# Patient Record
Sex: FEMALE | Race: WHITE | Employment: FULL TIME | URBAN - METROPOLITAN AREA
[De-identification: names, ages, dates, MRNs, and addresses within clinical notes are randomized per-mention and may not be internally consistent; named-entity substitution may affect disease eponyms.]

---

## 2017-01-04 ENCOUNTER — TELEPHONE (OUTPATIENT)
Dept: FAMILY MEDICINE CLINIC | Facility: CLINIC | Age: 57
End: 2017-01-04

## 2017-01-04 DIAGNOSIS — M54.12 CERVICAL RADICULOPATHY: Primary | ICD-10-CM

## 2017-01-04 DIAGNOSIS — Q76.49 CONGENITAL CERVICAL SPINE STENOSIS: ICD-10-CM

## 2017-01-04 NOTE — TELEPHONE ENCOUNTER
Pt requesting referral for Dr Ed Ortiz Neuro, for at least 6 more visit  Surgery follow ups. Please Advise.

## 2017-01-10 ENCOUNTER — TELEPHONE (OUTPATIENT)
Dept: FAMILY MEDICINE CLINIC | Facility: CLINIC | Age: 57
End: 2017-01-10

## 2017-01-10 RX ORDER — DULAGLUTIDE 1.5 MG/.5ML
INJECTION, SOLUTION SUBCUTANEOUS
Qty: 6 ML | Refills: 5 | Status: SHIPPED | OUTPATIENT
Start: 2017-01-10 | End: 2017-07-21

## 2017-01-12 RX ORDER — BLOOD SUGAR DIAGNOSTIC
STRIP MISCELLANEOUS
Qty: 100 EACH | Refills: 11 | Status: SHIPPED | OUTPATIENT
Start: 2017-01-12 | End: 2017-01-13

## 2017-01-13 NOTE — TELEPHONE ENCOUNTER
· Diabetic supplies refilled for one year per protocol.    Recent Visits       Provider Department Primary Dx    1 month ago Flynn Nick MD [facility], [facility], Edgar Ovalles Essential hypertension with goal blood pressure less than 130/80    6 mon

## 2017-01-13 NOTE — TELEPHONE ENCOUNTER
Pt calling checking status of refill states she usually picks up 200 at a time to make this more convenient please advise.

## 2017-01-17 RX ORDER — FLUTICASONE PROPIONATE 50 MCG
SPRAY, SUSPENSION (ML) NASAL
Qty: 1 INHALER | Refills: 2 | Status: SHIPPED | OUTPATIENT
Start: 2017-01-17 | End: 2017-05-17

## 2017-01-17 NOTE — TELEPHONE ENCOUNTER
Refill Protocol Appointment Criteria  · Appointment scheduled in the past 12 months or in the next 3 months  Recent Visits       Provider Department Primary Dx    1 month ago Nadine Rivera MD [facility], [facility], [place]ita Dow[place], Edgar Essential hypertension

## 2017-01-18 ENCOUNTER — TELEPHONE (OUTPATIENT)
Dept: FAMILY MEDICINE CLINIC | Facility: CLINIC | Age: 57
End: 2017-01-18

## 2017-01-18 RX ORDER — BLOOD SUGAR DIAGNOSTIC
STRIP MISCELLANEOUS
Qty: 200 STRIP | Refills: 4 | Status: SHIPPED | OUTPATIENT
Start: 2017-01-18 | End: 2017-08-29

## 2017-01-18 NOTE — TELEPHONE ENCOUNTER
Please ask the PharmD working with us to review the patient's medications and/or help pt make an appointment with her please.

## 2017-01-19 RX ORDER — FLUTICASONE PROPIONATE 50 MCG
SPRAY, SUSPENSION (ML) NASAL
Qty: 3 BOTTLE | Refills: 0 | Status: SHIPPED | OUTPATIENT
Start: 2017-01-19 | End: 2017-08-18

## 2017-01-19 NOTE — TELEPHONE ENCOUNTER
Juanpablo Freed and Fidel Valadez,     Thank you for the referral, I have called the patient and set up an appointment to see her this Tuesday. I will route my note to you after our visit.      Sincerely,   Celeste Grey, PharmD  Clinical Pharmacist

## 2017-01-19 NOTE — TELEPHONE ENCOUNTER
Refill Protocol Appointment Criteria  · Appointment scheduled in the past 12 months or in the next 3 months  Recent Visits       Provider Department Primary Dx    1 month ago Nayeli Porter MD [facility], [facility], [place]Ed[place] clinton Essential hypertension

## 2017-01-24 ENCOUNTER — PHARMACIST (OUTPATIENT)
Dept: FAMILY MEDICINE CLINIC | Facility: CLINIC | Age: 57
End: 2017-01-24

## 2017-01-24 DIAGNOSIS — I10 ESSENTIAL HYPERTENSION: Primary | ICD-10-CM

## 2017-01-24 DIAGNOSIS — K22.719 BARRETT'S ESOPHAGUS WITH DYSPLASIA: ICD-10-CM

## 2017-01-24 RX ORDER — LANSOPRAZOLE 15 MG/1
CAPSULE, DELAYED RELEASE ORAL
Qty: 270 CAPSULE | Refills: 0 | Status: SHIPPED | OUTPATIENT
Start: 2017-01-24 | End: 2018-01-30

## 2017-01-24 RX ORDER — LOSARTAN POTASSIUM 25 MG/1
25 TABLET ORAL DAILY
Qty: 30 TABLET | Refills: 1 | Status: SHIPPED | OUTPATIENT
Start: 2017-01-24 | End: 2017-01-24

## 2017-01-26 VITALS — HEART RATE: 62 BPM | SYSTOLIC BLOOD PRESSURE: 138 MMHG | DIASTOLIC BLOOD PRESSURE: 78 MMHG

## 2017-01-26 RX ORDER — PROPRANOLOL/HYDROCHLOROTHIAZID 40 MG-25MG
1 TABLET ORAL DAILY
COMMUNITY
End: 2017-11-27 | Stop reason: ALTCHOICE

## 2017-01-26 RX ORDER — CETIRIZINE HYDROCHLORIDE 10 MG/1
10 TABLET ORAL DAILY
COMMUNITY
End: 2017-02-07

## 2017-01-26 NOTE — PROGRESS NOTES
Rufus Mackey is a 61year old patient coming in to see the clinical pharmacist for an initial medication therapy management visit. HPI: Patient represents with a copy of her medication list, SMBG log, and BP log.  She states that since starting lisinop of dosing: Dulaglutide (Trulicity) is taken every Tuesday or Wednesday.   PRN medications: Betamethasone dipropionate cream (apply twice daily), chlordiazepoxide-clidinium (take 1 capsule four times daily every month), chlorhexidine gluconate (rinse mouth w • Herpes simplex    • Allergic rhinitis    • Spinal stenosis    • TIA (transient ischemic attack)    • Diabetes mellitus ([facility] [address]    • Ovarian cyst 1980     Laparoscopic cystectomy   • DUB (dysfunctional uterine bleeding) 2005     endometrial biopsy   • Hi Current Some Day Smoker  0.25 Packs/Day  For 10.00 Years     Types: Cigarettes    Smokeless tobacco: Never Used    Comment: 1 pack/wk per pt.     Alcohol Use: Yes  0.0 oz/week    0 Standard drinks or equivalent per week         Comment: Occasionally    Drug taking differently: TAKE 1 TABLET BY MOUTH EVERY OTHER DAY) Disp: 90 tablet Rfl: 0   PROPRANOLOL HCL 60 MG Oral Tab TAKE 1 TABLET(60 MG) BY MOUTH TWICE DAILY Disp: 180 tablet Rfl: 0   Imiquimod 5 % External Cream  Disp:  Rfl: 0   diazepam (VALIUM) 5 MG Ora her blood glucose every morning and occasionally around bedtime, which she states is close to 2 hours after dinner. HYPOGLYCEMIA: Patient denies any recent hypoglycemic events and states her BG has never reaching below 80 mg/dL.  In the past, when she h ORDERED COMPLETE METABOLIC PANEL FOR PATIENT AND SENT LOSARTAN 25 MG 30 DAYS SUPPLY 1 REFILL TO [facility] IN [place]. Counseled patient regarding possible side effects of losartan and instructed her to monitor for any side effects.  Counseled patient to -Rosuvastatin is another high-intensity statin that may have less of a risk to cause myalgias due to its hydrophilic nature, may consider switching to rosuvastatin 20 mg in the future and assessing side effects with that medication.  In 7/2016, AST normal

## 2017-01-27 RX ORDER — LOSARTAN POTASSIUM 25 MG/1
TABLET ORAL
Qty: 90 TABLET | Refills: 1 | Status: SHIPPED | OUTPATIENT
Start: 2017-01-27 | End: 2019-05-30

## 2017-01-27 NOTE — TELEPHONE ENCOUNTER
Hypertensive Medications  Protocol Criteria:  · Appointment scheduled in the past 6 months or in the next 3 months  · BMP or CMP in the past 12 months  · Creatinine result < 2  Recent Visits       Provider Department Primary Dx    2 months ago Mary Carmen Esposito

## 2017-01-30 ENCOUNTER — PATIENT MESSAGE (OUTPATIENT)
Dept: PULMONOLOGY | Facility: CLINIC | Age: 57
End: 2017-01-30

## 2017-01-30 DIAGNOSIS — G47.33 OSA (OBSTRUCTIVE SLEEP APNEA): Primary | ICD-10-CM

## 2017-01-30 NOTE — TELEPHONE ENCOUNTER
From: Esperanza Quinn  To: Maria Eugenia Duarte MD  Sent: 1/30/2017 4:49 PM CST  Subject: Other    Dear Dr. Jacquelyn Poon,    Per your suggestion as of approx. 1 year ago I am now using a DreamStation CPAP Machine from [employer].   I have to have neck surgery a

## 2017-01-31 ENCOUNTER — TELEPHONE (OUTPATIENT)
Dept: INTERNAL MEDICINE CLINIC | Facility: CLINIC | Age: 57
End: 2017-01-31

## 2017-01-31 DIAGNOSIS — E11.8 TYPE 2 DIABETES MELLITUS WITH COMPLICATION, WITHOUT LONG-TERM CURRENT USE OF INSULIN (HCC): Primary | ICD-10-CM

## 2017-01-31 NOTE — TELEPHONE ENCOUNTER
Patient called to inquire which labs she will need to have obtained prior to next PharmD's follow-up appointment.  Counseled patient to have her complete metabolic panel and also hemoglobin A1c drawn since it has been 6 months since her last value (which wa

## 2017-02-01 ENCOUNTER — TELEPHONE (OUTPATIENT)
Dept: FAMILY MEDICINE CLINIC | Facility: CLINIC | Age: 57
End: 2017-02-01

## 2017-02-01 ENCOUNTER — PATIENT MESSAGE (OUTPATIENT)
Dept: FAMILY MEDICINE CLINIC | Facility: CLINIC | Age: 57
End: 2017-02-01

## 2017-02-02 RX ORDER — GLIMEPIRIDE 4 MG/1
TABLET ORAL
Qty: 90 TABLET | Refills: 0 | Status: SHIPPED | OUTPATIENT
Start: 2017-02-02 | End: 2017-05-03

## 2017-02-02 NOTE — TELEPHONE ENCOUNTER
From: Kendra Gonzalez  To: Christine Perez MD  Sent: 2/1/2017 9:16 AM CST  Subject: Other    Hi Dr. Jerel Luevano,    I scheduled cervical stenosis surgery with Dr. Bry Lisa for 2/20/2017. He tells me I need to see you to get surgery clearance.  When I called you

## 2017-02-02 NOTE — TELEPHONE ENCOUNTER
*-*-*This message has not been handled. *-*-*     Dr. Harrison Mom office has already called me and I have an appointment scheduled for next week.  Please cancel this request.     Thanks!  ----- Message -----

## 2017-02-04 ENCOUNTER — LAB ENCOUNTER (OUTPATIENT)
Dept: LAB | Age: 57
End: 2017-02-04
Attending: NEUROLOGICAL SURGERY
Payer: COMMERCIAL

## 2017-02-04 ENCOUNTER — HOSPITAL ENCOUNTER (OUTPATIENT)
Dept: GENERAL RADIOLOGY | Age: 57
Discharge: HOME | End: 2017-02-04
Attending: NEUROLOGICAL SURGERY
Payer: COMMERCIAL

## 2017-02-04 DIAGNOSIS — M43.12 SPONDYLOLISTHESIS OF CERVICAL REGION: Primary | ICD-10-CM

## 2017-02-04 DIAGNOSIS — M48.02 SPINAL STENOSIS IN CERVICAL REGION: ICD-10-CM

## 2017-02-04 DIAGNOSIS — E78.5 HYPERLIPEMIA: ICD-10-CM

## 2017-02-04 DIAGNOSIS — M47.22 CERVICAL SPONDYLOSIS WITH RADICULOPATHY: ICD-10-CM

## 2017-02-04 DIAGNOSIS — E11.9 DIABETES MELLITUS (HCC): ICD-10-CM

## 2017-02-04 DIAGNOSIS — Z01.818 PRE-OP EXAM: ICD-10-CM

## 2017-02-04 DIAGNOSIS — E11.8 TYPE 2 DIABETES MELLITUS WITH COMPLICATION, WITHOUT LONG-TERM CURRENT USE OF INSULIN (HCC): ICD-10-CM

## 2017-02-04 DIAGNOSIS — I10 ESSENTIAL HYPERTENSION, MALIGNANT: ICD-10-CM

## 2017-02-04 DIAGNOSIS — I10 ESSENTIAL HYPERTENSION: ICD-10-CM

## 2017-02-04 LAB
ALBUMIN SERPL BCP-MCNC: 3.9 G/DL (ref 3.5–4.8)
ALBUMIN/GLOB SERPL: 1.6 {RATIO} (ref 1–2)
ALP SERPL-CCNC: 64 U/L (ref 32–100)
ALT SERPL-CCNC: 51 U/L (ref 14–54)
ANION GAP SERPL CALC-SCNC: 10 MMOL/L (ref 0–18)
APTT PPP: 26.3 SECONDS (ref 23.2–35.3)
AST SERPL-CCNC: 31 U/L (ref 15–41)
BASOPHILS # BLD: 0.1 K/UL (ref 0–0.2)
BASOPHILS NFR BLD: 1 %
BILIRUB SERPL-MCNC: 0.6 MG/DL (ref 0.3–1.2)
BUN SERPL-MCNC: 7 MG/DL (ref 8–20)
BUN/CREAT SERPL: 10.6 (ref 10–20)
CALCIUM SERPL-MCNC: 9.2 MG/DL (ref 8.5–10.5)
CHLORIDE SERPL-SCNC: 104 MMOL/L (ref 95–110)
CO2 SERPL-SCNC: 23 MMOL/L (ref 22–32)
CREAT SERPL-MCNC: 0.66 MG/DL (ref 0.5–1.5)
EOSINOPHIL # BLD: 0.4 K/UL (ref 0–0.7)
EOSINOPHIL NFR BLD: 6 %
ERYTHROCYTE [DISTWIDTH] IN BLOOD BY AUTOMATED COUNT: 13.8 % (ref 11–15)
GLOBULIN PLAS-MCNC: 2.5 G/DL (ref 2.5–3.7)
GLUCOSE SERPL-MCNC: 141 MG/DL (ref 70–99)
HCT VFR BLD AUTO: 38.7 % (ref 35–48)
HGB BLD-MCNC: 12.8 G/DL (ref 12–16)
LYMPHOCYTES # BLD: 2.1 K/UL (ref 1–4)
LYMPHOCYTES NFR BLD: 30 %
MCH RBC QN AUTO: 28.4 PG (ref 27–32)
MCHC RBC AUTO-ENTMCNC: 33.1 G/DL (ref 32–37)
MCV RBC AUTO: 85.8 FL (ref 80–100)
MONOCYTES # BLD: 0.4 K/UL (ref 0–1)
MONOCYTES NFR BLD: 6 %
NEUTROPHILS # BLD AUTO: 3.8 K/UL (ref 1.8–7.7)
NEUTROPHILS NFR BLD: 57 %
OSMOLALITY UR CALC.SUM OF ELEC: 284 MOSM/KG (ref 275–295)
PLATELET # BLD AUTO: 215 K/UL (ref 140–400)
PMV BLD AUTO: 9.2 FL (ref 7.4–10.3)
POTASSIUM SERPL-SCNC: 4 MMOL/L (ref 3.3–5.1)
PROT SERPL-MCNC: 6.4 G/DL (ref 5.9–8.4)
RBC # BLD AUTO: 4.51 M/UL (ref 3.7–5.4)
SODIUM SERPL-SCNC: 137 MMOL/L (ref 136–144)
WBC # BLD AUTO: 6.8 K/UL (ref 4–11)

## 2017-02-04 PROCEDURE — 87641 MR-STAPH DNA AMP PROBE: CPT

## 2017-02-04 PROCEDURE — 71020 XR CHEST PA + LAT CHEST (CPT=71020): CPT

## 2017-02-04 PROCEDURE — 93005 ELECTROCARDIOGRAM TRACING: CPT

## 2017-02-04 PROCEDURE — 85730 THROMBOPLASTIN TIME PARTIAL: CPT

## 2017-02-04 PROCEDURE — 85025 COMPLETE CBC W/AUTO DIFF WBC: CPT

## 2017-02-04 PROCEDURE — 36415 COLL VENOUS BLD VENIPUNCTURE: CPT

## 2017-02-04 PROCEDURE — 83036 HEMOGLOBIN GLYCOSYLATED A1C: CPT

## 2017-02-04 PROCEDURE — 82306 VITAMIN D 25 HYDROXY: CPT

## 2017-02-04 PROCEDURE — 80053 COMPREHEN METABOLIC PANEL: CPT

## 2017-02-04 PROCEDURE — 93010 ELECTROCARDIOGRAM REPORT: CPT | Performed by: NEUROLOGICAL SURGERY

## 2017-02-05 LAB
HBA1C MFR BLD: 7.3 % (ref 4–6)
MRSA DNA SPEC QL NAA+PROBE: NEGATIVE

## 2017-02-06 ENCOUNTER — PATIENT MESSAGE (OUTPATIENT)
Dept: FAMILY MEDICINE CLINIC | Facility: CLINIC | Age: 57
End: 2017-02-06

## 2017-02-06 LAB — 25(OH)D3 SERPL-MCNC: 15.2 NG/ML

## 2017-02-06 NOTE — PROGRESS NOTES
Quick Note:    Tests are all stable.  Will see patient tomorrow to review. - Dr. Mitchell Herman  ______

## 2017-02-07 ENCOUNTER — OFFICE VISIT (OUTPATIENT)
Dept: FAMILY MEDICINE CLINIC | Facility: CLINIC | Age: 57
End: 2017-02-07

## 2017-02-07 VITALS
WEIGHT: 205.81 LBS | DIASTOLIC BLOOD PRESSURE: 68 MMHG | SYSTOLIC BLOOD PRESSURE: 114 MMHG | BODY MASS INDEX: 35 KG/M2 | HEART RATE: 57 BPM

## 2017-02-07 DIAGNOSIS — E11.00 UNCONTROLLED TYPE 2 DIABETES MELLITUS WITH HYPEROSMOLARITY WITHOUT COMA, WITHOUT LONG-TERM CURRENT USE OF INSULIN (HCC): Primary | ICD-10-CM

## 2017-02-07 DIAGNOSIS — M50.90 CERVICAL DISC DISEASE: ICD-10-CM

## 2017-02-07 DIAGNOSIS — Z01.818 PREOPERATIVE CLEARANCE: ICD-10-CM

## 2017-02-07 DIAGNOSIS — I10 ESSENTIAL HYPERTENSION WITH GOAL BLOOD PRESSURE LESS THAN 130/80: ICD-10-CM

## 2017-02-07 DIAGNOSIS — Z72.0 TOBACCO USE: ICD-10-CM

## 2017-02-07 PROCEDURE — 99244 OFF/OP CNSLTJ NEW/EST MOD 40: CPT | Performed by: FAMILY MEDICINE

## 2017-02-07 PROCEDURE — 99212 OFFICE O/P EST SF 10 MIN: CPT | Performed by: FAMILY MEDICINE

## 2017-02-07 RX ORDER — FLUOCINONIDE 0.5 MG/G
OINTMENT TOPICAL AS NEEDED
Refills: 0 | COMMUNITY
Start: 2017-01-11 | End: 2017-11-21

## 2017-02-07 RX ORDER — AMLODIPINE BESYLATE 5 MG/1
5 TABLET ORAL DAILY
Refills: 2 | COMMUNITY
Start: 2016-12-15 | End: 2017-02-07

## 2017-02-07 NOTE — PROGRESS NOTES
HPI:   Shaheed Almodovar is a 61year old female who presents for a complete physical exam for preoperative clearance per Dr. Neva Casey' request for C6 corpectomy, C5-7 anterior fusion.  Fax [phone]    Pt quit smoking 7 days ago and surgery date is for 20th - INTRANASALLY AS NEEDED AS DIRECTED Disp: 3 Bottle Rfl: 0   ACCU-CHEK JUAN PLUS In Vitro Strip CHECK BLOOD SUGAR TWICE DAILY Disp: 200 strip Rfl: 4   TRULICITY 1.5 HN/7.4EC Subcutaneous Solution Pen-injector INJECT 1.5 MG INTO THE SKIN EVERY 7 DAYS Disp: 6 Diagnosis Date   • Unspecified essential hypertension 2003   • Other and unspecified hyperlipidemia    • Type II or unspecified type diabetes mellitus without mention of complication, not stated as uncontrolled 2005   • Barretts esophagus    • Herpes sim Cataracts Neg       Social History:     Smoking Status: Former Smoker                   Packs/Day: 0.25  Years: 10        Types: Cigarettes      Quit date: 01/31/2017    Smokeless Status: Never Used                        Comment: 1 pack/wk per pt.     Alco time. Stable overall with recent glucose readings controlled. Managed per Dr. Timmy Langley. Will not take glimepiride evening before procedure and will give herself 12 units of Lantus morning of surgery vs nl 25 units.      2. Essential hypertension with goal bloo

## 2017-02-10 NOTE — TELEPHONE ENCOUNTER
From: Shaheed Almodovar  To: Gautam Brown MD  Sent: 2/6/2017 4:39 PM CST  Subject: Test Results Question    On Saturday 2/4/2017 I also took an EKG and a chest X-rays as well as the blood work up for my pre-op testing.    Is it possible to view those as w

## 2017-02-13 ENCOUNTER — TELEPHONE (OUTPATIENT)
Dept: FAMILY MEDICINE CLINIC | Facility: CLINIC | Age: 57
End: 2017-02-13

## 2017-02-13 RX ORDER — AZITHROMYCIN 250 MG/1
TABLET, FILM COATED ORAL
Qty: 6 TABLET | Refills: 0 | Status: SHIPPED | OUTPATIENT
Start: 2017-02-13 | End: 2017-07-10

## 2017-02-13 NOTE — TELEPHONE ENCOUNTER
Pt stts she would like Dr to send Mony Simpson at pharmacy for a cold that turned into a sinus infection.  Please advise

## 2017-02-13 NOTE — TELEPHONE ENCOUNTER
Reason for Call/Chief Complaint: Patient was seen 2/7/17 and at the time had a cold.  Was told to call back if no better - patient scheduled for surgery 2/20/17 - states now feels like sinus infection  Onset: 2/7/17  Nursing Assessment/Associated Symptoms:

## 2017-02-13 NOTE — TELEPHONE ENCOUNTER
Patient notified Rx sent in. Advised to call back or go straight to ER if s/sx worsen, questions or concerns. Patient verbalized understanding and agrees with plan.

## 2017-02-14 ENCOUNTER — PHARMACIST (OUTPATIENT)
Dept: FAMILY MEDICINE CLINIC | Facility: CLINIC | Age: 57
End: 2017-02-14

## 2017-02-14 VITALS — HEART RATE: 70 BPM | DIASTOLIC BLOOD PRESSURE: 80 MMHG | SYSTOLIC BLOOD PRESSURE: 128 MMHG

## 2017-02-14 DIAGNOSIS — E55.9 VITAMIN D DEFICIENCY: Primary | ICD-10-CM

## 2017-02-14 NOTE — PROGRESS NOTES
Viktor Pal is a 61year old patient coming in to see the clinical pharmacist for an initial medication therapy management visit. HPI: Patient represents with a copy of her medication list, SMBG log, and BP log.   Patient's lisinopril was discontinued diabetes mellitus without mention of complication, not stated as uncontrolled 2005   • Barretts esophagus    • Herpes simplex    • Allergic rhinitis    • Spinal stenosis    • TIA (transient ischemic attack)    • Diabetes mellitus ([facility] [address]    • Ovarian cyst 19 necrosis   • Macular degeneration Neg    • Cataracts Neg        SH:     Social History   Marital Status:   Spouse Name: N/A    Years of Education: N/A  Number of Children: N/A     Occupational History  None on file     Social History Main Topics   S Take 2 capsules by mouth every morning and 1 capsule every evening Disp: 270 capsule Rfl: 0   FLUTICASONE PROPIONATE 50 MCG/ACT Nasal Suspension USE 2 SPRAYS INTRANASALLY AS NEEDED AS DIRECTED Disp: 3 Bottle Rfl: 0   ACCU-CHEK JUAN PLUS In Vitro Strip CECILIO mouth 4 (four) times daily as needed. Disp: 120 tablet Rfl: 2   aspirin 81 MG Oral Tab Take 1 tablet by mouth daily.  Disp:  Rfl:        DIET:   Breakfast (7:30am) - Egg fried in avocado oil with a piece of cheese on toast, coffee with equal and coffee    S Patient's in-clinic BP today was 128/80 mmHg, HR 70.  - Counseled patient on BP goal of <140/90.     2. Diabetes Mellitus, Goal A1c<7%, FPG  mg/dL, 2-H PPG <180 mg/dL  - Uncontrolled, last A1c on 02/04/17 was 7.3%.  PPG values are generally within [employer] Patient will start cholecalciferol after her surgery. 5. Dyslipidemia  - ASCVD score has decreased from 12.5% to 4.5%, since patient quit smoking. Moderate intensity statin is recommended.  Patient is currently taking atorvastatin 40 mg every other day,

## 2017-02-20 ENCOUNTER — ANESTHESIA EVENT (OUTPATIENT)
Dept: SURGERY | Facility: HOSPITAL | Age: 57
DRG: 473 | End: 2017-02-20
Payer: COMMERCIAL

## 2017-02-20 ENCOUNTER — HOSPITAL ENCOUNTER (INPATIENT)
Facility: HOSPITAL | Age: 57
LOS: 1 days | Discharge: HOME | DRG: 473 | End: 2017-02-21
Attending: NEUROLOGICAL SURGERY | Admitting: NEUROLOGICAL SURGERY
Payer: COMMERCIAL

## 2017-02-20 ENCOUNTER — APPOINTMENT (OUTPATIENT)
Dept: GENERAL RADIOLOGY | Facility: HOSPITAL | Age: 57
DRG: 473 | End: 2017-02-20
Attending: NEUROLOGICAL SURGERY
Payer: COMMERCIAL

## 2017-02-20 ENCOUNTER — SURGERY (OUTPATIENT)
Age: 57
End: 2017-02-20

## 2017-02-20 ENCOUNTER — ANESTHESIA (OUTPATIENT)
Dept: SURGERY | Facility: HOSPITAL | Age: 57
DRG: 473 | End: 2017-02-20
Payer: COMMERCIAL

## 2017-02-20 ENCOUNTER — TELEPHONE (OUTPATIENT)
Dept: FAMILY MEDICINE CLINIC | Facility: CLINIC | Age: 57
End: 2017-02-20

## 2017-02-20 DIAGNOSIS — M43.12 SPONDYLOLISTHESIS OF CERVICAL REGION: ICD-10-CM

## 2017-02-20 DIAGNOSIS — M47.12 CERVICAL SPONDYLOSIS WITH MYELOPATHY: Primary | ICD-10-CM

## 2017-02-20 DIAGNOSIS — M48.02 CERVICAL SPINAL STENOSIS: ICD-10-CM

## 2017-02-20 PROBLEM — J44.9 COPD (CHRONIC OBSTRUCTIVE PULMONARY DISEASE) (HCC): Chronic | Status: ACTIVE | Noted: 2017-02-20

## 2017-02-20 PROBLEM — E78.5 DYSLIPIDEMIA: Chronic | Status: ACTIVE | Noted: 2017-02-20

## 2017-02-20 PROBLEM — M47.812 CERVICAL SPONDYLARTHRITIS: Status: ACTIVE | Noted: 2017-02-20

## 2017-02-20 LAB
GLUCOSE BLDC GLUCOMTR-MCNC: 136 MG/DL (ref 70–99)
GLUCOSE BLDC GLUCOMTR-MCNC: 146 MG/DL (ref 70–99)
GLUCOSE BLDC GLUCOMTR-MCNC: 160 MG/DL (ref 70–99)
GLUCOSE BLDC GLUCOMTR-MCNC: 204 MG/DL (ref 70–99)
GLUCOSE BLDC GLUCOMTR-MCNC: 205 MG/DL (ref 70–99)

## 2017-02-20 PROCEDURE — 99232 SBSQ HOSP IP/OBS MODERATE 35: CPT | Performed by: HOSPITALIST

## 2017-02-20 PROCEDURE — 76001 XR OR CERVICAL SPINE (1 VIEW) WITH >60 MIN C-ARM  (CPT=72020/76001): CPT

## 2017-02-20 PROCEDURE — 72020 X-RAY EXAM OF SPINE 1 VIEW: CPT

## 2017-02-20 PROCEDURE — 0RB30ZZ EXCISION OF CERVICAL VERTEBRAL DISC, OPEN APPROACH: ICD-10-PCS | Performed by: NEUROLOGICAL SURGERY

## 2017-02-20 PROCEDURE — 0RG20A0 FUSION OF 2 OR MORE CERVICAL VERTEBRAL JOINTS WITH INTERBODY FUSION DEVICE, ANTERIOR APPROACH, ANTERIOR COLUMN, OPEN APPROACH: ICD-10-PCS | Performed by: NEUROLOGICAL SURGERY

## 2017-02-20 DEVICE — ARCHON SCRW 4.0X17 SLF-TP VAR: Type: IMPLANTABLE DEVICE | Site: SPINE CERVICAL | Status: FUNCTIONAL

## 2017-02-20 DEVICE — X-CORE MINI 15-20 12: Type: IMPLANTABLE DEVICE | Site: SPINE CERVICAL | Status: FUNCTIONAL

## 2017-02-20 DEVICE — X-CORE MINI TI LCK SCRW ENDCAP: Type: IMPLANTABLE DEVICE | Site: SPINE CERVICAL | Status: FUNCTIONAL

## 2017-02-20 DEVICE — OSTEOCEL PRO BONE MATRIX MED: Type: IMPLANTABLE DEVICE | Site: SPINE CERVICAL | Status: FUNCTIONAL

## 2017-02-20 DEVICE — ENDCAP SPNL 17MM 14MM 7D TI: Type: IMPLANTABLE DEVICE | Site: SPINE CERVICAL | Status: FUNCTIONAL

## 2017-02-20 DEVICE — FLOSEAL SEALENT STERILE 10ML: Type: IMPLANTABLE DEVICE | Site: SPINE CERVICAL | Status: FUNCTIONAL

## 2017-02-20 DEVICE — IMPLANTABLE DEVICE: Type: IMPLANTABLE DEVICE | Site: SPINE CERVICAL | Status: FUNCTIONAL

## 2017-02-20 DEVICE — X-CORE MINI TI LCK SCRW 12: Type: IMPLANTABLE DEVICE | Site: SPINE CERVICAL | Status: FUNCTIONAL

## 2017-02-20 RX ORDER — ALBUTEROL SULFATE 90 UG/1
1 AEROSOL, METERED RESPIRATORY (INHALATION) EVERY 4 HOURS PRN
Status: DISCONTINUED | OUTPATIENT
Start: 2017-02-20 | End: 2017-02-21

## 2017-02-20 RX ORDER — ATORVASTATIN CALCIUM 40 MG/1
40 TABLET, FILM COATED ORAL
Status: DISCONTINUED | OUTPATIENT
Start: 2017-02-20 | End: 2017-02-21

## 2017-02-20 RX ORDER — LIDOCAINE HYDROCHLORIDE 10 MG/ML
INJECTION, SOLUTION EPIDURAL; INFILTRATION; INTRACAUDAL; PERINEURAL AS NEEDED
Status: DISCONTINUED | OUTPATIENT
Start: 2017-02-20 | End: 2017-02-20 | Stop reason: SURG

## 2017-02-20 RX ORDER — SENNOSIDES 8.6 MG
17.2 TABLET ORAL NIGHTLY
Status: DISCONTINUED | OUTPATIENT
Start: 2017-02-20 | End: 2017-02-21

## 2017-02-20 RX ORDER — DEXAMETHASONE SODIUM PHOSPHATE 4 MG/ML
VIAL (ML) INJECTION AS NEEDED
Status: DISCONTINUED | OUTPATIENT
Start: 2017-02-20 | End: 2017-02-20 | Stop reason: SURG

## 2017-02-20 RX ORDER — FLUTICASONE PROPIONATE 50 MCG
1 SPRAY, SUSPENSION (ML) NASAL DAILY
Status: DISCONTINUED | OUTPATIENT
Start: 2017-02-21 | End: 2017-02-21

## 2017-02-20 RX ORDER — SODIUM CHLORIDE, SODIUM LACTATE, POTASSIUM CHLORIDE, CALCIUM CHLORIDE 600; 310; 30; 20 MG/100ML; MG/100ML; MG/100ML; MG/100ML
INJECTION, SOLUTION INTRAVENOUS CONTINUOUS PRN
Status: DISCONTINUED | OUTPATIENT
Start: 2017-02-20 | End: 2017-02-20 | Stop reason: SURG

## 2017-02-20 RX ORDER — MORPHINE SULFATE 4 MG/ML
4 INJECTION, SOLUTION INTRAMUSCULAR; INTRAVENOUS EVERY 10 MIN PRN
Status: DISCONTINUED | OUTPATIENT
Start: 2017-02-20 | End: 2017-02-20 | Stop reason: HOSPADM

## 2017-02-20 RX ORDER — MORPHINE SULFATE 2 MG/ML
2 INJECTION, SOLUTION INTRAMUSCULAR; INTRAVENOUS EVERY 2 HOUR PRN
Status: DISCONTINUED | OUTPATIENT
Start: 2017-02-20 | End: 2017-02-21

## 2017-02-20 RX ORDER — HYDROCODONE BITARTRATE AND ACETAMINOPHEN 5; 325 MG/1; MG/1
1 TABLET ORAL AS NEEDED
Status: DISCONTINUED | OUTPATIENT
Start: 2017-02-20 | End: 2017-02-20 | Stop reason: HOSPADM

## 2017-02-20 RX ORDER — HALOPERIDOL 5 MG/ML
0.25 INJECTION INTRAMUSCULAR ONCE AS NEEDED
Status: DISCONTINUED | OUTPATIENT
Start: 2017-02-20 | End: 2017-02-20 | Stop reason: HOSPADM

## 2017-02-20 RX ORDER — HYDROMORPHONE HYDROCHLORIDE 1 MG/ML
0.4 INJECTION, SOLUTION INTRAMUSCULAR; INTRAVENOUS; SUBCUTANEOUS EVERY 5 MIN PRN
Status: DISCONTINUED | OUTPATIENT
Start: 2017-02-20 | End: 2017-02-20 | Stop reason: HOSPADM

## 2017-02-20 RX ORDER — MORPHINE SULFATE 2 MG/ML
0.5 INJECTION, SOLUTION INTRAMUSCULAR; INTRAVENOUS EVERY 2 HOUR PRN
Status: DISCONTINUED | OUTPATIENT
Start: 2017-02-20 | End: 2017-02-21

## 2017-02-20 RX ORDER — HYDROMORPHONE HYDROCHLORIDE 1 MG/ML
INJECTION, SOLUTION INTRAMUSCULAR; INTRAVENOUS; SUBCUTANEOUS AS NEEDED
Status: DISCONTINUED | OUTPATIENT
Start: 2017-02-20 | End: 2017-02-20 | Stop reason: SURG

## 2017-02-20 RX ORDER — AMLODIPINE BESYLATE 5 MG/1
5 TABLET ORAL DAILY
Status: DISCONTINUED | OUTPATIENT
Start: 2017-02-21 | End: 2017-02-21

## 2017-02-20 RX ORDER — NALOXONE HYDROCHLORIDE 0.4 MG/ML
80 INJECTION, SOLUTION INTRAMUSCULAR; INTRAVENOUS; SUBCUTANEOUS AS NEEDED
Status: DISCONTINUED | OUTPATIENT
Start: 2017-02-20 | End: 2017-02-20 | Stop reason: HOSPADM

## 2017-02-20 RX ORDER — GLYCOPYRROLATE 0.2 MG/ML
INJECTION INTRAMUSCULAR; INTRAVENOUS AS NEEDED
Status: DISCONTINUED | OUTPATIENT
Start: 2017-02-20 | End: 2017-02-20 | Stop reason: SURG

## 2017-02-20 RX ORDER — MORPHINE SULFATE 2 MG/ML
1 INJECTION, SOLUTION INTRAMUSCULAR; INTRAVENOUS EVERY 2 HOUR PRN
Status: DISCONTINUED | OUTPATIENT
Start: 2017-02-20 | End: 2017-02-21

## 2017-02-20 RX ORDER — MIDAZOLAM HYDROCHLORIDE 1 MG/ML
INJECTION INTRAMUSCULAR; INTRAVENOUS AS NEEDED
Status: DISCONTINUED | OUTPATIENT
Start: 2017-02-20 | End: 2017-02-20 | Stop reason: SURG

## 2017-02-20 RX ORDER — DEXTROSE MONOHYDRATE 25 G/50ML
50 INJECTION, SOLUTION INTRAVENOUS AS NEEDED
Status: DISCONTINUED | OUTPATIENT
Start: 2017-02-20 | End: 2017-02-21

## 2017-02-20 RX ORDER — DIPHENHYDRAMINE HCL 25 MG
25 CAPSULE ORAL EVERY 4 HOURS PRN
Status: DISCONTINUED | OUTPATIENT
Start: 2017-02-20 | End: 2017-02-21

## 2017-02-20 RX ORDER — DICYCLOMINE HCL 20 MG
20 TABLET ORAL EVERY 4 HOURS PRN
Status: DISCONTINUED | OUTPATIENT
Start: 2017-02-20 | End: 2017-02-21

## 2017-02-20 RX ORDER — HYDROCODONE BITARTRATE AND ACETAMINOPHEN 5; 325 MG/1; MG/1
2 TABLET ORAL AS NEEDED
Status: DISCONTINUED | OUTPATIENT
Start: 2017-02-20 | End: 2017-02-20 | Stop reason: HOSPADM

## 2017-02-20 RX ORDER — MORPHINE SULFATE 2 MG/ML
2 INJECTION, SOLUTION INTRAMUSCULAR; INTRAVENOUS EVERY 10 MIN PRN
Status: DISCONTINUED | OUTPATIENT
Start: 2017-02-20 | End: 2017-02-20 | Stop reason: HOSPADM

## 2017-02-20 RX ORDER — BUPIVACAINE HYDROCHLORIDE AND EPINEPHRINE 2.5; 5 MG/ML; UG/ML
INJECTION, SOLUTION INFILTRATION; PERINEURAL AS NEEDED
Status: DISCONTINUED | OUTPATIENT
Start: 2017-02-20 | End: 2017-02-20 | Stop reason: HOSPADM

## 2017-02-20 RX ORDER — SODIUM CHLORIDE 9 MG/ML
INJECTION, SOLUTION INTRAVENOUS CONTINUOUS PRN
Status: DISCONTINUED | OUTPATIENT
Start: 2017-02-20 | End: 2017-02-20 | Stop reason: SURG

## 2017-02-20 RX ORDER — METOCLOPRAMIDE 10 MG/1
10 TABLET ORAL ONCE
Status: COMPLETED | OUTPATIENT
Start: 2017-02-20 | End: 2017-02-20

## 2017-02-20 RX ORDER — SODIUM CHLORIDE, SODIUM LACTATE, POTASSIUM CHLORIDE, CALCIUM CHLORIDE 600; 310; 30; 20 MG/100ML; MG/100ML; MG/100ML; MG/100ML
INJECTION, SOLUTION INTRAVENOUS CONTINUOUS
Status: DISCONTINUED | OUTPATIENT
Start: 2017-02-20 | End: 2017-02-21

## 2017-02-20 RX ORDER — SUCCINYLCHOLINE CHLORIDE 20 MG/ML
INJECTION INTRAMUSCULAR; INTRAVENOUS AS NEEDED
Status: DISCONTINUED | OUTPATIENT
Start: 2017-02-20 | End: 2017-02-20 | Stop reason: SURG

## 2017-02-20 RX ORDER — ACETAMINOPHEN 325 MG/1
650 TABLET ORAL ONCE
Status: COMPLETED | OUTPATIENT
Start: 2017-02-20 | End: 2017-02-20

## 2017-02-20 RX ORDER — HYDROCODONE BITARTRATE AND ACETAMINOPHEN 10; 325 MG/1; MG/1
1 TABLET ORAL EVERY 4 HOURS PRN
Status: DISCONTINUED | OUTPATIENT
Start: 2017-02-20 | End: 2017-02-21

## 2017-02-20 RX ORDER — GLIMEPIRIDE 4 MG/1
4 TABLET ORAL
Status: DISCONTINUED | OUTPATIENT
Start: 2017-02-21 | End: 2017-02-21

## 2017-02-20 RX ORDER — ONDANSETRON 2 MG/ML
4 INJECTION INTRAMUSCULAR; INTRAVENOUS EVERY 4 HOURS PRN
Status: DISCONTINUED | OUTPATIENT
Start: 2017-02-20 | End: 2017-02-21

## 2017-02-20 RX ORDER — ROCURONIUM BROMIDE 10 MG/ML
INJECTION, SOLUTION INTRAVENOUS AS NEEDED
Status: DISCONTINUED | OUTPATIENT
Start: 2017-02-20 | End: 2017-02-20 | Stop reason: SURG

## 2017-02-20 RX ORDER — ONDANSETRON 2 MG/ML
INJECTION INTRAMUSCULAR; INTRAVENOUS AS NEEDED
Status: DISCONTINUED | OUTPATIENT
Start: 2017-02-20 | End: 2017-02-20 | Stop reason: SURG

## 2017-02-20 RX ORDER — FAMOTIDINE 20 MG/1
20 TABLET ORAL ONCE
Status: DISCONTINUED | OUTPATIENT
Start: 2017-02-20 | End: 2017-02-20 | Stop reason: HOSPADM

## 2017-02-20 RX ORDER — PANTOPRAZOLE SODIUM 20 MG/1
20 TABLET, DELAYED RELEASE ORAL
Status: DISCONTINUED | OUTPATIENT
Start: 2017-02-21 | End: 2017-02-21

## 2017-02-20 RX ORDER — HYDROCODONE BITARTRATE AND ACETAMINOPHEN 10; 325 MG/1; MG/1
2 TABLET ORAL EVERY 4 HOURS PRN
Status: DISCONTINUED | OUTPATIENT
Start: 2017-02-20 | End: 2017-02-21

## 2017-02-20 RX ORDER — ACETAMINOPHEN 325 MG/1
650 TABLET ORAL EVERY 4 HOURS PRN
Status: DISCONTINUED | OUTPATIENT
Start: 2017-02-20 | End: 2017-02-21

## 2017-02-20 RX ORDER — CETIRIZINE HYDROCHLORIDE 10 MG/1
10 TABLET ORAL DAILY
COMMUNITY

## 2017-02-20 RX ORDER — HYDROMORPHONE HYDROCHLORIDE 1 MG/ML
0.6 INJECTION, SOLUTION INTRAMUSCULAR; INTRAVENOUS; SUBCUTANEOUS EVERY 5 MIN PRN
Status: DISCONTINUED | OUTPATIENT
Start: 2017-02-20 | End: 2017-02-20 | Stop reason: HOSPADM

## 2017-02-20 RX ORDER — MORPHINE SULFATE 10 MG/ML
6 INJECTION, SOLUTION INTRAMUSCULAR; INTRAVENOUS EVERY 10 MIN PRN
Status: DISCONTINUED | OUTPATIENT
Start: 2017-02-20 | End: 2017-02-20 | Stop reason: HOSPADM

## 2017-02-20 RX ORDER — LIDOCAINE HYDROCHLORIDE 40 MG/ML
SOLUTION TOPICAL AS NEEDED
Status: DISCONTINUED | OUTPATIENT
Start: 2017-02-20 | End: 2017-02-20 | Stop reason: SURG

## 2017-02-20 RX ORDER — LOSARTAN POTASSIUM 25 MG/1
25 TABLET ORAL DAILY
Status: DISCONTINUED | OUTPATIENT
Start: 2017-02-21 | End: 2017-02-21

## 2017-02-20 RX ORDER — HYDROMORPHONE HYDROCHLORIDE 1 MG/ML
0.2 INJECTION, SOLUTION INTRAMUSCULAR; INTRAVENOUS; SUBCUTANEOUS EVERY 5 MIN PRN
Status: DISCONTINUED | OUTPATIENT
Start: 2017-02-20 | End: 2017-02-20 | Stop reason: HOSPADM

## 2017-02-20 RX ORDER — DIPHENHYDRAMINE HYDROCHLORIDE 50 MG/ML
25 INJECTION INTRAMUSCULAR; INTRAVENOUS EVERY 4 HOURS PRN
Status: DISCONTINUED | OUTPATIENT
Start: 2017-02-20 | End: 2017-02-21

## 2017-02-20 RX ORDER — CETIRIZINE HYDROCHLORIDE 10 MG/1
10 TABLET ORAL DAILY
Status: DISCONTINUED | OUTPATIENT
Start: 2017-02-20 | End: 2017-02-21

## 2017-02-20 RX ORDER — CYCLOBENZAPRINE HCL 10 MG
10 TABLET ORAL EVERY 8 HOURS PRN
Status: DISCONTINUED | OUTPATIENT
Start: 2017-02-20 | End: 2017-02-21

## 2017-02-20 RX ORDER — ONDANSETRON 2 MG/ML
4 INJECTION INTRAMUSCULAR; INTRAVENOUS ONCE AS NEEDED
Status: DISCONTINUED | OUTPATIENT
Start: 2017-02-20 | End: 2017-02-20 | Stop reason: HOSPADM

## 2017-02-20 RX ORDER — SODIUM CHLORIDE 9 MG/ML
INJECTION, SOLUTION INTRAVENOUS CONTINUOUS
Status: DISCONTINUED | OUTPATIENT
Start: 2017-02-20 | End: 2017-02-21

## 2017-02-20 RX ORDER — DIAZEPAM 5 MG/1
5 TABLET ORAL EVERY 8 HOURS PRN
Status: DISCONTINUED | OUTPATIENT
Start: 2017-02-20 | End: 2017-02-21

## 2017-02-20 RX ADMIN — HYDROMORPHONE HYDROCHLORIDE 0.2 MG: 1 INJECTION, SOLUTION INTRAMUSCULAR; INTRAVENOUS; SUBCUTANEOUS at 12:55:00

## 2017-02-20 RX ADMIN — SODIUM CHLORIDE: 9 INJECTION, SOLUTION INTRAVENOUS at 10:42:00

## 2017-02-20 RX ADMIN — SUCCINYLCHOLINE CHLORIDE 120 MG: 20 INJECTION INTRAMUSCULAR; INTRAVENOUS at 10:40:00

## 2017-02-20 RX ADMIN — MIDAZOLAM HYDROCHLORIDE 2 MG: 1 INJECTION INTRAMUSCULAR; INTRAVENOUS at 10:33:00

## 2017-02-20 RX ADMIN — LIDOCAINE HYDROCHLORIDE 4 ML: 40 SOLUTION TOPICAL at 10:40:00

## 2017-02-20 RX ADMIN — GLYCOPYRROLATE 0.2 MG: 0.2 INJECTION INTRAMUSCULAR; INTRAVENOUS at 10:39:00

## 2017-02-20 RX ADMIN — DEXAMETHASONE SODIUM PHOSPHATE 4 MG: 4 MG/ML VIAL (ML) INJECTION at 10:45:00

## 2017-02-20 RX ADMIN — HYDROMORPHONE HYDROCHLORIDE 0.3 MG: 1 INJECTION, SOLUTION INTRAMUSCULAR; INTRAVENOUS; SUBCUTANEOUS at 12:50:00

## 2017-02-20 RX ADMIN — SODIUM CHLORIDE, SODIUM LACTATE, POTASSIUM CHLORIDE, CALCIUM CHLORIDE: 600; 310; 30; 20 INJECTION, SOLUTION INTRAVENOUS at 12:40:00

## 2017-02-20 RX ADMIN — ROCURONIUM BROMIDE 10 MG: 10 INJECTION, SOLUTION INTRAVENOUS at 10:39:00

## 2017-02-20 RX ADMIN — SODIUM CHLORIDE, SODIUM LACTATE, POTASSIUM CHLORIDE, CALCIUM CHLORIDE: 600; 310; 30; 20 INJECTION, SOLUTION INTRAVENOUS at 10:33:00

## 2017-02-20 RX ADMIN — LIDOCAINE HYDROCHLORIDE 50 MG: 10 INJECTION, SOLUTION EPIDURAL; INFILTRATION; INTRACAUDAL; PERINEURAL at 10:39:00

## 2017-02-20 RX ADMIN — HYDROMORPHONE HYDROCHLORIDE 0.5 MG: 1 INJECTION, SOLUTION INTRAMUSCULAR; INTRAVENOUS; SUBCUTANEOUS at 11:15:00

## 2017-02-20 RX ADMIN — ONDANSETRON 4 MG: 2 INJECTION INTRAMUSCULAR; INTRAVENOUS at 10:45:00

## 2017-02-20 NOTE — OPERATIVE REPORT
[facility]    PATIENT'S NAME: Lillian Toro   ATTENDING PHYSICIAN: Tracy Portillo MD   OPERATING PHYSICIAN: Amanda Franklin MD   PATIENT ACCOUNT#:   [id number]    LOCATION:  [facility] [address] PACU 11 [facility] 10  MEDICAL RECORD #:   [id number]       DATE OF LY the problem. We discussed benefits and risks of the proposed procedure. This discussion was documented in the chart. A consent was signed. We proceeded to the operating room on 02/20/2017.     PROCEDURE:  The patient was brought to the operating room, p and C6-7, and subsequently we performed our standard discectomies using straight curettes, up-going curettes, and Kerrisons.   Once we had delineated the C6 vertebral body, we used the high-speed bur to drill troughs laterally down through the cortex into t treated the pinholes with bone wax. The anterior osteophytes were resected with a large Leksell and the anterior vertebral column was prepared for plate placement.   Once that was adequately done, we chose our proper sized plate and drilled our holes in C5

## 2017-02-20 NOTE — PLAN OF CARE
NEUROLOGICAL SURGERY & SPINE SURGERY    2/20/2017  9:50 AM    PRE-OPERATIVE CONSULTATION    Yazmin Johnson was again informed of the nature of the problem, planned treatment, indications and alternatives.   We again reviewed the expected benefits

## 2017-02-20 NOTE — PROGRESS NOTES
[facility]    Progress Note    Nathaniel Ma Patient Status:  Inpatient    10/15/1960 MRN [id number]   Location [facility] 4W/SW/SE Attending Tania Carballo MD   Hosp Day # 0 PCP Chares Kehr, MD     Subjective:     Jennifer Joaquin II diabetes mellitus ([facility] [address]  CONT HOME MEDS, MONITOR ACCU CHECKS. Essential hypertension  CONT HOME MEDS, MONITOR. Dyslipidemia  CONT HOME MEDS.          Results:     Lab Results  Component Value Date   WBC 6.8 02/04/2017   HGB 12.8 02/04/2017

## 2017-02-20 NOTE — ANESTHESIA PREPROCEDURE EVALUATION
Anesthesia PreOp Note    HPI:     Shaheed Almodovar is a 61year old female who presents for preoperative consultation requested by: Obey Allen MD    Date of Surgery: 2/20/2017    Procedure(s):  ANTERIOR CERVICAL FUSION BG & INST 1 LEVEL  Indication: cervic Back pain         Date Noted: 11/13/2014      Sciatica         Date Noted: 11/13/2014      Herpes         Date Noted: 11/13/2014      Muscle pain         Date Noted: 11/13/2014      Dermatitis         Date Noted: 10/07/2014      Essential hypertension 7/28/2015   • Age-related nuclear cataract of both eyes 1/27/2015   • MGD (meibomian gland dysfunction) 7/28/2015   • Allergic rhinitis      pereniaal and seasonal   • Sleep apnea    • High blood pressure    • High cholesterol    • COPD (chronic obstructiv Past Week at Unknown time   TRULICITY 1.5 RQ/7.3ON Subcutaneous Solution Pen-injector INJECT 1.5 MG INTO THE SKIN EVERY 7 DAYS Disp: 6 mL Rfl: 5 Past Week at Unknown time   ATORVASTATIN CALCIUM 40 MG Oral Tab TAKE 1 TABLET BY MOUTH ONCE DAILY (Patient christinafabiola than a month at Unknown time   TEMAZEPAM 30 MG Oral Cap TAKE ONE CAPSULE BY MOUTH NIGHTLY AS NEEDED FOR SLEEP Disp: 90 capsule Rfl: 1 More than a month at Unknown time   BETAMETHASONE DIPROPIONATE 0.05 % External Cream APPLY TO AFFECTED SKIN TWICE DAILY Shona Maynard Age of Onset   • Colon Cancer Mother    • Heart Disease Mother      coronary artery disease   • Hypertension Mother    • Heart Disease Father      coronary artery disease   • Diabetes Father    • Glaucoma Sister    • Other[other] Pamila Meadow Sister      Vascul 02/08/17  1837 02/20/17  0840   BP:  127/71   Pulse:  66   Temp:  98.4 °F (36.9 °C)   TempSrc:  Oral   Resp:  16   Height: 1.626 m (5' 4\")    Weight: 90.719 kg (200 lb) 90.719 kg (200 lb)   SpO2:  96%        Anesthesia ROS/Med Hx and Physical Exam     Mary Barone

## 2017-02-20 NOTE — INTERVAL H&P NOTE
Pre-op Diagnosis: cervical spondylolithesis    The above referenced H&P was reviewed by Lee Hudson MD on 2/20/2017, the patient was examined and no significant changes have occurred in the patient's condition since the H&P was performed.   I discussed wit

## 2017-02-20 NOTE — ANESTHESIA POSTPROCEDURE EVALUATION
Patient: Ryland Dixon    Procedure Summary     Date Anesthesia Start Anesthesia Stop Room / Location    02/20/17 1033  [facility] OR  / [facility] OR       Procedure Diagnosis Surgeon Responsible Provider    ANTERIOR CERVICAL FUSION BG & INST 1 LEVEL (N/A

## 2017-02-21 ENCOUNTER — TELEPHONE (OUTPATIENT)
Dept: ENDOCRINOLOGY CLINIC | Facility: CLINIC | Age: 57
End: 2017-02-21

## 2017-02-21 VITALS
SYSTOLIC BLOOD PRESSURE: 145 MMHG | TEMPERATURE: 98 F | WEIGHT: 200 LBS | RESPIRATION RATE: 20 BRPM | HEIGHT: 64 IN | OXYGEN SATURATION: 94 % | BODY MASS INDEX: 34.15 KG/M2 | HEART RATE: 64 BPM | DIASTOLIC BLOOD PRESSURE: 70 MMHG

## 2017-02-21 LAB
ANION GAP SERPL CALC-SCNC: 8 MMOL/L (ref 0–18)
BASOPHILS # BLD: 0 K/UL (ref 0–0.2)
BASOPHILS NFR BLD: 0 %
BUN SERPL-MCNC: 7 MG/DL (ref 8–20)
BUN/CREAT SERPL: 11.3 (ref 10–20)
CALCIUM SERPL-MCNC: 8.8 MG/DL (ref 8.5–10.5)
CHLORIDE SERPL-SCNC: 105 MMOL/L (ref 95–110)
CO2 SERPL-SCNC: 26 MMOL/L (ref 22–32)
CREAT SERPL-MCNC: 0.62 MG/DL (ref 0.5–1.5)
EOSINOPHIL # BLD: 0 K/UL (ref 0–0.7)
EOSINOPHIL NFR BLD: 0 %
ERYTHROCYTE [DISTWIDTH] IN BLOOD BY AUTOMATED COUNT: 13.4 % (ref 11–15)
GLUCOSE BLDC GLUCOMTR-MCNC: 142 MG/DL (ref 70–99)
GLUCOSE SERPL-MCNC: 175 MG/DL (ref 70–99)
HCT VFR BLD AUTO: 35.8 % (ref 35–48)
HGB BLD-MCNC: 11.7 G/DL (ref 12–16)
LYMPHOCYTES # BLD: 1.4 K/UL (ref 1–4)
LYMPHOCYTES NFR BLD: 12 %
MCH RBC QN AUTO: 28.1 PG (ref 27–32)
MCHC RBC AUTO-ENTMCNC: 32.7 G/DL (ref 32–37)
MCV RBC AUTO: 86 FL (ref 80–100)
MONOCYTES # BLD: 0.7 K/UL (ref 0–1)
MONOCYTES NFR BLD: 6 %
NEUTROPHILS # BLD AUTO: 9.3 K/UL (ref 1.8–7.7)
NEUTROPHILS NFR BLD: 82 %
OSMOLALITY UR CALC.SUM OF ELEC: 290 MOSM/KG (ref 275–295)
PLATELET # BLD AUTO: 287 K/UL (ref 140–400)
PMV BLD AUTO: 8.2 FL (ref 7.4–10.3)
POTASSIUM SERPL-SCNC: 3.9 MMOL/L (ref 3.3–5.1)
RBC # BLD AUTO: 4.16 M/UL (ref 3.7–5.4)
SODIUM SERPL-SCNC: 139 MMOL/L (ref 136–144)
WBC # BLD AUTO: 11.4 K/UL (ref 4–11)

## 2017-02-21 PROCEDURE — 99239 HOSP IP/OBS DSCHRG MGMT >30: CPT | Performed by: HOSPITALIST

## 2017-02-21 RX ORDER — HYDROCODONE BITARTRATE AND ACETAMINOPHEN 10; 325 MG/1; MG/1
1 TABLET ORAL EVERY 6 HOURS PRN
Qty: 60 TABLET | Refills: 0 | Status: SHIPPED
Start: 2017-02-21 | End: 2017-07-10

## 2017-02-21 RX ORDER — METHOCARBAMOL 750 MG/1
750 TABLET, FILM COATED ORAL 3 TIMES DAILY PRN
Qty: 90 TABLET | Refills: 0 | Status: SHIPPED
Start: 2017-02-21 | End: 2017-08-28 | Stop reason: ALTCHOICE

## 2017-02-21 NOTE — PLAN OF CARE
Impaired Functional Mobility    • Achieve highest/safest level of mobility/gait Progressing        PAIN - ADULT    • Verbalizes/displays adequate comfort level or patient's stated pain goal Progressing        Patient/Family Goals    • Patient/Family Long T

## 2017-02-21 NOTE — OCCUPATIONAL THERAPY NOTE
OCCUPATIONAL THERAPY EVALUATION - INPATIENT      Room Number: 408/408-A  Evaluation Date: 2/21/2017  Type of Evaluation: Initial  Presenting Problem:  (s/p ACDF)    Physician Order: IP Consult to Occupational Therapy  Reason for Therapy: ADL/IADL Dysfuncti C5-6, C6-7, physical therapy   • Diabetes mellitus type 2 with complications ([facility] 7/55/1659   • Diabetic retinopathy of left eye ([facility] 1/27/2015   • Choroidal nevus, right eye 1/27/2015   • Alternating exotropia 1/27/2015   • Atrial tachycardia, paroxysma limits    RANGE OF MOTION   Upper extremity ROM is within functional limits     STRENGTH ASSESSMENT  Upper extremity strength is within functional limits     ACTIVITIES OF DAILY LIVING ASSESSMENT  AM-PAC ‘6-Clicks’ Inpatient Daily Activity Short Form  How

## 2017-02-21 NOTE — DISCHARGE SUMMARY
[facility]    Discharge Summary    Sterling Body Patient Status:  Inpatient    10/15/1960 MRN [id number]   Location [facility] 4W/SW/SE Attending No att. providers found   [facility] Day # 1 PCP July Huerta MD     Date of Admiss Notes to Patient:  TO PREVENT CONSTIPATION        Take 1 tablet (17.2 mg total) by mouth nightly.     Stop taking on:  3/23/2017   Quantity:  120 tablet   Refills:  0         CHANGE how you take these medications       Instructions Prescription details PEN        USE AS DIRECTED DAILY    Quantity:  100 each   Refills:  3       betamethasone dipropionate 0.05 % Crea   Commonly known as:  DIPROSONE        APPLY TO AFFECTED SKIN TWICE DAILY    Quantity:  45 g   Refills:  1       cetirizine 10 MG Tabs   Last Insulin Glargine 100 UNIT/ML Sopn   Commonly known as:  TANIYA PATEL   Notes to Patient:  RESUME HOME SCHEDULE        INJECT 20 UNITS SUBCUTANEOUS DAILY    Quantity:  30 mL   Refills:  1       Lansoprazole 15 MG Cpdr   Commonly known as:  PREVACID   Not re-check          Other Discharge Instructions: none  >30 MIN SPENT ON THIS DC   EAGLE MIRELES  2/21/2017  2:48 PM

## 2017-02-21 NOTE — PLAN OF CARE
Diabetes/Glucose Control    • Glucose maintained within prescribed range Adequate for Discharge    GLUCOSE LEVELS CONTROLED    Impaired Functional Mobility    • Achieve highest/safest level of mobility/gait Adequate for Discharge        PAIN - ADULT    • V

## 2017-02-21 NOTE — PHYSICAL THERAPY NOTE
PHYSICAL THERAPY EVALUATION - INPATIENT     Room Number: 408/408-A  Evaluation Date: 2/21/2017  Type of Evaluation: Initial  Physician Order: PT Eval and Treat    Presenting Problem: cervical spondylosis with myelopathy    pt is s/p  ACF C5 7   s/p C 6 Dry eyes 7/28/2015   • Age-related nuclear cataract of both eyes 1/27/2015   • MGD (meibomian gland dysfunction) 7/28/2015   • Allergic rhinitis      pereniaal and seasonal   • Sleep apnea    • High blood pressure    • High cholesterol    • COPD (chronic o promotion; Body mechanics;Breathing techniques;Relaxation;Repositioning    COGNITION  ·  WFL     RANGE OF MOTION AND STRENGTH ASSESSMENT   UE  See OT eval       Lower extremity ROM is within functional limits    Lower extremity strength is within functional CGA for safety cues for safety and proper sequencing    no family present for training  per pt spouse will be picking her outside later today and will not be coming in later for training      Skilled Therapy Provided: chart reviewed     RN approve particip function. Presently for a d/c to home would recommend need for continued  24 hr assisted mobility and care and need for a RW  . Per pt spouse will provide this care at d/c. No family training completed as they are unavailable.   Pt denies concerns for

## 2017-02-21 NOTE — TELEPHONE ENCOUNTER
Nick Bradford from 4   Calling to advise pt had neck surgery and will not be mobile for a while, and pt requested to call and inform  that is why she will not be coming in soon.

## 2017-02-21 NOTE — DISCHARGE SUMMARY
DISCHARGE INSTRUCTIONS PER DR. Childers [facility]    Wound Care:   No dressing on the incision necessary. Dab dry the incision after shower/cleaning. No tub baths until first post-op follow-up. Leave drain dressing until the next day.     May remove then and show

## 2017-02-21 NOTE — CM/SW NOTE
CTL/Rounds: Spoke with patient at bedside regarding discharge planning. Patient states that she is independent with ADLs, working and driving. Patient's  has taken time off work to assist her at home.  Per patient she will see surgeon in two weeks an

## 2017-02-22 ENCOUNTER — TELEPHONE (OUTPATIENT)
Dept: INTERNAL MEDICINE UNIT | Facility: HOSPITAL | Age: 57
End: 2017-02-22

## 2017-02-22 ENCOUNTER — TELEPHONE (OUTPATIENT)
Dept: MEDSURG UNIT | Facility: HOSPITAL | Age: 57
End: 2017-02-22

## 2017-02-22 NOTE — TELEPHONE ENCOUNTER
Patient discharged from [facility] on February 21, 2017. Please call patient to schedule hospital follow-up appointment with PCP, Dr. Susannah Herrera.

## 2017-03-01 ENCOUNTER — TELEPHONE (OUTPATIENT)
Dept: FAMILY MEDICINE CLINIC | Facility: CLINIC | Age: 57
End: 2017-03-01

## 2017-03-01 DIAGNOSIS — Z98.1 S/P CERVICAL SPINAL FUSION: Primary | ICD-10-CM

## 2017-03-01 NOTE — TELEPHONE ENCOUNTER
Pt requesting PT for after neck surgery  Would like to go to [facility], [address], [place], [place] ph# [phone]  Would like RN to call her back to discuss

## 2017-03-02 NOTE — TELEPHONE ENCOUNTER
Sourav Bradshaw My chart message     I need post op physical therapy for C6 corpectomy and C5-7 anterior fusion on 2/20/2017.   I cannot drive so I would like to go to the PT office closest to my home. Tanja Right you please give me a referral for [facility] at

## 2017-03-03 NOTE — TELEPHONE ENCOUNTER
There is a my chart communication on this. She can go to [facility] locations or the hospital locations but not [facility] - not in network.  Approved referral for [facility]

## 2017-03-21 RX ORDER — PROPRANOLOL HYDROCHLORIDE 60 MG/1
TABLET ORAL
Qty: 180 TABLET | Refills: 4 | Status: SHIPPED | OUTPATIENT
Start: 2017-03-21 | End: 2017-12-02

## 2017-04-06 ENCOUNTER — TELEPHONE (OUTPATIENT)
Dept: FAMILY MEDICINE CLINIC | Facility: CLINIC | Age: 57
End: 2017-04-06

## 2017-04-06 NOTE — TELEPHONE ENCOUNTER
[facility] stating that pt will need a new referral for PT/ Reason:F/U. Pt will need 12 more visit. Fax:[phone].

## 2017-04-10 ENCOUNTER — PATIENT MESSAGE (OUTPATIENT)
Dept: FAMILY MEDICINE CLINIC | Facility: CLINIC | Age: 57
End: 2017-04-10

## 2017-04-10 DIAGNOSIS — M43.12 SPONDYLOLISTHESIS OF CERVICAL REGION: Primary | ICD-10-CM

## 2017-04-11 NOTE — TELEPHONE ENCOUNTER
[facility] is calling on the status of this referral. They were informed that it was approved by  but is still awaiting insurance approval. [facility] will call back later this week to check the status

## 2017-04-13 RX ORDER — INSULIN GLARGINE 100 [IU]/ML
INJECTION, SOLUTION SUBCUTANEOUS
Qty: 30 ML | Refills: 0 | Status: SHIPPED | OUTPATIENT
Start: 2017-04-13 | End: 2017-07-21

## 2017-04-13 NOTE — TELEPHONE ENCOUNTER
LOV 7/2016. Letter sent in February reminding patient to schedule follow up. LM with patient today asking her to call and schedule.

## 2017-04-20 ENCOUNTER — TELEPHONE (OUTPATIENT)
Dept: PULMONOLOGY | Facility: CLINIC | Age: 57
End: 2017-04-20

## 2017-04-20 DIAGNOSIS — G47.33 OSA (OBSTRUCTIVE SLEEP APNEA): Primary | ICD-10-CM

## 2017-04-20 NOTE — TELEPHONE ENCOUNTER
Please clarify what a referral request is, do you mean place an order for new CPAP supplies? Thank You.

## 2017-05-03 RX ORDER — GLIMEPIRIDE 4 MG/1
TABLET ORAL
Qty: 90 TABLET | Refills: 0 | Status: SHIPPED | OUTPATIENT
Start: 2017-05-03 | End: 2017-07-21

## 2017-05-16 ENCOUNTER — TELEPHONE (OUTPATIENT)
Dept: FAMILY MEDICINE CLINIC | Facility: CLINIC | Age: 57
End: 2017-05-16

## 2017-05-16 DIAGNOSIS — M54.12 BRACHIAL NEURITIS: Primary | ICD-10-CM

## 2017-05-16 DIAGNOSIS — M47.12 CERVICAL SPONDYLOSIS WITH MYELOPATHY: ICD-10-CM

## 2017-05-16 NOTE — TELEPHONE ENCOUNTER
[facility] NINA STEINER from [facility] physical therapy is requesting referral for future additional visits.     Fax # [phone]

## 2017-05-18 RX ORDER — FLUTICASONE PROPIONATE 50 MCG
SPRAY, SUSPENSION (ML) NASAL
Qty: 3 INHALER | Refills: 0 | Status: SHIPPED | OUTPATIENT
Start: 2017-05-18 | End: 2017-08-16

## 2017-05-18 NOTE — TELEPHONE ENCOUNTER
Refill Protocol Appointment Criteria; PLEASE ADVISE ON ALLERGY ALERT. THANKS.   · Appointment scheduled in the past 12 months or in the next 3 months  Recent Visits       Provider Department Primary Dx    3 months ago Jose Chappell MD [facility], [facility], [facility]

## 2017-05-20 ENCOUNTER — HOSPITAL ENCOUNTER (OUTPATIENT)
Dept: GENERAL RADIOLOGY | Age: 57
Discharge: HOME | End: 2017-05-20
Attending: NEUROLOGICAL SURGERY
Payer: COMMERCIAL

## 2017-05-20 DIAGNOSIS — M43.12 SPONDYLOLISTHESIS OF CERVICAL REGION: ICD-10-CM

## 2017-05-20 DIAGNOSIS — M47.22 CERVICAL SPONDYLOSIS WITH RADICULOPATHY: ICD-10-CM

## 2017-05-20 DIAGNOSIS — M48.02 CERVICAL SPINAL STENOSIS: ICD-10-CM

## 2017-05-20 PROCEDURE — 72040 X-RAY EXAM NECK SPINE 2-3 VW: CPT | Performed by: NEUROLOGICAL SURGERY

## 2017-05-24 ENCOUNTER — PATIENT MESSAGE (OUTPATIENT)
Dept: FAMILY MEDICINE CLINIC | Facility: CLINIC | Age: 57
End: 2017-05-24

## 2017-05-27 NOTE — TELEPHONE ENCOUNTER
From: Marylene Kid  To: Solis Queen MD  Sent: 5/24/2017 10:56 PM CDT  Subject: Test Results Question    I had a 3 month post operative xray done of the neck on 5/20/2017 at the [address] [place] [address].  Since Dr. Denzel Harry is not linked to My Chart I cannot see

## 2017-06-09 ENCOUNTER — PATIENT MESSAGE (OUTPATIENT)
Dept: OPTOMETRY | Facility: CLINIC | Age: 57
End: 2017-06-09

## 2017-06-09 NOTE — TELEPHONE ENCOUNTER
DR Felicity Freed: OC last prescribed 6/20/16.     Refill Protocol Appointment Criteria  · Appointment scheduled in the past 6 months or in the next 3 months  Recent Visits       Provider Department Primary Dx    4 months ago Corbin Brambila  [address]

## 2017-06-12 RX ORDER — BETAMETHASONE DIPROPIONATE 0.5 MG/G
CREAM TOPICAL
Qty: 45 G | Refills: 0 | Status: SHIPPED | OUTPATIENT
Start: 2017-06-12 | End: 2017-08-28

## 2017-07-06 ENCOUNTER — PATIENT MESSAGE (OUTPATIENT)
Dept: FAMILY MEDICINE CLINIC | Facility: CLINIC | Age: 57
End: 2017-07-06

## 2017-07-06 ENCOUNTER — TELEPHONE (OUTPATIENT)
Dept: INTERNAL MEDICINE CLINIC | Facility: CLINIC | Age: 57
End: 2017-07-06

## 2017-07-06 DIAGNOSIS — Z09 FOLLOW UP: Primary | ICD-10-CM

## 2017-07-06 DIAGNOSIS — E11.8 TYPE 2 DIABETES MELLITUS WITH COMPLICATION, WITHOUT LONG-TERM CURRENT USE OF INSULIN (HCC): ICD-10-CM

## 2017-07-06 NOTE — TELEPHONE ENCOUNTER
Actions Requested: appointment in the next few days  Problem: hair loss , dry/cracked finger tips and stomach pain RUQ  Onset and Timin months  Associated Symptoms: Pt stts experiencing significant hair loss,dry finger tips and constant RUQ pain/discom and has diabetes mellitus or a weak immune system (e.g., HIV positive, cancer chemotherapy, organ transplant, splenectomy, chronic steroids)  * Fever > 100.5 F (38.1 C) and bedridden (e.g., nursing home patient, stroke, chronic illness, recovering from oswaldo

## 2017-07-06 NOTE — TELEPHONE ENCOUNTER
Advised patient of Dr. Coral Dos Santos note. Patient verbalized understanding. Appointment made for Monday 7/10/17 at 10:45am with Dr John Vizcaino in [address] [place] [address].

## 2017-07-10 ENCOUNTER — OFFICE VISIT (OUTPATIENT)
Dept: FAMILY MEDICINE CLINIC | Facility: CLINIC | Age: 57
End: 2017-07-10

## 2017-07-10 ENCOUNTER — LAB ENCOUNTER (OUTPATIENT)
Dept: LAB | Age: 57
End: 2017-07-10
Attending: FAMILY MEDICINE
Payer: COMMERCIAL

## 2017-07-10 VITALS — HEART RATE: 63 BPM | DIASTOLIC BLOOD PRESSURE: 64 MMHG | HEIGHT: 64 IN | SYSTOLIC BLOOD PRESSURE: 96 MMHG

## 2017-07-10 DIAGNOSIS — K76.0 FATTY LIVER: ICD-10-CM

## 2017-07-10 DIAGNOSIS — Z79.4 TYPE 2 DIABETES MELLITUS WITHOUT COMPLICATION, WITH LONG-TERM CURRENT USE OF INSULIN (HCC): ICD-10-CM

## 2017-07-10 DIAGNOSIS — L65.9 ALOPECIA: ICD-10-CM

## 2017-07-10 DIAGNOSIS — E11.9 TYPE 2 DIABETES MELLITUS WITHOUT COMPLICATION, WITH LONG-TERM CURRENT USE OF INSULIN (HCC): ICD-10-CM

## 2017-07-10 DIAGNOSIS — R10.11 RUQ PAIN: ICD-10-CM

## 2017-07-10 DIAGNOSIS — L65.9 ALOPECIA: Primary | ICD-10-CM

## 2017-07-10 DIAGNOSIS — L30.9 DERMATITIS: ICD-10-CM

## 2017-07-10 LAB
BASOPHILS # BLD: 0.1 K/UL (ref 0–0.2)
BASOPHILS NFR BLD: 1 %
EOSINOPHIL # BLD: 0.4 K/UL (ref 0–0.7)
EOSINOPHIL NFR BLD: 5 %
ERYTHROCYTE [DISTWIDTH] IN BLOOD BY AUTOMATED COUNT: 14.9 % (ref 11–15)
HBA1C MFR BLD: 7.3 % (ref 4–6)
HCT VFR BLD AUTO: 39 % (ref 35–48)
HGB BLD-MCNC: 12.9 G/DL (ref 12–16)
LYMPHOCYTES # BLD: 2.4 K/UL (ref 1–4)
LYMPHOCYTES NFR BLD: 31 %
MCH RBC QN AUTO: 26.8 PG (ref 27–32)
MCHC RBC AUTO-ENTMCNC: 33.1 G/DL (ref 32–37)
MCV RBC AUTO: 81.1 FL (ref 80–100)
MONOCYTES # BLD: 0.6 K/UL (ref 0–1)
MONOCYTES NFR BLD: 7 %
NEUTROPHILS # BLD AUTO: 4.2 K/UL (ref 1.8–7.7)
NEUTROPHILS NFR BLD: 56 %
PLATELET # BLD AUTO: 261 K/UL (ref 140–400)
PMV BLD AUTO: 8.6 FL (ref 7.4–10.3)
RBC # BLD AUTO: 4.81 M/UL (ref 3.7–5.4)
TSH SERPL-ACNC: 2.92 UIU/ML (ref 0.45–5.33)
VIT B12 SERPL-MCNC: 439 PG/ML (ref 181–914)
WBC # BLD AUTO: 7.5 K/UL (ref 4–11)

## 2017-07-10 PROCEDURE — 84443 ASSAY THYROID STIM HORMONE: CPT

## 2017-07-10 PROCEDURE — 99214 OFFICE O/P EST MOD 30 MIN: CPT | Performed by: FAMILY MEDICINE

## 2017-07-10 PROCEDURE — 83036 HEMOGLOBIN GLYCOSYLATED A1C: CPT

## 2017-07-10 PROCEDURE — 82306 VITAMIN D 25 HYDROXY: CPT

## 2017-07-10 PROCEDURE — 85025 COMPLETE CBC W/AUTO DIFF WBC: CPT

## 2017-07-10 PROCEDURE — 82607 VITAMIN B-12: CPT

## 2017-07-10 PROCEDURE — 99212 OFFICE O/P EST SF 10 MIN: CPT | Performed by: FAMILY MEDICINE

## 2017-07-10 PROCEDURE — 36415 COLL VENOUS BLD VENIPUNCTURE: CPT

## 2017-07-10 RX ORDER — CLOTRIMAZOLE AND BETAMETHASONE DIPROPIONATE 10; .64 MG/G; MG/G
1 CREAM TOPICAL 2 TIMES DAILY
Qty: 15 G | Refills: 3 | Status: SHIPPED | OUTPATIENT
Start: 2017-07-10 | End: 2017-11-21

## 2017-07-10 NOTE — PROGRESS NOTES
Ameena Zavala is a 61year old female. Patient presents with:  Abdominal Pain: R side. Hair/Scalp Problem: hair loss    HPI:   Still has tingling in right hand and some neck stiffness but feeling much better.    Reports excess hair loss in past few month Rfl:    Lansoprazole (PREVACID) 15 MG Oral Capsule Delayed Release Take 2 capsules by mouth every morning and 1 capsule every evening Disp: 270 capsule Rfl: 0   FLUTICASONE PROPIONATE 50 MCG/ACT Nasal Suspension USE 2 SPRAYS INTRANASALLY AS NEEDED AS DIRE rhinitis     pereniaal and seasonal   • Alternating exotropia 1/27/2015   • Anxiety state    • Atrial tachycardia, paroxysmal ([facility] 4/15/2015   • Barretts esophagus    • Carpal tunnel syndrome    • Choroidal nevus, right eye 1/27/2015   • COPD (chronic obs denies heartburn  NEURO: denies headaches  Musculoskeletal: no joint pain, back pain    EXAM:   BP 96/64   Pulse 63   Ht 5' 4\" (1.626 m)   LMP 02/08/2010   GENERAL: well developed, well nourished,in no apparent distress  SKIN: overall scalp hair thinning

## 2017-07-12 LAB — 25(OH)D3 SERPL-MCNC: 24.2 NG/ML

## 2017-07-14 ENCOUNTER — TELEPHONE (OUTPATIENT)
Dept: FAMILY MEDICINE CLINIC | Facility: CLINIC | Age: 57
End: 2017-07-14

## 2017-07-17 NOTE — PROGRESS NOTES
Vitamin D levels are still mildly decreased - would benefit from extra daily supplements - take extra 2000 units of vitamin d daily.  Diabetes, cholesterol is stable. - Dr. John Vizcaino

## 2017-07-21 ENCOUNTER — OFFICE VISIT (OUTPATIENT)
Dept: ENDOCRINOLOGY CLINIC | Facility: CLINIC | Age: 57
End: 2017-07-21

## 2017-07-21 VITALS
SYSTOLIC BLOOD PRESSURE: 114 MMHG | HEIGHT: 64 IN | WEIGHT: 204 LBS | DIASTOLIC BLOOD PRESSURE: 72 MMHG | BODY MASS INDEX: 34.83 KG/M2 | HEART RATE: 61 BPM

## 2017-07-21 DIAGNOSIS — E11.65 UNCONTROLLED TYPE 2 DIABETES MELLITUS WITH HYPERGLYCEMIA, WITH LONG-TERM CURRENT USE OF INSULIN (HCC): Primary | ICD-10-CM

## 2017-07-21 DIAGNOSIS — Z79.4 UNCONTROLLED TYPE 2 DIABETES MELLITUS WITH HYPERGLYCEMIA, WITH LONG-TERM CURRENT USE OF INSULIN (HCC): Primary | ICD-10-CM

## 2017-07-21 LAB
GLUCOSE BLOOD: 143
TEST STRIP LOT #: NORMAL NUMERIC

## 2017-07-21 PROCEDURE — 36416 COLLJ CAPILLARY BLOOD SPEC: CPT | Performed by: INTERNAL MEDICINE

## 2017-07-21 PROCEDURE — 99214 OFFICE O/P EST MOD 30 MIN: CPT | Performed by: INTERNAL MEDICINE

## 2017-07-21 PROCEDURE — 99212 OFFICE O/P EST SF 10 MIN: CPT | Performed by: INTERNAL MEDICINE

## 2017-07-21 PROCEDURE — 82962 GLUCOSE BLOOD TEST: CPT | Performed by: INTERNAL MEDICINE

## 2017-07-21 RX ORDER — DULAGLUTIDE 1.5 MG/.5ML
1.5 INJECTION, SOLUTION SUBCUTANEOUS
Qty: 12 PEN | Refills: 1 | Status: SHIPPED | OUTPATIENT
Start: 2017-07-21 | End: 2018-06-21

## 2017-07-21 RX ORDER — GLIMEPIRIDE 4 MG/1
TABLET ORAL
Qty: 90 TABLET | Refills: 1 | Status: SHIPPED | OUTPATIENT
Start: 2017-07-21 | End: 2017-10-18

## 2017-07-21 RX ORDER — LANCETS
EACH MISCELLANEOUS
Qty: 100 EACH | Refills: 5 | Status: SHIPPED | OUTPATIENT
Start: 2017-07-21 | End: 2017-08-29

## 2017-07-21 NOTE — PROGRESS NOTES
Name: Marylene Kid  Date: 7/21/2017    Referring Physician: No ref. provider found    HISTORY OF PRESENT ILLNESS   Marylene Kid is a 61year old female who presents for diabetes mellitus.      Prior HbA, C or glycohemoglobin were 8.5% 4/2014; 7.9% 7/201 reaction(s): PENICILLINS  Sulindac                    Comment:Other reaction(s): Hives    Social History:   Social History    Marital status:              Spouse name:                       Years of education:                 Number of children: menstrual cycle 2008    Neg endometrial biopsy   • MGD (meibomian gland dysfunction) 7/28/2015   • Ocular migraine 7/28/2015   • Other and unspecified hyperlipidemia    • Ovarian cyst 1980    Laparoscopic cystectomy   • Pregnancy 1990, 1991, 1993   • Sleep Uncontrolled  -Uncontrolled, HgA1c 7.3% -->stable   -Congratulated patient on improved BG levels  -Discussed importance of glycemic control to prevent complications of diabetes  -Discussed complications of diabetes include retinopathy, neuropathy, nephropa

## 2017-07-26 ENCOUNTER — TELEPHONE (OUTPATIENT)
Dept: FAMILY MEDICINE CLINIC | Facility: CLINIC | Age: 57
End: 2017-07-26

## 2017-07-26 ENCOUNTER — HOSPITAL ENCOUNTER (OUTPATIENT)
Dept: ULTRASOUND IMAGING | Age: 57
Discharge: HOME | End: 2017-07-26
Attending: FAMILY MEDICINE
Payer: COMMERCIAL

## 2017-07-26 ENCOUNTER — OFFICE VISIT (OUTPATIENT)
Dept: OBGYN CLINIC | Facility: CLINIC | Age: 57
End: 2017-07-26

## 2017-07-26 VITALS
WEIGHT: 199 LBS | HEART RATE: 65 BPM | DIASTOLIC BLOOD PRESSURE: 68 MMHG | HEIGHT: 63.7 IN | BODY MASS INDEX: 34.39 KG/M2 | SYSTOLIC BLOOD PRESSURE: 109 MMHG

## 2017-07-26 DIAGNOSIS — Z01.419 ENCOUNTER FOR GYNECOLOGICAL EXAMINATION WITHOUT ABNORMAL FINDING: Primary | ICD-10-CM

## 2017-07-26 DIAGNOSIS — K76.0 FATTY LIVER: ICD-10-CM

## 2017-07-26 DIAGNOSIS — Z12.31 ENCOUNTER FOR SCREENING MAMMOGRAM FOR BREAST CANCER: ICD-10-CM

## 2017-07-26 DIAGNOSIS — R10.11 RUQ PAIN: ICD-10-CM

## 2017-07-26 PROCEDURE — 99396 PREV VISIT EST AGE 40-64: CPT | Performed by: OBSTETRICS & GYNECOLOGY

## 2017-07-26 PROCEDURE — 76705 ECHO EXAM OF ABDOMEN: CPT | Performed by: FAMILY MEDICINE

## 2017-07-26 NOTE — PROGRESS NOTES
Well Woman Exam    HPI:  The patient is a 59yo female who presents today for annual exam. She continues to have anal warts and Ledell Atnika makes her feel ill. She is using apple cider vinegar and that works well for her.  She denies history of abnormal pap smear (transient ischemic attack)    • Tobacco abuse counseling 4/15/2015   • Type II or unspecified type diabetes mellitus without mention of complication, not stated as uncontrolled 2005   • Unspecified essential hypertension 2003       SURGICAL HISTORY:  Past Rfl: 1  •  insulin glargine (LANTUS SOLOSTAR) 100 UNIT/ML Subcutaneous Solution Pen-injector, INJECT 30 UNITS SUBCUTANEOUS DAILY, Disp: 30 mL, Rfl: 1  •  glimepiride 4 MG Oral Tab, TAKE 1 TABLET BY MOUTH EVERY MORNING BEFORE BREAKFAST, Disp: 90 tablet, Rfl Delayed Release, Take 2 capsules by mouth every morning and 1 capsule every evening, Disp: 270 capsule, Rfl: 0  •  FLUTICASONE PROPIONATE 50 MCG/ACT Nasal Suspension, USE 2 SPRAYS INTRANASALLY AS NEEDED AS DIRECTED, Disp: 3 Bottle, Rfl: 0  •  ACCU-CHEK KYLE Comment:Other reaction(s): METRONIDAZOLE  Naproxen Sodium             Comment:Other reaction(s): NAPROXEN SODIUM  Nitrofurantoin              Comment:Other reaction(s): NITROFURANTOIN  Nitrofurantoin Macr*        Comment:Other reaction(s): [place] masses or tenderness  Perineum: normal  Anal warts (3 of them noted on exam)    Assessment/Plan:  1. WWE:   1. Reviewed ASCCP guidelines with the patient   2. Cotesting negative in 2013 and will repeat in 2018   2. Breast Health:     1.  Reviewed current gu

## 2017-07-26 NOTE — TELEPHONE ENCOUNTER
Current Outpatient Prescriptions:  TRULICITY 1.5 ZL/8.7WV Subcutaneous Solution Pen-injector Inject 1.5 mg into the skin every 7 days.  Disp: 12 pen Rfl: 1   insulin glargine (LANTUS SOLOSTAR) 100 UNIT/ML Subcutaneous Solution Pen-injector INJECT 30 UNITS Disp:  Rfl:    Lansoprazole (PREVACID) 15 MG Oral Capsule Delayed Release Take 2 capsules by mouth every morning and 1 capsule every evening Disp: 270 capsule Rfl: 0   diazepam (VALIUM) 5 MG Oral Tab Take 1 tablet (5 mg total) by mouth as needed.  Disp: 30

## 2017-07-31 RX ORDER — DIAZEPAM 5 MG/1
5 TABLET ORAL AS NEEDED
Qty: 30 TABLET | Refills: 2 | Status: SHIPPED | OUTPATIENT
Start: 2017-07-31 | End: 2017-08-05

## 2017-08-01 RX ORDER — AMLODIPINE BESYLATE 10 MG/1
5 TABLET ORAL DAILY
Qty: 90 TABLET | Refills: 0 | Status: SHIPPED | OUTPATIENT
Start: 2017-08-01 | End: 2017-08-28

## 2017-08-01 NOTE — TELEPHONE ENCOUNTER
I called the patient who stated all she needs is her Amlodipine. 90 day supply   Refilled per protocol    Hypertensive Medications  Protocol Criteria:  · Appointment scheduled in the past 6 months or in the next 3 months  · BMP or CMP in the past 12 months

## 2017-08-01 NOTE — TELEPHONE ENCOUNTER
Patient notified of Rx for Diazepam faxed to [facility] at [phone] confirmation received.

## 2017-08-03 NOTE — TELEPHONE ENCOUNTER
Pt calling states the wrong dosage was sent for amlodipine, pt states she currentlu takes 5 mg but . 5 was sent to pharmacy. Please correct. Pt is out of meds.      Pharmacy also need to clarify the instructions for diazepam please advise how often pt is to

## 2017-08-05 RX ORDER — AMLODIPINE BESYLATE 5 MG/1
TABLET ORAL
Qty: 90 TABLET | Refills: 0 | Status: SHIPPED | OUTPATIENT
Start: 2017-08-05 | End: 2017-11-02

## 2017-08-05 RX ORDER — DIAZEPAM 5 MG/1
5 TABLET ORAL EVERY 12 HOURS PRN
Qty: 30 TABLET | Refills: 2 | Status: SHIPPED | OUTPATIENT
Start: 2017-08-05 | End: 2018-06-09

## 2017-08-05 NOTE — TELEPHONE ENCOUNTER
Pt called back & stated its too small for amlodipine 10 mg to cut in half. Pt preferred to take 5 mg qd, LR 8--1-17 # 90. Rx sent to pharm per protocol.   Pt also stated pharm need a specific direction for gen valium 5 mg, they don't accept 1 tab by suresh

## 2017-08-09 ENCOUNTER — PATIENT MESSAGE (OUTPATIENT)
Dept: FAMILY MEDICINE CLINIC | Facility: CLINIC | Age: 57
End: 2017-08-09

## 2017-08-10 ENCOUNTER — PATIENT MESSAGE (OUTPATIENT)
Dept: FAMILY MEDICINE CLINIC | Facility: CLINIC | Age: 57
End: 2017-08-10

## 2017-08-10 DIAGNOSIS — M54.5 LOW BACK PAIN, UNSPECIFIED BACK PAIN LATERALITY, UNSPECIFIED CHRONICITY, WITH SCIATICA PRESENCE UNSPECIFIED: Primary | ICD-10-CM

## 2017-08-12 NOTE — TELEPHONE ENCOUNTER
From: Kayren Bernheim  To: Frank Regalado MD  Sent: 8/9/2017 12:27 PM CDT  Subject: Visit Rosamaria Vizcaino,    This is a follow up to my last visit with you.  My stomach is doing better and it seems with the increase in Vitamin D my hair

## 2017-08-15 NOTE — TELEPHONE ENCOUNTER
From: Vaishnavi Neighbours  To: Bernie Cox MD  Sent: 8/10/2017 9:33 AM CDT  Subject: Referral Request    Last August Dr. Rosalina Hurley gave me injections in both the left and right sacarilliac's to ease my lower back pain and my leg pain.  The shot lasted unt

## 2017-08-17 ENCOUNTER — HOSPITAL ENCOUNTER (OUTPATIENT)
Dept: MAMMOGRAPHY | Age: 57
Discharge: HOME | End: 2017-08-17
Attending: OBSTETRICS & GYNECOLOGY
Payer: COMMERCIAL

## 2017-08-17 ENCOUNTER — HOSPITAL ENCOUNTER (OUTPATIENT)
Dept: GENERAL RADIOLOGY | Age: 57
Discharge: HOME | End: 2017-08-17
Attending: NEUROLOGICAL SURGERY
Payer: COMMERCIAL

## 2017-08-17 DIAGNOSIS — Z12.31 ENCOUNTER FOR SCREENING MAMMOGRAM FOR BREAST CANCER: ICD-10-CM

## 2017-08-17 DIAGNOSIS — M48.02 CERVICAL SPINAL STENOSIS: ICD-10-CM

## 2017-08-17 PROCEDURE — 77067 SCR MAMMO BI INCL CAD: CPT | Performed by: OBSTETRICS & GYNECOLOGY

## 2017-08-17 PROCEDURE — 72040 X-RAY EXAM NECK SPINE 2-3 VW: CPT | Performed by: NEUROLOGICAL SURGERY

## 2017-08-18 NOTE — TELEPHONE ENCOUNTER
Temazepam needs to be phoned in if approved last refilled on 6-20-16 #90 #1RF    Failed protocol no recent labs done.   Cholesterol Medications  Protocol Criteria:  · Appointment scheduled in the past 12 months or in the next 3 months  · ALT & LDL on file i Marie Adame MD    Office Visit    6 months ago Uncontrolled type 2 diabetes mellitus with hyperosmolarity without coma, without long-term current use of insulin [facility])    Denis Saldaña, Kendra Elliott MD    Office Visit    8

## 2017-08-19 RX ORDER — FLUTICASONE PROPIONATE 50 MCG
SPRAY, SUSPENSION (ML) NASAL
Qty: 3 BOTTLE | Refills: 4 | Status: SHIPPED | OUTPATIENT
Start: 2017-08-19 | End: 2017-08-31

## 2017-08-19 RX ORDER — TEMAZEPAM 30 MG/1
CAPSULE ORAL
Qty: 90 CAPSULE | Refills: 4 | Status: SHIPPED | OUTPATIENT
Start: 2017-08-19 | End: 2018-04-18

## 2017-08-19 RX ORDER — ATORVASTATIN CALCIUM 40 MG/1
TABLET, FILM COATED ORAL
Qty: 90 TABLET | Refills: 4 | Status: SHIPPED | OUTPATIENT
Start: 2017-08-19 | End: 2019-05-30

## 2017-08-22 NOTE — TELEPHONE ENCOUNTER
Last OV with Dr. Padmini Orellana 6-6-16. No record of Dr. Padmini Orellana filling ventolin. Dr. Padmini Orellana please advise.

## 2017-08-23 ENCOUNTER — PATIENT MESSAGE (OUTPATIENT)
Dept: FAMILY MEDICINE CLINIC | Facility: CLINIC | Age: 57
End: 2017-08-23

## 2017-08-24 NOTE — TELEPHONE ENCOUNTER
From: Emerald Guevara  To:  Soto Devine MD  Sent: 8/23/2017 9:31 PM CDT  Subject: Test Results Question    I took a neck x-ray on Thursday 8/17/2017 for a surgery follow up visit with Dr. Ricarda Nobles. I have already seenDr. Ed Ortiz on 8/22/2017 but he is n

## 2017-08-24 NOTE — TELEPHONE ENCOUNTER
FLUTICASONE 50 MCG NASAL SPRAY #48  USE 2 SPRAYS INTRANASALLY AS NEEDED AS DIRECTED    PLAN REQUIRES SPECIFIC DIRECTIONS TO PROCESS THE PRESCRIPTION PLEASE FAX BACK WITH FREQUENCY OF HOW PT WILL BE USING THE MEDICATION AND DAYS SUPPLY  LIMITATIONS

## 2017-08-25 ENCOUNTER — TELEPHONE (OUTPATIENT)
Dept: NEUROLOGY | Facility: CLINIC | Age: 57
End: 2017-08-25

## 2017-08-25 NOTE — TELEPHONE ENCOUNTER
Contacted patient and offered her appt on 8/29/17 at 2:40p with Dr. Mary Barney. Patient confirmed appt and notified if Dr. Mary Barney recommends repeat bilateral S1 TFESIs we can process it under her new insurance plan on 9/1/2017.  Patient verbalized understanding a

## 2017-08-28 ENCOUNTER — OFFICE VISIT (OUTPATIENT)
Dept: FAMILY MEDICINE CLINIC | Facility: CLINIC | Age: 57
End: 2017-08-28

## 2017-08-28 VITALS
BODY MASS INDEX: 35 KG/M2 | WEIGHT: 201.63 LBS | HEART RATE: 58 BPM | DIASTOLIC BLOOD PRESSURE: 66 MMHG | SYSTOLIC BLOOD PRESSURE: 104 MMHG

## 2017-08-28 DIAGNOSIS — Z79.4 TYPE 2 DIABETES MELLITUS WITH COMPLICATION, WITH LONG-TERM CURRENT USE OF INSULIN (HCC): ICD-10-CM

## 2017-08-28 DIAGNOSIS — E11.8 TYPE 2 DIABETES MELLITUS WITH COMPLICATION, WITH LONG-TERM CURRENT USE OF INSULIN (HCC): ICD-10-CM

## 2017-08-28 DIAGNOSIS — I10 ESSENTIAL HYPERTENSION: Primary | ICD-10-CM

## 2017-08-28 DIAGNOSIS — K13.0 DRY LIPS: ICD-10-CM

## 2017-08-28 DIAGNOSIS — J01.00 ACUTE MAXILLARY SINUSITIS, RECURRENCE NOT SPECIFIED: ICD-10-CM

## 2017-08-28 PROCEDURE — 99212 OFFICE O/P EST SF 10 MIN: CPT | Performed by: FAMILY MEDICINE

## 2017-08-28 PROCEDURE — 99214 OFFICE O/P EST MOD 30 MIN: CPT | Performed by: FAMILY MEDICINE

## 2017-08-28 RX ORDER — GABAPENTIN 300 MG/1
300 CAPSULE ORAL
COMMUNITY
Start: 2010-12-30 | End: 2017-08-29

## 2017-08-28 RX ORDER — AZITHROMYCIN 250 MG/1
TABLET, FILM COATED ORAL
Qty: 6 TABLET | Refills: 0 | Status: SHIPPED | OUTPATIENT
Start: 2017-08-28 | End: 2017-11-27 | Stop reason: ALTCHOICE

## 2017-08-28 RX ORDER — PREDNISONE 20 MG/1
TABLET ORAL
Qty: 10 TABLET | Refills: 0 | Status: SHIPPED | OUTPATIENT
Start: 2017-08-28 | End: 2017-11-27 | Stop reason: ALTCHOICE

## 2017-08-28 RX ORDER — BETAMETHASONE DIPROPIONATE 0.5 MG/G
CREAM TOPICAL
Qty: 45 G | Refills: 0 | Status: SHIPPED | OUTPATIENT
Start: 2017-08-28 | End: 2017-11-21

## 2017-08-28 NOTE — PROGRESS NOTES
Lian Brock is a 61year old female. Patient presents with:  Sinus Problem  Hypertension  Diabetes  Dryness: lips and finger tips     HPI:   Was having fluctuations in her glucose - reports was on low carb diet and thinks it threw her body off.  Glucose External Cream Apply 1 Application topically 2 (two) times daily.  Disp: 15 g Rfl: 3   BETAMETHASONE DIPROPIONATE 0.05 % External Cream APPLY EXTERNALLY TO THE AFFECTED AREA TWICE DAILY Disp: 45 g Rfl: 0   PROPRANOLOL HCL 60 MG Oral Tab TAKE 1 TABLET(60 MG) 1/27/2015   • Allergic rhinitis    • Allergic rhinitis     pereniaal and seasonal   • Alternating exotropia 1/27/2015   • Anxiety state    • Atrial tachycardia, paroxysmal ([facility] [address] 4/15/2015   • Barretts esophagus    • Carpal tunnel syndrome    • Choroidal ne cough  CARDIOVASCULAR: denies chest pain  GI: denies abdominal pain and denies heartburn  NEURO: denies headaches  Musculoskeletal: no joint pain, back pain    EXAM:   /66 (BP Location: Left arm)   Pulse 58   Wt 201 lb 9.6 oz (91.4 kg)   LMP 02/08/20

## 2017-08-29 ENCOUNTER — OFFICE VISIT (OUTPATIENT)
Dept: NEUROLOGY | Facility: CLINIC | Age: 57
End: 2017-08-29

## 2017-08-29 VITALS
SYSTOLIC BLOOD PRESSURE: 110 MMHG | BODY MASS INDEX: 34 KG/M2 | HEART RATE: 68 BPM | DIASTOLIC BLOOD PRESSURE: 62 MMHG | RESPIRATION RATE: 17 BRPM | WEIGHT: 197 LBS

## 2017-08-29 DIAGNOSIS — M51.9 LUMBAR DISC DISEASE: ICD-10-CM

## 2017-08-29 DIAGNOSIS — M47.816 LUMBAR SPONDYLOSIS: ICD-10-CM

## 2017-08-29 DIAGNOSIS — M54.16 LUMBAR RADICULOPATHY: Primary | ICD-10-CM

## 2017-08-29 PROCEDURE — 99214 OFFICE O/P EST MOD 30 MIN: CPT | Performed by: PHYSICAL MEDICINE & REHABILITATION

## 2017-08-29 RX ORDER — CYCLOSPORINE 0.5 MG/ML
EMULSION OPHTHALMIC
Qty: 180 ML | Refills: 0 | Status: SHIPPED | OUTPATIENT
Start: 2017-08-29 | End: 2018-09-17 | Stop reason: ALTCHOICE

## 2017-08-29 NOTE — PROGRESS NOTES
Low Back Pain H & P    Chief Complaint: Patient presents with:  Low Back Pain: pt here for follow up after Bilateral S1 transforaminal epidural steroid injections on 7/22/2016 with 100% relief until 5/2017.  pt would like to discuss repeat injections for c/ Exotropia    • Herpes simplex    • High blood pressure    • High cholesterol    • History of pregnancy 1990, 1991   • Hypercholesterolemia 2006   • Irregular menstrual cycle 2008    Neg endometrial biopsy   • MGD (meibomian gland dysfunction) 7/28/2015   • Occasionally    Drug use: No    Sexual activity: Yes    Partners: Male    Birth control/ protection: Tubal Ligation     Other Topics Concern    Caffeine Concern Yes    Comment: 2 cups coffee daily    Exercise No     Social History Narrative    The patient Strength:  All LE strength measurements 5/5 except:  Hamstring RIGHT:   4/5  Hamstring LEFT:   4/5   RIGHT plantar reflexes: downward response   LEFT plantar reflexes: downward response   Reflexes: 2+ in bilateral lower extremities     Assessment  1. right

## 2017-08-29 NOTE — PATIENT INSTRUCTIONS
Plan  I will perform bilateral S1 TFESI(s) with Dexamethasone 10 mg to each side. The patient will continue with her home exercise program.    The patient does not need any pain medications at this time.     The patient will follow up in 3 months, but

## 2017-08-31 ENCOUNTER — TELEPHONE (OUTPATIENT)
Dept: OPTOMETRY | Facility: CLINIC | Age: 57
End: 2017-08-31

## 2017-08-31 RX ORDER — FLUTICASONE PROPIONATE 50 MCG
SPRAY, SUSPENSION (ML) NASAL
Qty: 3 BOTTLE | Refills: 4 | Status: SHIPPED | OUTPATIENT
Start: 2017-08-31 | End: 2017-09-02

## 2017-08-31 NOTE — TELEPHONE ENCOUNTER
Dr Candace Giron- please advise on how frequently pt should be using fluticasone as well if you want pt to have 90 day supply or 6 month?

## 2017-08-31 NOTE — TELEPHONE ENCOUNTER
Patient states Insurance is requesting a prior auth for Restasis. Patient states to put in an explanation as to why this prescription was given in prior auth. Scooter Huffman will be faxing over prior [place] form shortly. Please advise thank you.

## 2017-09-01 ENCOUNTER — TELEPHONE (OUTPATIENT)
Dept: FAMILY MEDICINE CLINIC | Facility: CLINIC | Age: 57
End: 2017-09-01

## 2017-09-01 NOTE — TELEPHONE ENCOUNTER
[facility] calling and needs to clarify directions on med below. Please advise.     Medication Quantity Refills Start End   Fluticasone Propionate 50 MCG/ACT Nasal Suspension 3 Bottle 4 8/31/2017    Sig :  INSTILL 2 SPRAYS INTRANASALLY AS NEEDED AS DIRECTED

## 2017-09-02 RX ORDER — FLUTICASONE PROPIONATE 50 MCG
SPRAY, SUSPENSION (ML) NASAL
Qty: 3 BOTTLE | Refills: 4 | OUTPATIENT
Start: 2017-09-02 | End: 2020-05-07

## 2017-09-05 ENCOUNTER — TELEPHONE (OUTPATIENT)
Dept: NEUROLOGY | Facility: CLINIC | Age: 57
End: 2017-09-05

## 2017-09-05 NOTE — TELEPHONE ENCOUNTER
Spoke to patient regarding scheduling bilateral S1 TFESIs. Patient states she will have new insurance and is unable to schedule until new insurance info is received. Patient will call back once new info is obtained.

## 2017-09-05 NOTE — TELEPHONE ENCOUNTER
Received in basket from [email or web address] advising of approval for bilateral S! TFESIs for one unit/DOS. Will inform Nursing.

## 2017-10-02 NOTE — BRIEF OP NOTE
Allergic Reaction, Other [Local]  You are having an allergic reaction. This is due to exposure to something you have become sensitive to. This may be a household product, medicine, chemical, soap, cream, cosmetics or jewelry. A sting from an insect (that you were not aware of) can also cause this reaction. Sometimes it is difficult to know exactly what caused this reaction. There may be redness, itching, and swelling. The rash will fade over the next few days.  Home Care:  · If itching is a problem, avoid anything that heats up your skin (hot showers/baths, direct sunlight) since heat will make itching worse.  · An ice pack (ice cubes in a plastic bag, wrapped in a towel) will reduce local areas of redness and itching. Lanacaine cream or Solarcaine spray (or other product containing \"benzocaine\") will reduce the itching.  · Oral Benadryl (diphenhydramine) is an antihistamine available at drug and grocery stores. Unless a prescription antihistamine was given, Benadryl may be used to reduce itching if large areas of the skin are involved. Use lower doses during the daytime and higher doses at bedtime since the drug may make you sleepy. [NOTE: Do not use Benadryl if you have glaucoma or if you are a man with trouble urinating due to an enlarged prostate.] Claritin (loratadine) is an antihistamine that causes less drowsiness and is a good alternative for daytime use.  Follow Up  with your doctor or this facility in the next two days if your symptoms do not continue to improve.  Get Prompt Medical Attention  if any of the following occur:  · Spreading areas of itching, redness or swelling  · New or worse swelling in the face, eyelids, lips, mouth, throat or tongue  · Trouble swallowing or breathing  · Dizziness, weakness or fainting  · Signs of infection:  ¨ Spreading redness  ¨ Increased pain or swelling  ¨ Fever of 100.4ºF (38ºC) or higher, or as directed by your healthcare provider  ¨ Colored fluid draining from the  [address] BRIEF OPERATIVE NOTE     Lacinda Blare Location: [place]   [facility] [id number] [address] [id number]   Admission Date 2/20/2017 Operation Date 2/20/2017     Operating Physician Britni Graham MD        Preoperative Diagnosis: cer wound  © 6542-0634 [facility], [address] [place] [address], [place] [address]. All rights reserved. This information is not intended as a substitute for professional medical care. Always follow your healthcare professional's instructions.      PHARYNGITIS (Sore Throat),    Pharyngitis (sore throat) is often due to a virus, but can also be caused by the “strep” bacteria. This is called “strep throat”. Both viral and strep infection can cause throat pain that is worse when swallowing, aching all over with headache and fever. Both types of infections are contagious. They may be spread by coughing, kissing or touching others after touching your mouth or nose.  A test has been done to determine whether or not you have strep throat. Call this facility as directed for the result. If it is positive for strep infection you will need to take antibiotics. A prescription can be called in to your pharmacy at that time. If the test is negative, you probably have a viral pharyngitis and it will not require antibiotic treatment.  HOME CARE:  · If your symptoms are severe, rest at home for the first 2-3 days. If you are told that your test is positive for strep, you should be off work and school for the first two days of antibiotic treatment. After that, you will no longer be contagious.  · Children: Use acetaminophen (Tylenol) for fever, fussiness or discomfort. In infants over six months of age, you may use ibuprofen (Children's Motrin) instead of Tylenol. [NOTE: If your child has chronic liver or kidney disease or ever had a stomach ulcer or GI bleeding, talk with your doctor before using these medicines.]   (Aspirin should never be used in anyone under 18 years of age who is ill with a fever. It may cause severe liver damage.)Adults: You may use acetaminophen (Tylenol) or ibuprofen (Motrin, Advil) to control pain or fever, unless another medicine was prescribed for this. [NOTE: If you have chronic liver or kidney disease or ever  had a stomach ulcer or GI bleeding, talk with your doctor before using these medicines.]  · Throat lozenges or sprays (Chloraseptic and others), or gargling with warm salt water will reduce throat pain. Dissolve 1/2 teaspoon of salt in 1 glass of warm water. This is especially useful just before meals.  FOLLOW UP with your doctor as advised by our staff if you are not improving over the next week.  GET PROMPT MEDICAL ATTENTION  if any of the following occur:  · Fever of 100.4°F (38°C) oral or higher, not better with fever medication  · New or worsening ear pain, sinus pain or headache  · Painful lumps in the back of your neck  · Unable to swallow liquids or open your mouth wide due to throat pain  · Trouble breathing or noisy breathing  · Muffled voice  · New rash  © 4980-9245 [employer], [address] [place] [address], [place] [address]. All rights reserved. This information is not intended as a substitute for professional medical care. Always follow your healthcare professional's instructions.

## 2017-10-16 ENCOUNTER — IMMUNIZATION (OUTPATIENT)
Dept: FAMILY MEDICINE CLINIC | Facility: CLINIC | Age: 57
End: 2017-10-16

## 2017-10-16 DIAGNOSIS — Z23 NEED FOR VACCINATION: ICD-10-CM

## 2017-10-16 PROCEDURE — 90471 IMMUNIZATION ADMIN: CPT | Performed by: FAMILY MEDICINE

## 2017-10-16 PROCEDURE — 90686 IIV4 VACC NO PRSV 0.5 ML IM: CPT | Performed by: FAMILY MEDICINE

## 2017-10-23 ENCOUNTER — TELEPHONE (OUTPATIENT)
Dept: FAMILY MEDICINE CLINIC | Facility: CLINIC | Age: 57
End: 2017-10-23

## 2017-10-23 NOTE — TELEPHONE ENCOUNTER
PA for Bydureon 2 mg subcutaneous solution pen injector completed with [employer] via CMM response time 3-5 business days KEY [id number].

## 2017-10-24 ENCOUNTER — TELEPHONE (OUTPATIENT)
Dept: FAMILY MEDICINE CLINIC | Facility: CLINIC | Age: 57
End: 2017-10-24

## 2017-10-24 ENCOUNTER — NURSE ONLY (OUTPATIENT)
Dept: FAMILY MEDICINE CLINIC | Facility: CLINIC | Age: 57
End: 2017-10-24

## 2017-10-24 DIAGNOSIS — Z23 NEED FOR VACCINATION: Primary | ICD-10-CM

## 2017-10-24 PROCEDURE — 90670 PCV13 VACCINE IM: CPT | Performed by: FAMILY MEDICINE

## 2017-10-24 PROCEDURE — 90471 IMMUNIZATION ADMIN: CPT | Performed by: FAMILY MEDICINE

## 2017-10-24 NOTE — TELEPHONE ENCOUNTER
Fax received from [facility] therapeutis stating Bydureon 2mg inj pen up to 4 pens per month is approved from 10/19/17 through 10/19/18

## 2017-10-24 NOTE — PROGRESS NOTES
Pt in for Prevnar 13, ok per Dr. Mitchell Herman. Administered Prevnar 13 left deltoid, IM.  Pt tolerated vaccine well, N/C N/R

## 2017-11-04 RX ORDER — AMLODIPINE BESYLATE 5 MG/1
TABLET ORAL
Qty: 90 TABLET | Refills: 0 | Status: SHIPPED | OUTPATIENT
Start: 2017-11-04 | End: 2018-01-18

## 2017-11-04 NOTE — TELEPHONE ENCOUNTER
Chart reviewed, amlodipine besylate 5 mg refilled for 90 days per [facility] refill protocol.        Hypertensive Medications  Protocol Criteria:  · Appointment scheduled in the past 6 months or in the next 3 months  · BMP or CMP in the past 12 months  · Creatini

## 2017-11-05 ENCOUNTER — PATIENT MESSAGE (OUTPATIENT)
Dept: FAMILY MEDICINE CLINIC | Facility: CLINIC | Age: 57
End: 2017-11-05

## 2017-11-06 ENCOUNTER — TELEPHONE (OUTPATIENT)
Dept: INTERNAL MEDICINE CLINIC | Facility: CLINIC | Age: 57
End: 2017-11-06

## 2017-11-06 RX ORDER — LOSARTAN POTASSIUM 25 MG/1
25 TABLET ORAL DAILY
Qty: 90 TABLET | Refills: 2 | Status: SHIPPED | OUTPATIENT
Start: 2017-11-06 | End: 2017-11-27

## 2017-11-06 NOTE — TELEPHONE ENCOUNTER
From: Sterling Body  To: Evelio Castillo MD  Sent: 11/5/2017 4:00 PM CST  Subject: Prescription Question    Please soha my file that all prescriptions should be filled as a 90 day supply so I can avoid any future issues.   I need my Losartan 25 mg sent t

## 2017-11-06 NOTE — TELEPHONE ENCOUNTER
----- Message from Beronica Johnson sent at 11/5/2017  4:00 PM CST -----  Regarding: Prescription Question  Contact: [phone]  Please soha my file that all prescriptions should be filled as a 90 day supply so I can avoid any future issues.   I need my Lo

## 2017-11-08 ENCOUNTER — PATIENT MESSAGE (OUTPATIENT)
Dept: FAMILY MEDICINE CLINIC | Facility: CLINIC | Age: 57
End: 2017-11-08

## 2017-11-09 ENCOUNTER — TELEPHONE (OUTPATIENT)
Dept: INTERNAL MEDICINE CLINIC | Facility: CLINIC | Age: 57
End: 2017-11-09

## 2017-11-09 NOTE — TELEPHONE ENCOUNTER
Patient notified that she is due for DM eye exam. She states that her insurance has changed and she will find out who is in the new network and call us back.

## 2017-11-09 NOTE — TELEPHONE ENCOUNTER
From: Etelvina Maria  To: Quique Quintero MD  Sent: 11/8/2017 10:40 PM CST  Subject: Prescription Question    Can you please send in a script for Microlet Lancets for the free blood sugar meter I just received,   the [address]?   Please give m

## 2017-11-09 NOTE — TELEPHONE ENCOUNTER
Refilled per protocol    Diabetes Medications  Protocol Criteria:  · Appointment scheduled in the past 6 months or the next 3 months  · A1C < 7.5 in the past 6 months  · Creatinine in the past 12 months  · Creatinine result < 1.5   Recent Outpatient Visits

## 2017-11-14 ENCOUNTER — OFFICE VISIT (OUTPATIENT)
Dept: FAMILY MEDICINE CLINIC | Facility: CLINIC | Age: 57
End: 2017-11-14

## 2017-11-14 VITALS
HEIGHT: 64 IN | TEMPERATURE: 99 F | HEART RATE: 61 BPM | DIASTOLIC BLOOD PRESSURE: 70 MMHG | WEIGHT: 200 LBS | SYSTOLIC BLOOD PRESSURE: 116 MMHG | BODY MASS INDEX: 34.15 KG/M2

## 2017-11-14 DIAGNOSIS — K13.0 LIP LESION: Primary | ICD-10-CM

## 2017-11-14 PROCEDURE — 99213 OFFICE O/P EST LOW 20 MIN: CPT | Performed by: NURSE PRACTITIONER

## 2017-11-14 RX ORDER — KETOCONAZOLE 20 MG/G
CREAM TOPICAL
Qty: 30 G | Refills: 0 | Status: SHIPPED | OUTPATIENT
Start: 2017-11-14 | End: 2017-11-21

## 2017-11-14 NOTE — PROGRESS NOTES
HPI  Pt presents for con't rash to upper lips with little red dots surrounding lips. Lips feel very dry as do finger tips. Uses CPAP machine at night (without humidity); has seen Dr Jer Adame in August and given steroids but there is no improvement in s/s.  Pt 4/15/2015   • Spinal stenosis    • Spinal stenosis 2010    C5-6, C6-7, physical therapy   • TIA (transient ischemic attack)    • Tobacco abuse counseling 4/15/2015   • Type II or unspecified type diabetes mellitus without mention of complication, not state Disp: 30 g Rfl: 0   Losartan Potassium 25 MG Oral Tab Take 1 tablet (25 mg total) by mouth daily.  Disp: 90 tablet Rfl: 2   AMLODIPINE BESYLATE 5 MG Oral Tab TAKE 1 TABLET BY MOUTH DAILY Disp: 90 tablet Rfl: 0   glimepiride 4 MG Oral Tab TAKE 1 TABLET BY MO mouth daily. Disp:  Rfl:    GARLIC OR Take 1 capsule by mouth daily.  Disp:  Rfl:    Lansoprazole (PREVACID) 15 MG Oral Capsule Delayed Release Take 2 capsules by mouth every morning and 1 capsule every evening Disp: 270 capsule Rfl: 0   Imiquimod 5 % Exter Comment:Other reaction(s): NITROFURANTOIN MACROCRYSTAL  Penicillins                 Comment:Other reaction(s): PENICILLINS  Sulindac                    Comment:Other reaction(s): Hives    Physical Exam   Constitutional: She is oriented to person, place

## 2017-11-21 DIAGNOSIS — L20.9 ATOPIC DERMATITIS, UNSPECIFIED TYPE: Primary | ICD-10-CM

## 2017-11-22 ENCOUNTER — PATIENT MESSAGE (OUTPATIENT)
Dept: FAMILY MEDICINE CLINIC | Facility: CLINIC | Age: 57
End: 2017-11-22

## 2017-11-24 ENCOUNTER — NURSE TRIAGE (OUTPATIENT)
Dept: OTHER | Age: 57
End: 2017-11-24

## 2017-11-24 DIAGNOSIS — H04.123 DRY EYES: Primary | ICD-10-CM

## 2017-11-24 NOTE — TELEPHONE ENCOUNTER
From: Etelvina Maria  To:  Quique Quintero MD  Sent: 11/22/2017 10:31 PM CST  Subject: Referral Request    Hi Dr. Selwyn Mullins,    Can you have your staff see if they can obtain a referral for Dr. Michelle March for my diabetic eye exam. My eyes are always blurry so

## 2017-11-24 NOTE — TELEPHONE ENCOUNTER
Action Requested: Summary for Provider     []  Critical Lab, Recommendations Needed  [] Need Additional Advice  [x]   FYI    []   Need Orders  [] Need Medications Sent to Pharmacy  []  Other     SUMMARY: Appointment schedule for Monday 11/27/17 at 10:45am

## 2017-11-24 NOTE — TELEPHONE ENCOUNTER
To: REAL BOB ADO LPN/CMA      From: Kristin Hernandez      Created: 11/22/2017 10:31 PM        *-*-*This message has not been handled. *-*-*    Hi Dr. Vickey Austin,    Can you have your staff see if they can obtain a referral for Dr. Marleny Atkinson for my diabetic ey

## 2017-11-27 ENCOUNTER — OFFICE VISIT (OUTPATIENT)
Dept: FAMILY MEDICINE CLINIC | Facility: CLINIC | Age: 57
End: 2017-11-27

## 2017-11-27 VITALS
WEIGHT: 199.63 LBS | DIASTOLIC BLOOD PRESSURE: 69 MMHG | HEART RATE: 70 BPM | BODY MASS INDEX: 34 KG/M2 | SYSTOLIC BLOOD PRESSURE: 113 MMHG

## 2017-11-27 DIAGNOSIS — G43.809 OTHER MIGRAINE WITHOUT STATUS MIGRAINOSUS, NOT INTRACTABLE: Primary | ICD-10-CM

## 2017-11-27 DIAGNOSIS — J01.00 ACUTE MAXILLARY SINUSITIS, RECURRENCE NOT SPECIFIED: ICD-10-CM

## 2017-11-27 DIAGNOSIS — K76.0 FATTY LIVER: ICD-10-CM

## 2017-11-27 DIAGNOSIS — Z79.4 TYPE 2 DIABETES MELLITUS WITH COMPLICATION, WITH LONG-TERM CURRENT USE OF INSULIN (HCC): ICD-10-CM

## 2017-11-27 DIAGNOSIS — E11.8 TYPE 2 DIABETES MELLITUS WITH COMPLICATION, WITH LONG-TERM CURRENT USE OF INSULIN (HCC): ICD-10-CM

## 2017-11-27 PROCEDURE — 99212 OFFICE O/P EST SF 10 MIN: CPT | Performed by: FAMILY MEDICINE

## 2017-11-27 PROCEDURE — 99214 OFFICE O/P EST MOD 30 MIN: CPT | Performed by: FAMILY MEDICINE

## 2017-11-27 RX ORDER — MONTELUKAST SODIUM 10 MG/1
10 TABLET ORAL DAILY
Qty: 90 TABLET | Refills: 3 | Status: SHIPPED | OUTPATIENT
Start: 2017-11-27 | End: 2019-02-28

## 2017-11-27 RX ORDER — LANCETS
EACH MISCELLANEOUS
Refills: 2 | COMMUNITY
Start: 2017-11-09 | End: 2019-09-12

## 2017-11-27 RX ORDER — AZITHROMYCIN 250 MG/1
TABLET, FILM COATED ORAL
Qty: 6 TABLET | Refills: 0 | Status: SHIPPED | OUTPATIENT
Start: 2017-11-27 | End: 2017-12-14 | Stop reason: ALTCHOICE

## 2017-11-27 RX ORDER — TOPIRAMATE 25 MG/1
25 TABLET ORAL 2 TIMES DAILY
Qty: 60 TABLET | Refills: 1 | Status: SHIPPED | OUTPATIENT
Start: 2017-11-27 | End: 2017-11-27

## 2017-11-27 NOTE — PROGRESS NOTES
Esperanza Quinn is a 61year old female. Patient presents with:  Migraine: blurry vison with migraines   Hypertension    HPI:   Pt here for follow up. Reports BP has been high the last few months.  Reports feels a film over her eyes making it difficult to se Take 1 tablet (5 mg total) by mouth every 12 (twelve) hours as needed. Disp: 30 tablet Rfl: 2   TRULICITY 1.5 BM/5.4FI Subcutaneous Solution Pen-injector Inject 1.5 mg into the skin every 7 days.  Disp: 12 pen Rfl: 1   PROPRANOLOL HCL 60 MG Oral Tab TAKE 1 • Diabetes mellitus ([facility]    • Diabetes mellitus type 2 with complications ([address] [facility] 0/85/3681   • Diabetic retinopathy of left eye ([address] [facility] 1/27/2015   • Dry eyes 7/28/2015   • DUB (dysfunctional uterine bleeding) 2005    endometrial biopsy   • Esophageal refl distress  SKIN: no rashes,no suspicious lesions  HEENT: atraumatic, normocephalic,ears and throat are clear  Tenderness in maxillary sinuses.    NECK: supple,no adenopathy,no bruits  LUNGS: clear to auscultation  CARDIO: RRR without murmur  EXTREMITIES: no

## 2017-11-29 RX ORDER — TOPIRAMATE 25 MG/1
TABLET ORAL
Qty: 180 TABLET | Refills: 1 | Status: SHIPPED | OUTPATIENT
Start: 2017-11-29 | End: 2018-08-01

## 2017-11-30 ENCOUNTER — PATIENT MESSAGE (OUTPATIENT)
Dept: FAMILY MEDICINE CLINIC | Facility: CLINIC | Age: 57
End: 2017-11-30

## 2017-11-30 DIAGNOSIS — G43.809 OTHER MIGRAINE WITHOUT STATUS MIGRAINOSUS, NOT INTRACTABLE: ICD-10-CM

## 2017-11-30 DIAGNOSIS — H92.09 OTALGIA, UNSPECIFIED LATERALITY: Primary | ICD-10-CM

## 2017-12-01 NOTE — TELEPHONE ENCOUNTER
From: Kevan Andrade  To: Holly Ochoa MD  Sent: 11/30/2017 5:27 PM CST  Subject: Prescription Question    While recovering from neck surgery I was taking Methocarbamol 750 mg to relax the neck and back muscles.  I have noticed that when my headaches g

## 2017-12-01 NOTE — TELEPHONE ENCOUNTER
DR Encarnacion Erm: please advise on refill request for mehocarbamol 750mg (90 day supply).     LOV 11/27/17

## 2017-12-02 RX ORDER — PROPRANOLOL HYDROCHLORIDE 60 MG/1
60 TABLET ORAL 2 TIMES DAILY
Qty: 180 TABLET | Refills: 0 | Status: SHIPPED | OUTPATIENT
Start: 2017-12-02 | End: 2018-03-12

## 2017-12-02 NOTE — TELEPHONE ENCOUNTER
From: Jaydon Getting  Sent: 12/2/2017 11:44 AM CST  Subject: Medication Renewal Request    Perri Marvin.  Elizabeth would like a refill of the following medications:     PROPRANOLOL HCL 60 MG Oral Tab Hillary Friend MD]    Preferred pharmacy: [address] [place] [address] [facility]

## 2017-12-05 RX ORDER — METHOCARBAMOL 750 MG/1
750 TABLET, FILM COATED ORAL 3 TIMES DAILY PRN
Qty: 90 TABLET | Refills: 1 | Status: SHIPPED | OUTPATIENT
Start: 2017-12-05 | End: 2018-03-30

## 2017-12-05 NOTE — TELEPHONE ENCOUNTER
Please reply to pool: REAL Freed please see the pending methocarbamol rx, and patient's 2 questions below regarding methocarbamol and MRI or ENT referal  ---- Message -----  Stephanie Sorto From: Perri Johnson     Sent: 12/5/2017 12:23 PM CST       To: Eleazar Avitia

## 2017-12-05 NOTE — TELEPHONE ENCOUNTER
The MRI will cover her sinuses and part of her ear. It is not specific to inner ear - that would be a different kind of MRI that would not assess the rest of her brain so we will start with MRI of the brain. I renewed the meds.    She can see an ENT doc

## 2017-12-07 ENCOUNTER — OFFICE VISIT (OUTPATIENT)
Dept: OPTOMETRY | Facility: CLINIC | Age: 57
End: 2017-12-07

## 2017-12-07 DIAGNOSIS — H52.13 MYOPIA WITH ASTIGMATISM AND PRESBYOPIA, BILATERAL: ICD-10-CM

## 2017-12-07 DIAGNOSIS — H52.203 MYOPIA WITH ASTIGMATISM AND PRESBYOPIA, BILATERAL: ICD-10-CM

## 2017-12-07 DIAGNOSIS — H52.4 MYOPIA WITH ASTIGMATISM AND PRESBYOPIA, BILATERAL: ICD-10-CM

## 2017-12-07 DIAGNOSIS — E11.9 TYPE 2 DIABETES MELLITUS WITHOUT COMPLICATION, UNSPECIFIED LONG TERM INSULIN USE STATUS: Primary | ICD-10-CM

## 2017-12-07 DIAGNOSIS — G43.109 OCULAR MIGRAINE: ICD-10-CM

## 2017-12-07 DIAGNOSIS — H25.13 AGE-RELATED NUCLEAR CATARACT OF BOTH EYES: ICD-10-CM

## 2017-12-07 PROCEDURE — 92015 DETERMINE REFRACTIVE STATE: CPT | Performed by: OPTOMETRIST

## 2017-12-07 PROCEDURE — 92014 COMPRE OPH EXAM EST PT 1/>: CPT | Performed by: OPTOMETRIST

## 2017-12-07 RX ORDER — KETOCONAZOLE 20 MG/G
CREAM TOPICAL
Refills: 0 | COMMUNITY
Start: 2017-11-14

## 2017-12-08 NOTE — PROGRESS NOTES
Kevan Andrade is a 61year old female. HPI:     HPI     Diabetic Eye Exam   Diabetes characteristics include Type 2, controlled with diet, taking oral medications and on insulin. Duration of 10 years.            Comments   Patient is in for an annual d hyperlipidemia    • Ovarian cyst 1980    Laparoscopic cystectomy   • Pregnancy 1990, 1991, 1993   • Sleep apnea    • Sleep apnea, obstructive 4/15/2015   • Spinal stenosis    • Spinal stenosis 2010    C5-6, C6-7, physical therapy   • TIA (transient ischemi Sodium (SINGULAIR) 10 MG Oral Tab Take 1 tablet (10 mg total) by mouth daily. Disp: 90 tablet Rfl: 3   triamcinolone acetonide 0.5 % External Cream Apply thin layer to affected area twice a day as directed.  Disp: 30 g Rfl: 1   AMLODIPINE BESYLATE 5 MG Oral nightly. Disp: 120 capsule Rfl: 1   Chlorhexidine Gluconate 0.12 % Mouth/Throat Solution Use as directed 15 mL in the mouth or throat 2 (two) times daily.    Disp:  Rfl: 2   TRIAMCINOLONE ACETONIDE 0.1 % External Cream APPLY EXTERNALLY TO THE AFFECTED AREA Right Left    Dist cc 20/30 20/30    Near cc 5pt 5pt    Correction:  Glasses          Tonometry (Non-contact air puff, 6:29 PM)       Right Left    Pressure 16 19          Pupils       Pupils    Right PERRL    Left PERRL          Visual Fields needed.       Type 2 diabetes mellitus without complication ([facility].)  I advised patient that there is no background diabetic retinopathy in either eye and that they should continue to keep their blood sugar under control and continue to see their physician as

## 2017-12-12 ENCOUNTER — HOSPITAL ENCOUNTER (OUTPATIENT)
Dept: MRI IMAGING | Age: 57
Discharge: HOME | End: 2017-12-12
Attending: FAMILY MEDICINE
Payer: COMMERCIAL

## 2017-12-12 DIAGNOSIS — G43.809 OTHER MIGRAINE WITHOUT STATUS MIGRAINOSUS, NOT INTRACTABLE: ICD-10-CM

## 2017-12-12 PROCEDURE — 70551 MRI BRAIN STEM W/O DYE: CPT | Performed by: FAMILY MEDICINE

## 2017-12-13 ENCOUNTER — TELEPHONE (OUTPATIENT)
Dept: OTHER | Age: 57
End: 2017-12-13

## 2017-12-13 NOTE — TELEPHONE ENCOUNTER
Hi Dr. Samy Lester,    It looks like the referral for the dietician is still in open status and has not yet been authorized.   I have an appointment next week and do not want to run into any issues.  Can you please ask your staff to follow up on this referral and

## 2017-12-13 NOTE — PROGRESS NOTES
MRI brain shows no concerning causes for your pain. Can see ENT doctor for another opinion and/or neurologist. See ENT first.  - Dr. Kyung Joel

## 2017-12-14 ENCOUNTER — PATIENT MESSAGE (OUTPATIENT)
Dept: FAMILY MEDICINE CLINIC | Facility: CLINIC | Age: 57
End: 2017-12-14

## 2017-12-14 ENCOUNTER — OFFICE VISIT (OUTPATIENT)
Dept: OTOLARYNGOLOGY | Facility: CLINIC | Age: 57
End: 2017-12-14

## 2017-12-14 ENCOUNTER — TELEPHONE (OUTPATIENT)
Dept: OTOLARYNGOLOGY | Facility: CLINIC | Age: 57
End: 2017-12-14

## 2017-12-14 VITALS
SYSTOLIC BLOOD PRESSURE: 112 MMHG | TEMPERATURE: 99 F | DIASTOLIC BLOOD PRESSURE: 70 MMHG | BODY MASS INDEX: 34.15 KG/M2 | WEIGHT: 200 LBS | HEIGHT: 64 IN

## 2017-12-14 DIAGNOSIS — H92.09 OTALGIA, UNSPECIFIED LATERALITY: Primary | ICD-10-CM

## 2017-12-14 PROCEDURE — 99203 OFFICE O/P NEW LOW 30 MIN: CPT | Performed by: OTOLARYNGOLOGY

## 2017-12-14 PROCEDURE — 99212 OFFICE O/P EST SF 10 MIN: CPT | Performed by: OTOLARYNGOLOGY

## 2017-12-14 RX ORDER — CELECOXIB 200 MG/1
CAPSULE ORAL
Qty: 90 CAPSULE | Refills: 0 | Status: SHIPPED | OUTPATIENT
Start: 2017-12-14 | End: 2018-03-30

## 2017-12-14 RX ORDER — AZELASTINE 1 MG/ML
2 SPRAY, METERED NASAL 2 TIMES DAILY
Qty: 1 BOTTLE | Refills: 0 | Status: SHIPPED | OUTPATIENT
Start: 2017-12-14 | End: 2018-08-01

## 2017-12-14 RX ORDER — CELECOXIB 200 MG/1
200 CAPSULE ORAL DAILY PRN
Qty: 30 CAPSULE | Refills: 0 | Status: SHIPPED | OUTPATIENT
Start: 2017-12-14 | End: 2017-12-14

## 2017-12-14 NOTE — PROGRESS NOTES
Jaydon Juan is a 61year old female. Patient presents with:  Headache: c/o headace. migraines for 6 months, MRI  of the brain done 12/12/17      HISTORY OF PRESENT ILLNESS  She presents with a four-month history of worsening migraines.   She does have a patient does live in a home with stairs.         Lives with         Work             Family History   Problem Relation Age of Onset   • Colon Cancer Mother    • Heart Disease Mother      coronary artery disease   • Hypertension Mother scanned to media tab  1980: OTHER SURGICAL HISTORY      Comment: Laparoscopic cystectomy  2005: OTHER SURGICAL HISTORY      Comment: Endometrial biopsy  2008: OTHER SURGICAL HISTORY      Comment: Neg endometrial biopsy  02/21/2017: SPINE SURGERY PROCEDURE Lymph Detail Normal Submental. Submandibular. Anterior cervical. Posterior cervical. Supraclavicular. TMJ   very tender to palpation on the right.    Nose/Mouth/Throat Normal External nose - Normal. Lips/teeth/gums - Normal. Tonsils - Normal. Oropharynx Suspension, INSTILL 2 SPRAYS INTRANASALLY AS NEEDED AS DIRECTED once daily, Disp: 3 Bottle, Rfl: 4  •  RESTASIS 0.05 % Ophthalmic Emulsion, INSTILL 1 DROP IN AFFECTED EYE(S) TWICE DAILY( EVERY TWELVE HOURS), Disp: 180 mL, Rfl: 0  •  ATORVASTATIN 40 MG Oral laterality  Significant TMJ tenderness on the right with normal ear exam.  I suspect significant TMJ inflammation which could be a trigger of her migraines. Start Celebrex which she has tolerated in the past as well as warm heat and soft diet.   I will ask

## 2017-12-14 NOTE — TELEPHONE ENCOUNTER
Rx request for Celecoxib 200 mg, pt requesting a 90 day supply for insurance to cover rx. Please review. Thank you. LOV: 12/14/17  Last refill 12/14/17 #30 with 0 refills.

## 2017-12-16 NOTE — TELEPHONE ENCOUNTER
From: Marylene Kid  To: Solis Queen MD  Sent: 12/14/2017 2:00 PM CST  Subject: Referral Request    Hi Dr. Lalo Nguyen,    I saw the ENT today (Dr. Shaniqua Zepeda). He feels my headaches are TMJ related and wants me to go for PT for the TMJ.  I need a referral

## 2017-12-21 ENCOUNTER — HOSPITAL ENCOUNTER (OUTPATIENT)
Dept: ENDOCRINOLOGY | Facility: HOSPITAL | Age: 57
Discharge: HOME | End: 2017-12-21
Attending: FAMILY MEDICINE
Payer: COMMERCIAL

## 2017-12-21 VITALS — WEIGHT: 200.31 LBS | BODY MASS INDEX: 34 KG/M2

## 2017-12-21 DIAGNOSIS — R42 DIZZINESS: Primary | ICD-10-CM

## 2017-12-21 DIAGNOSIS — J30.9 CHRONIC ALLERGIC RHINITIS, UNSPECIFIED SEASONALITY, UNSPECIFIED TRIGGER: Primary | ICD-10-CM

## 2017-12-21 NOTE — PROGRESS NOTES
Chidi Patient  : 10/15/1960 was seen for Diabetic Medical Nutrition Therapy:    Date: 2017  Start time: 56  End time: 9896    H/o Type 2 diabetes, HTN, high cholesterol.   Pt reports recent problems with headaches progressing to migraines, sin antibodies. However, some people who do not test positive do still experience some symptoms after eating gluten.          Roger Traore, RD

## 2018-01-18 RX ORDER — AMLODIPINE BESYLATE 5 MG/1
TABLET ORAL
Qty: 90 TABLET | Refills: 0 | Status: SHIPPED | OUTPATIENT
Start: 2018-01-18 | End: 2018-05-08

## 2018-01-18 NOTE — TELEPHONE ENCOUNTER
Medication refilled for 90 days per protocol.     Hypertensive Medications  Protocol Criteria:  · Appointment scheduled in the past 6 months or in the next 3 months  · BMP or CMP in the past 12 months  · Creatinine result < 2  Recent Outpatient Visits 02/21/2017   CO2 26 02/21/2017   GLOBULIN 2.5 02/04/2017   AGRATIO 1.4 07/09/2016   ANIONGAP 8 02/21/2017   OSMOCALC 290 02/21/2017

## 2018-01-20 ENCOUNTER — APPOINTMENT (OUTPATIENT)
Dept: ENDOCRINOLOGY | Facility: HOSPITAL | Age: 58
End: 2018-01-20
Attending: FAMILY MEDICINE
Payer: COMMERCIAL

## 2018-01-25 ENCOUNTER — LAB ENCOUNTER (OUTPATIENT)
Dept: LAB | Age: 58
End: 2018-01-25
Attending: FAMILY MEDICINE
Payer: COMMERCIAL

## 2018-01-25 DIAGNOSIS — J30.9 CHRONIC ALLERGIC RHINITIS: ICD-10-CM

## 2018-01-25 PROCEDURE — 86003 ALLG SPEC IGE CRUDE XTRC EA: CPT

## 2018-01-25 PROCEDURE — 83516 IMMUNOASSAY NONANTIBODY: CPT

## 2018-01-25 PROCEDURE — 86256 FLUORESCENT ANTIBODY TITER: CPT

## 2018-01-25 PROCEDURE — 82785 ASSAY OF IGE: CPT

## 2018-01-25 PROCEDURE — 36415 COLL VENOUS BLD VENIPUNCTURE: CPT

## 2018-01-26 LAB
GLIADIN IGA SER-ACNC: 5.8 U/ML (ref ?–7)
GLIADIN IGG SER-ACNC: <0.4 U/ML (ref ?–7)
TTG IGA SER-ACNC: 0.2 U/ML (ref ?–7)
TTG IGG SER-ACNC: <0.6 U/ML (ref ?–7)

## 2018-01-27 ENCOUNTER — PATIENT MESSAGE (OUTPATIENT)
Dept: FAMILY MEDICINE CLINIC | Facility: CLINIC | Age: 58
End: 2018-01-27

## 2018-01-29 NOTE — TELEPHONE ENCOUNTER
From: Mally Ng  To: Becca Gonsales MD  Sent: 1/27/2018 5:21 PM CST  Subject: Prescription Question    Hi Dr. Radford Sites,    I just found out that my new insurance will pay for generic Prevacid as my old insurance would not.   Can you please put in a pres

## 2018-01-29 NOTE — TELEPHONE ENCOUNTER
Patient states she is currently taking Lansoprazole OTC 15 mg ( she takes two 15 mg tablets in them morning and 1 tablet at night). States she was on lansoprazole in past and 30 mg in the morning and night was her dose.  Still getting getting acid reflux at

## 2018-01-30 LAB
CLAM IGE QN: <0.1 KUA/L (ref ?–0.1)
CODFISH IGE QN: <0.1 KUA/L (ref ?–0.1)
CORN IGE QN: <0.1 KUA/L (ref ?–0.1)
COW MILK IGE QN: <0.1 KUA/L (ref ?–0.1)
EGG WHITE IGE QN: <0.1 KUA/L (ref ?–0.1)
IGE SERPL-ACNC: 22.5 KU/L (ref 2–214)
PEANUT IGE QN: <0.1 KUA/L (ref ?–0.1)
SCALLOP IGE QN: <0.1 KUA/L (ref ?–0.1)
SESAME SEED IGE QN: <0.1 KUA/L (ref ?–0.1)
SHRIMP IGE QN: <0.1 KUA/L (ref ?–0.1)
SOYBEAN IGE QN: <0.1 KUA/L (ref ?–0.1)
WALNUT IGE QN: <0.1 KUA/L (ref ?–0.1)
WHEAT IGE QN: <0.1 KUA/L (ref ?–0.1)

## 2018-01-30 RX ORDER — DEXLANSOPRAZOLE 60 MG/1
60 CAPSULE, DELAYED RELEASE ORAL DAILY
Qty: 90 CAPSULE | Refills: 4 | Status: SHIPPED | OUTPATIENT
Start: 2018-01-30 | End: 2018-02-06

## 2018-01-31 ENCOUNTER — PATIENT MESSAGE (OUTPATIENT)
Dept: FAMILY MEDICINE CLINIC | Facility: CLINIC | Age: 58
End: 2018-01-31

## 2018-01-31 ENCOUNTER — TELEPHONE (OUTPATIENT)
Dept: OPHTHALMOLOGY | Facility: CLINIC | Age: 58
End: 2018-01-31

## 2018-01-31 ENCOUNTER — TELEPHONE (OUTPATIENT)
Dept: OTHER | Age: 58
End: 2018-01-31

## 2018-01-31 DIAGNOSIS — K22.719 BARRETT'S ESOPHAGUS WITH DYSPLASIA: ICD-10-CM

## 2018-01-31 DIAGNOSIS — H43.391 FLOATERS, RIGHT: Primary | ICD-10-CM

## 2018-01-31 NOTE — TELEPHONE ENCOUNTER
From: Agnes Schaefer  To: Audelia Díaz MD  Sent: 1/31/2018 12:21 PM CST  Subject: Prescription Question    Hi Dr. Law Chavez,    I am SO SORRY to be such a thorn in your side.  It appears per the pharmacist there is no generic at this time for Dexilant and t

## 2018-01-31 NOTE — TELEPHONE ENCOUNTER
See other encounter 1/31/18 addressed by Bere Yun with Dr Geo Avila. Pt has appt with Dr Carvalho No 2/1/18 @ 1:15pm. [facility]. Tasked to manage care.      Pt informed if she developes severe visual disturbances, dizziness, swelling with rt eye pr

## 2018-01-31 NOTE — TELEPHONE ENCOUNTER
Pt calling about MyChart message copied below; educated to always call about symptoms instead of sending message.  Pt states has appt tomorrow scheduled with Dr Maryjo Savage after speaking with a nurse at that office (see 1/31/18 encounter with ophthalmology) a

## 2018-01-31 NOTE — TELEPHONE ENCOUNTER
Pt complains of a large floater in her right eye x 1 week. Pt states that she will get the referral started today. OV booked at 1:15 with RUFINA tomorrow.

## 2018-01-31 NOTE — TELEPHONE ENCOUNTER
pt called. She notice a large floater yesterday, she never had one this big. LOV 12/07/17 with [facility]. Please advise.

## 2018-01-31 NOTE — TELEPHONE ENCOUNTER
Advised patient referral had been completed. Patient verbalized understanding and will call to schedule ophthalmology appt.

## 2018-02-01 ENCOUNTER — TELEPHONE (OUTPATIENT)
Dept: FAMILY MEDICINE CLINIC | Facility: CLINIC | Age: 58
End: 2018-02-01

## 2018-02-01 ENCOUNTER — OFFICE VISIT (OUTPATIENT)
Dept: OPHTHALMOLOGY | Facility: CLINIC | Age: 58
End: 2018-02-01

## 2018-02-01 ENCOUNTER — TELEPHONE (OUTPATIENT)
Dept: OPHTHALMOLOGY | Facility: CLINIC | Age: 58
End: 2018-02-01

## 2018-02-01 DIAGNOSIS — E10.9 TYPE 1 DIABETES MELLITUS WITHOUT RETINOPATHY (HCC): ICD-10-CM

## 2018-02-01 DIAGNOSIS — H43.391 FLOATER, VITREOUS, RIGHT: Primary | ICD-10-CM

## 2018-02-01 DIAGNOSIS — H25.13 AGE-RELATED NUCLEAR CATARACT OF BOTH EYES: ICD-10-CM

## 2018-02-01 PROCEDURE — 99243 OFF/OP CNSLTJ NEW/EST LOW 30: CPT | Performed by: OPHTHALMOLOGY

## 2018-02-01 PROCEDURE — 99212 OFFICE O/P EST SF 10 MIN: CPT | Performed by: OPHTHALMOLOGY

## 2018-02-01 NOTE — TELEPHONE ENCOUNTER
Pt states she was seen for floaters today, asking if OV would be considered a DM EE? States she is concerned about billing because she had a DM EE on 12/2017 and she was just seen for an eye problem not EE. Pls advise thank you.

## 2018-02-01 NOTE — PROGRESS NOTES
Esperanza Quinn is a 61year old female.     HPI:     HPI     Consult    Additional comments: Consult per Dr. Luz Benavides    Additional comments: Pt has been a diabetic for 10 years  10 years on pills/  Last 2 years on Insulin   Pt check counseling 4/15/2015   • Type II or unspecified type diabetes mellitus without mention of complication, not stated as uncontrolled 2005   • Unspecified essential hypertension 2003       Surgical History: Viktor Kae has a past surgical history that incl (two) times daily.  Disp: 180 tablet Rfl: 0   TOPIRAMATE 25 MG Oral Tab TAKE 1 TABLET BY MOUTH TWICE DAILY Disp: 180 tablet Rfl: 1   MICROLET LANCETS Does not apply Misc TEST QID Disp:  Rfl: 2   Montelukast Sodium (SINGULAIR) 10 MG Oral Tab Take 1 tablet (1 clidinium-chlordiazepoxide 5-2.5 MG Oral Cap Take 1 capsule by mouth 4 (four) times daily before meals and nightly.  Disp: 120 capsule Rfl: 1   Chlorhexidine Gluconate 0.12 % Mouth/Throat Solution Use as directed 15 mL in the mouth or throat 2 (two) times PERRL    Left PERRL          Dilation     Both eyes:  Paremyd @ 1:18 PM            Additional Tests     Amsler       Right Left     Normal Normal            Slit Lamp and Fundus Exam     External Exam       Right Left    External Normal Normal          Sli this encounter     Follow up instructions:  Return in about 1 year (around 2/1/2019) for Diabetic eye exam.    2/1/2018  Scribed by: Serafin Kennedy MD

## 2018-02-01 NOTE — PATIENT INSTRUCTIONS
Floater, vitreous, right   There is no evidence of retinal pathology. All signs and symptoms of retinal detachment/tears explained in detail.     Patient instructed to call the office if they experience increase in floaters, increase in flashes of light, l is not intended as a substitute for professional medical care. Always follow your healthcare professional's instructions.         Treating Flashes and Floaters    Most often, seeing a few flashes and floaters is normal. Also, some people may notice them for

## 2018-02-05 ENCOUNTER — OFFICE VISIT (OUTPATIENT)
Dept: PHYSICAL THERAPY | Facility: HOSPITAL | Age: 58
End: 2018-02-05
Attending: OTOLARYNGOLOGY
Payer: COMMERCIAL

## 2018-02-05 DIAGNOSIS — H92.09 OTALGIA, UNSPECIFIED LATERALITY: ICD-10-CM

## 2018-02-05 PROCEDURE — 97162 PT EVAL MOD COMPLEX 30 MIN: CPT

## 2018-02-05 PROCEDURE — 97110 THERAPEUTIC EXERCISES: CPT

## 2018-02-05 NOTE — TELEPHONE ENCOUNTER
Dr Jer Adame, do you want to do a PA on the [facility] or just order lansoprazole?

## 2018-02-05 NOTE — PROGRESS NOTES
TMJ EVALUATION:    Referring Physician: Alex Duff MD  Date of Onset:   7709 Date of Service: 2/5/18   Dx:  Otalgia, unspecified laterality (H92.09)  SUBJECTIVE:     PATIENT SUMMARY:  Chelo Erazo year 55old y/o female who presents to therapy today with com pain.  Based on evaluation findings,  Megan Inman would benefit from skilled therapy services to address these impairments and progress her towards pain free and functional use of jaw.   OBJECTIVE:     Observation/Posture: Slumped with forward head    ROM: ( W=w range to tolerate daily activities without increasing tension. 2. Improve postural awareness to facilitate Sx management and aide with decreasing paracervical tension.   3. Improve Vertical ROM to 48 mm(or better),and lateral excursion to 9 mm or greater b From: 2/5/2018     To: 5/5/2018

## 2018-02-06 RX ORDER — LANSOPRAZOLE 15 MG/1
CAPSULE, DELAYED RELEASE ORAL
Qty: 270 CAPSULE | Refills: 0 | Status: SHIPPED | OUTPATIENT
Start: 2018-02-06 | End: 2018-06-07

## 2018-02-07 ENCOUNTER — OFFICE VISIT (OUTPATIENT)
Dept: PHYSICAL THERAPY | Facility: HOSPITAL | Age: 58
End: 2018-02-07
Attending: OTOLARYNGOLOGY
Payer: COMMERCIAL

## 2018-02-07 DIAGNOSIS — H92.09 OTALGIA, UNSPECIFIED LATERALITY: ICD-10-CM

## 2018-02-07 PROCEDURE — 97140 MANUAL THERAPY 1/> REGIONS: CPT

## 2018-02-07 PROCEDURE — 97110 THERAPEUTIC EXERCISES: CPT

## 2018-02-07 NOTE — PROGRESS NOTES
Diagnosis:Otalgia, unspecified laterality (H92.09)  Visit #/ Authorized # of Visits: 2/8       Next MD visit: none scheduled  Fall Risk: standard         Precautions: n/a           Medication Changes since last visit?: No  Subjective: No questions regardin

## 2018-02-12 ENCOUNTER — OFFICE VISIT (OUTPATIENT)
Dept: PHYSICAL THERAPY | Facility: HOSPITAL | Age: 58
End: 2018-02-12
Attending: OTOLARYNGOLOGY
Payer: COMMERCIAL

## 2018-02-12 DIAGNOSIS — H92.09 OTALGIA, UNSPECIFIED LATERALITY: ICD-10-CM

## 2018-02-12 PROCEDURE — 97140 MANUAL THERAPY 1/> REGIONS: CPT

## 2018-02-12 NOTE — PROGRESS NOTES
Diagnosis:Otalgia, unspecified laterality (H92.09)  Visit #/ Authorized # of Visits: 3/8       Next MD visit: none scheduled  Fall Risk: standard         Precautions: n/a           Medication Changes since last visit?: No  Subjective: Reported having a hea

## 2018-02-14 ENCOUNTER — OFFICE VISIT (OUTPATIENT)
Dept: PHYSICAL THERAPY | Facility: HOSPITAL | Age: 58
End: 2018-02-14
Attending: OTOLARYNGOLOGY
Payer: COMMERCIAL

## 2018-02-14 DIAGNOSIS — H92.09 OTALGIA, UNSPECIFIED LATERALITY: ICD-10-CM

## 2018-02-14 PROCEDURE — 97140 MANUAL THERAPY 1/> REGIONS: CPT

## 2018-02-14 NOTE — PROGRESS NOTES
Diagnosis:Otalgia, unspecified laterality (H92.09)  Visit #/ Authorized # of Visits: 4/8       Next MD visit: none scheduled  Fall Risk: standard         Precautions: n/a           Medication Changes since last visit?: No  Subjective: Reported having to ta

## 2018-02-19 ENCOUNTER — OFFICE VISIT (OUTPATIENT)
Dept: PHYSICAL THERAPY | Facility: HOSPITAL | Age: 58
End: 2018-02-19
Attending: OTOLARYNGOLOGY
Payer: COMMERCIAL

## 2018-02-19 DIAGNOSIS — H92.09 OTALGIA, UNSPECIFIED LATERALITY: ICD-10-CM

## 2018-02-19 PROCEDURE — 97140 MANUAL THERAPY 1/> REGIONS: CPT

## 2018-02-19 PROCEDURE — 97110 THERAPEUTIC EXERCISES: CPT

## 2018-02-19 NOTE — PROGRESS NOTES
Diagnosis:Otalgia, unspecified laterality (H92.09)  Visit #/ Authorized # of Visits: 5/8       Next MD visit: none scheduled  Fall Risk: standard         Precautions: n/a           Medication Changes since last visit?: No  Subjective: Noted nausea  after l

## 2018-02-21 ENCOUNTER — APPOINTMENT (OUTPATIENT)
Dept: PHYSICAL THERAPY | Facility: HOSPITAL | Age: 58
End: 2018-02-21
Attending: OTOLARYNGOLOGY
Payer: COMMERCIAL

## 2018-02-26 ENCOUNTER — OFFICE VISIT (OUTPATIENT)
Dept: PHYSICAL THERAPY | Facility: HOSPITAL | Age: 58
End: 2018-02-26
Attending: OTOLARYNGOLOGY
Payer: COMMERCIAL

## 2018-02-26 DIAGNOSIS — H92.09 OTALGIA, UNSPECIFIED LATERALITY: ICD-10-CM

## 2018-02-26 PROCEDURE — 97140 MANUAL THERAPY 1/> REGIONS: CPT

## 2018-02-26 PROCEDURE — 97110 THERAPEUTIC EXERCISES: CPT

## 2018-02-26 NOTE — PROGRESS NOTES
Diagnosis:Otalgia, unspecified laterality (H92.09)  Visit #/ Authorized # of Visits: 6/8       Next MD visit: none scheduled  Fall Risk: standard         Precautions: n/a           Medication Changes since last visit?: No  Subjective: \"Tuesday was the onl

## 2018-02-28 ENCOUNTER — OFFICE VISIT (OUTPATIENT)
Dept: PHYSICAL THERAPY | Facility: HOSPITAL | Age: 58
End: 2018-02-28
Attending: OTOLARYNGOLOGY
Payer: COMMERCIAL

## 2018-02-28 DIAGNOSIS — H92.09 OTALGIA, UNSPECIFIED LATERALITY: ICD-10-CM

## 2018-02-28 PROCEDURE — 97110 THERAPEUTIC EXERCISES: CPT

## 2018-02-28 PROCEDURE — 97140 MANUAL THERAPY 1/> REGIONS: CPT

## 2018-02-28 NOTE — PROGRESS NOTES
Diagnosis:Otalgia, unspecified laterality (H92.09)  Visit #/ Authorized # of Visits: 7/8       Next MD visit: none scheduled  Fall Risk: standard         Precautions: n/a           Medication Changes since last visit?: No  Subjective: Mild soreness noted a

## 2018-03-05 ENCOUNTER — OFFICE VISIT (OUTPATIENT)
Dept: PHYSICAL THERAPY | Facility: HOSPITAL | Age: 58
End: 2018-03-05
Attending: OTOLARYNGOLOGY
Payer: COMMERCIAL

## 2018-03-05 PROCEDURE — 97110 THERAPEUTIC EXERCISES: CPT

## 2018-03-05 PROCEDURE — 97140 MANUAL THERAPY 1/> REGIONS: CPT

## 2018-03-05 NOTE — PROGRESS NOTES
Diagnosis:Otalgia, unspecified laterality (H92.09)  Visit #/ Authorized # of Visits: 8/8       Next MD visit: none scheduled  Fall Risk: standard         Precautions: n/a           Medication Changes since last visit?: No  Subjective: \"Today I'm fine.  Yes                Joint/Accessory movements:              Distraction:  Slightly restricted on Rt.             OYNCDPV Glide:  No significant restriction              Anterior translation: Slightly restricted on Rt.      TMD Disability Index Questionnaire = 4

## 2018-03-05 NOTE — PROGRESS NOTES
Patient Name: Verena Mullins, : 10/15/1960, MRN: [id number]   Date:  3/5/2018  Referring Physician:  Mario Hollingsworth    Diagnosis: Diagnosis: Otalgia, unspecified laterality (H92.09)    Progress Summary    Pt has attended 8 visits in Physical Therapy. closing of mouth  Provocation tests:                TMJ Compression: Posterior= (-); Contralateral= (-)              Distraction: (-) bilaterally  Observation/palpation:               Resting position of mandible and tongue:  WNL              Teeth position return this letter via fax as soon as possible to [phone]. If you have any questions, please contact me at Dept: [phone].     Sincerely,  Graham Clark    Electronically signed by therapist: Graham Clark, PT    Physician's certification require

## 2018-03-09 ENCOUNTER — TELEPHONE (OUTPATIENT)
Dept: OTOLARYNGOLOGY | Facility: CLINIC | Age: 58
End: 2018-03-09

## 2018-03-09 DIAGNOSIS — M26.69 TMJ INFLAMMATION: Primary | ICD-10-CM

## 2018-03-09 NOTE — TELEPHONE ENCOUNTER
[facility] PT Dept requesting 6 additional PT sessions. Requesting a referral to be placed in system. Please advise thank you.

## 2018-03-10 NOTE — TELEPHONE ENCOUNTER
AMELIA for [facility] PT and advised order in the system.

## 2018-03-13 RX ORDER — PROPRANOLOL HYDROCHLORIDE 60 MG/1
TABLET ORAL
Qty: 180 TABLET | Refills: 4 | Status: SHIPPED | OUTPATIENT
Start: 2018-03-13 | End: 2019-02-22

## 2018-03-22 DIAGNOSIS — G47.33 SLEEP APNEA, OBSTRUCTIVE: Primary | ICD-10-CM

## 2018-03-26 ENCOUNTER — NURSE TRIAGE (OUTPATIENT)
Dept: OTHER | Age: 58
End: 2018-03-26

## 2018-03-26 ENCOUNTER — TELEPHONE (OUTPATIENT)
Dept: OPTOMETRY | Facility: CLINIC | Age: 58
End: 2018-03-26

## 2018-03-26 NOTE — TELEPHONE ENCOUNTER
LM that if she had them adjusted and she cannot see as well as she would like to call and make and appointment for a NC glasses recheck.

## 2018-03-26 NOTE — TELEPHONE ENCOUNTER
Pt states she bought glasses with prescription given, states she has had the glasses adjusted a few times, states she is not seeing well for distance portion, asking how long does it take to get used to the new prescription? Pls advise thank you.

## 2018-03-26 NOTE — TELEPHONE ENCOUNTER
Action Requested: Summary for Provider     []  Critical Lab, Recommendations Needed  [] Need Additional Advice  [x]   FYI    []   Need Orders  [] Need Medications Sent to Pharmacy  []  Other     SUMMARY: Pt with 2-3 weeks of lower back pain that radiates d

## 2018-03-29 ENCOUNTER — OFFICE VISIT (OUTPATIENT)
Dept: FAMILY MEDICINE CLINIC | Facility: CLINIC | Age: 58
End: 2018-03-29

## 2018-03-29 ENCOUNTER — APPOINTMENT (OUTPATIENT)
Dept: LAB | Age: 58
End: 2018-03-29
Attending: FAMILY MEDICINE
Payer: COMMERCIAL

## 2018-03-29 ENCOUNTER — HOSPITAL ENCOUNTER (OUTPATIENT)
Dept: GENERAL RADIOLOGY | Age: 58
Discharge: HOME | End: 2018-03-29
Attending: FAMILY MEDICINE
Payer: COMMERCIAL

## 2018-03-29 VITALS
DIASTOLIC BLOOD PRESSURE: 68 MMHG | BODY MASS INDEX: 34.49 KG/M2 | TEMPERATURE: 98 F | SYSTOLIC BLOOD PRESSURE: 128 MMHG | HEIGHT: 64 IN | WEIGHT: 202 LBS

## 2018-03-29 DIAGNOSIS — M79.661 PAIN IN BOTH LOWER LEGS: ICD-10-CM

## 2018-03-29 DIAGNOSIS — I70.0 ATHEROSCLEROSIS OF ABDOMINAL AORTA (HCC): ICD-10-CM

## 2018-03-29 DIAGNOSIS — E78.2 MIXED HYPERLIPIDEMIA: ICD-10-CM

## 2018-03-29 DIAGNOSIS — M54.41 ACUTE BILATERAL LOW BACK PAIN WITH BILATERAL SCIATICA: ICD-10-CM

## 2018-03-29 DIAGNOSIS — M54.42 ACUTE BILATERAL LOW BACK PAIN WITH BILATERAL SCIATICA: ICD-10-CM

## 2018-03-29 DIAGNOSIS — M79.662 PAIN IN BOTH LOWER LEGS: ICD-10-CM

## 2018-03-29 DIAGNOSIS — M79.10 MUSCLE PAIN: ICD-10-CM

## 2018-03-29 DIAGNOSIS — M54.42 ACUTE BILATERAL LOW BACK PAIN WITH BILATERAL SCIATICA: Primary | ICD-10-CM

## 2018-03-29 DIAGNOSIS — M54.41 ACUTE BILATERAL LOW BACK PAIN WITH BILATERAL SCIATICA: Primary | ICD-10-CM

## 2018-03-29 PROCEDURE — 99214 OFFICE O/P EST MOD 30 MIN: CPT | Performed by: FAMILY MEDICINE

## 2018-03-29 PROCEDURE — 72110 X-RAY EXAM L-2 SPINE 4/>VWS: CPT | Performed by: FAMILY MEDICINE

## 2018-03-29 PROCEDURE — 80048 BASIC METABOLIC PNL TOTAL CA: CPT

## 2018-03-29 PROCEDURE — 99212 OFFICE O/P EST SF 10 MIN: CPT | Performed by: FAMILY MEDICINE

## 2018-03-29 PROCEDURE — 36415 COLL VENOUS BLD VENIPUNCTURE: CPT

## 2018-03-29 PROCEDURE — 83735 ASSAY OF MAGNESIUM: CPT

## 2018-03-29 RX ORDER — PEN NEEDLE, DIABETIC 32GX 5/32"
NEEDLE, DISPOSABLE MISCELLANEOUS
Refills: 2 | COMMUNITY
Start: 2018-03-13 | End: 2019-09-12

## 2018-03-29 RX ORDER — DOXYCYCLINE 100 MG/1
CAPSULE ORAL
Refills: 0 | COMMUNITY
Start: 2018-01-04 | End: 2018-08-01 | Stop reason: ALTCHOICE

## 2018-03-29 NOTE — PROGRESS NOTES
Patient ID: Shadi Arriaga is a 61year old female.     HPI  Patient presents with:  Low Back Pain  Leg Pain    Action Requested: Summary for Provider     []  Critical Lab, Recommendations Needed  [] Need Additional Advice  [x]   FYI    []   Need Orders  [ leg all the way down to the foot. Her HLA-B27 test was negative and January 2015. She states sometimes lying down helps the pressure and sometimes it makes it worse.   She does have Robaxin and Celebrex at home which she states she is able to tolerate a 2010    C5-6, C6-7, physical therapy   • TIA (transient ischemic attack)    • Tobacco abuse counseling 4/15/2015   • Type II or unspecified type diabetes mellitus without mention of complication, not stated as uncontrolled 2005   • Unspecified essential hy Cream Apply thin layer to affected area twice a day as directed.  Disp: 30 g Rfl: 1   glimepiride 4 MG Oral Tab TAKE 1 TABLET BY MOUTH EVERY MORNING BEFORE BREAKFAST Disp: 90 tablet Rfl: 1   insulin glargine (LANTUS SOLOSTAR) 100 UNIT/ML Subcutaneous Soluti TWICE DAILY AS NEEDED Disp: 60 g Rfl: 0   Dicyclomine HCl (BENTYL) 20 MG Oral Tab Take 1 tablet (20 mg total) by mouth 4 (four) times daily as needed.  Disp: 120 tablet Rfl: 2     Allergies:  Clarithromycin          Nausea and vomiting    Comment:Other reac Diagnoses and all orders for this visit:    Acute bilateral low back pain with bilateral sciatica  -     MAGNESIUM; Future  -     BASIC METABOLIC PANEL (8); Future  -     XR LUMBAR SPINE (MIN 4 VIEWS) (CPT=72110);  Future  Exam is quite normal at this t

## 2018-04-02 ENCOUNTER — TELEPHONE (OUTPATIENT)
Dept: OTHER | Age: 58
End: 2018-04-02

## 2018-04-02 NOTE — TELEPHONE ENCOUNTER
Pt called back, verified with insurance that referral is needed prior to having US PV Arterial Lower Ext Exercise test.   Routing to managed care, please advise. Pt's test is scheduled for Wednesday 4/4/18.    Pt would like a phone call so that she knows th

## 2018-04-02 NOTE — TELEPHONE ENCOUNTER
Patient called about whether her insurance has approved the US of her extremities ordered by Dr Adriana Zepeda on 3/29/18. She has scheduled the procedure. Advised patient to call her insurance to verify and call back if needed; she agreed.

## 2018-04-03 NOTE — TELEPHONE ENCOUNTER
There is no referral on file for this exam . Please generate a referral for this imaging test so it can be processed through their insurance .     Thank You

## 2018-04-03 NOTE — TELEPHONE ENCOUNTER
Rec'd call from pt, she is concerned that she has not received authorization for test which is scheduled for tomorrow. Spoke to [facility] in [facility].  There is no need for a referral and no pre-authorization needed for this test.   Per [facility], if pt woul

## 2018-04-04 ENCOUNTER — HOSPITAL ENCOUNTER (OUTPATIENT)
Dept: ULTRASOUND IMAGING | Facility: HOSPITAL | Age: 58
Discharge: HOME | End: 2018-04-04
Attending: FAMILY MEDICINE
Payer: COMMERCIAL

## 2018-04-04 DIAGNOSIS — M79.10 MUSCLE PAIN: ICD-10-CM

## 2018-04-04 DIAGNOSIS — M54.42 ACUTE BILATERAL LOW BACK PAIN WITH BILATERAL SCIATICA: ICD-10-CM

## 2018-04-04 DIAGNOSIS — M79.661 PAIN IN BOTH LOWER LEGS: ICD-10-CM

## 2018-04-04 DIAGNOSIS — I70.0 ATHEROSCLEROSIS OF ABDOMINAL AORTA (HCC): ICD-10-CM

## 2018-04-04 DIAGNOSIS — E78.2 MIXED HYPERLIPIDEMIA: ICD-10-CM

## 2018-04-04 DIAGNOSIS — M54.41 ACUTE BILATERAL LOW BACK PAIN WITH BILATERAL SCIATICA: ICD-10-CM

## 2018-04-04 DIAGNOSIS — M79.662 PAIN IN BOTH LOWER LEGS: ICD-10-CM

## 2018-04-04 PROCEDURE — 93923 UPR/LXTR ART STDY 3+ LVLS: CPT | Performed by: FAMILY MEDICINE

## 2018-04-11 ENCOUNTER — TELEPHONE (OUTPATIENT)
Dept: PHYSICAL THERAPY | Age: 58
End: 2018-04-11

## 2018-04-11 ENCOUNTER — APPOINTMENT (OUTPATIENT)
Dept: PHYSICAL THERAPY | Facility: HOSPITAL | Age: 58
End: 2018-04-11
Attending: OTOLARYNGOLOGY
Payer: COMMERCIAL

## 2018-04-12 ENCOUNTER — OFFICE VISIT (OUTPATIENT)
Dept: OPTOMETRY | Facility: CLINIC | Age: 58
End: 2018-04-12

## 2018-04-12 DIAGNOSIS — H52.4 MYOPIA WITH ASTIGMATISM AND PRESBYOPIA, BILATERAL: Primary | ICD-10-CM

## 2018-04-12 DIAGNOSIS — H52.13 MYOPIA WITH ASTIGMATISM AND PRESBYOPIA, BILATERAL: Primary | ICD-10-CM

## 2018-04-12 DIAGNOSIS — H52.203 MYOPIA WITH ASTIGMATISM AND PRESBYOPIA, BILATERAL: Primary | ICD-10-CM

## 2018-04-12 NOTE — PROGRESS NOTES
Shanna Kinsey is a 61year old female. HPI:     HPI     Patient is in for a glasses recheck. She had a refraction in  and finally bought the glasses at [facility]. She  cannot see as well as she would like in the distance but reading vision is god. other surgical history (2005) (Endometrial biopsy); other surgical history (2008) (Neg endometrial biopsy); tubal ligation (1996); electrocardiogram, complete (2/5/2013) (scanned to media tab); and spine surgery procedure unlisted (02/21/2017) (cervical ). DAILY Disp: 180 tablet Rfl: 1   MICROLET LANCETS Does not apply Misc TEST QID Disp:  Rfl: 2   Montelukast Sodium (SINGULAIR) 10 MG Oral Tab Take 1 tablet (10 mg total) by mouth daily.  Disp: 90 tablet Rfl: 3   triamcinolone acetonide 0.5 % External Cream Ap Disp: 120 capsule Rfl: 1   Chlorhexidine Gluconate 0.12 % Mouth/Throat Solution Use as directed 15 mL in the mouth or throat 2 (two) times daily.    Disp:  Rfl: 2   TRIAMCINOLONE ACETONIDE 0.1 % External Cream APPLY EXTERNALLY TO THE AFFECTED AREA TWICE JAQUAN bilateral  New glasses RX given. I advised patient to have them check the progressive height to make sure it is not too high. NO CHARGE GLASSES RECHECK. No orders of the defined types were placed in this encounter.       Meds This Visit:    No presc

## 2018-04-12 NOTE — PATIENT INSTRUCTIONS
Myopia with astigmatism and presbyopia, bilateral  New glasses RX given. I advised patient to have them check the progressive height to make sure it is not too high. NO CHARGE GLASSES RECHECK.

## 2018-04-16 ENCOUNTER — APPOINTMENT (OUTPATIENT)
Dept: PHYSICAL THERAPY | Facility: HOSPITAL | Age: 58
End: 2018-04-16
Attending: OTOLARYNGOLOGY
Payer: COMMERCIAL

## 2018-04-16 ENCOUNTER — TELEPHONE (OUTPATIENT)
Dept: PHYSICAL THERAPY | Facility: HOSPITAL | Age: 58
End: 2018-04-16

## 2018-04-18 ENCOUNTER — OFFICE VISIT (OUTPATIENT)
Dept: PHYSICAL THERAPY | Facility: HOSPITAL | Age: 58
End: 2018-04-18
Attending: OTOLARYNGOLOGY
Payer: COMMERCIAL

## 2018-04-18 DIAGNOSIS — M26.69 TMJ INFLAMMATION: ICD-10-CM

## 2018-04-18 PROCEDURE — 97140 MANUAL THERAPY 1/> REGIONS: CPT

## 2018-04-18 PROCEDURE — 97164 PT RE-EVAL EST PLAN CARE: CPT

## 2018-04-18 NOTE — PROGRESS NOTES
Diagnosis: Otalgia, unspecified laterality (H92.09)  Visit #/ Authorized # of Visits: 9/14(+6 approved, total 14: ins[place]     Next MD visit: none scheduled  Fall Risk: standard         Precautions: n/a           Medication Changes since last visit                Joint/Accessory movements:              Distraction:  Slightly restricted on Rt.               [id number] Glide:  No significant restriction              Anterior translation: Slightly restricted on Rt.     -MHP to cervical and Suboccipital corey

## 2018-04-20 NOTE — TELEPHONE ENCOUNTER
Rx request for Temazepam 30 mg, UNABLE to fill per protocol. Please review. Thank you.     LOV: 3/29/18  Last refill: 8/19/17    Please reply to pool: REAL TITUSO LPN/CMA      Refill Protocol Appointment Criteria  · Appointment scheduled in the past 6 months

## 2018-04-21 RX ORDER — TEMAZEPAM 30 MG/1
CAPSULE ORAL
Qty: 90 CAPSULE | Refills: 1 | Status: SHIPPED | OUTPATIENT
Start: 2018-04-21 | End: 2019-01-21

## 2018-04-23 ENCOUNTER — OFFICE VISIT (OUTPATIENT)
Dept: PHYSICAL THERAPY | Facility: HOSPITAL | Age: 58
End: 2018-04-23
Attending: OTOLARYNGOLOGY
Payer: COMMERCIAL

## 2018-04-23 DIAGNOSIS — M26.69 TMJ INFLAMMATION: ICD-10-CM

## 2018-04-23 PROCEDURE — 97140 MANUAL THERAPY 1/> REGIONS: CPT

## 2018-04-23 PROCEDURE — 97110 THERAPEUTIC EXERCISES: CPT

## 2018-04-23 NOTE — PROGRESS NOTES
Diagnosis: Otalgia, unspecified laterality (H92.09)  Visit #/ Authorized # of Visits: 10/14(+6 approved, total 14: [place]     Next MD visit: none scheduled  Fall Risk: standard         Precautions: n/a           Medication Changes since last visi headaches by 50% so that patient may participate in social activities--MET. 5. Independent with HEP to allow for Sx management and progression toward goals--IN PROGRESS          Plan: Continue to progress as able    Charges:  TherEx1, MTx 3     Total Timed

## 2018-04-25 ENCOUNTER — OFFICE VISIT (OUTPATIENT)
Dept: PHYSICAL THERAPY | Facility: HOSPITAL | Age: 58
End: 2018-04-25
Attending: OTOLARYNGOLOGY
Payer: COMMERCIAL

## 2018-04-25 DIAGNOSIS — M26.69 TMJ INFLAMMATION: ICD-10-CM

## 2018-04-25 PROCEDURE — 97140 MANUAL THERAPY 1/> REGIONS: CPT

## 2018-04-25 NOTE — PROGRESS NOTES
Diagnosis: Otalgia, unspecified laterality (H92.09)  Visit #/ Authorized # of Visits: 11/14(+6 approved, total 14: [place]     Next MD visit: none scheduled  Fall Risk: standard         Precautions: n/a           Medication Changes since last visi

## 2018-04-30 ENCOUNTER — OFFICE VISIT (OUTPATIENT)
Dept: PHYSICAL THERAPY | Facility: HOSPITAL | Age: 58
End: 2018-04-30
Attending: OTOLARYNGOLOGY
Payer: COMMERCIAL

## 2018-04-30 DIAGNOSIS — M26.69 TMJ INFLAMMATION: ICD-10-CM

## 2018-04-30 PROCEDURE — 97530 THERAPEUTIC ACTIVITIES: CPT

## 2018-04-30 PROCEDURE — 97140 MANUAL THERAPY 1/> REGIONS: CPT

## 2018-04-30 NOTE — PROGRESS NOTES
Diagnosis: Otalgia, unspecified laterality (H92.09)  Visit #/ Authorized # of Visits: 12/14(+6 approved, total 14: [place]     Next MD visit: none scheduled  Fall Risk: standard         Precautions: n/a           Medication Changes since last visi motion to chew/bite and yawn without deviation or pain--MET FOR VERTICAL ROM; LATERAL REMAINS IN PROGRESS . 4. Subjective report of decreased headaches by 50% so that patient may participate in social activities--MET.   5. Independent with HEP to allow for

## 2018-05-02 ENCOUNTER — OFFICE VISIT (OUTPATIENT)
Dept: PHYSICAL THERAPY | Facility: HOSPITAL | Age: 58
End: 2018-05-02
Attending: OTOLARYNGOLOGY
Payer: COMMERCIAL

## 2018-05-02 ENCOUNTER — TELEPHONE (OUTPATIENT)
Dept: FAMILY MEDICINE CLINIC | Facility: CLINIC | Age: 58
End: 2018-05-02

## 2018-05-02 DIAGNOSIS — M26.69 TMJ INFLAMMATION: ICD-10-CM

## 2018-05-02 PROCEDURE — 97140 MANUAL THERAPY 1/> REGIONS: CPT

## 2018-05-02 NOTE — PROGRESS NOTES
Diagnosis: Otalgia, unspecified laterality (H92.09)  Visit #/ Authorized # of Visits: 13/14(+6 approved, total 14: [place]     Next MD visit: none scheduled  Fall Risk: standard         Precautions: n/a           Medication Changes since last visi

## 2018-05-02 NOTE — TELEPHONE ENCOUNTER
PA for Ciclopirox 8% solution completed with [employer] via CMM response time 3-5 business days [address].

## 2018-05-04 NOTE — TELEPHONE ENCOUNTER
PA denied. Nail infection must be confirmed with the appropriate lat testing and a copy of the laboratory report is required.

## 2018-05-05 NOTE — TELEPHONE ENCOUNTER
Insurance will not cover this neither. The other option is oral medications - lamisil but I do not recommend that either because she usually has so many side effects usually.

## 2018-05-07 ENCOUNTER — OFFICE VISIT (OUTPATIENT)
Dept: PHYSICAL THERAPY | Facility: HOSPITAL | Age: 58
End: 2018-05-07
Attending: OTOLARYNGOLOGY
Payer: COMMERCIAL

## 2018-05-07 DIAGNOSIS — M26.69 TMJ INFLAMMATION: ICD-10-CM

## 2018-05-07 PROCEDURE — 97110 THERAPEUTIC EXERCISES: CPT

## 2018-05-07 PROCEDURE — 97140 MANUAL THERAPY 1/> REGIONS: CPT

## 2018-05-07 NOTE — PROGRESS NOTES
Diagnosis: Otalgia, unspecified laterality (H92.09)  Visit #/ Authorized # of Visits: 14/14(+6 approved, total 14: [place]     Next MD visit: none scheduled  Fall Risk: standard         Precautions: n/a           Medication Changes since last visi shrugs with Blue TB x 15--issued as HEP  Reviewed sleeping positions with pillow  Tennis ball massage x 5 min  Headache releases in supine x 5, abolished, B    Assessment: Patient progressed weel in therapy with improved mandibular ROM, no mandibular devia

## 2018-05-07 NOTE — PROGRESS NOTES
Patient Name: Chidi Patient, : 10/15/1960, MRN: [id number]   Date:  2018  Referring Physician:  Nona López    Diagnosis: Otalgia, unspecified laterality (H92.09)    Discharge Summary    Pt has attended 14 visits in Physical Therapy.     Layne WNL              Palpation:  tenderness to UTs only  Joint Mobility:                Joint/Accessory movements:              Distraction:  No restrictions noted              Lateral Glide:  No significant restriction              Anterior translation: Sligh

## 2018-05-09 ENCOUNTER — APPOINTMENT (OUTPATIENT)
Dept: PHYSICAL THERAPY | Facility: HOSPITAL | Age: 58
End: 2018-05-09
Attending: OTOLARYNGOLOGY
Payer: COMMERCIAL

## 2018-05-10 RX ORDER — AMLODIPINE BESYLATE 5 MG/1
TABLET ORAL
Qty: 90 TABLET | Refills: 0 | Status: SHIPPED | OUTPATIENT
Start: 2018-05-10 | End: 2018-09-13

## 2018-05-10 NOTE — TELEPHONE ENCOUNTER
Hypertensive Medications  Protocol Criteria:  · Appointment scheduled in the past 6 months or in the next 3 months  · BMP or CMP in the past 12 months  · Creatinine result < 2  Recent Outpatient Visits            3 days ago TMJ inflammation    [employer]

## 2018-05-16 ENCOUNTER — APPOINTMENT (OUTPATIENT)
Dept: PHYSICAL THERAPY | Facility: HOSPITAL | Age: 58
End: 2018-05-16
Attending: FAMILY MEDICINE
Payer: COMMERCIAL

## 2018-05-21 ENCOUNTER — APPOINTMENT (OUTPATIENT)
Dept: PHYSICAL THERAPY | Facility: HOSPITAL | Age: 58
End: 2018-05-21
Attending: FAMILY MEDICINE
Payer: COMMERCIAL

## 2018-05-31 ENCOUNTER — TELEPHONE (OUTPATIENT)
Dept: NEUROLOGY | Facility: CLINIC | Age: 58
End: 2018-05-31

## 2018-05-31 ENCOUNTER — PATIENT MESSAGE (OUTPATIENT)
Dept: FAMILY MEDICINE CLINIC | Facility: CLINIC | Age: 58
End: 2018-05-31

## 2018-05-31 DIAGNOSIS — M51.9 LUMBAR DISC DISEASE: ICD-10-CM

## 2018-05-31 DIAGNOSIS — M54.16 LUMBAR RADICULOPATHY: Primary | ICD-10-CM

## 2018-05-31 NOTE — TELEPHONE ENCOUNTER
Spoke to patient, states [employer] blue precision is correct insurance. Patient would like to have injection and did not have injection when last  ordered LOV 8/29/17 due to no insurance when lost job. NOV none-transferred to PSR to make NOV.    Please

## 2018-06-01 NOTE — TELEPHONE ENCOUNTER
Patient has been scheduled for a bilateral S1 TFESIs  on 6/8/18 at the [address] [place] [address]. Medications and allergies reviewed. Patient informed to hold aspirins, nsaids, blood thinners, vitamins and fish oils 3-7 days prior to procedure.  Patient informed we will need ve

## 2018-06-01 NOTE — TELEPHONE ENCOUNTER
She will need to be seen and ok for injections as ordered in the past.  She will need to follow up after that.

## 2018-06-01 NOTE — TELEPHONE ENCOUNTER
From: Ryland Dixon  To: Nadine Rivera MD  Sent: 5/31/2018 4:56 PM CDT  Subject: Referral Request     Hi Dr. Webster Credit,    I hope all is well.  Back in 2016 Dr. Laurita Rivera gave me steroid injections in both the left and right sacroiliac joints due to lower

## 2018-06-01 NOTE — TELEPHONE ENCOUNTER
Called [facility] to verify insurance coverage. T/t Laureano ANTOINE who states she is in network with our managed care plan effective 18. Referral from PCP Dr. Deana Lugo  will  on 18. NOTE: Dr. Suggs Nurse wants to see Pt. before procedure.  Will inform Nurs

## 2018-06-02 ENCOUNTER — PATIENT MESSAGE (OUTPATIENT)
Dept: FAMILY MEDICINE CLINIC | Facility: CLINIC | Age: 58
End: 2018-06-02

## 2018-06-02 DIAGNOSIS — M54.16 LUMBAR RADICULOPATHY: Primary | ICD-10-CM

## 2018-06-04 NOTE — TELEPHONE ENCOUNTER
Left detailed message informing patient of the below message from Dr. Angela Monroe. Patient instructed to call back with questions.  -verified in fyi

## 2018-06-05 NOTE — TELEPHONE ENCOUNTER
Dr Yaneth Luna please clarify if want to see patient in office prior to injection. Injection is tentatively scheduled for 6/8/18. LOV 8/29/17. NOV 8/1/18.

## 2018-06-06 ENCOUNTER — TELEPHONE (OUTPATIENT)
Dept: NEUROLOGY | Facility: CLINIC | Age: 58
End: 2018-06-06

## 2018-06-06 NOTE — TELEPHONE ENCOUNTER
Received in basket from [email or web address] advising of approval for bilateral S1 TFESIs for one unit/DOS. Procedure is scheduled on 06/08/18. Will  inform Nursing.

## 2018-06-06 NOTE — TELEPHONE ENCOUNTER
If she is not able to be seen before injections, then I will need to see her afterward. The August date will work if we are not able to get her in sooner.

## 2018-06-07 DIAGNOSIS — K22.719 BARRETT'S ESOPHAGUS WITH DYSPLASIA: ICD-10-CM

## 2018-06-08 ENCOUNTER — OFFICE VISIT (OUTPATIENT)
Dept: SURGERY | Facility: CLINIC | Age: 58
End: 2018-06-08

## 2018-06-08 DIAGNOSIS — M54.16 LUMBAR RADICULOPATHY: Primary | ICD-10-CM

## 2018-06-08 DIAGNOSIS — M51.9 LUMBAR DISC DISEASE: ICD-10-CM

## 2018-06-08 PROCEDURE — 64483 NJX AA&/STRD TFRM EPI L/S 1: CPT | Performed by: PHYSICAL MEDICINE & REHABILITATION

## 2018-06-08 NOTE — PROCEDURES
Diego FELIZ [facility].    BILATERAL S1 TFESI   NAME:  Mally Ng    MR #:    [id number] :  10/15/1960     PHYSICIAN:  Kiki Villegas        Operative Report    DATE OF PROCEDURE: 2018   PREOPERATIVE DIAGNOSES: 1. right > left S1 radi they remained completely stable. Also, throughout the whole procedure, prior to injection of any medication, aspiration was performed. No blood, fluid, or air was aspirated at anytime.

## 2018-06-09 RX ORDER — LANSOPRAZOLE 15 MG/1
CAPSULE, DELAYED RELEASE ORAL
Qty: 270 CAPSULE | Refills: 0 | Status: SHIPPED | OUTPATIENT
Start: 2018-06-09 | End: 2019-03-31

## 2018-06-09 NOTE — TELEPHONE ENCOUNTER
Refill passed per [facility] [facility] protocol.   Refill Protocol Appointment Criteria  · Appointment scheduled in the past 12 months or in the next 3 months  Recent Outpatient Visits            1 month ago TMJ inflammation    [facility]

## 2018-06-09 NOTE — TELEPHONE ENCOUNTER
From: Rupa Farias  Sent: 6/9/2018 11:44 AM CDT  Subject: Medication Renewal Request    Osiris Pruitt.  Cameron Mckeon would like a refill of the following medications:     Dicyclomine HCl (BENTYL) 20 MG Oral Tab [place] MD]     clidinium-chlordiazepoxide 5-2

## 2018-06-10 RX ORDER — CHLORDIAZEPOXIDE HYDROCHLORIDE AND CLIDINIUM BROMIDE 5; 2.5 MG/1; MG/1
1 CAPSULE ORAL
Qty: 120 CAPSULE | Refills: 1 | Status: SHIPPED
Start: 2018-06-10 | End: 2019-04-22

## 2018-06-10 RX ORDER — DIAZEPAM 5 MG/1
5 TABLET ORAL EVERY 12 HOURS PRN
Qty: 30 TABLET | Refills: 2 | Status: SHIPPED
Start: 2018-06-10 | End: 2020-11-19

## 2018-06-10 RX ORDER — DICYCLOMINE HCL 20 MG
20 TABLET ORAL 4 TIMES DAILY PRN
Qty: 120 TABLET | Refills: 2 | Status: SHIPPED
Start: 2018-06-10

## 2018-06-21 ENCOUNTER — PATIENT MESSAGE (OUTPATIENT)
Dept: ENDOCRINOLOGY CLINIC | Facility: CLINIC | Age: 58
End: 2018-06-21

## 2018-06-21 DIAGNOSIS — E03.9 HYPOTHYROIDISM, UNSPECIFIED TYPE: Primary | ICD-10-CM

## 2018-06-21 DIAGNOSIS — E11.8 UNCONTROLLED TYPE 2 DIABETES MELLITUS WITH COMPLICATION, UNSPECIFIED WHETHER LONG TERM INSULIN USE: ICD-10-CM

## 2018-06-21 DIAGNOSIS — E11.65 UNCONTROLLED TYPE 2 DIABETES MELLITUS WITH COMPLICATION, UNSPECIFIED WHETHER LONG TERM INSULIN USE: ICD-10-CM

## 2018-06-21 RX ORDER — DULAGLUTIDE 1.5 MG/.5ML
1.5 INJECTION, SOLUTION SUBCUTANEOUS
Qty: 6 ML | Refills: 0 | Status: SHIPPED | OUTPATIENT
Start: 2018-06-21 | End: 2018-09-12

## 2018-06-21 NOTE — TELEPHONE ENCOUNTER
LOV 7/21/17 -No FU -RTC 6 mos    Sent mychart message reminding patient to book apt.     Pended refill for provider

## 2018-06-21 NOTE — TELEPHONE ENCOUNTER
From: Mally Ng  To: Manisha Viera MD  Sent: 6/21/2018 1:02 PM CDT  Subject: Non-Urgent Medical Question    I received a note that I need to come see you for my annual check up.   Please put thru an order thru for my complete blood workup so we can dis

## 2018-06-22 RX ORDER — TRIAMCINOLONE ACETONIDE 5 MG/G
CREAM TOPICAL
Qty: 30 G | Refills: 0 | Status: SHIPPED | OUTPATIENT
Start: 2018-06-22 | End: 2020-10-12

## 2018-06-22 NOTE — TELEPHONE ENCOUNTER
LOV: 3-29-18 Last Rx: 8-1-16    No protocol     Please advise in regards to refill request. Thank You

## 2018-06-29 ENCOUNTER — TELEPHONE (OUTPATIENT)
Dept: NEUROLOGY | Facility: CLINIC | Age: 58
End: 2018-06-29

## 2018-06-29 DIAGNOSIS — M54.16 LUMBAR RADICULOPATHY: Primary | ICD-10-CM

## 2018-06-29 NOTE — TELEPHONE ENCOUNTER
Patient calling with condition update post Bilateral S1 TFESIs 6/8/18  Patient feels she received 70% relief in the first 2 weeks then pain returned to baseline. Feels she was more active so she was gardening doing yardwork and pain slowly came back.     Pa

## 2018-07-03 ENCOUNTER — TELEPHONE (OUTPATIENT)
Dept: FAMILY MEDICINE CLINIC | Facility: CLINIC | Age: 58
End: 2018-07-03

## 2018-07-03 DIAGNOSIS — E11.8 DM (DIABETES MELLITUS) WITH COMPLICATIONS (HCC): Primary | ICD-10-CM

## 2018-07-03 NOTE — TELEPHONE ENCOUNTER
Lissette Yu for Dr Susannah Herrera please see pended referral to Endocrinology dx type 2 dm with complications    Patient calling she has diabetic f/u with Dr Rayna Juarez 7/18/18 and needs referral approved.  Also she has lab orders from Dr Rayna Juarez and does she need to wa

## 2018-07-05 ENCOUNTER — PATIENT MESSAGE (OUTPATIENT)
Dept: FAMILY MEDICINE CLINIC | Facility: CLINIC | Age: 58
End: 2018-07-05

## 2018-07-05 ENCOUNTER — APPOINTMENT (OUTPATIENT)
Dept: LAB | Age: 58
End: 2018-07-05
Attending: INTERNAL MEDICINE
Payer: COMMERCIAL

## 2018-07-05 DIAGNOSIS — E11.8 UNCONTROLLED TYPE 2 DIABETES MELLITUS WITH COMPLICATION, UNSPECIFIED WHETHER LONG TERM INSULIN USE: ICD-10-CM

## 2018-07-05 DIAGNOSIS — E11.65 UNCONTROLLED TYPE 2 DIABETES MELLITUS WITH COMPLICATION, UNSPECIFIED WHETHER LONG TERM INSULIN USE: ICD-10-CM

## 2018-07-05 DIAGNOSIS — E03.9 HYPOTHYROIDISM, UNSPECIFIED TYPE: ICD-10-CM

## 2018-07-05 LAB
ALBUMIN SERPL BCP-MCNC: 3.9 G/DL (ref 3.5–4.8)
ALBUMIN/GLOB SERPL: 1.6 {RATIO} (ref 1–2)
ALP SERPL-CCNC: 55 U/L (ref 32–100)
ALT SERPL-CCNC: 35 U/L (ref 14–54)
ANION GAP SERPL CALC-SCNC: 8 MMOL/L (ref 0–18)
AST SERPL-CCNC: 23 U/L (ref 15–41)
BILIRUB SERPL-MCNC: 0.5 MG/DL (ref 0.3–1.2)
BUN SERPL-MCNC: 9 MG/DL (ref 8–20)
BUN/CREAT SERPL: 11.8 (ref 10–20)
CALCIUM SERPL-MCNC: 9.2 MG/DL (ref 8.5–10.5)
CHLORIDE SERPL-SCNC: 106 MMOL/L (ref 95–110)
CHOLEST SERPL-MCNC: 189 MG/DL (ref 110–200)
CO2 SERPL-SCNC: 25 MMOL/L (ref 22–32)
CREAT SERPL-MCNC: 0.76 MG/DL (ref 0.5–1.5)
CREAT UR-MCNC: 81.6 MG/DL
GLOBULIN PLAS-MCNC: 2.4 G/DL (ref 2.5–3.7)
GLUCOSE SERPL-MCNC: 149 MG/DL (ref 70–99)
HDLC SERPL-MCNC: 59 MG/DL
LDLC SERPL CALC-MCNC: 97 MG/DL (ref 0–99)
MICROALBUMIN UR-MCNC: 0.2 MG/DL (ref 0–1.8)
MICROALBUMIN/CREAT UR: 2.5 MG/G{CREAT} (ref 0–20)
NONHDLC SERPL-MCNC: 130 MG/DL
OSMOLALITY UR CALC.SUM OF ELEC: 289 MOSM/KG (ref 275–295)
PATIENT FASTING: YES
POTASSIUM SERPL-SCNC: 4.3 MMOL/L (ref 3.3–5.1)
PROT SERPL-MCNC: 6.3 G/DL (ref 5.9–8.4)
SODIUM SERPL-SCNC: 139 MMOL/L (ref 136–144)
T4 FREE SERPL-MCNC: 1.01 NG/DL (ref 0.58–1.64)
TRIGL SERPL-MCNC: 165 MG/DL (ref 1–149)
TSH SERPL-ACNC: 5.52 UIU/ML (ref 0.45–5.33)

## 2018-07-05 PROCEDURE — 82043 UR ALBUMIN QUANTITATIVE: CPT

## 2018-07-05 PROCEDURE — 80053 COMPREHEN METABOLIC PANEL: CPT

## 2018-07-05 PROCEDURE — 84439 ASSAY OF FREE THYROXINE: CPT

## 2018-07-05 PROCEDURE — 80061 LIPID PANEL: CPT

## 2018-07-05 PROCEDURE — 36415 COLL VENOUS BLD VENIPUNCTURE: CPT

## 2018-07-05 PROCEDURE — 82570 ASSAY OF URINE CREATININE: CPT

## 2018-07-05 PROCEDURE — 84443 ASSAY THYROID STIM HORMONE: CPT

## 2018-07-05 NOTE — TELEPHONE ENCOUNTER
From: Esperanza Quinn  To: Kelle Pearce MD  Sent: 7/5/2018 2:36 PM CDT  Subject: Referral Request    I'm sorry. I just noticed that it looks like Dr. Ariadna Rodriguez office has already put in for a referral for the MRI. It is pending at this time.  Please adv

## 2018-07-10 ENCOUNTER — TELEPHONE (OUTPATIENT)
Dept: NEUROLOGY | Facility: CLINIC | Age: 58
End: 2018-07-10

## 2018-07-10 NOTE — TELEPHONE ENCOUNTER
Pt. informed insurance was verified and MRI L-spine wo is a covered benefit and does not require authorization. Transferred call to scheduling for appt.

## 2018-07-25 ENCOUNTER — HOSPITAL ENCOUNTER (OUTPATIENT)
Dept: MRI IMAGING | Age: 58
Discharge: HOME | End: 2018-07-25
Attending: PHYSICAL MEDICINE & REHABILITATION
Payer: COMMERCIAL

## 2018-07-25 ENCOUNTER — NURSE ONLY (OUTPATIENT)
Dept: ENDOCRINOLOGY CLINIC | Facility: CLINIC | Age: 58
End: 2018-07-25

## 2018-07-25 DIAGNOSIS — E11.8 UNCONTROLLED TYPE 2 DIABETES MELLITUS WITH COMPLICATION, UNSPECIFIED WHETHER LONG TERM INSULIN USE: ICD-10-CM

## 2018-07-25 DIAGNOSIS — M54.16 LUMBAR RADICULOPATHY: ICD-10-CM

## 2018-07-25 DIAGNOSIS — E11.65 UNCONTROLLED TYPE 2 DIABETES MELLITUS WITH COMPLICATION, UNSPECIFIED WHETHER LONG TERM INSULIN USE: ICD-10-CM

## 2018-07-25 DIAGNOSIS — R79.89 ELEVATED TSH: Primary | ICD-10-CM

## 2018-07-25 LAB
CARTRIDGE LOT#: ABNORMAL NUMERIC
HEMOGLOBIN A1C: 7.1 % (ref 4.3–5.6)

## 2018-07-25 PROCEDURE — 83036 HEMOGLOBIN GLYCOSYLATED A1C: CPT | Performed by: INTERNAL MEDICINE

## 2018-07-25 PROCEDURE — 72148 MRI LUMBAR SPINE W/O DYE: CPT | Performed by: PHYSICAL MEDICINE & REHABILITATION

## 2018-07-25 PROCEDURE — 36416 COLLJ CAPILLARY BLOOD SPEC: CPT | Performed by: INTERNAL MEDICINE

## 2018-07-25 NOTE — PROGRESS NOTES
Patient here for BG review with RN. She has appt with Radha Arceo scheduled in September but had steroid injections on 6/8 so following up for check on sugars.  Morning sugars since injection:  119, 161, 119, 123, 127, 109, 160, 159, 128, 157, 144  She is taking Gli

## 2018-07-26 RX ORDER — GLIMEPIRIDE 4 MG/1
TABLET ORAL
Qty: 90 TABLET | Refills: 0 | Status: SHIPPED | OUTPATIENT
Start: 2018-07-26 | End: 2018-09-12

## 2018-08-01 ENCOUNTER — TELEPHONE (OUTPATIENT)
Dept: NEUROLOGY | Facility: CLINIC | Age: 58
End: 2018-08-01

## 2018-08-01 ENCOUNTER — OFFICE VISIT (OUTPATIENT)
Dept: NEUROLOGY | Facility: CLINIC | Age: 58
End: 2018-08-01
Payer: COMMERCIAL

## 2018-08-01 VITALS
DIASTOLIC BLOOD PRESSURE: 84 MMHG | HEIGHT: 64 IN | SYSTOLIC BLOOD PRESSURE: 110 MMHG | RESPIRATION RATE: 16 BRPM | BODY MASS INDEX: 35 KG/M2 | WEIGHT: 205 LBS | HEART RATE: 60 BPM

## 2018-08-01 DIAGNOSIS — M51.9 LUMBAR DISC DISEASE: ICD-10-CM

## 2018-08-01 DIAGNOSIS — M47.816 LUMBAR SPONDYLOSIS: ICD-10-CM

## 2018-08-01 DIAGNOSIS — M51.24 THORACIC DISC HERNIATION: ICD-10-CM

## 2018-08-01 DIAGNOSIS — M51.9 THORACIC DISC DISEASE: ICD-10-CM

## 2018-08-01 DIAGNOSIS — M54.16 LUMBAR RADICULOPATHY: Primary | ICD-10-CM

## 2018-08-01 PROCEDURE — 99214 OFFICE O/P EST MOD 30 MIN: CPT | Performed by: PHYSICAL MEDICINE & REHABILITATION

## 2018-08-01 NOTE — PATIENT INSTRUCTIONS
As of October 6th 2014, the Drug Enforcement Agency [facility]) is reclassifying all hydrocodone combination medications from Schedule III to Schedule II. This includes medications such as Norco, Vicodin, Lortab, Zohydro, and Vicoprofen.     What this means for y will start PT on the thoracic and lumbar spines. She will get a thoracic MRI scan. She will get lumbar flexion and extension x-rays. She will continue with the Ibuprofen for the pain.     She will call me once she has had the imaging studies and I

## 2018-08-01 NOTE — TELEPHONE ENCOUNTER
L/m advising Pt. insurance was verified and MRI T-spine wo is a covered benefit and does not require authorization. Can proceed with scheduling appt.

## 2018-08-01 NOTE — PROGRESS NOTES
Low Back Pain H & P    Chief Complaint: Patient presents with:  Low Back Pain: Patient presents for follow up on low back pain, LOV: 8/29/17, states her pain worsen, pain is constant and worse when active.  Bilateral side low back, occasionally radiates maria eugenia Esophageal reflux    • Exotropia    • Herpes simplex    • High blood pressure    • High cholesterol    • History of pregnancy 1990, 1991   • Hypercholesterolemia 2006   • Irregular menstrual cycle 2008    Neg endometrial biopsy   • MGD (meibomian gland dys oz/week     Comment: Occasionally    Drug use: No    Sexual activity: Yes    Partners: Male    Birth control/ protection: Tubal Ligation     Other Topics Concern    Caffeine Concern Yes    Comment: 2 cups coffee daily    Exercise No     Social History Narr Spine:  Sitting straight leg raise-RIGHT Positive for right posterior leg pain   Sitting straight leg raise-LEFT Positive for left posterior leg pain     Radiology Imaging:  I reviewed with the patient her MRI of the lumbar spine from 7/25/18.       Krys

## 2018-08-08 ENCOUNTER — TELEPHONE (OUTPATIENT)
Dept: NEUROLOGY | Facility: CLINIC | Age: 58
End: 2018-08-08

## 2018-08-08 NOTE — TELEPHONE ENCOUNTER
Received Iin basket from [email or web address]  advising of approval for physical therapy. Will call Pt. To inform. L/m advising Pt. 8 PT sessions were approved. Can proceed with scheduling appts.

## 2018-09-07 ENCOUNTER — TELEPHONE (OUTPATIENT)
Dept: OBGYN CLINIC | Facility: CLINIC | Age: 58
End: 2018-09-07

## 2018-09-07 ENCOUNTER — APPOINTMENT (OUTPATIENT)
Dept: LAB | Age: 58
End: 2018-09-07
Attending: INTERNAL MEDICINE
Payer: COMMERCIAL

## 2018-09-07 DIAGNOSIS — R79.89 ELEVATED TSH: ICD-10-CM

## 2018-09-07 LAB
T3FREE SERPL-MCNC: 3.25 PG/ML (ref 2.53–4.29)
T4 FREE SERPL-MCNC: 0.89 NG/DL (ref 0.58–1.64)
TSH SERPL-ACNC: 2.18 UIU/ML (ref 0.45–5.33)

## 2018-09-07 PROCEDURE — 84443 ASSAY THYROID STIM HORMONE: CPT

## 2018-09-07 PROCEDURE — 84439 ASSAY OF FREE THYROXINE: CPT

## 2018-09-07 PROCEDURE — 36415 COLL VENOUS BLD VENIPUNCTURE: CPT

## 2018-09-07 PROCEDURE — 84481 FREE ASSAY (FT-3): CPT

## 2018-09-07 NOTE — TELEPHONE ENCOUNTER
Triage unfortunately cannot verify insurance. Please verify pt's insurance and let her know if we accept her insurance or not. Thank you!

## 2018-09-12 ENCOUNTER — OFFICE VISIT (OUTPATIENT)
Dept: ENDOCRINOLOGY CLINIC | Facility: CLINIC | Age: 58
End: 2018-09-12

## 2018-09-12 VITALS
SYSTOLIC BLOOD PRESSURE: 111 MMHG | BODY MASS INDEX: 34 KG/M2 | HEART RATE: 65 BPM | DIASTOLIC BLOOD PRESSURE: 72 MMHG | WEIGHT: 200.81 LBS

## 2018-09-12 DIAGNOSIS — E11.65 UNCONTROLLED TYPE 2 DIABETES MELLITUS WITH COMPLICATION, WITH LONG-TERM CURRENT USE OF INSULIN (HCC): Primary | ICD-10-CM

## 2018-09-12 DIAGNOSIS — E11.8 UNCONTROLLED TYPE 2 DIABETES MELLITUS WITH COMPLICATION, WITH LONG-TERM CURRENT USE OF INSULIN (HCC): Primary | ICD-10-CM

## 2018-09-12 DIAGNOSIS — Z79.4 UNCONTROLLED TYPE 2 DIABETES MELLITUS WITH COMPLICATION, WITH LONG-TERM CURRENT USE OF INSULIN (HCC): Primary | ICD-10-CM

## 2018-09-12 LAB
CARTRIDGE LOT#: ABNORMAL NUMERIC
GLUCOSE BLOOD: 147
HEMOGLOBIN A1C: 7 % (ref 4.3–5.6)
TEST STRIP LOT #: NORMAL NUMERIC

## 2018-09-12 PROCEDURE — 83036 HEMOGLOBIN GLYCOSYLATED A1C: CPT | Performed by: INTERNAL MEDICINE

## 2018-09-12 PROCEDURE — 82962 GLUCOSE BLOOD TEST: CPT | Performed by: INTERNAL MEDICINE

## 2018-09-12 PROCEDURE — 36416 COLLJ CAPILLARY BLOOD SPEC: CPT | Performed by: INTERNAL MEDICINE

## 2018-09-12 PROCEDURE — 99212 OFFICE O/P EST SF 10 MIN: CPT | Performed by: INTERNAL MEDICINE

## 2018-09-12 PROCEDURE — 99214 OFFICE O/P EST MOD 30 MIN: CPT | Performed by: INTERNAL MEDICINE

## 2018-09-12 RX ORDER — DULAGLUTIDE 1.5 MG/.5ML
1.5 INJECTION, SOLUTION SUBCUTANEOUS
Qty: 6 ML | Refills: 6 | Status: SHIPPED | OUTPATIENT
Start: 2018-09-12 | End: 2019-02-26

## 2018-09-12 RX ORDER — ALBUTEROL SULFATE 90 UG/1
2 AEROSOL, METERED RESPIRATORY (INHALATION)
COMMUNITY
Start: 2015-12-04 | End: 2020-10-12

## 2018-09-12 RX ORDER — GLIMEPIRIDE 4 MG/1
TABLET ORAL
Qty: 90 TABLET | Refills: 3 | Status: SHIPPED | OUTPATIENT
Start: 2018-09-12 | End: 2019-04-22

## 2018-09-12 NOTE — PROGRESS NOTES
Name: Minor Lawson  Date: 9/12/2018    Referring Physician: No ref. provider found    HISTORY OF PRESENT ILLNESS   Minor Lawson is a 61year old female who presents for diabetes mellitus.      Prior HbA, C or glycohemoglobin were 8.5% 4/2014; 7.9% 7/201 MACROCRYSTAL  Penicillins                 Comment:Other reaction(s): PENICILLINS  Sulindac                    Comment:Other reaction(s): Hives    Social History:   Social History    Socioeconomic History      Marital status:       Spouse name: Not o nuclear cataract of both eyes 1/27/2015   • Allergic rhinitis    • Allergic rhinitis     pereniaal and seasonal   • Alternating exotropia 1/27/2015   • Anxiety state    • Atrial tachycardia, paroxysmal ([facility] [address] 4/15/2015   • Barretts esophagus    • Carpal julianna Appearance:  alert, well developed, in no acute distress  Eyes:  normal conjunctivae, sclera. , normal sclera and normal pupils  Ears/Nose/Mouth/Throat/Neck:  no palpable thyroid nodules or cervical lymphadenopathy  Back: no kyphosis or back tenderness  Res

## 2018-09-13 ENCOUNTER — OFFICE VISIT (OUTPATIENT)
Dept: FAMILY MEDICINE CLINIC | Facility: CLINIC | Age: 58
End: 2018-09-13

## 2018-09-13 VITALS
HEART RATE: 63 BPM | SYSTOLIC BLOOD PRESSURE: 105 MMHG | WEIGHT: 201 LBS | BODY MASS INDEX: 35 KG/M2 | DIASTOLIC BLOOD PRESSURE: 67 MMHG

## 2018-09-13 DIAGNOSIS — I10 ESSENTIAL HYPERTENSION: Primary | ICD-10-CM

## 2018-09-13 DIAGNOSIS — E78.2 MIXED HYPERLIPIDEMIA: ICD-10-CM

## 2018-09-13 DIAGNOSIS — R01.1 MURMUR: ICD-10-CM

## 2018-09-13 DIAGNOSIS — E10.9 TYPE 1 DIABETES MELLITUS WITHOUT RETINOPATHY (HCC): ICD-10-CM

## 2018-09-13 DIAGNOSIS — G47.33 SLEEP APNEA, OBSTRUCTIVE: ICD-10-CM

## 2018-09-13 DIAGNOSIS — R10.13 EPIGASTRIC PAIN: ICD-10-CM

## 2018-09-13 PROCEDURE — 99214 OFFICE O/P EST MOD 30 MIN: CPT | Performed by: FAMILY MEDICINE

## 2018-09-13 PROCEDURE — 99212 OFFICE O/P EST SF 10 MIN: CPT | Performed by: FAMILY MEDICINE

## 2018-09-13 RX ORDER — AMLODIPINE BESYLATE 5 MG/1
5 TABLET ORAL
Qty: 90 TABLET | Refills: 1 | Status: SHIPPED | OUTPATIENT
Start: 2018-09-13 | End: 2019-02-17

## 2018-09-13 RX ORDER — ATORVASTATIN CALCIUM 40 MG/1
20 TABLET, FILM COATED ORAL NIGHTLY
Qty: 90 TABLET | Refills: 0 | COMMUNITY
Start: 2018-09-13 | End: 2018-09-17

## 2018-09-13 NOTE — PROGRESS NOTES
Yaya Hsu is a 61year old female. Patient presents with:  Bloating    HPI:   2-3 weeks - Having more bloating and constant pressure in top of middle of her stomach. Was taking mylanta and her normal medications.  Any position occasional would get quic chlordiazepoxide-clidinium 5-2.5 MG Oral Cap Take 1 capsule by mouth 4 (four) times daily before meals and nightly. Disp: 120 capsule Rfl: 1   diazepam (VALIUM) 5 MG Oral Tab Take 1 tablet (5 mg total) by mouth every 12 (twelve) hours as needed.  Disp: 30 Cholecalciferol 30998 units Oral Cap Take 1 capsule by mouth once a week.  Disp: 8 capsule Rfl: 1   LOSARTAN POTASSIUM 25 MG Oral Tab TAKE 1 TABLET(25 MG) BY MOUTH DAILY Disp: 90 tablet Rfl: 1   B Complex Vitamins (B COMPLEX OR) Take 1 tablet by mouth na uncontrolled 2005   • Unspecified essential hypertension 2003      Social History:  Social History    Tobacco Use      Smoking status: Former Smoker        Packs/day: 0.25        Years: 10.00        Pack years: 2.5        Types: Cigarettes        Quit date

## 2018-09-15 ENCOUNTER — PATIENT MESSAGE (OUTPATIENT)
Dept: ENDOCRINOLOGY CLINIC | Facility: CLINIC | Age: 58
End: 2018-09-15

## 2018-09-17 ENCOUNTER — OFFICE VISIT (OUTPATIENT)
Dept: OBGYN CLINIC | Facility: CLINIC | Age: 58
End: 2018-09-17

## 2018-09-17 VITALS
WEIGHT: 201 LBS | HEART RATE: 65 BPM | HEIGHT: 64 IN | DIASTOLIC BLOOD PRESSURE: 73 MMHG | BODY MASS INDEX: 34.31 KG/M2 | SYSTOLIC BLOOD PRESSURE: 111 MMHG

## 2018-09-17 DIAGNOSIS — N95.1 VAGINAL DRYNESS, MENOPAUSAL: ICD-10-CM

## 2018-09-17 DIAGNOSIS — Z12.31 VISIT FOR SCREENING MAMMOGRAM: ICD-10-CM

## 2018-09-17 DIAGNOSIS — Z01.419 ENCOUNTER FOR GYNECOLOGICAL EXAMINATION WITHOUT ABNORMAL FINDING: Primary | ICD-10-CM

## 2018-09-17 DIAGNOSIS — A63.0 ANAL WARTS: ICD-10-CM

## 2018-09-17 DIAGNOSIS — Z12.4 SCREENING FOR MALIGNANT NEOPLASM OF CERVIX: ICD-10-CM

## 2018-09-17 PROCEDURE — 99214 OFFICE O/P EST MOD 30 MIN: CPT | Performed by: OBSTETRICS & GYNECOLOGY

## 2018-09-17 PROCEDURE — 99396 PREV VISIT EST AGE 40-64: CPT | Performed by: OBSTETRICS & GYNECOLOGY

## 2018-09-17 NOTE — TELEPHONE ENCOUNTER
From: Claudette Lies  To: Hitesh Wood MD  Sent: 9/15/2018 10:27 AM CDT  Subject: Prescription Question    Hi Dr. Lagos Bolds,    I saw you on Wednesday 9/12/2018.  When I went to the pharmacy to pickup Ketoconazole-Hydrocortisone 2 & 1 % External Kit they said

## 2018-09-17 NOTE — PROGRESS NOTES
Chan Reich is a 61year old female  No LMP recorded (lmp unknown).  Patient is postmenopausal. who presents for Patient presents with:  Gyn Exam: New Pt, annual   pt has anal warts and is using imiquod lotion but cannot take it due to it makes he • Hypercholesterolemia 2006   • Irregular menstrual cycle 2008    Neg endometrial biopsy   • MGD (meibomian gland dysfunction) 7/28/2015   • Ocular migraine 7/28/2015   • Other and unspecified hyperlipidemia    • Ovarian cyst 1980    Laparoscopic cystectom Types: Cigarettes        Quit date: 2017        Years since quittin.6      Smokeless tobacco: Former User      Tobacco comment: 1 pack/wk per pt. Substance and Sexual Activity      Alcohol use:  Yes        Alcohol/week: 0.0 oz        Comme •  TRIAMCINOLONE ACETONIDE 0.5 % External Cream, APPLY THIN LAYER EXTERNALLY TO THE AFFECTED AREA TWICE DAILY AS DIRECTED, Disp: 30 g, Rfl: 0  •  Dicyclomine HCl (BENTYL) 20 MG Oral Tab, Take 1 tablet (20 mg total) by mouth 4 (four) times daily as needed. , •  GARLIC OR, Take 1 capsule by mouth daily. , Disp: , Rfl:   •  Chlorhexidine Gluconate 0.12 % Mouth/Throat Solution, Use as directed 15 mL in the mouth or throat 2 (two) times daily.   , Disp: , Rfl: 2  •  Ketoconazole-Hydrocortisone 2 & 1 % External Kit, Eyes:  denies blurred or double vision  Cardiovascular:  denies chest pain or palpitations  Respiratory:  denies shortness of breath  Gastrointestinal:  denies heartburn, abdominal pain, diarrhea or constipation  Genitourinary:  denies dysuria, incontinenc Assessment & Plan:   ASCCP guidelines discussed,cotest done,mammogram ordered,rtc 1 year for annual exam  rtc for vulvar lesion removal- procedure explained to patient  Hemorrhoid cream for hemorrhoids- see pcp if no improvement  Discussed postmenopausal v

## 2018-09-18 ENCOUNTER — TELEPHONE (OUTPATIENT)
Dept: FAMILY MEDICINE CLINIC | Facility: CLINIC | Age: 58
End: 2018-09-18

## 2018-09-18 ENCOUNTER — TELEPHONE (OUTPATIENT)
Dept: ENDOCRINOLOGY CLINIC | Facility: CLINIC | Age: 58
End: 2018-09-18

## 2018-09-18 PROBLEM — E10.9 TYPE 1 DIABETES MELLITUS WITHOUT RETINOPATHY (HCC): Status: RESOLVED | Noted: 2018-02-01 | Resolved: 2018-09-18

## 2018-09-18 LAB — HPV I/H RISK 1 DNA SPEC QL NAA+PROBE: NEGATIVE

## 2018-09-19 ENCOUNTER — HOSPITAL ENCOUNTER (OUTPATIENT)
Dept: CARDIOLOGY CLINIC | Age: 58
Discharge: HOME | End: 2018-09-19
Attending: FAMILY MEDICINE
Payer: COMMERCIAL

## 2018-09-19 DIAGNOSIS — R01.1 MURMUR: ICD-10-CM

## 2018-09-19 PROCEDURE — 93306 TTE W/DOPPLER COMPLETE: CPT | Performed by: FAMILY MEDICINE

## 2018-09-19 NOTE — TELEPHONE ENCOUNTER
Spoke with the pharmacy. They wanted clarification on cream order. They received order for ketokonasole-hydrocortisone 1% with instructions in comments to dispense 1% ketoconazole cream only.      However pharmacy only has ketokonazole 2% cream. Would this

## 2018-09-24 DIAGNOSIS — I51.89 DIASTOLIC DYSFUNCTION: ICD-10-CM

## 2018-09-24 DIAGNOSIS — I34.0 MITRAL VALVE INSUFFICIENCY, UNSPECIFIED ETIOLOGY: Primary | ICD-10-CM

## 2018-09-24 NOTE — PROGRESS NOTES
Echo shows the mild mitral valve regurgitation and decreased relaxation of the heart muscle with filling.  Please see cardiologist Dr. Shirley Green if any recommendation on medication changes to optimize your heart muscle or any other tests recommended. - Dr. Kaiser Plants

## 2018-09-25 ENCOUNTER — NURSE ONLY (OUTPATIENT)
Dept: FAMILY MEDICINE CLINIC | Facility: CLINIC | Age: 58
End: 2018-09-25

## 2018-09-25 DIAGNOSIS — Z23 NEED FOR PNEUMOCOCCAL VACCINATION: ICD-10-CM

## 2018-09-25 DIAGNOSIS — Z23 NEED FOR INFLUENZA VACCINATION: Primary | ICD-10-CM

## 2018-09-25 PROCEDURE — 90472 IMMUNIZATION ADMIN EACH ADD: CPT | Performed by: FAMILY MEDICINE

## 2018-09-25 PROCEDURE — 90686 IIV4 VACC NO PRSV 0.5 ML IM: CPT | Performed by: FAMILY MEDICINE

## 2018-09-25 PROCEDURE — 90732 PPSV23 VACC 2 YRS+ SUBQ/IM: CPT | Performed by: FAMILY MEDICINE

## 2018-09-25 PROCEDURE — 90471 IMMUNIZATION ADMIN: CPT | Performed by: FAMILY MEDICINE

## 2018-09-25 NOTE — PROGRESS NOTES
Patient is here for her Pneumovax 23 and Influenza injection. Patient tolerated injection well, no adverse reaction noted.

## 2018-09-27 ENCOUNTER — TELEPHONE (OUTPATIENT)
Dept: ENDOCRINOLOGY CLINIC | Facility: CLINIC | Age: 58
End: 2018-09-27

## 2018-09-27 NOTE — TELEPHONE ENCOUNTER
Kit not covered but same dose and formulation available in cream. Called in cream as written per [facility] and no PA required.

## 2018-09-27 NOTE — TELEPHONE ENCOUNTER
Current Outpatient Medications:  Ketoconazole-Hydrocortisone 2 & 1 % External Kit Apply 2 Application topically daily.  Disp: 1 kit Rfl: 0     PA request call [phone] Pt ID# [id number]

## 2018-10-20 ENCOUNTER — TELEPHONE (OUTPATIENT)
Dept: OBGYN CLINIC | Facility: CLINIC | Age: 58
End: 2018-10-20

## 2018-10-20 NOTE — TELEPHONE ENCOUNTER
Pt has warts on her genital area. Advised pt clinic is closed and call can be rerouted to answering service for an on-call doctor.    Pt stated she can wait until Monday 10/22/18 for a call back

## 2018-10-22 NOTE — TELEPHONE ENCOUNTER
LMTCB. LEFT MESSAGE THAT [facility] HAS 1 APPT TOMORROW AT 1PM AT [facility] AND THAT I BOOKED THE APPT. ASKED PT TO CALL US BACK EITHER WAY ABOUT THE APPT.

## 2018-10-22 NOTE — TELEPHONE ENCOUNTER
Assisted pt with rescheduling appt to 10/29/18 at 3pm at [facility]. Pt very appreciative and verbalized understanding.

## 2018-10-22 NOTE — TELEPHONE ENCOUNTER
PER PT CANCELLED THE APPT FOR TOMORROW / BECAUSE SHE'S OUT OF TOWN / PT WANT TO RESCHEDULE / PT STATE SHE WILL BE AVAILABLE 10/24/18 / PLS ADV

## 2018-10-25 ENCOUNTER — PATIENT MESSAGE (OUTPATIENT)
Dept: FAMILY MEDICINE CLINIC | Facility: CLINIC | Age: 58
End: 2018-10-25

## 2018-10-25 ENCOUNTER — TELEPHONE (OUTPATIENT)
Dept: OTHER | Age: 58
End: 2018-10-25

## 2018-10-25 NOTE — TELEPHONE ENCOUNTER
From: Yury De La Cruz  To: Catalino Solano MD  Sent: 10/25/2018 10:17 AM CDT  Subject: Prescription Question     My insurance sent me a letter a while ago that they will allow for me to take Dexilant 60 mg once a day.   (Doesn't guarantee that the insuranc

## 2018-10-25 NOTE — TELEPHONE ENCOUNTER
Dr Gonzalez=medication pended for approval,it was discontinued on 2/6/18. From: Minor Lawson  To:  Marie Adame MD  Sent: 10/25/2018 10:17 AM CDT  Subject: Prescription Question    My insurance sent me a letter a while ago that they will allow for me

## 2018-10-26 RX ORDER — DEXLANSOPRAZOLE 60 MG/1
60 CAPSULE, DELAYED RELEASE ORAL DAILY
Qty: 90 CAPSULE | Refills: 4 | Status: SHIPPED | OUTPATIENT
Start: 2018-10-26 | End: 2019-04-18 | Stop reason: ALTCHOICE

## 2018-10-29 ENCOUNTER — OFFICE VISIT (OUTPATIENT)
Dept: OBGYN CLINIC | Facility: CLINIC | Age: 58
End: 2018-10-29

## 2018-10-29 VITALS
BODY MASS INDEX: 35 KG/M2 | DIASTOLIC BLOOD PRESSURE: 76 MMHG | HEART RATE: 64 BPM | SYSTOLIC BLOOD PRESSURE: 118 MMHG | WEIGHT: 201.38 LBS

## 2018-10-29 DIAGNOSIS — N90.89 VULVAR SKIN TAG: Primary | ICD-10-CM

## 2018-10-29 PROCEDURE — 56605 BIOPSY OF VULVA/PERINEUM: CPT | Performed by: OBSTETRICS & GYNECOLOGY

## 2018-10-29 PROCEDURE — 56606 BIOPSY OF VULVA/PERINEUM: CPT | Performed by: OBSTETRICS & GYNECOLOGY

## 2018-10-29 NOTE — PROCEDURES
Vulvar Biopsy       Birth control method(s) used:  postmenopausal    Consent signed. Procedure discussed with patient in detail including indication, risk, benefits, alternatives and complications.     Lesion Description: 3 irreg shaped 1cm skin tags on in

## 2018-11-01 RX ORDER — LOSARTAN POTASSIUM 25 MG/1
TABLET ORAL
Qty: 90 TABLET | Refills: 4 | Status: SHIPPED | OUTPATIENT
Start: 2018-11-01 | End: 2018-11-27 | Stop reason: ALTCHOICE

## 2018-11-01 NOTE — TELEPHONE ENCOUNTER
Review pended refill request as it does not fall under a protocol.     Last Rx: 7-30-18  LOV: 9-13-18

## 2018-11-05 DIAGNOSIS — G47.30 SLEEP APNEA, UNSPECIFIED TYPE: Primary | ICD-10-CM

## 2018-11-08 ENCOUNTER — TELEPHONE (OUTPATIENT)
Dept: FAMILY MEDICINE CLINIC | Facility: CLINIC | Age: 58
End: 2018-11-08

## 2018-11-19 ENCOUNTER — TELEPHONE (OUTPATIENT)
Dept: FAMILY MEDICINE CLINIC | Facility: CLINIC | Age: 58
End: 2018-11-19

## 2018-11-19 NOTE — TELEPHONE ENCOUNTER
[facility] returning call with insurance information. [facility] insurance fax:  Fax# [phone]    She states that the referral needs to be submitted to the insurance. Please advise.

## 2018-11-19 NOTE — TELEPHONE ENCOUNTER
[facility] requesting copy of referral from 11/05/2018 to be faxed to Patients insurance.      Will call back with insurance information

## 2018-11-21 ENCOUNTER — TELEPHONE (OUTPATIENT)
Dept: NEUROLOGY | Facility: CLINIC | Age: 58
End: 2018-11-21

## 2018-11-21 DIAGNOSIS — M51.24 THORACIC DISC HERNIATION: ICD-10-CM

## 2018-11-21 DIAGNOSIS — M54.16 LUMBAR RADICULOPATHY: Primary | ICD-10-CM

## 2018-11-21 DIAGNOSIS — M51.9 THORACIC DISC DISEASE: ICD-10-CM

## 2018-11-21 DIAGNOSIS — M51.9 LUMBAR DISC DISEASE: ICD-10-CM

## 2018-11-21 DIAGNOSIS — M47.816 LUMBAR SPONDYLOSIS: ICD-10-CM

## 2018-11-21 NOTE — TELEPHONE ENCOUNTER
Pt called requesting updated physical therapy order as previous order . Order has been placed. Pt has been notified, verbalized understanding.

## 2018-11-24 ENCOUNTER — HOSPITAL ENCOUNTER (OUTPATIENT)
Dept: MRI IMAGING | Facility: HOSPITAL | Age: 58
Discharge: HOME | End: 2018-11-24
Attending: PHYSICAL MEDICINE & REHABILITATION
Payer: COMMERCIAL

## 2018-11-24 DIAGNOSIS — M51.9 THORACIC DISC DISEASE: ICD-10-CM

## 2018-11-24 DIAGNOSIS — M51.24 THORACIC DISC HERNIATION: ICD-10-CM

## 2018-11-24 PROCEDURE — 72146 MRI CHEST SPINE W/O DYE: CPT | Performed by: PHYSICAL MEDICINE & REHABILITATION

## 2018-11-26 ENCOUNTER — TELEPHONE (OUTPATIENT)
Dept: NEUROLOGY | Facility: CLINIC | Age: 58
End: 2018-11-26

## 2018-11-27 ENCOUNTER — OFFICE VISIT (OUTPATIENT)
Dept: FAMILY MEDICINE CLINIC | Facility: CLINIC | Age: 58
End: 2018-11-27

## 2018-11-27 ENCOUNTER — TELEPHONE (OUTPATIENT)
Dept: NEUROLOGY | Facility: CLINIC | Age: 58
End: 2018-11-27

## 2018-11-27 VITALS
DIASTOLIC BLOOD PRESSURE: 69 MMHG | WEIGHT: 202 LBS | BODY MASS INDEX: 35 KG/M2 | HEART RATE: 69 BPM | SYSTOLIC BLOOD PRESSURE: 113 MMHG

## 2018-11-27 DIAGNOSIS — G56.03 BILATERAL CARPAL TUNNEL SYNDROME: Primary | ICD-10-CM

## 2018-11-27 PROBLEM — M48.04 THORACIC STENOSIS: Status: ACTIVE | Noted: 2018-11-27

## 2018-11-27 PROCEDURE — 99212 OFFICE O/P EST SF 10 MIN: CPT | Performed by: FAMILY MEDICINE

## 2018-11-27 PROCEDURE — 99214 OFFICE O/P EST MOD 30 MIN: CPT | Performed by: FAMILY MEDICINE

## 2018-11-27 RX ORDER — LANCETS
EACH MISCELLANEOUS
Qty: 400 EACH | Refills: 4 | Status: SHIPPED | OUTPATIENT
Start: 2018-11-27 | End: 2018-11-27 | Stop reason: ALTCHOICE

## 2018-11-27 RX ORDER — METHOCARBAMOL 750 MG/1
TABLET, FILM COATED ORAL
Qty: 90 TABLET | Refills: 1 | Status: SHIPPED | OUTPATIENT
Start: 2018-11-27 | End: 2018-11-27 | Stop reason: ALTCHOICE

## 2018-11-27 NOTE — TELEPHONE ENCOUNTER
Starting new job this coming Monday and will be unable to answer her phone - hoping to receive MRI results this week. Pt was informed Dr. Toni Eaton takes 7-10 days to read imaging, but we will be sure to contact her as soon as they are received.  Pt verbali

## 2018-11-27 NOTE — PROGRESS NOTES
Ryland Dixon is a 60year old female. Patient presents with:  Hand Pain    HPI:   Right hand carpal tunnel - last few months has been worse. Reports more numbness and pain. Wears a splint at night. Reports she had EMG done probably over 5 years ago.     (twelve) hours as needed.  Disp: 30 tablet Rfl: 2   LANSOPRAZOLE 15 MG Oral Capsule Delayed Release TAKE 2 CAPSULES BY MOUTH EVERY MORNING AND 1 CAPSULE BY MOUTH EVERY EVENING Disp: 270 capsule Rfl: 0   Ciclopirox 8 % External Solution Apply 1 Application t History:   Diagnosis Date   • Age-related nuclear cataract of both eyes 1/27/2015   • Allergic rhinitis    • Allergic rhinitis     pereniaal and seasonal   • Alternating exotropia 1/27/2015   • Amenorrhea 08/2007   • Anxiety 2008   • Anxiety state    • Atr Alcohol use:  Yes      Alcohol/week: 0.0 oz      Comment: Occasionally    Drug use: No       REVIEW OF SYSTEMS:   GENERAL HEALTH: feels well otherwise  SKIN: denies any unusual skin lesions or rashes  HEENT: denies eye complaints,denies sore throat, denies

## 2018-11-27 NOTE — TELEPHONE ENCOUNTER
Received in basket from [email or web address] [email or web address]  advising of approval for physical therapy under referral # [id number]. Will call Pt. To inform. L/m advising Pt.  8 PT sessions were approved. Can proceed with scheduling appts.

## 2018-11-28 NOTE — TELEPHONE ENCOUNTER
She has 5 bulging discs and one herniated disc which causes moderate central stenosis. Please see how she has done with the PT. Depending on how she has done or is doing with the PT, she might need to have a sooner appointment.

## 2018-11-29 NOTE — TELEPHONE ENCOUNTER
Deanna Friend from [address] [place] [address] is calling in reg to form that was faxed onNov 23 for referrla Per Deanna Friend needs a code to be added [phone]. Can the form please be signed and sent back to [phone].

## 2018-12-05 NOTE — TELEPHONE ENCOUNTER
Re-faxed updated referral to [phone]. Corrected referral with CPT  was faxed on 12/3/18.      Thank you,  [facility]

## 2018-12-11 ENCOUNTER — TELEPHONE (OUTPATIENT)
Dept: NEUROLOGY | Facility: CLINIC | Age: 58
End: 2018-12-11

## 2018-12-11 ENCOUNTER — PROCEDURE VISIT (OUTPATIENT)
Dept: NEUROLOGY | Facility: CLINIC | Age: 58
End: 2018-12-11

## 2018-12-11 VITALS — HEIGHT: 64 IN | BODY MASS INDEX: 35 KG/M2

## 2018-12-11 DIAGNOSIS — G56.03 BILATERAL CARPAL TUNNEL SYNDROME: Primary | ICD-10-CM

## 2018-12-11 PROCEDURE — 95886 MUSC TEST DONE W/N TEST COMP: CPT | Performed by: PHYSICAL MEDICINE & REHABILITATION

## 2018-12-11 PROCEDURE — 95911 NRV CNDJ TEST 9-10 STUDIES: CPT | Performed by: PHYSICAL MEDICINE & REHABILITATION

## 2018-12-11 NOTE — TELEPHONE ENCOUNTER
Right carpal tunnel injection cpt codes 52897,54550,  Received fax from [employer] advising no authorization is required for in office injection  Will inform Nursing

## 2018-12-15 NOTE — PROCEDURES
[address] [place] [address]  Phone: [phone]  Fax: [phone]    ELECTRODIAGNOSTIC REPORT          Patient:  Elizabeth Dodd Hand Dominance: right  Patient ID: [id number] Referring Dr: Dr. Kwabena Palacios o the peak latency was increased for Wrist stimulation    The needle EMG study was normal in all 10 tested muscles: L. Abductor pollicis brevis, L. First dorsal interosseous, L. Flexor carpi radialis, L. Biceps brachii, L.  Triceps brachii, R. Abductor poll Wrist Dig II 4.90 5.73 6.7 17.1 Wrist - Dig II 14 29   L Median - Digit III      Wrist Dig II 3.65 4.53 27.5 44.1 Wrist - Dig II 14 38   R Ulnar - Digit  V      Wrist Dig V 2.66 3.49 27.8 42.3 Wrist - Dig V 14 53   L Ulnar - Digit  V      Wrist Dig V 2.

## 2019-01-09 ENCOUNTER — TELEPHONE (OUTPATIENT)
Dept: NEUROLOGY | Facility: CLINIC | Age: 59
End: 2019-01-09

## 2019-01-09 DIAGNOSIS — M51.24 HERNIATED THORACIC DISC WITHOUT MYELOPATHY: Primary | ICD-10-CM

## 2019-01-09 DIAGNOSIS — M54.6 CHRONIC THORACIC BACK PAIN, UNSPECIFIED BACK PAIN LATERALITY: ICD-10-CM

## 2019-01-09 DIAGNOSIS — G89.29 CHRONIC THORACIC BACK PAIN, UNSPECIFIED BACK PAIN LATERALITY: ICD-10-CM

## 2019-01-09 DIAGNOSIS — M51.9 THORACIC DISC DISEASE: ICD-10-CM

## 2019-01-09 DIAGNOSIS — M51.9 LUMBAR DISC DISEASE: ICD-10-CM

## 2019-01-09 DIAGNOSIS — M48.04 THORACIC STENOSIS: ICD-10-CM

## 2019-01-09 NOTE — TELEPHONE ENCOUNTER
Contacted patient who states she is unsure what she asked for and the baby is crying and she will call back.   No action required at this time

## 2019-01-17 NOTE — TELEPHONE ENCOUNTER
Pt called VM left to obtain more information regarding request for new PT order. No NOV. LOV 08/01/18. Follow up 3 months.

## 2019-01-19 ENCOUNTER — NURSE TRIAGE (OUTPATIENT)
Dept: OTHER | Age: 59
End: 2019-01-19

## 2019-01-19 NOTE — TELEPHONE ENCOUNTER
Action Requested: Summary for Provider     []  Critical Lab, Recommendations Needed  [] Need Additional Advice  []   FYI    []   Need Orders  [] Need Medications Sent to Pharmacy  []  Other     SUMMARY: pt states she has had a cold since Christmas.  Cough p

## 2019-01-21 ENCOUNTER — TELEPHONE (OUTPATIENT)
Dept: FAMILY MEDICINE CLINIC | Facility: CLINIC | Age: 59
End: 2019-01-21

## 2019-01-21 ENCOUNTER — TELEPHONE (OUTPATIENT)
Dept: OTHER | Age: 59
End: 2019-01-21

## 2019-01-21 ENCOUNTER — OFFICE VISIT (OUTPATIENT)
Dept: FAMILY MEDICINE CLINIC | Facility: CLINIC | Age: 59
End: 2019-01-21

## 2019-01-21 VITALS
BODY MASS INDEX: 34.31 KG/M2 | DIASTOLIC BLOOD PRESSURE: 88 MMHG | WEIGHT: 201 LBS | HEART RATE: 66 BPM | TEMPERATURE: 99 F | HEIGHT: 64 IN | SYSTOLIC BLOOD PRESSURE: 145 MMHG

## 2019-01-21 DIAGNOSIS — J01.00 ACUTE NON-RECURRENT MAXILLARY SINUSITIS: Primary | ICD-10-CM

## 2019-01-21 PROCEDURE — 99213 OFFICE O/P EST LOW 20 MIN: CPT | Performed by: NURSE PRACTITIONER

## 2019-01-21 RX ORDER — LEVOFLOXACIN 500 MG/1
500 TABLET, FILM COATED ORAL DAILY
Qty: 10 TABLET | Refills: 0 | Status: SHIPPED | OUTPATIENT
Start: 2019-01-21 | End: 2019-01-21

## 2019-01-21 RX ORDER — LEVOFLOXACIN 500 MG/1
500 TABLET, FILM COATED ORAL DAILY
Qty: 10 TABLET | Refills: 0 | COMMUNITY
Start: 2019-01-21 | End: 2019-04-18 | Stop reason: ALTCHOICE

## 2019-01-21 RX ORDER — DOXYCYCLINE HYCLATE 100 MG
100 TABLET ORAL 2 TIMES DAILY
Qty: 20 TABLET | Refills: 0 | Status: SHIPPED | OUTPATIENT
Start: 2019-01-21 | End: 2019-01-21 | Stop reason: SINTOL

## 2019-01-21 NOTE — PROGRESS NOTES
HPI  Pt here for flu s/s that started on [place]. Had headache, congestion and dry cough for 2 weeks. Last week developed congestion, has prod cough-yellow/green sputum. Has not checked temperature. Taking tylenol, equate DM, cough drops.      Gets • Diabetes mellitus ([facility]    • Diabetes mellitus type 2 with complications ([facility] 8/20/4347   • Diabetic retinopathy of left eye ([facility] 1/27/2015   • Dry eyes 7/28/2015   • DUB (dysfunctional uterine bleeding) 2005    endometrial biopsy   • Esophageal refl Socioeconomic History      Marital status:       Spouse name: Not on file      Number of children: Not on file      Years of education: Not on file      Highest education level: Not on file    Social Needs      Financial resource strain: Not on yahir Insulin Pen Needle (BD PEN NEEDLE ESTEVAN U/F) 32G X 4 MM Does not apply Misc USE TWICE DAILY AS DIRECTED Disp: 90 each Rfl: 0   Dexlansoprazole 60 MG Oral Capsule Delayed Release Take 60 mg by mouth daily.  Disp: 90 capsule Rfl: 4   AmLODIPine Besylate 5 MG (ROBAXIN-750) 750 MG Oral Tab Take 1 tablet (750 mg total) by mouth 3 (three) times daily as needed.  Disp: 90 tablet Rfl: 0   BD PEN NEEDLE ESTEVAN U/F 32G X 4 MM Does not apply Misc USE BID UTD Disp:  Rfl: 2   PROPRANOLOL HCL 60 MG Oral Tab TAKE 1 TABLET(60 well-nourished. No distress. HENT:   Head: Normocephalic and atraumatic.    Right Ear: Tympanic membrane and ear canal normal. No cerumen present  Left Ear: Tympanic membrane and ear canal normal. No cerumen present  Nose: Mucosal edema and rhinorrhea pre

## 2019-01-21 NOTE — PATIENT INSTRUCTIONS
UPPER RESPIRATORY INFECTION-ANTIBIOTICS PRESCRIBED    -encourage fluids-water, diluted juices  -humidifier at bedside  -tylenol or ibuprofen for pain, fever  -check temperature with thermometer  -cough medicine: Delsym for dry cough, Muccinex for moist cou

## 2019-01-21 NOTE — ASSESSMENT & PLAN NOTE
Start levaquin 500 mg I po q d x 10 days  Supportive care discussed    Please call if symptoms worsen or are not resolving.

## 2019-01-21 NOTE — TELEPHONE ENCOUNTER
Marcos Pena pt called back and was inform of your message below. She stated that she can not take doxycycline it gave her a bad rash on her back. She took it three months ago. She stated that she is able to take Levaquin.  She is ok if you send her LevaquinPlease

## 2019-01-22 RX ORDER — TEMAZEPAM 30 MG/1
CAPSULE ORAL
Qty: 90 CAPSULE | Refills: 1 | Status: SHIPPED | OUTPATIENT
Start: 2019-01-22 | End: 2019-09-12

## 2019-01-22 NOTE — TELEPHONE ENCOUNTER
Pt called asking for Pt order for thoracic spine. Pt stated that order was written on OV 08/01/18 but pt was not able to do cause she started a new job.  Pt stated that her thoracic back has become more painful and she would like to do PT now, the symptoms

## 2019-01-22 NOTE — TELEPHONE ENCOUNTER
No Protocol on this med.      Requested Prescriptions     Pending Prescriptions Disp Refills   • TEMAZEPAM 30 MG Oral Cap [Pharmacy Med Name: TEMAZEPAM 30MG CAPSULES] 90 capsule 0     Sig: TAKE ONE CAPSULE BY MOUTH EVERY EVENING AS NEEDED FOR SLEEP       La

## 2019-01-24 NOTE — TELEPHONE ENCOUNTER
Pt was called  left told that Dr Viann Meckel has consent to rewrite order for thoracic PT. Pt told the order will be present to insurance for auth. Pt told once auth obtain we can mail her order or fax it to PT provider if she provides info.

## 2019-01-26 ENCOUNTER — TELEPHONE (OUTPATIENT)
Dept: OTHER | Age: 59
End: 2019-01-26

## 2019-01-26 NOTE — TELEPHONE ENCOUNTER
Pt contacted. States it will be week 5 with this infection. Pt states Z-Jackson worked in the past, but per Ilan Goldberg, she will need something stronger. Pt asking if there is a stronger Z-Jackson dose she can take.  Pt states when she cough, there is a metallic taste

## 2019-01-28 ENCOUNTER — TELEPHONE (OUTPATIENT)
Dept: NEUROLOGY | Facility: CLINIC | Age: 59
End: 2019-01-28

## 2019-01-28 RX ORDER — AZITHROMYCIN 250 MG/1
TABLET, FILM COATED ORAL
Qty: 6 TABLET | Refills: 0 | Status: SHIPPED | OUTPATIENT
Start: 2019-01-28 | End: 2019-01-28

## 2019-01-28 RX ORDER — AZITHROMYCIN 250 MG/1
TABLET, FILM COATED ORAL
Qty: 6 TABLET | Refills: 0 | Status: SHIPPED | OUTPATIENT
Start: 2019-01-28 | End: 2019-04-18 | Stop reason: ALTCHOICE

## 2019-01-28 NOTE — TELEPHONE ENCOUNTER
Advised patient of Dr Jael March note. Patient verbalized understanding and had no further questions. Resent to correct pharmacy in [address] [place] [address].

## 2019-01-28 NOTE — TELEPHONE ENCOUNTER
please see pt mychart message below and Joshua Pineda message below. She is waiting for a abx to be send in. She stated that she is allergic to Penicillin.  Pls Advise

## 2019-01-28 NOTE — TELEPHONE ENCOUNTER
Received  In basket from [email or web address] advising of approval for physical therapy Will call Pt. To inform. L/m advising 8 PT sessions were approved. Can proceed with scheduling appts.

## 2019-01-28 NOTE — TELEPHONE ENCOUNTER
Please call to check on patient's symptoms at his point. I was out of the office. She is allergic or has had reactions to pretty much every antibiotic except for z-pack so there are no other options.

## 2019-01-28 NOTE — TELEPHONE ENCOUNTER
Advised patient of Dr Tyler Mckeon note. Patient verbalized understanding. Patient stated she still has a \"metallic taste, is coughing, blowing her nose and taking tylenol every 6 hours\".  Please advise

## 2019-02-04 NOTE — TELEPHONE ENCOUNTER
Please see pt med request below and advise. Pharmacist states Temazepam 15 mg and 30 mg are on back order. Please advise.

## 2019-02-04 NOTE — TELEPHONE ENCOUNTER
Pt states pharmacy advised Pt the meds below has been discontinued and req a alternative meds. and states out of meds,     •  TEMAZEPAM 30 MG Oral Cap, TAKE ONE CAPSULE BY MOUTH EVERY EVENING AS NEEDED FOR SLEEP , Disp: 90 capsule, Rfl: 1

## 2019-02-04 NOTE — TELEPHONE ENCOUNTER
Temazepam med is on back order at [facility], can pt. get 2 of the 15mgs instead of 30mgs?

## 2019-02-05 RX ORDER — TEMAZEPAM 30 MG/1
CAPSULE ORAL
Qty: 90 CAPSULE | Refills: 1 | Status: CANCELLED | OUTPATIENT
Start: 2019-02-05

## 2019-02-05 NOTE — TELEPHONE ENCOUNTER
I called the patient who stated to try the [facility] in [address] [place] [address]. I called the [facility] in [place] and they do have the Temazepam 30 mg tablets #90. The medication was called into [facility] at 10:01 am    I notified the patient who will  the script today.

## 2019-02-05 NOTE — TELEPHONE ENCOUNTER
Pt can call other pharmacies to see if in stock - can call upgrade here or others.  Pt is very sensitive to medications so I think best to stick with temazepam.  There are multiple messages on this

## 2019-02-05 NOTE — TELEPHONE ENCOUNTER
Verified Temazepam information with pharmacy tech, was advised that Temazepam 15 mg is not available at this [facility].      Left VM for pt asking what pharmacy she wants alternative medication sent to as a message was received from pt today asking

## 2019-02-05 NOTE — TELEPHONE ENCOUNTER
geovany discontinued the tamezepan. Pt requesting alternative medication for sleep.   She is out of medicine and only slept for 2 hours last night

## 2019-02-06 NOTE — TELEPHONE ENCOUNTER
Tati following up on refill request for Temazepam currently requested dose is on back order. Advised this order has been phoned into a pharmacy that had it available, [facility] will cancel script at this time.  (order phoned into [facility] 2/5/19)

## 2019-02-14 ENCOUNTER — OFFICE VISIT (OUTPATIENT)
Dept: FAMILY MEDICINE CLINIC | Facility: CLINIC | Age: 59
End: 2019-02-14

## 2019-02-14 ENCOUNTER — NURSE TRIAGE (OUTPATIENT)
Dept: OTHER | Age: 59
End: 2019-02-14

## 2019-02-14 VITALS
SYSTOLIC BLOOD PRESSURE: 132 MMHG | WEIGHT: 202.19 LBS | BODY MASS INDEX: 34.52 KG/M2 | DIASTOLIC BLOOD PRESSURE: 69 MMHG | HEART RATE: 54 BPM | HEIGHT: 64 IN

## 2019-02-14 DIAGNOSIS — J01.10 ACUTE FRONTAL SINUSITIS, RECURRENCE NOT SPECIFIED: ICD-10-CM

## 2019-02-14 DIAGNOSIS — J02.9 SORE THROAT: Primary | ICD-10-CM

## 2019-02-14 LAB
CONTROL LINE PRESENT WITH A CLEAR BACKGROUND (YES/NO): YES YES/NO
KIT LOT #: NORMAL NUMERIC
STREP GRP A CUL-SCR: NEGATIVE

## 2019-02-14 PROCEDURE — 99212 OFFICE O/P EST SF 10 MIN: CPT | Performed by: FAMILY MEDICINE

## 2019-02-14 PROCEDURE — 99213 OFFICE O/P EST LOW 20 MIN: CPT | Performed by: FAMILY MEDICINE

## 2019-02-14 PROCEDURE — 87880 STREP A ASSAY W/OPTIC: CPT | Performed by: FAMILY MEDICINE

## 2019-02-14 RX ORDER — SULFAMETHOXAZOLE AND TRIMETHOPRIM 800; 160 MG/1; MG/1
1 TABLET ORAL 2 TIMES DAILY
Qty: 20 TABLET | Refills: 0 | Status: SHIPPED | OUTPATIENT
Start: 2019-02-14 | End: 2019-04-18 | Stop reason: ALTCHOICE

## 2019-02-14 NOTE — PROGRESS NOTES
HPI:   Mally Ng is a 60year old female who presents for upper respiratory symptoms for  3  weeks. Patient reports sore throat, congestion dizziness this week.        Current Outpatient Medications on File Prior to Visit:  azithromycin 250 MG Oral Tab nightly. Disp: 120 capsule Rfl: 1   diazepam (VALIUM) 5 MG Oral Tab Take 1 tablet (5 mg total) by mouth every 12 (twelve) hours as needed.  Disp: 30 tablet Rfl: 2   LANSOPRAZOLE 15 MG Oral Capsule Delayed Release TAKE 2 CAPSULES BY MOUTH EVERY MORNING AND 1 History:   Diagnosis Date   • Age-related nuclear cataract of both eyes 1/27/2015   • Allergic rhinitis    • Allergic rhinitis     pereniaal and seasonal   • Alternating exotropia 1/27/2015   • Amenorrhea 08/2007   • Anxiety 2008   • Anxiety state    • Atr • OTHER SURGICAL HISTORY  2005    Endometrial biopsy   • OTHER SURGICAL HISTORY  2008    Endometrial biopsy   • OTHER SURGICAL HISTORY  02/21/2017    cervical neck surgery    • SPINE SURGERY PROCEDURE UNLISTED  02/21/2017    cervical    • TUBAL LIGATION worsen.     Stacia Parker MD

## 2019-02-14 NOTE — TELEPHONE ENCOUNTER
Action Requested: Summary for Provider     []  Critical Lab, Recommendations Needed  [] Need Additional Advice  []   FYI    []   Need Orders  [] Need Medications Sent to Pharmacy  []  Other     SUMMARY: pt reports a list of respiratory symptoms over the pa

## 2019-02-18 ENCOUNTER — APPOINTMENT (OUTPATIENT)
Dept: PHYSICAL THERAPY | Age: 59
End: 2019-02-18
Attending: PHYSICAL MEDICINE & REHABILITATION
Payer: COMMERCIAL

## 2019-02-18 RX ORDER — AMLODIPINE BESYLATE 5 MG/1
TABLET ORAL
Qty: 90 TABLET | Refills: 0 | Status: SHIPPED | OUTPATIENT
Start: 2019-02-18 | End: 2019-05-30

## 2019-02-19 ENCOUNTER — TELEPHONE (OUTPATIENT)
Dept: FAMILY MEDICINE CLINIC | Facility: CLINIC | Age: 59
End: 2019-02-19

## 2019-02-19 NOTE — TELEPHONE ENCOUNTER
PA for Dexilant 60 mg cap completed with [employer] via CMM response time 3-5 business days KEY [id number].

## 2019-02-19 NOTE — TELEPHONE ENCOUNTER
Prior Authorization needed for Dexilant. 1. Go to key. [employer]  2. Enter  Key: Azeem Pontifdave  Last Name: Kiesha Conde  : 10/15/1960  3. Complete form and send to plan.

## 2019-02-21 ENCOUNTER — OFFICE VISIT (OUTPATIENT)
Dept: PHYSICAL THERAPY | Age: 59
End: 2019-02-21
Attending: PHYSICAL MEDICINE & REHABILITATION
Payer: COMMERCIAL

## 2019-02-21 DIAGNOSIS — M48.04 THORACIC STENOSIS: ICD-10-CM

## 2019-02-21 DIAGNOSIS — M51.24 HERNIATED THORACIC DISC WITHOUT MYELOPATHY: ICD-10-CM

## 2019-02-21 DIAGNOSIS — M51.9 LUMBAR DISC DISEASE: ICD-10-CM

## 2019-02-21 DIAGNOSIS — M54.6 CHRONIC THORACIC BACK PAIN, UNSPECIFIED BACK PAIN LATERALITY: ICD-10-CM

## 2019-02-21 DIAGNOSIS — G89.29 CHRONIC THORACIC BACK PAIN, UNSPECIFIED BACK PAIN LATERALITY: ICD-10-CM

## 2019-02-21 DIAGNOSIS — M51.9 THORACIC DISC DISEASE: ICD-10-CM

## 2019-02-21 PROCEDURE — 97161 PT EVAL LOW COMPLEX 20 MIN: CPT | Performed by: PHYSICAL THERAPIST

## 2019-02-21 PROCEDURE — 97530 THERAPEUTIC ACTIVITIES: CPT | Performed by: PHYSICAL THERAPIST

## 2019-02-21 NOTE — PROGRESS NOTES
P.T. EVALUATION:   Referring Physician: Dr. Odessa Trevizo  Diagnosis: Herniated thoracic disc without myelopathy (M51.24)  Thoracic disc disease (M51.9)  Thoracic stenosis (M48.04)  Lumbar disc disease (M51.9)  Chronic thoracic back pain, unspecified back pain la standing. Corrects self easily when cued. Balance: WFL    Gait: WFL. Walks independently without a device. Today’s Treatment and Response:  Patient education provided on PT eval findings, treatment plan, goals, HEP.   Patient received today:  PT Eval care.      X___________________________________________________ Date____________________    Certification From: 4/11/3737  To:5/22/2019

## 2019-02-22 NOTE — TELEPHONE ENCOUNTER
Please confirm with pt but I believe she already did years ago.  We have to do a PA for this last time

## 2019-02-22 NOTE — TELEPHONE ENCOUNTER
PA denied. Plan states patient must have tried and failed a generic strength PPI. Some examples are omeprazole, pantoprazole, and rabeprazole.

## 2019-02-23 RX ORDER — PROPRANOLOL HYDROCHLORIDE 60 MG/1
TABLET ORAL
Qty: 180 TABLET | Refills: 0 | Status: SHIPPED | OUTPATIENT
Start: 2019-02-23 | End: 2019-05-30

## 2019-02-23 RX ORDER — RABEPRAZOLE SODIUM 20 MG/1
20 TABLET, DELAYED RELEASE ORAL DAILY
Qty: 90 TABLET | Refills: 1 | Status: SHIPPED | OUTPATIENT
Start: 2019-02-23 | End: 2019-03-08

## 2019-02-23 NOTE — TELEPHONE ENCOUNTER
Called patient left voice message regarding new rx sent to the pharmacy, advised to call back if she has any questions.

## 2019-02-23 NOTE — TELEPHONE ENCOUNTER
Spoke with patient informed of the denial. Patient states she has taken omeprazole and pantoprazole but not rabeprazole and does not recall exactly when she took it.  Patient states she has been on the [address] for years as it is the only thing that works f

## 2019-02-26 ENCOUNTER — TELEPHONE (OUTPATIENT)
Dept: ENDOCRINOLOGY CLINIC | Facility: CLINIC | Age: 59
End: 2019-02-26

## 2019-02-26 RX ORDER — DULAGLUTIDE 1.5 MG/.5ML
1.5 INJECTION, SOLUTION SUBCUTANEOUS
Qty: 6 ML | Refills: 1 | Status: SHIPPED | OUTPATIENT
Start: 2019-02-26 | End: 2019-02-27

## 2019-02-26 NOTE — TELEPHONE ENCOUNTER
Pt requesting new script for Rx   TRULICITY sent to [facility] in [place], [place] ph: [phone] Pt also requesting refills added on to medication.  Pt states she is completely out of medication        Current Outpatient Medications:     •  TRULICITY 1.5 MG/0

## 2019-02-27 RX ORDER — DULAGLUTIDE 1.5 MG/.5ML
1.5 INJECTION, SOLUTION SUBCUTANEOUS
Qty: 6 ML | Refills: 1 | Status: SHIPPED | OUTPATIENT
Start: 2019-02-27 | End: 2019-04-18

## 2019-02-28 ENCOUNTER — OFFICE VISIT (OUTPATIENT)
Dept: PHYSICAL THERAPY | Age: 59
End: 2019-02-28
Attending: PHYSICAL MEDICINE & REHABILITATION
Payer: COMMERCIAL

## 2019-02-28 DIAGNOSIS — M51.9 THORACIC DISC DISEASE: ICD-10-CM

## 2019-02-28 DIAGNOSIS — M51.24 HERNIATED THORACIC DISC WITHOUT MYELOPATHY: ICD-10-CM

## 2019-02-28 DIAGNOSIS — M54.6 CHRONIC THORACIC BACK PAIN, UNSPECIFIED BACK PAIN LATERALITY: ICD-10-CM

## 2019-02-28 DIAGNOSIS — G89.29 CHRONIC THORACIC BACK PAIN, UNSPECIFIED BACK PAIN LATERALITY: ICD-10-CM

## 2019-02-28 DIAGNOSIS — M48.04 THORACIC STENOSIS: ICD-10-CM

## 2019-02-28 DIAGNOSIS — M51.9 LUMBAR DISC DISEASE: ICD-10-CM

## 2019-02-28 PROCEDURE — 97110 THERAPEUTIC EXERCISES: CPT

## 2019-02-28 RX ORDER — MONTELUKAST SODIUM 10 MG/1
10 TABLET ORAL DAILY
Qty: 90 TABLET | Refills: 3 | Status: SHIPPED | OUTPATIENT
Start: 2019-02-28 | End: 2019-03-08

## 2019-02-28 NOTE — TELEPHONE ENCOUNTER
Fax received from [address] for medication     Drug: Montelukast 10mg tablet  Sig: take one tablet by mouth   Qty: 90  Last refill: 5/8/2018    No there notes indicated. Please advise.

## 2019-02-28 NOTE — TELEPHONE ENCOUNTER
Please advise on refill request.     Refill Protocol Appointment Criteria  · Appointment scheduled in the past 12 months or in the next 3 months  Recent Outpatient Visits            1 week ago Herniated thoracic disc without myelopathy    [facility]

## 2019-02-28 NOTE — PROGRESS NOTES
Diagnosis: Herniated thoracic disc without myelopathy (M51.24)  Thoracic disc disease (M51.9)  Thoracic stenosis (M48.04)  Lumbar disc disease (M51.9)  Chronic thoracic back pain, unspecified back pain laterality (M54.6,G89.29)    Date of Onset: 1/24/2019

## 2019-03-05 ENCOUNTER — OFFICE VISIT (OUTPATIENT)
Dept: PHYSICAL THERAPY | Age: 59
End: 2019-03-05
Attending: PHYSICAL MEDICINE & REHABILITATION
Payer: COMMERCIAL

## 2019-03-05 DIAGNOSIS — M51.9 LUMBAR DISC DISEASE: ICD-10-CM

## 2019-03-05 DIAGNOSIS — G89.29 CHRONIC THORACIC BACK PAIN, UNSPECIFIED BACK PAIN LATERALITY: ICD-10-CM

## 2019-03-05 DIAGNOSIS — M48.04 THORACIC STENOSIS: ICD-10-CM

## 2019-03-05 DIAGNOSIS — M51.9 THORACIC DISC DISEASE: ICD-10-CM

## 2019-03-05 DIAGNOSIS — M54.6 CHRONIC THORACIC BACK PAIN, UNSPECIFIED BACK PAIN LATERALITY: ICD-10-CM

## 2019-03-05 DIAGNOSIS — M51.24 HERNIATED THORACIC DISC WITHOUT MYELOPATHY: ICD-10-CM

## 2019-03-05 PROCEDURE — 97110 THERAPEUTIC EXERCISES: CPT

## 2019-03-07 ENCOUNTER — OFFICE VISIT (OUTPATIENT)
Dept: PHYSICAL THERAPY | Age: 59
End: 2019-03-07
Attending: PHYSICAL MEDICINE & REHABILITATION
Payer: COMMERCIAL

## 2019-03-07 ENCOUNTER — PATIENT MESSAGE (OUTPATIENT)
Dept: FAMILY MEDICINE CLINIC | Facility: CLINIC | Age: 59
End: 2019-03-07

## 2019-03-07 DIAGNOSIS — M51.9 LUMBAR DISC DISEASE: ICD-10-CM

## 2019-03-07 DIAGNOSIS — M54.6 CHRONIC THORACIC BACK PAIN, UNSPECIFIED BACK PAIN LATERALITY: ICD-10-CM

## 2019-03-07 DIAGNOSIS — M51.9 THORACIC DISC DISEASE: ICD-10-CM

## 2019-03-07 DIAGNOSIS — M48.04 THORACIC STENOSIS: ICD-10-CM

## 2019-03-07 DIAGNOSIS — G89.29 CHRONIC THORACIC BACK PAIN, UNSPECIFIED BACK PAIN LATERALITY: ICD-10-CM

## 2019-03-07 DIAGNOSIS — M51.24 HERNIATED THORACIC DISC WITHOUT MYELOPATHY: ICD-10-CM

## 2019-03-07 PROCEDURE — 97110 THERAPEUTIC EXERCISES: CPT

## 2019-03-07 PROCEDURE — 97140 MANUAL THERAPY 1/> REGIONS: CPT

## 2019-03-07 NOTE — PROGRESS NOTES
Diagnosis: Herniated thoracic disc without myelopathy (M51.24)  Thoracic disc disease (M51.9)  Thoracic stenosis (M48.04)  Lumbar disc disease (M51.9)  Chronic thoracic back pain, unspecified back pain laterality (M54.6,G89.29)    Date of Onset: 1/24/2019 min

## 2019-03-08 ENCOUNTER — PATIENT MESSAGE (OUTPATIENT)
Dept: FAMILY MEDICINE CLINIC | Facility: CLINIC | Age: 59
End: 2019-03-08

## 2019-03-08 RX ORDER — RABEPRAZOLE SODIUM 20 MG/1
20 TABLET, DELAYED RELEASE ORAL DAILY
Qty: 90 TABLET | Refills: 1 | Status: SHIPPED | OUTPATIENT
Start: 2019-03-08 | End: 2019-04-18

## 2019-03-08 RX ORDER — MONTELUKAST SODIUM 10 MG/1
10 TABLET ORAL DAILY
Qty: 90 TABLET | Refills: 3 | Status: SHIPPED | OUTPATIENT
Start: 2019-03-08 | End: 2020-03-14

## 2019-03-08 NOTE — TELEPHONE ENCOUNTER
From: Viktor Pal  To:  Anson Siemens, MD  Sent: 3/7/2019 9:53 PM CST  Subject: Prescription Question    Please take [facility] in [place] [address] off my preferred pharmacy list and add as my preferred pharmacy:  Diana [facility]  [address] [facility], [address] [place] [address],[address]

## 2019-03-08 NOTE — TELEPHONE ENCOUNTER
Called Jenae at [employer] and cancelled the singulair and the rabeprazole. Resent both Rx's to [facility]. Updated patient's pharmacy list.     Email sent to patient.

## 2019-03-09 NOTE — TELEPHONE ENCOUNTER
Dr Gonzalez=pended  Blood sugar machine for approval, first time to prescribe. From: Shanna Kinsey  To: Ashley Lilly MD  Sent: 3/8/2019 10:19 PM CST  Subject: Prescription Question    I forgot to add this to my script request yesterday.   Sorry to

## 2019-03-12 ENCOUNTER — OFFICE VISIT (OUTPATIENT)
Dept: PHYSICAL THERAPY | Age: 59
End: 2019-03-12
Attending: PHYSICAL MEDICINE & REHABILITATION
Payer: COMMERCIAL

## 2019-03-12 DIAGNOSIS — M54.6 CHRONIC THORACIC BACK PAIN, UNSPECIFIED BACK PAIN LATERALITY: ICD-10-CM

## 2019-03-12 DIAGNOSIS — M48.04 THORACIC STENOSIS: ICD-10-CM

## 2019-03-12 DIAGNOSIS — M51.24 HERNIATED THORACIC DISC WITHOUT MYELOPATHY: ICD-10-CM

## 2019-03-12 DIAGNOSIS — M51.9 LUMBAR DISC DISEASE: ICD-10-CM

## 2019-03-12 DIAGNOSIS — G89.29 CHRONIC THORACIC BACK PAIN, UNSPECIFIED BACK PAIN LATERALITY: ICD-10-CM

## 2019-03-12 DIAGNOSIS — M51.9 THORACIC DISC DISEASE: ICD-10-CM

## 2019-03-12 PROCEDURE — 97110 THERAPEUTIC EXERCISES: CPT | Performed by: PHYSICAL THERAPIST

## 2019-03-12 PROCEDURE — 97140 MANUAL THERAPY 1/> REGIONS: CPT | Performed by: PHYSICAL THERAPIST

## 2019-03-14 ENCOUNTER — OFFICE VISIT (OUTPATIENT)
Dept: PHYSICAL THERAPY | Age: 59
End: 2019-03-14
Attending: PHYSICAL MEDICINE & REHABILITATION
Payer: COMMERCIAL

## 2019-03-14 DIAGNOSIS — M48.04 THORACIC STENOSIS: ICD-10-CM

## 2019-03-14 DIAGNOSIS — G89.29 CHRONIC THORACIC BACK PAIN, UNSPECIFIED BACK PAIN LATERALITY: ICD-10-CM

## 2019-03-14 DIAGNOSIS — M51.24 HERNIATED THORACIC DISC WITHOUT MYELOPATHY: ICD-10-CM

## 2019-03-14 DIAGNOSIS — M51.9 LUMBAR DISC DISEASE: ICD-10-CM

## 2019-03-14 DIAGNOSIS — M51.9 THORACIC DISC DISEASE: ICD-10-CM

## 2019-03-14 DIAGNOSIS — M54.6 CHRONIC THORACIC BACK PAIN, UNSPECIFIED BACK PAIN LATERALITY: ICD-10-CM

## 2019-03-14 NOTE — PROGRESS NOTES
Diagnosis: Herniated thoracic disc without myelopathy (M51.24)  Thoracic disc disease (M51.9)  Thoracic stenosis (M48.04)  Lumbar disc disease (M51.9)  Chronic thoracic back pain, unspecified back pain laterality (M54.6,G89.29)    Date of Onset: 1/24/2019 Progress Report    Patient Name: Dave Ahumada MRN: [id number]  Date: 3/14/2019  Referring Physician: Madi Murillo    Diagnosis: Herniated thoracic disc without myelopathy (M51.24)  Thoracic disc disease (M51.9)  Thoracic stenosis (M48.04)  Lumbar its progression, and management of residual symptoms. 2. Patient will report back pain of not more than 1/10 during activity. 3. Increase trunk ROM to WNL into all planes to improve overall mobility during household activities.   4. Increase strength to 5

## 2019-03-18 ENCOUNTER — OFFICE VISIT (OUTPATIENT)
Dept: PHYSICAL THERAPY | Age: 59
End: 2019-03-18
Attending: PHYSICAL MEDICINE & REHABILITATION
Payer: COMMERCIAL

## 2019-03-18 DIAGNOSIS — M51.9 LUMBAR DISC DISEASE: ICD-10-CM

## 2019-03-18 DIAGNOSIS — M51.24 HERNIATED THORACIC DISC WITHOUT MYELOPATHY: ICD-10-CM

## 2019-03-18 DIAGNOSIS — M51.9 THORACIC DISC DISEASE: ICD-10-CM

## 2019-03-18 DIAGNOSIS — M48.04 THORACIC STENOSIS: ICD-10-CM

## 2019-03-18 DIAGNOSIS — G89.29 CHRONIC THORACIC BACK PAIN, UNSPECIFIED BACK PAIN LATERALITY: ICD-10-CM

## 2019-03-18 DIAGNOSIS — M54.6 CHRONIC THORACIC BACK PAIN, UNSPECIFIED BACK PAIN LATERALITY: ICD-10-CM

## 2019-03-18 PROCEDURE — 97110 THERAPEUTIC EXERCISES: CPT

## 2019-03-18 PROCEDURE — 97140 MANUAL THERAPY 1/> REGIONS: CPT

## 2019-03-18 NOTE — PROGRESS NOTES
Diagnosis: Herniated thoracic disc without myelopathy (M51.24)  Thoracic disc disease (M51.9)  Thoracic stenosis (M48.04)  Lumbar disc disease (M51.9)  Chronic thoracic back pain, unspecified back pain laterality (M54.6,G89.29)    Date of Onset: 1/24/2019 resume previous household activities. 5. Patient will verbalize and demonstrate strategies to improve posture during activity. Plan: Continue PT.       Charges: EX 2, MAN MOB 1       Total Timed Treatment: 45 min  Total Treatment Time: 45 min

## 2019-03-21 ENCOUNTER — OFFICE VISIT (OUTPATIENT)
Dept: PHYSICAL THERAPY | Age: 59
End: 2019-03-21
Attending: PHYSICAL MEDICINE & REHABILITATION
Payer: COMMERCIAL

## 2019-03-21 PROCEDURE — 97110 THERAPEUTIC EXERCISES: CPT | Performed by: PHYSICAL THERAPIST

## 2019-03-21 PROCEDURE — 97140 MANUAL THERAPY 1/> REGIONS: CPT | Performed by: PHYSICAL THERAPIST

## 2019-03-21 NOTE — PROGRESS NOTES
Physical Therapy Treatment and Progress Report    Patient Name: Sonny Golden, MRN: [id number]  Date: 3/21/2019  Referring Physician: Dr. Eduarda Collins    Diagnosis: Herniated thoracic disc without myelopathy (M51.24)  Thoracic disc disease (M51.9) rotation 1 x 10 each (L sided discomfort alleviated with right rotation)  - supine R/L active hamstring stretch with sheet  5\" holds x 10 each (tighter on left side)  - supine R/L piriformis stretch 5\" holds x 10 each (R sided stretch more so)  - trisha certification required: Yes  I certify the need for these services furnished under this plan of treatment and while under my care.       X____________________________________________________ Date____________________    Certification From: 6/37/3124  To:6/19

## 2019-03-22 ENCOUNTER — TELEPHONE (OUTPATIENT)
Dept: NEUROLOGY | Facility: CLINIC | Age: 59
End: 2019-03-22

## 2019-03-22 DIAGNOSIS — M48.04 THORACIC STENOSIS: ICD-10-CM

## 2019-03-22 DIAGNOSIS — G89.29 CHRONIC THORACIC BACK PAIN, UNSPECIFIED BACK PAIN LATERALITY: Primary | ICD-10-CM

## 2019-03-22 DIAGNOSIS — M47.816 LUMBAR SPONDYLOSIS: ICD-10-CM

## 2019-03-22 DIAGNOSIS — M51.9 LUMBAR DISC DISEASE: ICD-10-CM

## 2019-03-22 DIAGNOSIS — M51.9 THORACIC DISC DISEASE: ICD-10-CM

## 2019-03-22 DIAGNOSIS — M51.24 HERNIATED THORACIC DISC WITHOUT MYELOPATHY: ICD-10-CM

## 2019-03-22 DIAGNOSIS — M54.16 LUMBAR RADICULOPATHY: ICD-10-CM

## 2019-03-22 DIAGNOSIS — M54.6 CHRONIC THORACIC BACK PAIN, UNSPECIFIED BACK PAIN LATERALITY: Primary | ICD-10-CM

## 2019-03-22 NOTE — TELEPHONE ENCOUNTER
Spoke with Radha Bhatti PT at [address] [place] [address]. Eliot Brunson PT has sent PT note. Pt has had 8 sessions. Eliot Brunson PT feels pt would benefit with continuation of therapy. Eliot Brunson PT would like order for thoracic and lumbar as given in January.  Sent to Dr Bahman Kirby for PT order

## 2019-03-28 ENCOUNTER — TELEPHONE (OUTPATIENT)
Dept: NEUROLOGY | Facility: CLINIC | Age: 59
End: 2019-03-28

## 2019-03-28 NOTE — TELEPHONE ENCOUNTER
Received in basket from CASEY [email or web address]  advising of approval for physical therapy. Will call Pt. To inform. L/ m advising 8 PT sessions were approved. Can proceed with scheduling appts.

## 2019-03-30 ENCOUNTER — PATIENT MESSAGE (OUTPATIENT)
Dept: OTHER | Age: 59
End: 2019-03-30

## 2019-03-30 ENCOUNTER — PATIENT MESSAGE (OUTPATIENT)
Dept: FAMILY MEDICINE CLINIC | Facility: CLINIC | Age: 59
End: 2019-03-30

## 2019-03-30 DIAGNOSIS — K22.719 BARRETT'S ESOPHAGUS WITH DYSPLASIA: ICD-10-CM

## 2019-03-30 RX ORDER — LANSOPRAZOLE 15 MG/1
CAPSULE, DELAYED RELEASE ORAL
Qty: 270 CAPSULE | Refills: 0 | Status: CANCELLED | OUTPATIENT
Start: 2019-03-30

## 2019-03-30 NOTE — TELEPHONE ENCOUNTER
From: Ryland Dixon  To: Nadine Rivera MD  Sent: 3/30/2019 10:28 AM CDT  Subject: Referral Request    Hi Dr. Webster Credit, I hope all is well.      I don't want to waste your time in office with this request. I am in need of a referral for a therapist for arou

## 2019-03-30 NOTE — TELEPHONE ENCOUNTER
Dr Sydney Melgar, please see patient's email. Called patient. She had seen a therapist in past, but that person is too far away, and she'd like to see someone closer. She denied any feelings of self-harm or harming others.  Advised if any self-harm feelings or fe

## 2019-03-31 NOTE — TELEPHONE ENCOUNTER
pls advise on lansoprazole rx refill request from pt?   LOV- for non acute issue-09/13/18  LR-06/2018    Refill Protocol Appointment Criteria  · Appointment scheduled in the past 12 months or in the next 3 months  Recent Outpatient Visits            1 week

## 2019-04-01 RX ORDER — LANSOPRAZOLE 15 MG/1
CAPSULE, DELAYED RELEASE ORAL
Qty: 270 CAPSULE | Refills: 0 | Status: SHIPPED | OUTPATIENT
Start: 2019-04-01 | End: 2019-06-27

## 2019-04-03 ENCOUNTER — OFFICE VISIT (OUTPATIENT)
Dept: PHYSICAL THERAPY | Age: 59
End: 2019-04-03
Attending: PHYSICAL MEDICINE & REHABILITATION
Payer: COMMERCIAL

## 2019-04-03 ENCOUNTER — TELEPHONE (OUTPATIENT)
Dept: OTHER | Age: 59
End: 2019-04-03

## 2019-04-03 PROCEDURE — 97110 THERAPEUTIC EXERCISES: CPT | Performed by: PHYSICAL THERAPIST

## 2019-04-03 PROCEDURE — 97140 MANUAL THERAPY 1/> REGIONS: CPT | Performed by: PHYSICAL THERAPIST

## 2019-04-03 RX ORDER — PERPHENAZINE 16 MG/1
TABLET, FILM COATED ORAL
Qty: 100 EACH | Refills: 0 | Status: SHIPPED | OUTPATIENT
Start: 2019-04-03 | End: 2019-09-12

## 2019-04-03 NOTE — TELEPHONE ENCOUNTER
PA for Lansoprazole 15 mg cap completed with [employer] via CMM response time 3-5 business days [address].

## 2019-04-03 NOTE — PROGRESS NOTES
Diagnosis: Herniated thoracic disc without myelopathy (M51.24)  Thoracic disc disease (M51.9)  Thoracic stenosis (M48.04)  Lumbar disc disease (M51.9)  Chronic thoracic back pain, unspecified back pain laterality (M54.6,G89.29)    Date of Onset: 1/24/2019 activities. 5. Patient will verbalize and demonstrate strategies to improve posture during activity. Plan: Continue PT.       Charges: EX 2, MAN MOB 1       Total Timed Treatment: 40 min  Total Treatment Time: 40 min

## 2019-04-08 ENCOUNTER — OFFICE VISIT (OUTPATIENT)
Dept: PHYSICAL THERAPY | Age: 59
End: 2019-04-08
Attending: PHYSICAL MEDICINE & REHABILITATION
Payer: COMMERCIAL

## 2019-04-08 PROCEDURE — 97140 MANUAL THERAPY 1/> REGIONS: CPT | Performed by: PHYSICAL THERAPIST

## 2019-04-08 PROCEDURE — 97110 THERAPEUTIC EXERCISES: CPT | Performed by: PHYSICAL THERAPIST

## 2019-04-08 NOTE — PROGRESS NOTES
Diagnosis: Herniated thoracic disc without myelopathy (M51.24)  Thoracic disc disease (M51.9)  Thoracic stenosis (M48.04)  Lumbar disc disease (M51.9)  Chronic thoracic back pain, unspecified back pain laterality (M54.6,G89.29)    Date of Onset: 1/24/2019 MOB 1       Total Timed Treatment: 40 min  Total Treatment Time: 40 min

## 2019-04-10 ENCOUNTER — OFFICE VISIT (OUTPATIENT)
Dept: PHYSICAL THERAPY | Age: 59
End: 2019-04-10
Attending: PHYSICAL MEDICINE & REHABILITATION
Payer: COMMERCIAL

## 2019-04-10 PROCEDURE — 97110 THERAPEUTIC EXERCISES: CPT | Performed by: PHYSICAL THERAPIST

## 2019-04-10 PROCEDURE — 97140 MANUAL THERAPY 1/> REGIONS: CPT | Performed by: PHYSICAL THERAPIST

## 2019-04-10 NOTE — PROGRESS NOTES
Diagnosis: Herniated thoracic disc without myelopathy (M51.24)  Thoracic disc disease (M51.9)  Thoracic stenosis (M48.04)  Lumbar disc disease (M51.9)  Chronic thoracic back pain, unspecified back pain laterality (M54.6,G89.29)    Date of Onset: 1/24/2019 posture during activity. Plan: Continue PT.       Charges: EX 2, MAN MOB 1       Total Timed Treatment: 45 min  Total Treatment Time: 45 min

## 2019-04-15 ENCOUNTER — APPOINTMENT (OUTPATIENT)
Dept: PHYSICAL THERAPY | Age: 59
End: 2019-04-15
Attending: PHYSICAL MEDICINE & REHABILITATION
Payer: COMMERCIAL

## 2019-04-17 ENCOUNTER — OFFICE VISIT (OUTPATIENT)
Dept: PHYSICAL THERAPY | Age: 59
End: 2019-04-17
Attending: PHYSICAL MEDICINE & REHABILITATION
Payer: COMMERCIAL

## 2019-04-17 PROCEDURE — 97140 MANUAL THERAPY 1/> REGIONS: CPT | Performed by: PHYSICAL THERAPIST

## 2019-04-17 PROCEDURE — 97110 THERAPEUTIC EXERCISES: CPT | Performed by: PHYSICAL THERAPIST

## 2019-04-17 NOTE — PROGRESS NOTES
Diagnosis: Herniated thoracic disc without myelopathy (M51.24)  Thoracic disc disease (M51.9)  Thoracic stenosis (M48.04)  Lumbar disc disease (M51.9)  Chronic thoracic back pain, unspecified back pain laterality (M54.6,G89.29)    Date of Onset: 1/24/2019 Treatment: 45 min  Total Treatment Time: 45 min

## 2019-04-18 ENCOUNTER — OFFICE VISIT (OUTPATIENT)
Dept: FAMILY MEDICINE CLINIC | Facility: CLINIC | Age: 59
End: 2019-04-18

## 2019-04-18 ENCOUNTER — APPOINTMENT (OUTPATIENT)
Dept: LAB | Age: 59
End: 2019-04-18
Attending: NURSE PRACTITIONER
Payer: COMMERCIAL

## 2019-04-18 VITALS
WEIGHT: 205 LBS | HEART RATE: 60 BPM | SYSTOLIC BLOOD PRESSURE: 120 MMHG | DIASTOLIC BLOOD PRESSURE: 77 MMHG | BODY MASS INDEX: 35 KG/M2 | HEIGHT: 64 IN

## 2019-04-18 DIAGNOSIS — R07.89 OTHER CHEST PAIN: ICD-10-CM

## 2019-04-18 DIAGNOSIS — R10.11 RUQ PAIN: ICD-10-CM

## 2019-04-18 DIAGNOSIS — K22.719 BARRETT'S ESOPHAGUS WITH DYSPLASIA: Primary | ICD-10-CM

## 2019-04-18 PROCEDURE — 93010 ELECTROCARDIOGRAM REPORT: CPT | Performed by: NURSE PRACTITIONER

## 2019-04-18 PROCEDURE — 99214 OFFICE O/P EST MOD 30 MIN: CPT | Performed by: NURSE PRACTITIONER

## 2019-04-18 PROCEDURE — 93005 ELECTROCARDIOGRAM TRACING: CPT

## 2019-04-18 NOTE — PROGRESS NOTES
HPI  Pt here for pain under left breast3 times over past week-pain is classified as sharp. . Radiated to left arm. Feels like her esophagus hurts after walking even short distances.    Has h/o nguyen's esophagus  Sees Dr Darylene Ear from [facility] [address] [facility] eye ([facility] 1/27/2015   • Dry eyes 7/28/2015   • DUB (dysfunctional uterine bleeding) 2005    endometrial biopsy   • Esophageal reflux    • Exotropia    • Genital warts    • Heart murmur    • Herpes simplex    • High blood pressure    • High cholesterol    • Years of education: Not on file      Highest education level: Not on file    Occupational History      Not on file    Social Needs      Financial resource strain: Not on file      Food insecurity:        Worry: Not on file        Inability: Not on file Belt: Not Asked        Self-Exams: Not Asked    Social History Narrative      The patient does not use an assistive device. .        The patient does live in a home with stairs.             Lives with             Work         Marianna Kitchen chlordiazepoxide-clidinium 5-2.5 MG Oral Cap Take 1 capsule by mouth 4 (four) times daily before meals and nightly. Disp: 120 capsule Rfl: 1   diazepam (VALIUM) 5 MG Oral Tab Take 1 tablet (5 mg total) by mouth every 12 (twelve) hours as needed.  Disp: 30 UNKNOWN  Gabapentin              UNKNOWN  Methylprednisolone      PAIN  Metronidazole           UNKNOWN  Naproxen Sodium         UNKNOWN  Nitrofurantoin          UNKNOWN  Nitrofurantoin Macr*    UNKNOWN  Doxycycline             RASH    Physical Exam   Nurs

## 2019-04-22 ENCOUNTER — OFFICE VISIT (OUTPATIENT)
Dept: PHYSICAL THERAPY | Age: 59
End: 2019-04-22
Attending: PHYSICAL MEDICINE & REHABILITATION
Payer: COMMERCIAL

## 2019-04-22 PROCEDURE — 97140 MANUAL THERAPY 1/> REGIONS: CPT | Performed by: PHYSICAL THERAPIST

## 2019-04-22 PROCEDURE — 97110 THERAPEUTIC EXERCISES: CPT | Performed by: PHYSICAL THERAPIST

## 2019-04-22 NOTE — PROGRESS NOTES
Diagnosis: Herniated thoracic disc without myelopathy (M51.24)  Thoracic disc disease (M51.9)  Thoracic stenosis (M48.04)  Lumbar disc disease (M51.9)  Chronic thoracic back pain, unspecified back pain laterality (M54.6,G89.29)    Date of Onset: 1/24/2019 EX 2, MAN MOB 1       Total Timed Treatment: 40 min  Total Treatment Time: 40 min

## 2019-04-23 RX ORDER — CHLORDIAZEPOXIDE HYDROCHLORIDE AND CLIDINIUM BROMIDE 5; 2.5 MG/1; MG/1
1 CAPSULE ORAL
Qty: 120 CAPSULE | Refills: 1 | Status: SHIPPED | OUTPATIENT
Start: 2019-04-23 | End: 2020-10-12

## 2019-04-23 NOTE — TELEPHONE ENCOUNTER
Refill passed per [facility] protocol.   Refill Protocol Appointment Criteria  · Appointment scheduled in the past 12 months or in the next 3 months  Recent Outpatient Visits            Today     [facility] [place] [address].[facility]

## 2019-04-23 NOTE — TELEPHONE ENCOUNTER
LOV 9/12/18. RTC 6 months. No f/u scheduled. Sent [employer] message reminding pt to make f/u appt. Pended 1 month supply for provider.

## 2019-04-24 ENCOUNTER — OFFICE VISIT (OUTPATIENT)
Dept: PHYSICAL THERAPY | Age: 59
End: 2019-04-24
Attending: PHYSICAL MEDICINE & REHABILITATION
Payer: COMMERCIAL

## 2019-04-24 PROCEDURE — 97140 MANUAL THERAPY 1/> REGIONS: CPT | Performed by: PHYSICAL THERAPIST

## 2019-04-24 RX ORDER — GLIMEPIRIDE 4 MG/1
TABLET ORAL
Qty: 30 TABLET | Refills: 0 | Status: SHIPPED | OUTPATIENT
Start: 2019-04-24 | End: 2019-05-22

## 2019-04-25 ENCOUNTER — TELEPHONE (OUTPATIENT)
Dept: OTHER | Age: 59
End: 2019-04-25

## 2019-04-25 NOTE — TELEPHONE ENCOUNTER
Received call from pharmacy, PA required on chlordiazepoxide-clidinium 5-2.5 MG Oral Cap.   [facility] [address] ID# [id number] [phone]

## 2019-04-25 NOTE — TELEPHONE ENCOUNTER
PA for Chlordiazepoxide-Clidinium 5-2.5 mg cap completed with [employer] via CMM response time 3-5 business days KEY [id number].

## 2019-04-28 NOTE — TELEPHONE ENCOUNTER
Ok. Please let pt know. I have no recommendation for generic alternative.  Not sure if she wants to pay out of pocket - pt has numerous drug allergies

## 2019-04-29 ENCOUNTER — OFFICE VISIT (OUTPATIENT)
Dept: OPTOMETRY | Facility: CLINIC | Age: 59
End: 2019-04-29

## 2019-04-29 DIAGNOSIS — H25.13 AGE-RELATED NUCLEAR CATARACT OF BOTH EYES: ICD-10-CM

## 2019-04-29 DIAGNOSIS — H52.203 MYOPIA WITH ASTIGMATISM AND PRESBYOPIA, BILATERAL: ICD-10-CM

## 2019-04-29 DIAGNOSIS — E11.9 TYPE 2 DIABETES MELLITUS WITHOUT COMPLICATION, UNSPECIFIED WHETHER LONG TERM INSULIN USE (HCC): Primary | ICD-10-CM

## 2019-04-29 DIAGNOSIS — H52.13 MYOPIA WITH ASTIGMATISM AND PRESBYOPIA, BILATERAL: ICD-10-CM

## 2019-04-29 DIAGNOSIS — H52.4 MYOPIA WITH ASTIGMATISM AND PRESBYOPIA, BILATERAL: ICD-10-CM

## 2019-04-29 PROCEDURE — 92015 DETERMINE REFRACTIVE STATE: CPT | Performed by: OPTOMETRIST

## 2019-04-29 PROCEDURE — 92014 COMPRE OPH EXAM EST PT 1/>: CPT | Performed by: OPTOMETRIST

## 2019-04-29 NOTE — PATIENT INSTRUCTIONS
Type II diabetes mellitus ([facility]  I advised patient that there is no background diabetic retinopathy in either eye and that they should continue to keep their blood sugar under control and continue to see their physician as directed.  I stressed the importan

## 2019-04-29 NOTE — TELEPHONE ENCOUNTER
Called patient left message regarding denial of medication and Dr. [facility] recommendation to purchase medication OOP. Advised to call back with any questions or concerns.

## 2019-04-29 NOTE — ASSESSMENT & PLAN NOTE
New glasses RX given to update as needed. I advised that if she has any issues with the new RX that she return for a recheck.  Preferred less minus

## 2019-04-30 NOTE — PROGRESS NOTES
Rufus Mackey is a 60year old female. HPI:     HPI     Diabetic Eye Exam     Diabetes characteristics include controlled with diet, taking oral medications, Type 2 and on insulin. Duration of 11 years. Number of years diabetic 6.   Number of years o C6-7, physical therapy   • TIA (transient ischemic attack)    • Tobacco abuse counseling 4/15/2015   • Type II or unspecified type diabetes mellitus without mention of complication, not stated as uncontrolled 2005   • Unspecified essential hypertension 200 2 CAPSULES BY MOUTH EVERY MORNING AND 1 CAPSULE BY MOUTH EVERY EVENING Disp: 270 capsule Rfl: 0   Montelukast Sodium (SINGULAIR) 10 MG Oral Tab Take 1 tablet (10 mg total) by mouth daily.  Disp: 90 tablet Rfl: 3   Propranolol HCl 60 MG Oral Tab TAKE 1 TABLE DAILY Disp: 1000 strip Rfl: 2   Fluticasone Propionate 50 MCG/ACT Nasal Suspension INSTILL 2 SPRAYS INTRANASALLY AS NEEDED AS DIRECTED once daily Disp: 3 Bottle Rfl: 4   ATORVASTATIN 40 MG Oral Tab TAKE 1 TABLET BY MOUTH ONCE DAILY Disp: 90 tablet Rfl: 4 Tonometry (Non-contact air puff, 6:18 PM)       Right Left    Pressure 14 17          Pupils       Pupils    Right PERRL    Left PERRL          Visual Fields       Left Right     Full Full          Extraocular Movement       Right Left     Full Full importance of yearly diabetic eye exams. Patient has been advised to call immediately if they notice any changes or problems with their vision     Myopia with astigmatism and presbyopia, bilateral  New glasses RX given to update as needed.  I advised that i

## 2019-04-30 NOTE — PROGRESS NOTES
Agnes Schaefer is a 60year old female. HPI:     HPI     Diabetic Eye Exam     Diabetes characteristics include controlled with diet, taking oral medications and Type 2. Duration of 11 years. Number of years diabetic 6. Number of years on pills 11. therapy   • TIA (transient ischemic attack)    • Tobacco abuse counseling 4/15/2015   • Type II or unspecified type diabetes mellitus without mention of complication, not stated as uncontrolled 2005   • Unspecified essential hypertension 2003   • Viral inf MOUTH EVERY MORNING AND 1 CAPSULE BY MOUTH EVERY EVENING Disp: 270 capsule Rfl: 0   Montelukast Sodium (SINGULAIR) 10 MG Oral Tab Take 1 tablet (10 mg total) by mouth daily.  Disp: 90 tablet Rfl: 3   Propranolol HCl 60 MG Oral Tab TAKE 1 TABLET(60 MG) BY MO strip Rfl: 2   Fluticasone Propionate 50 MCG/ACT Nasal Suspension INSTILL 2 SPRAYS INTRANASALLY AS NEEDED AS DIRECTED once daily Disp: 3 Bottle Rfl: 4   ATORVASTATIN 40 MG Oral Tab TAKE 1 TABLET BY MOUTH ONCE DAILY Disp: 90 tablet Rfl: 4   cetirizine 10 MG air puff, 6:18 PM)       Right Left    Pressure 14 17          Pupils       Pupils    Right PERRL    Left PERRL          Visual Fields       Left Right     Full Full          Extraocular Movement       Right Left     Full, Ortho Full, Ortho          Neuro/ importance of yearly diabetic eye exams. Patient has been advised to call immediately if they notice any changes or problems with their vision     Myopia with astigmatism and presbyopia, bilateral  New glasses RX given to update as needed.  I advised that i

## 2019-05-01 ENCOUNTER — OFFICE VISIT (OUTPATIENT)
Dept: PHYSICAL THERAPY | Age: 59
End: 2019-05-01
Attending: FAMILY MEDICINE
Payer: COMMERCIAL

## 2019-05-01 PROCEDURE — 97140 MANUAL THERAPY 1/> REGIONS: CPT | Performed by: PHYSICAL THERAPIST

## 2019-05-01 PROCEDURE — 97110 THERAPEUTIC EXERCISES: CPT | Performed by: PHYSICAL THERAPIST

## 2019-05-06 ENCOUNTER — OFFICE VISIT (OUTPATIENT)
Dept: FAMILY MEDICINE CLINIC | Facility: CLINIC | Age: 59
End: 2019-05-06

## 2019-05-06 ENCOUNTER — APPOINTMENT (OUTPATIENT)
Dept: PHYSICAL THERAPY | Age: 59
End: 2019-05-06
Attending: FAMILY MEDICINE
Payer: COMMERCIAL

## 2019-05-06 VITALS
RESPIRATION RATE: 18 BRPM | WEIGHT: 200 LBS | HEART RATE: 63 BPM | DIASTOLIC BLOOD PRESSURE: 79 MMHG | TEMPERATURE: 98 F | BODY MASS INDEX: 34.15 KG/M2 | SYSTOLIC BLOOD PRESSURE: 126 MMHG | HEIGHT: 64 IN

## 2019-05-06 DIAGNOSIS — J01.80 OTHER ACUTE SINUSITIS, RECURRENCE NOT SPECIFIED: ICD-10-CM

## 2019-05-06 DIAGNOSIS — H66.91 RIGHT OTITIS MEDIA, UNSPECIFIED OTITIS MEDIA TYPE: Primary | ICD-10-CM

## 2019-05-06 PROCEDURE — 99213 OFFICE O/P EST LOW 20 MIN: CPT | Performed by: NURSE PRACTITIONER

## 2019-05-06 RX ORDER — SULFAMETHOXAZOLE AND TRIMETHOPRIM 800; 160 MG/1; MG/1
1 TABLET ORAL 2 TIMES DAILY
Qty: 20 TABLET | Refills: 0 | Status: SHIPPED | OUTPATIENT
Start: 2019-05-06 | End: 2019-05-16

## 2019-05-06 NOTE — PROGRESS NOTES
HPI    Patient presents for a head cold with right ear pain X 3 days. Productive cough; yellow and green. Denies fever. Has taken otc cold medications with some improvement. With pain in her right molars.       Review of Systems   Constitutional: Andrzej Flash Exotropia    • Genital warts    • Heart murmur    • Herpes simplex    • High blood pressure    • High cholesterol    • History of pregnancy 1990, 1991   • Hypercholesterolemia 2006   • Irregular menstrual cycle 2008    Neg endometrial biopsy   • MGD (meibo Financial resource strain: Not on file      Food insecurity:        Worry: Not on file        Inability: Not on file      Transportation needs:        Medical: Not on file        Non-medical: Not on file    Tobacco Use      Smoking status: Former Smoker patient does live in a home with stairs. Lives with             Work         Current Outpatient Medications:  Dulaglutide (TRULICITY SC) Inject into the skin.  Disp:  Rfl:    glimepiride 4 MG Oral Tab TAKE 1 TABLET BY MOUTH times daily as needed. Disp: 120 tablet Rfl: 2   diazepam (VALIUM) 5 MG Oral Tab Take 1 tablet (5 mg total) by mouth every 12 (twelve) hours as needed. Disp: 30 tablet Rfl: 2   Ciclopirox 8 % External Solution Apply 1 Application topically nightly.  Disp: 1 UNKNOWN  Naproxen Sodium         UNKNOWN  Nitrofurantoin          UNKNOWN  Nitrofurantoin Macr*    UNKNOWN  Doxycycline             RASH    Physical Exam   Nursing note and vitals reviewed. Constitutional: She is oriented to person, place, and time.  She

## 2019-05-09 ENCOUNTER — OFFICE VISIT (OUTPATIENT)
Dept: PHYSICAL THERAPY | Age: 59
End: 2019-05-09
Attending: FAMILY MEDICINE
Payer: COMMERCIAL

## 2019-05-09 PROCEDURE — 97140 MANUAL THERAPY 1/> REGIONS: CPT | Performed by: PHYSICAL THERAPIST

## 2019-05-09 PROCEDURE — 97110 THERAPEUTIC EXERCISES: CPT | Performed by: PHYSICAL THERAPIST

## 2019-05-09 NOTE — PROGRESS NOTES
Physical Therapy Treatment and Discharge Summary    Patient Name: Yobany Bacon, MRN: [id number]  Date: 5/9/2019  Referring Physician: Dr. Jennifer Obrien    Diagnosis: Herniated thoracic disc without myelopathy (M51.24)  Thoracic disc disease (M51. during household activities. 4. Increase strength to 5/5 throughout so patient can resume previous household activities. 5. Patient will verbalize and demonstrate strategies to improve posture during activity. Plan:  Discharge from PT.  Patient

## 2019-05-12 ENCOUNTER — PATIENT MESSAGE (OUTPATIENT)
Dept: FAMILY MEDICINE CLINIC | Facility: CLINIC | Age: 59
End: 2019-05-12

## 2019-05-13 ENCOUNTER — APPOINTMENT (OUTPATIENT)
Dept: PHYSICAL THERAPY | Age: 59
End: 2019-05-13
Attending: PHYSICAL MEDICINE & REHABILITATION
Payer: COMMERCIAL

## 2019-05-13 ENCOUNTER — TELEPHONE (OUTPATIENT)
Dept: OTHER | Age: 59
End: 2019-05-13

## 2019-05-13 NOTE — TELEPHONE ENCOUNTER
From: Ryland Dixon  To: Nadine Rivera MD  Sent: 5/12/2019 11:00 AM CDT  Subject: Visit Rachel Host Dr. Webster Credit,    I hope you had a wonderful Mothers Day!     On Monday 5/6/2019 I visited with nurse Shakira Power due to a chest cold, ear and sinus

## 2019-05-13 NOTE — TELEPHONE ENCOUNTER
Dayami Ortiz, please advise. Patient stated, symptoms have improved (see below). Only issues are the ear humming sound and the heart beat sound with the popping, all are bothering her. The ear pain has resolved. Nasal congestion is less.  No change in allergy

## 2019-05-13 NOTE — TELEPHONE ENCOUNTER
----- Message from Elian Johnson sent at 5/12/2019 11:00 AM CDT -----  Regarding: Visit Follow-up Question  Contact: [phone]  Hi Dr. Anita Birmingham,    I hope you had a wonderful Mothers Day!     On Monday 5/6/2019 I visited with nurse Tanya Schreiber due to a chest co

## 2019-05-16 ENCOUNTER — APPOINTMENT (OUTPATIENT)
Dept: PHYSICAL THERAPY | Age: 59
End: 2019-05-16
Attending: PHYSICAL MEDICINE & REHABILITATION
Payer: COMMERCIAL

## 2019-05-21 ENCOUNTER — APPOINTMENT (OUTPATIENT)
Dept: PHYSICAL THERAPY | Age: 59
End: 2019-05-21
Attending: PHYSICAL MEDICINE & REHABILITATION
Payer: COMMERCIAL

## 2019-05-22 RX ORDER — GLIMEPIRIDE 4 MG/1
TABLET ORAL
Qty: 30 TABLET | Refills: 0 | Status: SHIPPED | OUTPATIENT
Start: 2019-05-22 | End: 2019-06-19

## 2019-05-23 ENCOUNTER — APPOINTMENT (OUTPATIENT)
Dept: PHYSICAL THERAPY | Age: 59
End: 2019-05-23
Attending: PHYSICAL MEDICINE & REHABILITATION
Payer: COMMERCIAL

## 2019-05-30 ENCOUNTER — APPOINTMENT (OUTPATIENT)
Dept: PHYSICAL THERAPY | Age: 59
End: 2019-05-30
Attending: PHYSICAL MEDICINE & REHABILITATION
Payer: COMMERCIAL

## 2019-05-30 DIAGNOSIS — E55.9 VITAMIN D DEFICIENCY: ICD-10-CM

## 2019-05-31 RX ORDER — ATORVASTATIN CALCIUM 40 MG/1
40 TABLET, FILM COATED ORAL
Qty: 90 TABLET | Refills: 1 | Status: SHIPPED | OUTPATIENT
Start: 2019-05-31 | End: 2019-12-23

## 2019-05-31 RX ORDER — AMLODIPINE BESYLATE 5 MG/1
TABLET ORAL
Qty: 90 TABLET | Refills: 1 | Status: SHIPPED | OUTPATIENT
Start: 2019-05-31 | End: 2019-12-21

## 2019-05-31 RX ORDER — PROPRANOLOL HYDROCHLORIDE 60 MG/1
TABLET ORAL
Qty: 180 TABLET | Refills: 0 | Status: SHIPPED | OUTPATIENT
Start: 2019-05-31 | End: 2019-09-21

## 2019-05-31 RX ORDER — LOSARTAN POTASSIUM 25 MG/1
TABLET ORAL
Qty: 90 TABLET | Refills: 1 | Status: SHIPPED | OUTPATIENT
Start: 2019-05-31 | End: 2019-12-21

## 2019-05-31 NOTE — TELEPHONE ENCOUNTER
Refill passed per [facility] protocol.      Hypertensive Medications  Protocol Criteria:  · Appointment scheduled in the past 6 months or in the next 3 months  · BMP or CMP in the past 12 months  · Creatinine result < 2  Recent Outpatient Visits

## 2019-05-31 NOTE — TELEPHONE ENCOUNTER
Pt calling in regards to refill states she is losing her insurance and would like to get refill before she does looking for 90 day supply

## 2019-05-31 NOTE — TELEPHONE ENCOUNTER
Cannot refill medication per RN protocol. Did attempt to call patient and schedule follow up. Left detailed message to call back. Spoke with on-call provider, Dr. Alejandro Sun.  She states ok to refill 90 days at this time because patient has been seen in t

## 2019-05-31 NOTE — TELEPHONE ENCOUNTER
Pt checking status on refill request.  States her insurance ends today and requesting RN to send rx. Pls call. Thank you.

## 2019-05-31 NOTE — TELEPHONE ENCOUNTER
Review pended refill request Cholecalciferol 60115 units Oral Cap. It does not fall under a protocol.

## 2019-06-05 RX ORDER — GLIMEPIRIDE 4 MG/1
TABLET ORAL
Qty: 30 TABLET | Refills: 0 | Status: SHIPPED | OUTPATIENT
Start: 2019-06-05 | End: 2019-07-14

## 2019-06-14 ENCOUNTER — TELEPHONE (OUTPATIENT)
Dept: FAMILY MEDICINE CLINIC | Facility: CLINIC | Age: 59
End: 2019-06-14

## 2019-06-14 DIAGNOSIS — G47.30 INSOMNIA WITH SLEEP APNEA: Primary | ICD-10-CM

## 2019-06-14 DIAGNOSIS — G47.00 INSOMNIA WITH SLEEP APNEA: Primary | ICD-10-CM

## 2019-06-14 NOTE — TELEPHONE ENCOUNTER
Received a fax from [facility] requesting cpap supplies. Please sign off on referral if you agree.      Thank you, Jorge

## 2019-06-19 RX ORDER — GLIMEPIRIDE 4 MG/1
TABLET ORAL
Qty: 30 TABLET | Refills: 0 | Status: SHIPPED | OUTPATIENT
Start: 2019-06-19 | End: 2019-08-12

## 2019-06-19 NOTE — TELEPHONE ENCOUNTER
Pharmacy calling for a request on the following medication         Current Outpatient Medications:   •  GLIMEPIRIDE 4 MG Oral Tab, TAKE 1 TABLET BY MOUTH EVERY MORNING BEFORE BREAKFAST., Disp: 30 tablet, Rfl: 0

## 2019-06-27 DIAGNOSIS — K22.719 BARRETT'S ESOPHAGUS WITH DYSPLASIA: ICD-10-CM

## 2019-06-28 RX ORDER — LANSOPRAZOLE 15 MG/1
CAPSULE, DELAYED RELEASE ORAL
Qty: 270 CAPSULE | Refills: 1 | Status: SHIPPED | OUTPATIENT
Start: 2019-06-28 | End: 2019-12-21

## 2019-06-28 NOTE — TELEPHONE ENCOUNTER
Review pended refill request as it does not fall under a protocol.     Last Rx: 4/1/19  LOV: 2/14/19    Requested Prescriptions     Pending Prescriptions Disp Refills   • LANSOPRAZOLE 15 MG Oral Capsule Delayed Release [Pharmacy Med Name: Lansoprazole  15

## 2019-07-15 RX ORDER — GLIMEPIRIDE 4 MG/1
TABLET ORAL
Qty: 30 TABLET | Refills: 0 | Status: SHIPPED | OUTPATIENT
Start: 2019-07-15 | End: 2019-08-14

## 2019-07-20 ENCOUNTER — PATIENT MESSAGE (OUTPATIENT)
Dept: FAMILY MEDICINE CLINIC | Facility: CLINIC | Age: 59
End: 2019-07-20

## 2019-07-20 NOTE — TELEPHONE ENCOUNTER
From: Vaishnavi Santizo  To: Bernie Cox MD  Sent: 7/20/2019 12:54 PM CDT  Subject: Prescription Question    Hi Dr. Landy Aremnta,    I hope all is well! I am in need of a few scripts that do not appear on my med refill screen in My Chart.      Can you please

## 2019-07-20 NOTE — TELEPHONE ENCOUNTER
Please advise rx last refilled in 2015  Refill Protocol Appointment Criteria  · Appointment scheduled in the past 6 months or in the next 3 months  Recent Outpatient Visits            2 months ago     FAY Elizondo in [place] [id number].[facility]

## 2019-07-22 ENCOUNTER — TELEPHONE (OUTPATIENT)
Dept: ENDOCRINOLOGY CLINIC | Facility: CLINIC | Age: 59
End: 2019-07-22

## 2019-07-22 RX ORDER — FLUTICASONE PROPIONATE 50 MCG
2 SPRAY, SUSPENSION (ML) NASAL DAILY
Qty: 3 BOTTLE | Refills: 3 | Status: SHIPPED | OUTPATIENT
Start: 2019-07-22 | End: 2019-09-12

## 2019-07-22 RX ORDER — ALBUTEROL SULFATE 90 UG/1
2 AEROSOL, METERED RESPIRATORY (INHALATION) EVERY 4 HOURS PRN
Qty: 3 INHALER | Refills: 6 | Status: SHIPPED | OUTPATIENT
Start: 2019-07-22 | End: 2020-03-16

## 2019-07-22 NOTE — TELEPHONE ENCOUNTER
LOV 9/2018. Letter already sent. LM on 7/14 with patient and no follow up scheduled. Sent [employer] message today.

## 2019-07-27 ENCOUNTER — APPOINTMENT (OUTPATIENT)
Dept: LAB | Age: 59
End: 2019-07-27
Attending: FAMILY MEDICINE
Payer: COMMERCIAL

## 2019-07-27 ENCOUNTER — TELEPHONE (OUTPATIENT)
Dept: OTHER | Age: 59
End: 2019-07-27

## 2019-07-27 ENCOUNTER — PATIENT MESSAGE (OUTPATIENT)
Dept: FAMILY MEDICINE CLINIC | Facility: CLINIC | Age: 59
End: 2019-07-27

## 2019-07-27 DIAGNOSIS — M25.552 LEFT HIP PAIN: Primary | ICD-10-CM

## 2019-07-27 DIAGNOSIS — M79.605 LEFT LEG PAIN: ICD-10-CM

## 2019-07-27 PROCEDURE — 80061 LIPID PANEL: CPT | Performed by: FAMILY MEDICINE

## 2019-07-27 PROCEDURE — 36415 COLL VENOUS BLD VENIPUNCTURE: CPT | Performed by: FAMILY MEDICINE

## 2019-07-27 PROCEDURE — 80048 BASIC METABOLIC PNL TOTAL CA: CPT | Performed by: FAMILY MEDICINE

## 2019-07-27 PROCEDURE — 82306 VITAMIN D 25 HYDROXY: CPT | Performed by: FAMILY MEDICINE

## 2019-07-27 PROCEDURE — 83036 HEMOGLOBIN GLYCOSYLATED A1C: CPT | Performed by: FAMILY MEDICINE

## 2019-07-27 NOTE — TELEPHONE ENCOUNTER
Spoke with patient , states thats he completed her PT due to bulging disc.     States that her L hip and L leg pain 8/10 now, taking pain pill  and muscle relaxant medications,states that pain is worse when sitting or lying down, better on activity, denies

## 2019-07-27 NOTE — TELEPHONE ENCOUNTER
See Acute TE 7/27/19. From: Rufus Mackey  To: Martell Martinez MD  Sent: 7/27/2019 11:51 AM CDT  Subject: Non-Urgent Medical Question    Hi Dr. Skylar Moore,    I have been having a lot of pain in my left hip for about 10 months now.   I was hoping it would

## 2019-07-29 NOTE — TELEPHONE ENCOUNTER
S/w pt who states she has [place] for [place] (as noted in media in 6/20/19).  Scheduled f/u appt w/ Dr. Odessa Trevizo for 8/27/19 at 4:00pm.     Pt interested in having MRI done prior to visit as she mentioned family hx of vascular necrosis and is concerned the

## 2019-07-29 NOTE — TELEPHONE ENCOUNTER
I will get her in for an appointment. She has now been seen in the office since 8/1/18. She had an EMG with me on 12/11/18 which showed bilateral carpal tunnel syndrome.

## 2019-07-29 NOTE — TELEPHONE ENCOUNTER
Pt needs to contact Dr. Rafia Aranog. He has been managing this. And forwarding to Dr. Rafia Arango.

## 2019-07-29 NOTE — TELEPHONE ENCOUNTER
Pt called  left  is O which is out of network for our clinics. Pt was informed that Dr Óscar Reyes is calling per Dr Mitchell Herman request for appt.  Under [facility] she is considered out of network but is welcome to call and make appt as self

## 2019-07-30 ENCOUNTER — HOSPITAL ENCOUNTER (OUTPATIENT)
Dept: MAMMOGRAPHY | Age: 59
Discharge: HOME | End: 2019-07-30
Attending: OBSTETRICS & GYNECOLOGY
Payer: COMMERCIAL

## 2019-07-30 DIAGNOSIS — Z12.31 VISIT FOR SCREENING MAMMOGRAM: ICD-10-CM

## 2019-07-30 PROCEDURE — 77067 SCR MAMMO BI INCL CAD: CPT | Performed by: OBSTETRICS & GYNECOLOGY

## 2019-07-30 PROCEDURE — 77063 BREAST TOMOSYNTHESIS BI: CPT | Performed by: OBSTETRICS & GYNECOLOGY

## 2019-07-31 ENCOUNTER — HOSPITAL ENCOUNTER (OUTPATIENT)
Dept: GENERAL RADIOLOGY | Age: 59
Discharge: HOME | End: 2019-07-31
Attending: FAMILY MEDICINE
Payer: COMMERCIAL

## 2019-07-31 DIAGNOSIS — M25.552 LEFT HIP PAIN: ICD-10-CM

## 2019-07-31 PROCEDURE — 73502 X-RAY EXAM HIP UNI 2-3 VIEWS: CPT | Performed by: FAMILY MEDICINE

## 2019-07-31 NOTE — PROGRESS NOTES
Nick Roche - x-ray of your hip is completely normal. The pain is most likely coming for your spine or could be muscle strain.  - Dr. De Souza Kos

## 2019-08-12 ENCOUNTER — NURSE TRIAGE (OUTPATIENT)
Dept: FAMILY MEDICINE CLINIC | Facility: CLINIC | Age: 59
End: 2019-08-12

## 2019-08-12 DIAGNOSIS — R00.2 PALPITATIONS: Primary | ICD-10-CM

## 2019-08-12 RX ORDER — GLIMEPIRIDE 4 MG/1
TABLET ORAL
Qty: 30 TABLET | Refills: 0 | Status: SHIPPED | OUTPATIENT
Start: 2019-08-12 | End: 2019-08-14

## 2019-08-12 NOTE — TELEPHONE ENCOUNTER
That pulse is normal. Np- Kimo Hernandez had ordered stress test for patient in April that was never done. Is she asking for referral to cardiologist? I am not understanding this message.

## 2019-08-12 NOTE — TELEPHONE ENCOUNTER
Spoke with patient, informed of LB note below, she is trying to schedule Stress test, had to re-schedule  one already. States she is feeling better as the day progresses.   Has Insurance approval for a cardiologist  , would like specific name and number

## 2019-08-12 NOTE — TELEPHONE ENCOUNTER
Action Requested: Summary for Provider     []  Critical Lab, Recommendations Needed  [x] Need Additional Advice  []   FYI    []   Need Orders  [] Need Medications Sent to Pharmacy  []  Other     SUMMARY: Per protocol advised ER.  Patient stated she'd \"elliott

## 2019-08-14 ENCOUNTER — OFFICE VISIT (OUTPATIENT)
Dept: ENDOCRINOLOGY CLINIC | Facility: CLINIC | Age: 59
End: 2019-08-14

## 2019-08-14 VITALS
DIASTOLIC BLOOD PRESSURE: 69 MMHG | SYSTOLIC BLOOD PRESSURE: 109 MMHG | BODY MASS INDEX: 35 KG/M2 | WEIGHT: 201 LBS | HEART RATE: 75 BPM

## 2019-08-14 DIAGNOSIS — E11.65 UNCONTROLLED TYPE 2 DIABETES MELLITUS WITH COMPLICATION, WITH LONG-TERM CURRENT USE OF INSULIN (HCC): Primary | ICD-10-CM

## 2019-08-14 DIAGNOSIS — Z79.4 UNCONTROLLED TYPE 2 DIABETES MELLITUS WITH COMPLICATION, WITH LONG-TERM CURRENT USE OF INSULIN (HCC): Primary | ICD-10-CM

## 2019-08-14 DIAGNOSIS — E11.8 UNCONTROLLED TYPE 2 DIABETES MELLITUS WITH COMPLICATION, WITH LONG-TERM CURRENT USE OF INSULIN (HCC): Primary | ICD-10-CM

## 2019-08-14 LAB
GLUCOSE BLOOD: 174
TEST STRIP LOT #: NORMAL NUMERIC

## 2019-08-14 PROCEDURE — 36416 COLLJ CAPILLARY BLOOD SPEC: CPT | Performed by: INTERNAL MEDICINE

## 2019-08-14 PROCEDURE — 82962 GLUCOSE BLOOD TEST: CPT | Performed by: INTERNAL MEDICINE

## 2019-08-14 PROCEDURE — 99213 OFFICE O/P EST LOW 20 MIN: CPT | Performed by: INTERNAL MEDICINE

## 2019-08-14 RX ORDER — GLIMEPIRIDE 1 MG/1
1 TABLET ORAL
Qty: 90 TABLET | Refills: 1 | Status: SHIPPED | OUTPATIENT
Start: 2019-08-14 | End: 2019-12-20

## 2019-08-14 NOTE — PROGRESS NOTES
Name: Kristin Hernandez  Date: 8/14/2019    Referring Physician: No ref. provider found    HISTORY OF PRESENT ILLNESS   Kristin Hernandez is a 60year old female who presents for diabetes mellitus.      Prior HbA, C or glycohemoglobin were 8.5% 4/2014; 7.9% 7/201 on file      Number of children: Not on file      Years of education: Not on file      Highest education level: Not on file    Tobacco Use      Smoking status: Former Smoker        Packs/day: 0.25        Years: 2.00        Pack years: .5        Types: Ciga Neg endometrial biopsy   • MGD (meibomian gland dysfunction) 7/28/2015   • Ocular migraine 7/28/2015   • Other and unspecified hyperlipidemia    • Ovarian cyst 1980    Laparoscopic cystectomy   • Pregnancy 1990, 1991, 1993   • Sleep apnea    • Sleep apnea, sensory grossly intact and motor grossly intact  Psychiatric:  oriented to time, self, and place  Nutritional:  no abnormal weight gain or loss    ASSESSMENT/PLAN:      1.  Diabetes Mellitus Type 2, Uncontrolled  -Uncontrolled, HgA1c 6.8% -->stable   -Congr

## 2019-08-27 NOTE — TELEPHONE ENCOUNTER
Pt calling for Thoracic MRI results - completed 11/24/18.
Pt is starting physical therapy now that  order has been updated. She will be going to the [place] location. Pt was instructed to call after a few weeks of PT and let us know how she is feeling and will make appointment accordingly.      Pt mentioned
Pt returning call, please advise
See other note.
TCB  For MRI results per  as copied below-    She has 5 bulging discs and one herniated disc which causes moderate central stenosis. Please see how she has done with the PT.   Depending on how she has done or is doing with the PT, she might need
Tylenol or extra strength tylenol if needed for discomfort, avoid   Advil, Motrin, Aleve and aspirin to minimize bleeding.

## 2019-09-12 ENCOUNTER — OFFICE VISIT (OUTPATIENT)
Dept: NEUROLOGY | Facility: CLINIC | Age: 59
End: 2019-09-12

## 2019-09-12 ENCOUNTER — TELEPHONE (OUTPATIENT)
Dept: NEUROLOGY | Facility: CLINIC | Age: 59
End: 2019-09-12

## 2019-09-12 VITALS — RESPIRATION RATE: 18 BRPM | HEART RATE: 76 BPM | DIASTOLIC BLOOD PRESSURE: 68 MMHG | SYSTOLIC BLOOD PRESSURE: 120 MMHG

## 2019-09-12 DIAGNOSIS — M48.04 THORACIC STENOSIS: ICD-10-CM

## 2019-09-12 DIAGNOSIS — M51.24 HERNIATED THORACIC DISC WITHOUT MYELOPATHY: ICD-10-CM

## 2019-09-12 DIAGNOSIS — M54.16 LUMBAR RADICULOPATHY: Primary | ICD-10-CM

## 2019-09-12 DIAGNOSIS — M47.816 LUMBAR SPONDYLOSIS: ICD-10-CM

## 2019-09-12 DIAGNOSIS — M51.9 LUMBAR DISC DISEASE: ICD-10-CM

## 2019-09-12 DIAGNOSIS — M51.9 THORACIC DISC DISEASE: ICD-10-CM

## 2019-09-12 PROCEDURE — 99215 OFFICE O/P EST HI 40 MIN: CPT | Performed by: PHYSICAL MEDICINE & REHABILITATION

## 2019-09-12 RX ORDER — ACETAMINOPHEN 500 MG
500 TABLET ORAL EVERY 6 HOURS PRN
COMMUNITY

## 2019-09-12 RX ORDER — IBUPROFEN 200 MG
200 TABLET ORAL EVERY 6 HOURS PRN
COMMUNITY
End: 2020-10-12

## 2019-09-12 NOTE — PATIENT INSTRUCTIONS
As of October 6th 2014, the Drug Enforcement Agency [facility]) is reclassifying all hydrocodone combination medications from Schedule III to Schedule II. This includes medications such as Norco, Vicodin, Lortab, Zohydro, and Vicoprofen.     What this means for y will perform bilateral L5 TFESI(s). She will take the Ibuprofen 600 mg 3 times a day and the Tylenol 100 mg up to 3 times a day. She will get into the PT for the lumbar spine.     The patient will follow up in 2 months, but the patient will call me 2

## 2019-09-12 NOTE — PROGRESS NOTES
Patient has been scheduled for a bilateral L5 TFESIs  on 9/27/19 at the [facility]. Medications and allergies reviewed. Patient informed to hold aspirin 81mg, nsaids-Ibuprofen, blood thinners, multivitamins, vitamin E and fish oils 3-7 days prior to procedure.  P

## 2019-09-12 NOTE — PROGRESS NOTES
Low Back Pain H & P    Chief Complaint: Patient presents with:  Hip Pain: pt here for follow up after EMG 12/11/18. pt c/o severe left hip, knee and hamstring pain that increased 3 weeks ago worsening with climbing stairs. denies injury.  had Hip Xray 7/31/ Chlamydia 1986   • Choroidal nevus, right eye 1/27/2015   • COPD (chronic obstructive pulmonary disease) ([facility]    • Depression 2008   • Diabetes mellitus ([facility]    • Diabetes mellitus type 2 with complications ([facility] 3/99/4184   • Diabetic retinopathy of lef • Colon Cancer Mother    • Heart Disease Mother         coronary artery disease   • Hypertension Mother    • Heart Disease Father         coronary artery disease   • Diabetes Father    • Glaucoma Sister    • Other (Other) Sister         Vascular necrosis Concerns:         Service: Not Asked        Blood Transfusions: Not Asked        Caffeine Concern: Yes          2 cups coffee daily        Occupational Exposure: Not Asked        Hobby Hazards: Not Asked        Sleep Concern: Not Asked        St measurements 5/5 except:  Hamstring RIGHT:   4-/5  Hamstring LEFT:   3+/5   RIGHT plantar reflexes: downward response   LEFT plantar reflexes: downward response   Reflexes: 2+ in bilateral lower extremities except:  Right medial hamstring which are absent spent discussing patient care and treatment. The patient understands and agrees with the stated plan. Devyn Calvillo MD  9/12/2019

## 2019-09-12 NOTE — TELEPHONE ENCOUNTER
Bilateral L5 LUMBAR TRANSFORAMINAL EPIDURAL [id number]  Sending clinical notes to Keisha Berger for authorization.  Pending approval.

## 2019-09-13 ENCOUNTER — TELEPHONE (OUTPATIENT)
Dept: PHYSICAL THERAPY | Facility: HOSPITAL | Age: 59
End: 2019-09-13

## 2019-09-17 ENCOUNTER — TELEPHONE (OUTPATIENT)
Dept: NEUROLOGY | Facility: CLINIC | Age: 59
End: 2019-09-17

## 2019-09-17 NOTE — TELEPHONE ENCOUNTER
Received in-basket CASEY Richardson @ [facility] advising approval for Bilateral L5 LUMBAR TRANSFORAMINAL EPIDURAL  For one unit / DOS, Will inform nursing.

## 2019-09-23 RX ORDER — PROPRANOLOL HYDROCHLORIDE 60 MG/1
TABLET ORAL
Qty: 180 TABLET | Refills: 4 | Status: SHIPPED | OUTPATIENT
Start: 2019-09-23 | End: 2020-10-12

## 2019-09-24 ENCOUNTER — TELEPHONE (OUTPATIENT)
Dept: NEUROLOGY | Facility: CLINIC | Age: 59
End: 2019-09-24

## 2019-09-24 NOTE — TELEPHONE ENCOUNTER
Pt calling stating that her previous TFESIs did not offer relief, but the SIJ injection did.  Pt calling to make sure that since she is allergic to kenalog, the dexamethasone was ordered and that the TFESI is the procedure she should be having for her [facility]

## 2019-09-27 ENCOUNTER — OFFICE VISIT (OUTPATIENT)
Dept: SURGERY | Facility: CLINIC | Age: 59
End: 2019-09-27

## 2019-09-27 DIAGNOSIS — M54.16 LUMBAR RADICULOPATHY: Primary | ICD-10-CM

## 2019-09-27 DIAGNOSIS — M51.9 LUMBAR DISC DISEASE: ICD-10-CM

## 2019-09-27 PROCEDURE — 64483 NJX AA&/STRD TFRM EPI L/S 1: CPT | Performed by: PHYSICAL MEDICINE & REHABILITATION

## 2019-09-27 RX ORDER — PROPRANOLOL HYDROCHLORIDE 60 MG/1
TABLET ORAL
Qty: 180 TABLET | Refills: 1 | OUTPATIENT
Start: 2019-09-27

## 2019-09-27 NOTE — PROCEDURES
[facility]    BILATERAL LUMBAR TRANSFORAMINAL   NAME:  Jaiden Richards    MR #:    [id number] :  10/15/1960     PHYSICIAN:  Niranjan Villegas        Operative Report    DATE OF PROCEDURE: 2019   PREOPERATIVE DIAGNOSES: 1. left > removed. Then  fluoroscopy was right anterior obliqued opening up the left L5-S1 intervertebral foramen. At this point in time, the patient's skin was anesthetized with 1 to 2 cc of 1% PF lidocaine without epinephrine.   Then, a 5 inch, 22-gauge spinal ne

## 2019-09-27 NOTE — TELEPHONE ENCOUNTER
Patient states will callback to schedule follow up appointment and states the medication is already been filled

## 2019-09-30 ENCOUNTER — PATIENT MESSAGE (OUTPATIENT)
Dept: FAMILY MEDICINE CLINIC | Facility: CLINIC | Age: 59
End: 2019-09-30

## 2019-10-01 ENCOUNTER — TELEPHONE (OUTPATIENT)
Dept: FAMILY MEDICINE CLINIC | Facility: CLINIC | Age: 59
End: 2019-10-01

## 2019-10-01 NOTE — TELEPHONE ENCOUNTER
Pt is calling regarding medication LANSOPRAZOLE 15 MG Oral Capsule Delayed Release. She states insurance is not covering medication and is requesting an explaination from Dr. Irineo Najjar to the health insurance why pt needs to be on medication.  Pt states she has

## 2019-10-01 NOTE — TELEPHONE ENCOUNTER
From: Vaishnavi Neighbours  To: RUFINA Russell FNP-C  Sent: 9/30/2019 7:46 PM CDT  Subject: Non-Urgent Medical Question    Does your office have flu shots yet?

## 2019-10-04 NOTE — TELEPHONE ENCOUNTER
Patient contacted and reported diagnosed with jere's esophagus prior to her establishment of care with [facility]. the patient reports a history of  GERD from approximately 2009, progressing to Jere's esophagitis.  she has been successfully treated

## 2019-10-07 ENCOUNTER — APPOINTMENT (OUTPATIENT)
Dept: PHYSICAL THERAPY | Age: 59
End: 2019-10-07
Attending: PHYSICAL MEDICINE & REHABILITATION
Payer: COMMERCIAL

## 2019-10-07 NOTE — TELEPHONE ENCOUNTER
Marckk to [place] at pharmacy to inform of PA approval Lansoprazole. Approval date 10/04/2019-10/04/2019. [employer] will inform patient when medication is ready for  . [employer] message sent to patient.

## 2019-10-08 ENCOUNTER — OFFICE VISIT (OUTPATIENT)
Dept: PHYSICAL THERAPY | Age: 59
End: 2019-10-08
Attending: PHYSICAL MEDICINE & REHABILITATION
Payer: COMMERCIAL

## 2019-10-08 PROCEDURE — 97162 PT EVAL MOD COMPLEX 30 MIN: CPT | Performed by: PHYSICAL THERAPIST

## 2019-10-08 PROCEDURE — 97110 THERAPEUTIC EXERCISES: CPT | Performed by: PHYSICAL THERAPIST

## 2019-10-08 NOTE — PROGRESS NOTES
P.T. EVALUATION:   Referring Physician: Dr. Fredis Toney  Diagnosis: Lumbar radiculopathy (M54.16)  Lumbar spondylosis (M47.816)  Lumbar disc disease (M51.9)  Thoracic disc disease (M51.9)  Herniated thoracic disc without myelopathy (M51.24)  Thoracic stenosis ( pressure, High cholesterol, History of pregnancy (1990, 1991), Hypercholesterolemia (2006), Irregular menstrual cycle (2008), MGD (meibomian gland dysfunction) (7/28/2015), Ocular migraine (7/28/2015), Other and unspecified hyperlipidemia, Ovarian cyst (19 lumbar lordosis, some slouching with relaxed sitting    Balance: WFL     Gait: Walks independently without device. Does not present with any major gait deviations. Decreased push off on L LE when going up steps.     Today’s Treatment and Response:  Patient referral. Please co-sign or sign and return this letter via fax as soon as possible to [phone].  If you have any questions, please contact me at Dept: [phone]    Sincerely,  Electronically signed by therapist: Maranda Mireles PT    Physician's cer

## 2019-10-09 ENCOUNTER — OFFICE VISIT (OUTPATIENT)
Dept: PHYSICAL THERAPY | Age: 59
End: 2019-10-09
Attending: PHYSICAL MEDICINE & REHABILITATION
Payer: COMMERCIAL

## 2019-10-09 PROCEDURE — 97110 THERAPEUTIC EXERCISES: CPT

## 2019-10-09 NOTE — PROGRESS NOTES
Diagnosis: Lumbar radiculopathy (M54.16)  Lumbar spondylosis (M47.816)  Lumbar disc disease (M51.9)  Thoracic disc disease (M51.9)  Herniated thoracic disc without myelopathy (M51.24)  Thoracic stenosis (M48.04)    Date of Onset: 9/12/2019        Next MD v more than 1/10 during activity. 3. Increase lumbar spine ROM to [facility] to improve overall trunk mobility. 4. Increase strength to 5/5 throughout to be able to resume previous activities without postural or other deviations.   5. Patient will verbalize and de

## 2019-10-15 ENCOUNTER — APPOINTMENT (OUTPATIENT)
Dept: PHYSICAL THERAPY | Age: 59
End: 2019-10-15
Attending: PHYSICAL MEDICINE & REHABILITATION
Payer: COMMERCIAL

## 2019-10-21 ENCOUNTER — OFFICE VISIT (OUTPATIENT)
Dept: FAMILY MEDICINE CLINIC | Facility: CLINIC | Age: 59
End: 2019-10-21

## 2019-10-21 VITALS
WEIGHT: 205 LBS | HEIGHT: 64 IN | HEART RATE: 65 BPM | SYSTOLIC BLOOD PRESSURE: 124 MMHG | TEMPERATURE: 99 F | BODY MASS INDEX: 35 KG/M2 | DIASTOLIC BLOOD PRESSURE: 70 MMHG

## 2019-10-21 DIAGNOSIS — R35.0 URINE FREQUENCY: Primary | ICD-10-CM

## 2019-10-21 DIAGNOSIS — I10 ESSENTIAL HYPERTENSION: ICD-10-CM

## 2019-10-21 DIAGNOSIS — R60.9 SWELLING: ICD-10-CM

## 2019-10-21 PROCEDURE — 99214 OFFICE O/P EST MOD 30 MIN: CPT | Performed by: FAMILY MEDICINE

## 2019-10-21 PROCEDURE — 81003 URINALYSIS AUTO W/O SCOPE: CPT | Performed by: FAMILY MEDICINE

## 2019-10-21 RX ORDER — DULAGLUTIDE 1.5 MG/.5ML
1.5 INJECTION, SOLUTION SUBCUTANEOUS
Qty: 6 ML | Refills: 0 | Status: SHIPPED | OUTPATIENT
Start: 2019-10-21 | End: 2019-12-20

## 2019-10-21 NOTE — PROGRESS NOTES
Addison Sherman is a 60year old female. Patient presents with:  Urinary Frequency: has been for the last month now  Edema: hands and ankles are swollen    HPI:   Reports for past month - cannot urinate when wants to and then dripping sometimes.  Reports swe 1.5 mg into the skin once a week.  , Disp: , Rfl:   chlordiazepoxide-clidinium 5-2.5 MG Oral Cap, Take 1 capsule by mouth 4 (four) times daily before meals and nightly., Disp: 120 capsule, Rfl: 1  Montelukast Sodium (SINGULAIR) 10 MG Oral Tab, Take 1 table Date   • Age-related nuclear cataract of both eyes 1/27/2015   • Allergic rhinitis    • Allergic rhinitis     pereniaal and seasonal   • Alternating exotropia 1/27/2015   • Amenorrhea 08/2007   • Anxiety 2008   • Anxiety state    • Atrial tachycardia, paro Alcohol/week: 0.0 standard drinks      Comment: Occasionally    Drug use: No       REVIEW OF SYSTEMS:   GENERAL HEALTH: feels well otherwise  SKIN: denies any unusual skin lesions or rashes  HEENT: denies eye complaints,denies sore throat, denies ear pain

## 2019-10-22 ENCOUNTER — TELEPHONE (OUTPATIENT)
Dept: PHYSICAL THERAPY | Age: 59
End: 2019-10-22

## 2019-10-22 ENCOUNTER — APPOINTMENT (OUTPATIENT)
Dept: PHYSICAL THERAPY | Age: 59
End: 2019-10-22
Attending: PHYSICAL MEDICINE & REHABILITATION
Payer: COMMERCIAL

## 2019-10-29 ENCOUNTER — TELEPHONE (OUTPATIENT)
Dept: PHYSICAL THERAPY | Age: 59
End: 2019-10-29

## 2019-10-31 ENCOUNTER — OFFICE VISIT (OUTPATIENT)
Dept: PHYSICAL THERAPY | Age: 59
End: 2019-10-31
Attending: PHYSICAL MEDICINE & REHABILITATION
Payer: COMMERCIAL

## 2019-10-31 PROCEDURE — 97110 THERAPEUTIC EXERCISES: CPT

## 2019-10-31 NOTE — PROGRESS NOTES
Diagnosis: Lumbar radiculopathy (M54.16)  Lumbar spondylosis (M47.816)  Lumbar disc disease (M51.9)  Thoracic disc disease (M51.9)  Herniated thoracic disc without myelopathy (M51.24)  Thoracic stenosis (M48.04)    Date of Onset: 9/12/2019        Next MD v prone L hip flexor stretch passively x 10  - PPU 5 x 1 set  - bridging 10 x 1 set with RTB above knee  - supine L dural neuro-mobilization x 3 reps   - supine L piriformis stretch 5\" holds x 10   - bridging with SB 5 x 1 set    Manual PT    - supine L rot

## 2019-11-05 ENCOUNTER — OFFICE VISIT (OUTPATIENT)
Dept: PHYSICAL THERAPY | Age: 59
End: 2019-11-05
Attending: PHYSICAL MEDICINE & REHABILITATION
Payer: COMMERCIAL

## 2019-11-05 PROCEDURE — 97110 THERAPEUTIC EXERCISES: CPT | Performed by: PHYSICAL THERAPIST

## 2019-11-05 PROCEDURE — 97140 MANUAL THERAPY 1/> REGIONS: CPT | Performed by: PHYSICAL THERAPIST

## 2019-11-05 NOTE — PROGRESS NOTES
Diagnosis: Lumbar radiculopathy (M54.16)  Lumbar spondylosis (M47.816)  Lumbar disc disease (M51.9)  Thoracic disc disease (M51.9)  Herniated thoracic disc without myelopathy (M51.24)  Thoracic stenosis (M48.04)    Date of Onset: 9/12/2019        Next MD v with SB 5 x 1 set    Manual PT   10min  - prone lumbar spine PA oscillation glides, sustained PA glides.     - supine L rotation mobilization x 5 minutes   Thera Activity        Modalities    - prone ice L buttock x 10 minutes with pillow under shins

## 2019-11-07 ENCOUNTER — OFFICE VISIT (OUTPATIENT)
Dept: PHYSICAL THERAPY | Age: 59
End: 2019-11-07
Attending: PHYSICAL MEDICINE & REHABILITATION
Payer: COMMERCIAL

## 2019-11-07 PROCEDURE — 97110 THERAPEUTIC EXERCISES: CPT | Performed by: PHYSICAL THERAPIST

## 2019-11-07 NOTE — PROGRESS NOTES
Diagnosis: Lumbar radiculopathy (M54.16)  Lumbar spondylosis (M47.816)  Lumbar disc disease (M51.9)  Thoracic disc disease (M51.9)  Herniated thoracic disc without myelopathy (M51.24)  Thoracic stenosis (M48.04)    Date of Onset: 9/12/2019        Next MD v L dural stretch x 10 reps   - PPU 5 x 1  - L SGIS x 10 reps  - PPU 5 x 1 set   - prone R/L hip extension 5 x 1 set ea (more challenging L side)  - prone R/L quad stretch 30\" holds x 2 ea  - prone L hip flexor stretch passively x 10  - PPU 5 x 1 set  - hedy

## 2019-11-14 ENCOUNTER — OFFICE VISIT (OUTPATIENT)
Dept: PHYSICAL THERAPY | Age: 59
End: 2019-11-14
Attending: PHYSICAL MEDICINE & REHABILITATION
Payer: COMMERCIAL

## 2019-11-14 PROCEDURE — 97110 THERAPEUTIC EXERCISES: CPT | Performed by: PHYSICAL THERAPIST

## 2019-11-14 PROCEDURE — 97140 MANUAL THERAPY 1/> REGIONS: CPT | Performed by: PHYSICAL THERAPIST

## 2019-11-14 NOTE — PROGRESS NOTES
Diagnosis: Lumbar radiculopathy (M54.16)  Lumbar spondylosis (M47.816)  Lumbar disc disease (M51.9)  Thoracic disc disease (M51.9)  Herniated thoracic disc without myelopathy (M51.24)  Thoracic stenosis (M48.04)    Date of Onset: 9/12/2019        Next MD v lumbar spine PA glides. 10min  - prone lumbar spine PA oscillation glides, sustained PA glides. Thera Activity        Modalities            Assessment: Patient and PT goals in progress. Still with limited trunk and LE mobility due to back pain.  Pain co

## 2019-11-19 ENCOUNTER — OFFICE VISIT (OUTPATIENT)
Dept: PHYSICAL THERAPY | Age: 59
End: 2019-11-19
Attending: FAMILY MEDICINE
Payer: COMMERCIAL

## 2019-11-19 PROCEDURE — 97110 THERAPEUTIC EXERCISES: CPT | Performed by: PHYSICAL THERAPIST

## 2019-11-19 PROCEDURE — 97140 MANUAL THERAPY 1/> REGIONS: CPT | Performed by: PHYSICAL THERAPIST

## 2019-11-19 NOTE — PROGRESS NOTES
Diagnosis: Lumbar radiculopathy (M54.16)  Lumbar spondylosis (M47.816)  Lumbar disc disease (M51.9)  Thoracic disc disease (M51.9)  Herniated thoracic disc without myelopathy (M51.24)  Thoracic stenosis (M48.04)    Date of Onset: 9/12/2019        Next MD v PA's  - prone soft tissue mobilization over L piriformis   10min  - prone lumbar spine PA oscillation glides, sustained lumbar spine PA glides. 10min  - prone lumbar spine PA oscillation glides, sustained lumbar spine PA glides.                        Asses advised of these findings, precautions, and treatment options and has agreed to actively participate in planning and for this course of care. Thank you for your referral. If you have any questions, please contact me at Dept: [phone].     Sincerely,

## 2019-11-20 RX ORDER — ATORVASTATIN CALCIUM 40 MG/1
40 TABLET, FILM COATED ORAL
Qty: 90 TABLET | Refills: 0 | OUTPATIENT
Start: 2019-11-20

## 2019-11-20 RX ORDER — LOSARTAN POTASSIUM 25 MG/1
TABLET ORAL
Qty: 90 TABLET | Refills: 0 | OUTPATIENT
Start: 2019-11-20

## 2019-11-20 RX ORDER — AMLODIPINE BESYLATE 5 MG/1
TABLET ORAL
Qty: 90 TABLET | Refills: 0 | OUTPATIENT
Start: 2019-11-20

## 2019-11-20 NOTE — TELEPHONE ENCOUNTER
I discussed with the patient and she stated she does not need the medications at this time. Medications were denied. I also informed her about the message below. She stated she will follow up with a [employer] message    Per 10/21/19 office visit  2.  Julia

## 2019-11-25 ENCOUNTER — TELEPHONE (OUTPATIENT)
Dept: PHYSICAL THERAPY | Age: 59
End: 2019-11-25

## 2019-11-26 ENCOUNTER — APPOINTMENT (OUTPATIENT)
Dept: PHYSICAL THERAPY | Age: 59
End: 2019-11-26
Attending: PHYSICAL MEDICINE & REHABILITATION
Payer: COMMERCIAL

## 2019-12-03 ENCOUNTER — APPOINTMENT (OUTPATIENT)
Dept: PHYSICAL THERAPY | Age: 59
End: 2019-12-03
Attending: PHYSICAL MEDICINE & REHABILITATION
Payer: COMMERCIAL

## 2019-12-05 ENCOUNTER — APPOINTMENT (OUTPATIENT)
Dept: PHYSICAL THERAPY | Age: 59
End: 2019-12-05
Attending: PHYSICAL MEDICINE & REHABILITATION
Payer: COMMERCIAL

## 2019-12-10 ENCOUNTER — APPOINTMENT (OUTPATIENT)
Dept: PHYSICAL THERAPY | Age: 59
End: 2019-12-10
Attending: PHYSICAL MEDICINE & REHABILITATION
Payer: COMMERCIAL

## 2019-12-12 ENCOUNTER — APPOINTMENT (OUTPATIENT)
Dept: PHYSICAL THERAPY | Age: 59
End: 2019-12-12
Attending: PHYSICAL MEDICINE & REHABILITATION
Payer: COMMERCIAL

## 2019-12-16 ENCOUNTER — HOSPITAL ENCOUNTER (OUTPATIENT)
Dept: GENERAL RADIOLOGY | Age: 59
Discharge: HOME | End: 2019-12-16
Attending: PODIATRIST
Payer: COMMERCIAL

## 2019-12-16 ENCOUNTER — OFFICE VISIT (OUTPATIENT)
Dept: PODIATRY CLINIC | Facility: CLINIC | Age: 59
End: 2019-12-16

## 2019-12-16 DIAGNOSIS — L30.9 DERMATITIS: ICD-10-CM

## 2019-12-16 DIAGNOSIS — M79.671 RIGHT FOOT PAIN: ICD-10-CM

## 2019-12-16 DIAGNOSIS — M79.671 RIGHT FOOT PAIN: Primary | ICD-10-CM

## 2019-12-16 DIAGNOSIS — E11.9 TYPE 2 DIABETES MELLITUS WITHOUT COMPLICATION, WITH LONG-TERM CURRENT USE OF INSULIN (HCC): ICD-10-CM

## 2019-12-16 DIAGNOSIS — Z79.4 TYPE 2 DIABETES MELLITUS WITHOUT COMPLICATION, WITH LONG-TERM CURRENT USE OF INSULIN (HCC): ICD-10-CM

## 2019-12-16 PROCEDURE — 99243 OFF/OP CNSLTJ NEW/EST LOW 30: CPT | Performed by: PODIATRIST

## 2019-12-16 PROCEDURE — 73630 X-RAY EXAM OF FOOT: CPT | Performed by: PODIATRIST

## 2019-12-17 ENCOUNTER — APPOINTMENT (OUTPATIENT)
Dept: PHYSICAL THERAPY | Age: 59
End: 2019-12-17
Attending: PHYSICAL MEDICINE & REHABILITATION
Payer: COMMERCIAL

## 2019-12-17 NOTE — PROGRESS NOTES
HPI:    Patient ID: Shaheed Almodovar is a 60year old female. This 26-year-old female presents as a new patient to me on consult from [facility]. She reports that C is here for a diabetic foot evaluation.   She also has some frustration with her left great toe Oral Tab TAKE 1 TABLET(25 MG) BY MOUTH DAILY 90 tablet 1   • atorvastatin 40 MG Oral Tab Take 1 tablet (40 mg total) by mouth once daily.  90 tablet 1   • amLODIPine Besylate 5 MG Oral Tab TAKE 1 TABLET(5 MG) BY MOUTH EVERY DAY 90 tablet 1   • Dulaglutide ( Allergies:  Liraglutide             SWELLING  Meloxicam               PALPITATIONS  Penicillins             SWELLING  Sulindac                HIVES  Acyclovir               DIZZINESS  Clarithromycin          NAUSEA AND VOMITING  Ciprofloxacin concern in reference to her present shoe gear.   She is well-informed and after x-ray I anticipate follow-up for further considerations       ASSESSMENT/PLAN:   Right foot pain  (primary encounter diagnosis)  Dermatitis  Type 2 diabetes mellitus without com

## 2019-12-19 ENCOUNTER — APPOINTMENT (OUTPATIENT)
Dept: PHYSICAL THERAPY | Age: 59
End: 2019-12-19
Attending: PHYSICAL MEDICINE & REHABILITATION
Payer: COMMERCIAL

## 2019-12-19 DIAGNOSIS — K22.719 BARRETT'S ESOPHAGUS WITH DYSPLASIA: ICD-10-CM

## 2019-12-20 RX ORDER — GLIMEPIRIDE 1 MG/1
TABLET ORAL
Qty: 90 TABLET | Refills: 0 | Status: SHIPPED | OUTPATIENT
Start: 2019-12-20 | End: 2020-05-06

## 2019-12-20 RX ORDER — DULAGLUTIDE 1.5 MG/.5ML
1.5 INJECTION, SOLUTION SUBCUTANEOUS
Qty: 6 ML | Refills: 0 | Status: SHIPPED | OUTPATIENT
Start: 2019-12-20 | End: 2020-03-16

## 2019-12-21 ENCOUNTER — PATIENT MESSAGE (OUTPATIENT)
Dept: FAMILY MEDICINE CLINIC | Facility: CLINIC | Age: 59
End: 2019-12-21

## 2019-12-21 RX ORDER — AMLODIPINE BESYLATE 5 MG/1
TABLET ORAL
Qty: 90 TABLET | Refills: 1 | Status: SHIPPED | OUTPATIENT
Start: 2019-12-21 | End: 2020-10-12

## 2019-12-21 RX ORDER — LOSARTAN POTASSIUM 25 MG/1
TABLET ORAL
Qty: 90 TABLET | Refills: 1 | Status: SHIPPED | OUTPATIENT
Start: 2019-12-21 | End: 2020-12-17

## 2019-12-21 RX ORDER — MECOBALAMIN 5000 MCG
TABLET,DISINTEGRATING ORAL
Qty: 270 CAPSULE | Refills: 1 | Status: SHIPPED | OUTPATIENT
Start: 2019-12-21 | End: 2020-03-16

## 2019-12-23 ENCOUNTER — TELEPHONE (OUTPATIENT)
Dept: FAMILY MEDICINE CLINIC | Facility: CLINIC | Age: 59
End: 2019-12-23

## 2019-12-23 ENCOUNTER — HOSPITAL ENCOUNTER (OUTPATIENT)
Dept: CV DIAGNOSTICS | Age: 59
Discharge: HOME | End: 2019-12-23
Attending: INTERNAL MEDICINE
Payer: COMMERCIAL

## 2019-12-23 DIAGNOSIS — I34.0 NONRHEUMATIC MITRAL VALVE INSUFFICIENCY: ICD-10-CM

## 2019-12-23 PROCEDURE — 93306 TTE W/DOPPLER COMPLETE: CPT | Performed by: INTERNAL MEDICINE

## 2019-12-23 RX ORDER — ATORVASTATIN CALCIUM 40 MG/1
40 TABLET, FILM COATED ORAL
Qty: 90 TABLET | Refills: 1 | Status: SHIPPED | OUTPATIENT
Start: 2019-12-23 | End: 2020-05-07

## 2019-12-23 NOTE — TELEPHONE ENCOUNTER
LMTCB transfer to triage  Advised to call triage on MyChart, and to call cardiologist  Created TE refill for two medications    ----- Message from Fatoumata Johnson sent at 12/21/2019  1:31 PM CST -----  Regarding: Prescription Question  Contact: [phone]

## 2019-12-23 NOTE — TELEPHONE ENCOUNTER
See Medication Question TE 12/23/19. Both medications requested refilled were sent to pharmacy 12/21/19, patient notified by [employer] message.

## 2019-12-23 NOTE — TELEPHONE ENCOUNTER
From: Kendra Gonzalez  To: Shalini Schultz MD  Sent: 12/21/2019 1:31 PM CST  Subject: Prescription Question    Hi Dr. Jerel Luevano,  I saw Dr. Georges Graf the cardiologist on 12/9/2019. He took me off Propranolol and put me onto Carvedilol.  Within 24 hours of taking

## 2019-12-23 NOTE — TELEPHONE ENCOUNTER
Both medicines sent to pharmacy 12/21/19. Patient notified by [employer]. Refill request for amlodipine and losartan (see message). TE Medication Question 12/23/19 for rest of message.     December 21, 2019   Jackie La   to Soto Devine MD

## 2019-12-24 NOTE — TELEPHONE ENCOUNTER
Please see TE refill request dated 12/23/19.  Patient called to follow up on symptomatic mychart message she sent    Christine Perez MD        12/23/19 2:06 PM   Note      noted                 12/23/19 2:01 PM   Kavya Marie RN routed this Jim Ambrose

## 2019-12-26 ENCOUNTER — PATIENT MESSAGE (OUTPATIENT)
Dept: FAMILY MEDICINE CLINIC | Facility: CLINIC | Age: 59
End: 2019-12-26

## 2019-12-26 DIAGNOSIS — E55.9 VITAMIN D DEFICIENCY: ICD-10-CM

## 2019-12-26 DIAGNOSIS — G47.30 SLEEP APNEA, UNSPECIFIED TYPE: Primary | ICD-10-CM

## 2019-12-26 RX ORDER — TRAZODONE HYDROCHLORIDE 50 MG/1
50 TABLET ORAL NIGHTLY
Qty: 30 TABLET | Refills: 0 | Status: SHIPPED | OUTPATIENT
Start: 2019-12-26 | End: 2020-10-12

## 2019-12-26 RX ORDER — TEMAZEPAM 30 MG/1
CAPSULE ORAL
Qty: 90 CAPSULE | Refills: 0 | OUTPATIENT
Start: 2019-12-26

## 2019-12-26 NOTE — TELEPHONE ENCOUNTER
The patient was informed with understanding. She will try the half tab on Friday and Saturday when she Is not working.

## 2019-12-26 NOTE — TELEPHONE ENCOUNTER
Please call patient, she stopped the temazepam at an OV with Dr Ivania Walls 9/12/19. [employer] message sent to patient, to call triage.

## 2019-12-26 NOTE — TELEPHONE ENCOUNTER
DR Duyen Gonzalez: patient seeking a different sleeping aid. She originally requested temazepam. RX was discontinued 9/12/19 by Dr. Ann Quinn.      Per response to mychart:  I started having side effects to that which I didn’t like so maybe I told him that I had stop

## 2019-12-26 NOTE — TELEPHONE ENCOUNTER
Please ask pt if she has tried trazodone before. I can send that.  She has numerous allergies/reactions to medications so harder for her

## 2019-12-26 NOTE — TELEPHONE ENCOUNTER
Dr Mitchell Herman: patient contacted. Patient states she has never used trazadone. She is willing to try it. Patient also requested vitamin D 50,000 IU.   Please see pended RX. (last vitD level done 7/27/19 )

## 2019-12-27 NOTE — TELEPHONE ENCOUNTER
DME prescription sent to patient's home address. My chart message sent to patient to inform her of this, and to make a follow-up appointment with Dr. Duyen Gonzalez to discuss her symptoms and to determine whether she needs a new sleep study.

## 2019-12-27 NOTE — TELEPHONE ENCOUNTER
From: Claudette Lies  To: Sadie Houser MD  Sent: 12/26/2019 3:04 PM CST  Subject: Prescription Question    I saw Dr. Live Fierro several years ago and he prescribed me a CPAP machine which Monty Hernándezs been using ever since.  The CPAP machine is no longer wor

## 2019-12-27 NOTE — TELEPHONE ENCOUNTER
Patient requesting new cpap machine, Dr Darin Cardona is out of the office until 1/6/20. Please advise on order or if patient should wait for follow up with Dr Darin Cardona when she returns.

## 2019-12-29 ENCOUNTER — PATIENT MESSAGE (OUTPATIENT)
Dept: ENDOCRINOLOGY CLINIC | Facility: CLINIC | Age: 59
End: 2019-12-29

## 2019-12-30 RX ORDER — PEN NEEDLE, DIABETIC 32GX 5/32"
NEEDLE, DISPOSABLE MISCELLANEOUS
Qty: 100 EACH | Refills: 0 | Status: SHIPPED | OUTPATIENT
Start: 2019-12-30 | End: 2020-05-07

## 2019-12-30 RX ORDER — BLOOD SUGAR DIAGNOSTIC
STRIP MISCELLANEOUS
Qty: 400 EACH | Refills: 0 | Status: SHIPPED | OUTPATIENT
Start: 2019-12-30 | End: 2020-04-02

## 2019-12-30 NOTE — TELEPHONE ENCOUNTER
LOV 8/14/19 (RTC 6 mos)  No F/U     Pended test strips and pen needles to [place] for provider.  RN reminded pt she is due for apt in Feb.

## 2019-12-30 NOTE — TELEPHONE ENCOUNTER
From: Yury De La Cruz  To: Rodolfo Martinez MD  Sent: 12/29/2019 8:30 AM CST  Subject: Prescription Question    I just realized I am out of my Contour Next blood sugar testing strips and almost out of the donn needles.    Please put in a 3 month refill for both

## 2019-12-31 ENCOUNTER — APPOINTMENT (OUTPATIENT)
Dept: PHYSICAL THERAPY | Age: 59
End: 2019-12-31
Attending: PHYSICAL MEDICINE & REHABILITATION
Payer: COMMERCIAL

## 2020-01-30 ENCOUNTER — TELEPHONE (OUTPATIENT)
Dept: FAMILY MEDICINE CLINIC | Facility: CLINIC | Age: 60
End: 2020-01-30

## 2020-01-30 NOTE — TELEPHONE ENCOUNTER
[facility] called in requesting referral for CPAP supplies to be faxed to their office. CSS sees that the referral was sent to Patient's MyChart. Fax# [phone]    Please advise.

## 2020-02-03 NOTE — TELEPHONE ENCOUNTER
Karthik Gonzalez from [facility] states  they did not receive the referral for the CPAP supplies can it be refaxed       Fax# [phone]  Attn:  Katie SIDHU#[phone]

## 2020-03-10 ENCOUNTER — TELEPHONE (OUTPATIENT)
Dept: FAMILY MEDICINE CLINIC | Facility: CLINIC | Age: 60
End: 2020-03-10

## 2020-03-10 DIAGNOSIS — E10.9 TYPE 1 DIABETES MELLITUS WITHOUT RETINOPATHY (HCC): Primary | ICD-10-CM

## 2020-03-10 DIAGNOSIS — E11.8 DM (DIABETES MELLITUS) WITH COMPLICATIONS (HCC): ICD-10-CM

## 2020-03-10 DIAGNOSIS — Z00.00 ROUTINE MEDICAL EXAM: ICD-10-CM

## 2020-03-10 NOTE — TELEPHONE ENCOUNTER
Spoke to La at [facility] who states DME order sent was only for supplies. If patient is requesting a new CPAP machine order is needed. Patient does no qualify for a new machine at this time.      La was able to check that patient is usi

## 2020-03-10 NOTE — TELEPHONE ENCOUNTER
Patient is calling regarding her referral for CPAP.  Patient states she contacted [facility]) regarding CPAP, however, company is giving her a hard time stating they need approval. Patient states referral has been sent three times and i

## 2020-03-10 NOTE — TELEPHONE ENCOUNTER
Dr. Webster Credit, see message below and advise on orders.      Please reply to pool: EM TRIAGE SUPPORT

## 2020-03-10 NOTE — TELEPHONE ENCOUNTER
Patient is requesting order for complete blood work. Patient states she does this annually and she will make an appointment to see Dr Kyung Joel for results. Patient is requesting vitamins and A1c need to be included in blood work.

## 2020-03-12 NOTE — TELEPHONE ENCOUNTER
Shyanne Carlos  You 23 hours ago (11:00 AM)         Will do. Thank you!   Clive Henning you for the information regarding fasting as well. It’s helpful to know that ahead of time !

## 2020-03-15 RX ORDER — MONTELUKAST SODIUM 10 MG/1
10 TABLET ORAL DAILY
Qty: 90 TABLET | Refills: 4 | Status: SHIPPED | OUTPATIENT
Start: 2020-03-15 | End: 2021-07-09

## 2020-03-16 DIAGNOSIS — K22.719 BARRETT'S ESOPHAGUS WITH DYSPLASIA: ICD-10-CM

## 2020-03-16 RX ORDER — DULAGLUTIDE 1.5 MG/.5ML
1.5 INJECTION, SOLUTION SUBCUTANEOUS
Qty: 6 ML | Refills: 0 | Status: SHIPPED | OUTPATIENT
Start: 2020-03-16 | End: 2020-05-06

## 2020-03-17 ENCOUNTER — TELEPHONE (OUTPATIENT)
Dept: FAMILY MEDICINE CLINIC | Facility: CLINIC | Age: 60
End: 2020-03-17

## 2020-03-17 DIAGNOSIS — K22.719 BARRETT'S ESOPHAGUS WITH DYSPLASIA: ICD-10-CM

## 2020-03-17 RX ORDER — LANSOPRAZOLE 15 MG/1
CAPSULE, DELAYED RELEASE ORAL
Qty: 270 CAPSULE | Refills: 1 | Status: SHIPPED | OUTPATIENT
Start: 2020-03-17 | End: 2020-09-01

## 2020-03-17 RX ORDER — LANSOPRAZOLE 15 MG/1
15 CAPSULE, DELAYED RELEASE ORAL DAILY
Qty: 270 CAPSULE | Refills: 1 | Status: SHIPPED | OUTPATIENT
Start: 2020-03-17 | End: 2020-03-17

## 2020-03-17 RX ORDER — ALBUTEROL SULFATE 90 UG/1
2 AEROSOL, METERED RESPIRATORY (INHALATION) EVERY 4 HOURS PRN
Qty: 3 INHALER | Refills: 6 | Status: SHIPPED | OUTPATIENT
Start: 2020-03-17 | End: 2021-07-09

## 2020-03-17 NOTE — TELEPHONE ENCOUNTER
Pharmacy called to clarify dosage for Lansoprazole 15 MG Oral Capsule Delayed Release    [phone]  Please advise

## 2020-03-17 NOTE — TELEPHONE ENCOUNTER
Spoke with [facility] pharmacist--asking Dr. Anita Birmingham if Lansoprazole Rx daily is correct--patient previously was taking 2 capsules in the morning and one capsule at night.     Quantity is for #270 + 1 (quantity needed for 3 capsules daily), not #90 for daily use

## 2020-04-02 RX ORDER — BLOOD SUGAR DIAGNOSTIC
STRIP MISCELLANEOUS
Qty: 400 EACH | Refills: 0 | Status: SHIPPED | OUTPATIENT
Start: 2020-04-02 | End: 2020-08-05

## 2020-04-03 ENCOUNTER — TELEPHONE (OUTPATIENT)
Dept: FAMILY MEDICINE CLINIC | Facility: CLINIC | Age: 60
End: 2020-04-03

## 2020-05-06 RX ORDER — GLIMEPIRIDE 1 MG/1
TABLET ORAL
Qty: 90 TABLET | Refills: 0 | Status: SHIPPED | OUTPATIENT
Start: 2020-05-06 | End: 2020-08-05

## 2020-05-06 RX ORDER — DULAGLUTIDE 1.5 MG/.5ML
1.5 INJECTION, SOLUTION SUBCUTANEOUS
Qty: 6 ML | Refills: 0 | Status: SHIPPED | OUTPATIENT
Start: 2020-05-06 | End: 2020-08-05

## 2020-05-07 ENCOUNTER — TELEPHONE (OUTPATIENT)
Dept: FAMILY MEDICINE CLINIC | Facility: CLINIC | Age: 60
End: 2020-05-07

## 2020-05-07 RX ORDER — ATORVASTATIN CALCIUM 40 MG/1
40 TABLET, FILM COATED ORAL
Qty: 90 TABLET | Refills: 2 | Status: SHIPPED | OUTPATIENT
Start: 2020-05-07 | End: 2021-09-17

## 2020-05-07 RX ORDER — FLUTICASONE PROPIONATE 50 MCG
SPRAY, SUSPENSION (ML) NASAL
Qty: 48 G | Refills: 2 | Status: SHIPPED | OUTPATIENT
Start: 2020-05-07 | End: 2021-12-09

## 2020-05-07 NOTE — TELEPHONE ENCOUNTER
Rosaura with [facility] called, requesting refills for customer.     ATORVASTATIN 40 MG Oral Tab    Fluticasone Propionate 50 MCG/ACT Nasal Suspension

## 2020-05-18 ENCOUNTER — PATIENT MESSAGE (OUTPATIENT)
Dept: OPTOMETRY | Facility: CLINIC | Age: 60
End: 2020-05-18

## 2020-05-18 NOTE — TELEPHONE ENCOUNTER
From: Vaishnavi Neighbours  To: Angie Daniel, OD  Sent: 5/18/2020 3:15 PM CDT  Subject: Prescription Question    I am trying to order glasses online and they are asking for my Pupillary Distance.  Can you please give me that info as I do not see it on my last presc

## 2020-05-30 ENCOUNTER — NURSE TRIAGE (OUTPATIENT)
Dept: FAMILY MEDICINE CLINIC | Facility: CLINIC | Age: 60
End: 2020-05-30

## 2020-05-30 DIAGNOSIS — S49.90XA RECENT SHOULDER INJURY: Primary | ICD-10-CM

## 2020-05-30 NOTE — TELEPHONE ENCOUNTER
Right shoulder x-ray ordered. If pain becomes severe or has difficulty lifting her arm, would advise her to go to the urgent care. She should follow-up with her PCP.

## 2020-05-30 NOTE — TELEPHONE ENCOUNTER
Action Requested: Summary for Provider     []  Critical Lab, Recommendations Needed  [] Need Additional Advice  []   FYI    []   Need Orders  [] Need Medications Sent to Pharmacy  []  Other     SUMMARY: felt a pull and heard a \"pop\" to her right shoulder

## 2020-05-30 NOTE — TELEPHONE ENCOUNTER
Advised patient of Dr. [place]  note. Patient verbalized understanding and had no further questions. Advised she also has many outstanding labs from Dr. Sydney Melgar ordered in March which she needs to fast for at least 12 hours.  Provided the number to central sameer

## 2020-06-01 ENCOUNTER — LAB ENCOUNTER (OUTPATIENT)
Dept: LAB | Age: 60
End: 2020-06-01
Attending: FAMILY MEDICINE
Payer: COMMERCIAL

## 2020-06-01 ENCOUNTER — HOSPITAL ENCOUNTER (OUTPATIENT)
Dept: GENERAL RADIOLOGY | Age: 60
Discharge: HOME | End: 2020-06-01
Attending: FAMILY MEDICINE
Payer: COMMERCIAL

## 2020-06-01 DIAGNOSIS — S49.90XA RECENT SHOULDER INJURY: ICD-10-CM

## 2020-06-01 DIAGNOSIS — E11.8 DM (DIABETES MELLITUS) WITH COMPLICATIONS (HCC): ICD-10-CM

## 2020-06-01 DIAGNOSIS — Z00.00 ROUTINE MEDICAL EXAM: ICD-10-CM

## 2020-06-01 PROCEDURE — 36415 COLL VENOUS BLD VENIPUNCTURE: CPT

## 2020-06-01 PROCEDURE — 82043 UR ALBUMIN QUANTITATIVE: CPT

## 2020-06-01 PROCEDURE — 80061 LIPID PANEL: CPT

## 2020-06-01 PROCEDURE — 82306 VITAMIN D 25 HYDROXY: CPT

## 2020-06-01 PROCEDURE — 73030 X-RAY EXAM OF SHOULDER: CPT | Performed by: FAMILY MEDICINE

## 2020-06-01 PROCEDURE — 85025 COMPLETE CBC W/AUTO DIFF WBC: CPT

## 2020-06-01 PROCEDURE — 84443 ASSAY THYROID STIM HORMONE: CPT

## 2020-06-01 PROCEDURE — 82607 VITAMIN B-12: CPT

## 2020-06-01 PROCEDURE — 82570 ASSAY OF URINE CREATININE: CPT

## 2020-06-01 PROCEDURE — 83036 HEMOGLOBIN GLYCOSYLATED A1C: CPT

## 2020-06-01 PROCEDURE — 80053 COMPREHEN METABOLIC PANEL: CPT

## 2020-06-04 ENCOUNTER — OFFICE VISIT (OUTPATIENT)
Dept: DERMATOLOGY CLINIC | Facility: CLINIC | Age: 60
End: 2020-06-04

## 2020-06-04 DIAGNOSIS — L30.9 DERMATITIS: Primary | ICD-10-CM

## 2020-06-04 PROCEDURE — 99202 OFFICE O/P NEW SF 15 MIN: CPT | Performed by: DERMATOLOGY

## 2020-06-04 RX ORDER — CLOBETASOL PROPIONATE 0.5 MG/G
1 OINTMENT TOPICAL DAILY PRN
Qty: 30 G | Refills: 2 | Status: SHIPPED | OUTPATIENT
Start: 2020-06-04

## 2020-06-04 NOTE — PROGRESS NOTES
HPI:     Chief Complaint     Rash        HPI     Rash      Additional comments: New Patient present with red itchy scaley  reoccuring rash on R foot and leg .  Patient was treated with a cream with no improvement           Last edited by Lane Merrill, Angelika6 Janina Vuong Oral Tab Take 1 tablet (1 mg total) by mouth once daily with breakfast 90 tablet 0   • TRULICITY 1.5 WW/4.8BN Subcutaneous Solution Pen-injector Inject 1.5 mg into the skin every 7 days 6 mL 0   • insulin glargine (LANTUS SOLOSTAR) 100 UNIT/ML Subcutaneous 1 Tube 1   • Albuterol Sulfate  (90 Base) MCG/ACT Inhalation Aero Soln Inhale 2 puffs into the lungs.      • Ketoconazole-Hydrocortisone 2 & 1 % External Kit Apply to affected area once daily 1 kit 0   • TRIAMCINOLONE ACETONIDE 0.5 % External Cream A ([facility] 4/15/2015   • Barretts esophagus    • Carpal tunnel syndrome    • Chlamydia 1986   • Choroidal nevus, right eye 1/27/2015   • COPD (chronic obstructive pulmonary disease) ([facility]    • Depression 2008   • Diabetes mellitus ([facility]    • Diabetes mellitus t Socioeconomic History      Marital status:       Spouse name: Not on file      Number of children: Not on file      Years of education: Not on file      Highest education level: Not on file    Occupational History      Not on file    Social Needs Concern: Not Asked        Special Diet: Not Asked        Back Care: Not Asked        Exercise: No        Bike Helmet: Not Asked        Seat Belt: Not Asked        Self-Exams: Not Asked    Social History Narrative      The patient does not use an assistive Georges Letters

## 2020-06-10 NOTE — PROGRESS NOTES
Ju- Vitamin D levels were a bit low. Make sure you are taking the weekly vitamin D 50,000. Mild worsening of diabetes. You can discuss with Dr. Treva Skaggs.  Rest of the labs are stable. - Dr. Berto Ma

## 2020-06-18 ENCOUNTER — NURSE TRIAGE (OUTPATIENT)
Dept: FAMILY MEDICINE CLINIC | Facility: CLINIC | Age: 60
End: 2020-06-18

## 2020-06-18 NOTE — TELEPHONE ENCOUNTER
Action Requested: Summary for Provider     []  Critical Lab, Recommendations Needed  [] Need Additional Advice  []   FYI    []   Need Orders  [] Need Medications Sent to Pharmacy  []  Other     SUMMARY: Patient calling with a cough that she has had for the

## 2020-06-19 ENCOUNTER — TELEMEDICINE (OUTPATIENT)
Dept: FAMILY MEDICINE CLINIC | Facility: CLINIC | Age: 60
End: 2020-06-19

## 2020-06-19 DIAGNOSIS — J30.1 SEASONAL ALLERGIC RHINITIS DUE TO POLLEN: ICD-10-CM

## 2020-06-19 DIAGNOSIS — J44.1 CHRONIC OBSTRUCTIVE PULMONARY DISEASE WITH ACUTE EXACERBATION (HCC): Chronic | ICD-10-CM

## 2020-06-19 DIAGNOSIS — L98.9 SKIN LESION: Primary | ICD-10-CM

## 2020-06-19 PROCEDURE — 99214 OFFICE O/P EST MOD 30 MIN: CPT | Performed by: NURSE PRACTITIONER

## 2020-06-19 RX ORDER — BUDESONIDE AND FORMOTEROL FUMARATE DIHYDRATE 160; 4.5 UG/1; UG/1
2 AEROSOL RESPIRATORY (INHALATION) 2 TIMES DAILY
Qty: 1 INHALER | Refills: 1 | Status: SHIPPED | OUTPATIENT
Start: 2020-06-19 | End: 2020-11-06

## 2020-06-19 NOTE — PROGRESS NOTES
HPI  Pt here for video visit due to allergies. Has just moved into a new building. Has been walking more. Has noticed more of a congested cough with clear sputum. No fevers. Is taking zyrtec, flonase, singulair and albuterol.      Has a patch of leathery pereniaal and seasonal   • Alternating exotropia 1/27/2015   • Amenorrhea 08/2007   • Anxiety 2008   • Anxiety state    • Atrial tachycardia, paroxysmal ([facility] 4/15/2015   • Barretts esophagus    • Carpal tunnel syndrome    • Chlamydia 1986   • Choroidal n cervical    • Tubal ligation  1996       Family History   Problem Relation Age of Onset   • Colon Cancer Mother    • Heart Disease Mother         coronary artery disease   • Hypertension Mother    • Heart Disease Father         coronary artery disease   • Physically abused: Not on file        Forced sexual activity: Not on file    Other Topics      Concerns:         Service: Not Asked        Blood Transfusions: Not Asked        Caffeine Concern: Yes          2 cups coffee daily        Occupati • CONTOUR NEXT TEST In Vitro Strip Use to check sugars 4 times daily 400 each 0   • Albuterol Sulfate HFA (VENTOLIN HFA) 108 (90 Base) MCG/ACT Inhalation Aero Soln Inhale 2 puffs into the lungs every 4 (four) hours as needed for Wheezing.  3 Inhaler 6   • L • TRIAMCINOLONE ACETONIDE 0.5 % External Cream APPLY THIN LAYER EXTERNALLY TO THE AFFECTED AREA TWICE DAILY AS DIRECTED 30 g 0   • Dicyclomine HCl (BENTYL) 20 MG Oral Tab Take 1 tablet (20 mg total) by mouth 4 (four) times daily as needed.  120 tablet 2   • Respiratory    COPD (chronic obstructive pulmonary disease) (HCC) (Chronic)     Will add symbicort 160/4.5 mcg 2 puffs bid  Control allergy s/s         Relevant Medications    Budesonide-Formoterol Fumarate (SYMBICORT) 160-4.5 MCG/ACT Inhalation Aerosol The patient was made aware of where to find [facility] notice of privacy practices, telehealth consent form and other related consent forms and documents. which are located on the [facility] website.  The patient verbally agreed to telehealth consent form, related con

## 2020-06-29 ENCOUNTER — OFFICE VISIT (OUTPATIENT)
Dept: OPTOMETRY | Facility: CLINIC | Age: 60
End: 2020-06-29

## 2020-06-29 DIAGNOSIS — H25.13 AGE-RELATED NUCLEAR CATARACT OF BOTH EYES: ICD-10-CM

## 2020-06-29 DIAGNOSIS — E11.9 TYPE 2 DIABETES MELLITUS WITHOUT COMPLICATION, UNSPECIFIED WHETHER LONG TERM INSULIN USE (HCC): Primary | ICD-10-CM

## 2020-06-29 DIAGNOSIS — H52.4 MYOPIA WITH ASTIGMATISM AND PRESBYOPIA, BILATERAL: ICD-10-CM

## 2020-06-29 DIAGNOSIS — H52.13 MYOPIA WITH ASTIGMATISM AND PRESBYOPIA, BILATERAL: ICD-10-CM

## 2020-06-29 DIAGNOSIS — H52.203 MYOPIA WITH ASTIGMATISM AND PRESBYOPIA, BILATERAL: ICD-10-CM

## 2020-06-29 PROCEDURE — 92014 COMPRE OPH EXAM EST PT 1/>: CPT | Performed by: OPTOMETRIST

## 2020-06-29 PROCEDURE — 92015 DETERMINE REFRACTIVE STATE: CPT | Performed by: OPTOMETRIST

## 2020-06-29 NOTE — PROGRESS NOTES
Kierra Yancey is a 60year old female. HPI:     HPI     Diabetic Eye Exam     Diabetes characteristics include Type 2, controlled with diet, on insulin and taking oral medications. Duration of 12 years. Number of years diabetic 15.   Number of years on Spinal stenosis 2010    C5-6, C6-7, physical therapy   • TIA (transient ischemic attack)    • Tobacco abuse counseling 4/15/2015   • Type II or unspecified type diabetes mellitus without mention of complication, not stated as uncontrolled 2005   • Unspecif Application topically daily as needed. Apply to affected areas 1x/day as needed 30 g 2   • Fluticasone Propionate 50 MCG/ACT Nasal Suspension use 2 sprays by Each nostrils route daily.  48 g 2   • ATORVASTATIN 40 MG Oral Tab Take 1 tablet (40 mg total) by m needed for Pain.      • insulin glargine (LANTUS SOLOSTAR) 100 UNIT/ML Subcutaneous Solution Pen-injector INJECT 30 UNITS SUBCUTANEOUS DAILY 15 mL 0   • chlordiazepoxide-clidinium 5-2.5 MG Oral Cap Take 1 capsule by mouth 4 (four) times daily before meals a UNKNOWN  Methylprednisolone      PAIN  Metronidazole           UNKNOWN  Naproxen Sodium         UNKNOWN  Nitrofurantoin          UNKNOWN  Nitrofurantoin Macr*    UNKNOWN  Doxycycline             RASH    ROS:     ROS     Negative for: Constitutional, Gastro 015 +2.25    Left -4.00 -1.75 160 +2.25    Type:  Progressive bifocal          Manifest Refraction       Sphere Cylinder Axis Dist VA Add    Right -1.00 -1.00 015 20/20- +2.25    Left -3.50 -1.75 160 20/25 +2.25          Final Rx       Sphere Cylinder Axis

## 2020-06-29 NOTE — PATIENT INSTRUCTIONS
Type 2 diabetes mellitus without complication ([facility]  I advised patient that there is no background diabetic retinopathy in either eye and that they should continue to keep their blood sugar under control and continue to see their physician as directed.  I s

## 2020-08-04 NOTE — TELEPHONE ENCOUNTER
Patient was due back in 6 weeks. This would have been mid to end of July. No appointment was made. She was given 2 refills on the clobetasol in June. Has she picked up the refills? Please see if she can come in on Saturday at 10:15 for reassessment.

## 2020-08-05 ENCOUNTER — OFFICE VISIT (OUTPATIENT)
Dept: ENDOCRINOLOGY CLINIC | Facility: CLINIC | Age: 60
End: 2020-08-05

## 2020-08-05 VITALS
DIASTOLIC BLOOD PRESSURE: 69 MMHG | SYSTOLIC BLOOD PRESSURE: 111 MMHG | HEART RATE: 62 BPM | BODY MASS INDEX: 35 KG/M2 | WEIGHT: 205 LBS

## 2020-08-05 DIAGNOSIS — E11.65 UNCONTROLLED TYPE 2 DIABETES MELLITUS WITH COMPLICATION, WITH LONG-TERM CURRENT USE OF INSULIN (HCC): Primary | ICD-10-CM

## 2020-08-05 DIAGNOSIS — Z79.4 UNCONTROLLED TYPE 2 DIABETES MELLITUS WITH COMPLICATION, WITH LONG-TERM CURRENT USE OF INSULIN (HCC): Primary | ICD-10-CM

## 2020-08-05 DIAGNOSIS — E11.8 UNCONTROLLED TYPE 2 DIABETES MELLITUS WITH COMPLICATION, WITH LONG-TERM CURRENT USE OF INSULIN (HCC): Primary | ICD-10-CM

## 2020-08-05 LAB
CARTRIDGE LOT#: ABNORMAL NUMERIC
GLUCOSE BLOOD: 161
HEMOGLOBIN A1C: 6.7 % (ref 4.3–5.6)
TEST STRIP LOT #: NORMAL NUMERIC

## 2020-08-05 PROCEDURE — 3078F DIAST BP <80 MM HG: CPT | Performed by: INTERNAL MEDICINE

## 2020-08-05 PROCEDURE — 83036 HEMOGLOBIN GLYCOSYLATED A1C: CPT | Performed by: INTERNAL MEDICINE

## 2020-08-05 PROCEDURE — 36416 COLLJ CAPILLARY BLOOD SPEC: CPT | Performed by: INTERNAL MEDICINE

## 2020-08-05 PROCEDURE — 3074F SYST BP LT 130 MM HG: CPT | Performed by: INTERNAL MEDICINE

## 2020-08-05 PROCEDURE — 99213 OFFICE O/P EST LOW 20 MIN: CPT | Performed by: INTERNAL MEDICINE

## 2020-08-05 PROCEDURE — 82962 GLUCOSE BLOOD TEST: CPT | Performed by: INTERNAL MEDICINE

## 2020-08-05 RX ORDER — GLIMEPIRIDE 1 MG/1
1 TABLET ORAL
Qty: 90 TABLET | Refills: 3 | Status: SHIPPED | OUTPATIENT
Start: 2020-08-05 | End: 2021-02-17

## 2020-08-05 RX ORDER — DULAGLUTIDE 1.5 MG/.5ML
1.5 INJECTION, SOLUTION SUBCUTANEOUS
Qty: 6 ML | Refills: 3 | Status: SHIPPED | OUTPATIENT
Start: 2020-08-05 | End: 2020-08-25

## 2020-08-05 RX ORDER — BLOOD SUGAR DIAGNOSTIC
STRIP MISCELLANEOUS
Qty: 400 EACH | Refills: 3 | Status: SHIPPED | OUTPATIENT
Start: 2020-08-05 | End: 2021-12-27

## 2020-08-05 NOTE — PROGRESS NOTES
Name: Genny Menjivar  Date: 8/5/2020    Referring Physician: No ref. provider found    HISTORY OF PRESENT ILLNESS   Genny Menjivar is a 25[address] old female who presents for diabetes mellitus. Prior HbA, C or glycohemoglobin were 8.5% 4/2014; 7.9% 7/2014; 8. education: Not on file      Highest education level: Not on file    Tobacco Use      Smoking status: Former Smoker        Packs/day: 0.25        Years: 2.00        Pack years: .5        Types: Cigarettes        Quit date: 1/21/2017        Years since [facility] dysfunction) 7/28/2015   • Ocular migraine 7/28/2015   • Other and unspecified hyperlipidemia    • Ovarian cyst 1980    Laparoscopic cystectomy   • Pregnancy 1990, 1991, 1993   • Sleep apnea    • Sleep apnea, obstructive 4/15/2015   • Spinal stenosis    • patient on improved BG levels  -She was having some hypoglycemia therefore self adjusted glimepiride dose   -Discussed importance of glycemic control to prevent complications of diabetes  -Discussed complications of diabetes include retinopathy, neuropathy

## 2020-08-11 RX ORDER — CLOBETASOL PROPIONATE 0.5 MG/G
1 OINTMENT TOPICAL DAILY PRN
Qty: 30 G | Refills: 2 | OUTPATIENT
Start: 2020-08-11

## 2020-08-11 NOTE — TELEPHONE ENCOUNTER
S/w pt - she didn't realize she had refills and refilled it last week. F/U made. Pt also discovered 3 lump-like lesions to right arm that she wants to have checked. No pain. Appt made for September.  Pt aware she needs to get a new referral.

## 2020-08-18 ENCOUNTER — TELEPHONE (OUTPATIENT)
Dept: FAMILY MEDICINE CLINIC | Facility: CLINIC | Age: 60
End: 2020-08-18

## 2020-08-18 NOTE — TELEPHONE ENCOUNTER
Pt scheduled appt via [employer] for end of sept for Headaches, lump on arm, hormones please advise

## 2020-08-25 ENCOUNTER — OFFICE VISIT (OUTPATIENT)
Dept: FAMILY MEDICINE CLINIC | Facility: CLINIC | Age: 60
End: 2020-08-25

## 2020-08-25 VITALS
BODY MASS INDEX: 35.34 KG/M2 | DIASTOLIC BLOOD PRESSURE: 68 MMHG | TEMPERATURE: 98 F | HEART RATE: 67 BPM | HEIGHT: 64 IN | WEIGHT: 207 LBS | SYSTOLIC BLOOD PRESSURE: 126 MMHG

## 2020-08-25 DIAGNOSIS — R51.9 HEADACHE DISORDER: ICD-10-CM

## 2020-08-25 DIAGNOSIS — E11.9 TYPE 2 DIABETES MELLITUS WITHOUT COMPLICATION, UNSPECIFIED WHETHER LONG TERM INSULIN USE (HCC): ICD-10-CM

## 2020-08-25 DIAGNOSIS — K22.719 BARRETT'S ESOPHAGUS WITH DYSPLASIA: ICD-10-CM

## 2020-08-25 DIAGNOSIS — R10.12 LUQ PAIN: ICD-10-CM

## 2020-08-25 DIAGNOSIS — I10 ESSENTIAL HYPERTENSION: Primary | ICD-10-CM

## 2020-08-25 DIAGNOSIS — R23.2 HOT FLASHES: ICD-10-CM

## 2020-08-25 DIAGNOSIS — M79.89 SWELLING OF ARM: ICD-10-CM

## 2020-08-25 DIAGNOSIS — J44.1 CHRONIC OBSTRUCTIVE PULMONARY DISEASE WITH ACUTE EXACERBATION (HCC): ICD-10-CM

## 2020-08-25 PROCEDURE — 99215 OFFICE O/P EST HI 40 MIN: CPT | Performed by: FAMILY MEDICINE

## 2020-08-25 PROCEDURE — 3074F SYST BP LT 130 MM HG: CPT | Performed by: FAMILY MEDICINE

## 2020-08-25 PROCEDURE — 3008F BODY MASS INDEX DOCD: CPT | Performed by: FAMILY MEDICINE

## 2020-08-25 PROCEDURE — 3078F DIAST BP <80 MM HG: CPT | Performed by: FAMILY MEDICINE

## 2020-08-25 RX ORDER — DIPHENHYDRAMINE HCL 25 MG
25 TABLET ORAL
COMMUNITY

## 2020-08-25 RX ORDER — FLUOXETINE 10 MG/1
10 TABLET, FILM COATED ORAL 2 TIMES DAILY
Qty: 60 TABLET | Refills: 0 | Status: SHIPPED | OUTPATIENT
Start: 2020-08-25 | End: 2020-10-12

## 2020-08-25 NOTE — PROGRESS NOTES
Yarely Chang is a 60year old female. Patient presents with:  Headache  Derm Problem: concerned with small painless bumps on arms   Abdominal Pain: c/o left sided abdominal pain and weight gain    HPI:   Reports just looking at food and gains weight.  Repor mg total) by mouth once daily, Disp: 90 tablet, Rfl: 2  Albuterol Sulfate HFA (VENTOLIN HFA) 108 (90 Base) MCG/ACT Inhalation Aero Soln, Inhale 2 puffs into the lungs every 4 (four) hours as needed for Wheezing., Disp: 3 Inhaler, Rfl: 6  Lansoprazole 15 MG by mouth daily. , Disp: , Rfl:   Insulin Pen Needle (BD PEN NEEDLE ESTEVAN U/F) 32G X 4 MM Does not apply Misc, 1 each by Does not apply route daily. , Disp: 100 each, Rfl: 3  CONTOUR NEXT TEST In Vitro Strip, Use to check sugars 4 times daily, Disp: 400 each, 08/2007   • Anxiety 2008   • Anxiety state    • Atrial tachycardia, paroxysmal (HCC) 4/15/2015   • Barretts esophagus    • Carpal tunnel syndrome    • Chlamydia 1986   • Choroidal nevus, right eye 1/27/2015   • COPD (chronic obstructive pulmonary disease) rashes  HEENT: denies eye complaints,denies sore throat, denies ear pain  RESPIRATORY: denies shortness of breath, denies cough  CARDIOVASCULAR: denies chest pain  GI:pos abdominal pain and denies heartburn  NEURO:pos headaches  Musculoskeletal: pos joint

## 2020-09-01 DIAGNOSIS — K22.719 BARRETT'S ESOPHAGUS WITH DYSPLASIA: ICD-10-CM

## 2020-09-01 RX ORDER — MECOBALAMIN 5000 MCG
TABLET,DISINTEGRATING ORAL
Qty: 270 CAPSULE | Refills: 0 | Status: SHIPPED | OUTPATIENT
Start: 2020-09-01 | End: 2020-11-19

## 2020-09-10 ENCOUNTER — OFFICE VISIT (OUTPATIENT)
Dept: DERMATOLOGY CLINIC | Facility: CLINIC | Age: 60
End: 2020-09-10

## 2020-09-10 DIAGNOSIS — L28.0 LICHENIFICATION AND LICHEN SIMPLEX CHRONICUS: ICD-10-CM

## 2020-09-10 DIAGNOSIS — L30.9 DERMATITIS: Primary | ICD-10-CM

## 2020-09-10 DIAGNOSIS — L85.8 KERATOSIS PILARIS: ICD-10-CM

## 2020-09-10 PROCEDURE — 99213 OFFICE O/P EST LOW 20 MIN: CPT | Performed by: DERMATOLOGY

## 2020-09-10 RX ORDER — AMMONIUM LACTATE 12 G/100G
CREAM TOPICAL
Qty: 1 TUBE | Refills: 3 | Status: SHIPPED | OUTPATIENT
Start: 2020-09-10

## 2020-09-10 RX ORDER — CARVEDILOL 12.5 MG/1
TABLET ORAL
COMMUNITY
End: 2020-10-12

## 2020-09-10 RX ORDER — AMLODIPINE BESYLATE 2.5 MG/1
TABLET ORAL
COMMUNITY
Start: 2020-07-15 | End: 2020-12-17

## 2020-09-10 NOTE — PROGRESS NOTES
HPI:     Chief Complaint     Dermatitis        HPI     Dermatitis      Additional comments: LOV 6/4/20. pt presenting today with Dermatits f/u to R foot and possible new Dermatitis to R arm 6 months. c/o itching and swellling to R arm.  pt has been using Cl Besylate 2.5 MG Oral Tab      • Ammonium Lactate (LAC-HYDRIN) 12 % External Cream Apply to affected areas 1-2x/day 1 Tube 3   • Lansoprazole 15 MG Oral Capsule Delayed Release TAKE 2 CAPSULES IN THE MORNING AND 1 CAPSULE IN THE EVENING 270 capsule 0   • di tablet 1   • LOSARTAN POTASSIUM 25 MG Oral Tab TAKE 1 TABLET(25 MG) BY MOUTH DAILY 90 tablet 1   • Propranolol HCl 60 MG Oral Tab TAKE ONE TABLET BY MOUTH TWICE DAILY (EVERY 12 HOURS AS DIRECTED) 180 tablet 4   • aspirin 81 MG Oral Tab Take 81 mg by mouth not taking: Reported on 8/25/2020 ) 120 capsule 1   • Ketoconazole-Hydrocortisone 2 & 1 % External Kit Apply to affected area once daily (Patient not taking: Reported on 8/25/2020 ) 1 kit 0   • TRIAMCINOLONE ACETONIDE 0.5 % External Cream APPLY THIN LAYER [address]   • Hypercholesterolemia 2006   • Irregular menstrual cycle 2008    Neg endometrial biopsy   • MGD (meibomian gland dysfunction) 7/28/2015   • Ocular migraine 7/28/2015   • Other and unspecified hyperlipidemia    • Ovarian cyst 1980    [place] Quit date: 1/21/2017        Years since quitting: 3.6      Smokeless tobacco: Former User      Tobacco comment: 1 pack/wk per pt. Substance and Sexual Activity      Alcohol use:  Yes        Alcohol/week: 0.0 standard drinks        Comment: Occasionally • Glaucoma Sister    • Other (Other) Sister         Vascular necrosis       PHYSICAL EXAM:   Patient is alert and oriented and appears their stated age. They are well-nourished. Exam is remarkable for the following-  1.   The more eczematous patches on

## 2020-09-22 ENCOUNTER — IMMUNIZATION (OUTPATIENT)
Dept: FAMILY MEDICINE CLINIC | Facility: CLINIC | Age: 60
End: 2020-09-22

## 2020-09-22 DIAGNOSIS — Z23 NEED FOR VACCINATION: ICD-10-CM

## 2020-09-22 PROCEDURE — 90471 IMMUNIZATION ADMIN: CPT | Performed by: FAMILY MEDICINE

## 2020-09-22 PROCEDURE — 90686 IIV4 VACC NO PRSV 0.5 ML IM: CPT | Performed by: FAMILY MEDICINE

## 2020-10-01 ENCOUNTER — HOSPITAL ENCOUNTER (OUTPATIENT)
Dept: ULTRASOUND IMAGING | Age: 60
Discharge: HOME | End: 2020-10-01
Attending: FAMILY MEDICINE
Payer: COMMERCIAL

## 2020-10-01 DIAGNOSIS — R10.12 LUQ PAIN: ICD-10-CM

## 2020-10-01 PROCEDURE — 76705 ECHO EXAM OF ABDOMEN: CPT | Performed by: FAMILY MEDICINE

## 2020-10-04 NOTE — PROGRESS NOTES
[facility] - The ultrasound of your spleen was normal. If you are still having that pain, I will send you for a chest x-ray. - Dr. Sydney Melgar

## 2020-10-09 ENCOUNTER — NURSE TRIAGE (OUTPATIENT)
Dept: FAMILY MEDICINE CLINIC | Facility: CLINIC | Age: 60
End: 2020-10-09

## 2020-10-12 ENCOUNTER — TELEPHONE (OUTPATIENT)
Dept: FAMILY MEDICINE CLINIC | Facility: CLINIC | Age: 60
End: 2020-10-12

## 2020-10-12 ENCOUNTER — TELEMEDICINE (OUTPATIENT)
Dept: FAMILY MEDICINE CLINIC | Facility: CLINIC | Age: 60
End: 2020-10-12

## 2020-10-12 DIAGNOSIS — R09.89 SYMPTOMS OF UPPER RESPIRATORY INFECTION (URI): Primary | ICD-10-CM

## 2020-10-12 PROCEDURE — 99213 OFFICE O/P EST LOW 20 MIN: CPT | Performed by: FAMILY MEDICINE

## 2020-10-12 RX ORDER — PROPRANOLOL HYDROCHLORIDE 80 MG/1
TABLET ORAL
COMMUNITY
End: 2021-03-23

## 2020-10-12 NOTE — PROGRESS NOTES
Virtual Video by [employer] Check-In    Yolygloria Mancilla verbally consents to a Virtual/Video by [employer] visit on 10/12/20. Patient has been referred to the [facility] website at [email or web address] to review the yearly Consent to Treat document.     Pa Pen-injector, INJECT 40 UNITS SUBCUTANEOUS DAILY, Disp: 27 mL, Rfl: 3    •  Insulin Pen Needle (BD PEN NEEDLE ESTEVAN U/F) 32G X 4 MM Does not apply Misc, 1 each by Does not apply route daily. , Disp: 100 each, Rfl: 3    •  CONTOUR NEXT TEST In Vitro Strip, Us Ciclopirox 8 % External Solution, Apply 1 Application topically nightly., Disp: 1 Bottle, Rfl: 2    •  ketoconazole 2 % External Cream, MELVIN EXT AA BID, Disp: , Rfl: 0    •  cetirizine 10 MG Oral Tab, Take 10 mg by mouth daily. , Disp: , Rfl:     •  B Comple Unspecified essential hypertension 2003   • Viral infection characterized by skin and mucous membrane lesions       Social History:  Social History    Tobacco Use      Smoking status: Former Smoker        Packs/day: 0.25        Years: 2.00        Pack year

## 2020-10-12 NOTE — TELEPHONE ENCOUNTER
Patient following up, called on Fri 10/9 for reported flu like symptoms, was advised UC. Patient reports she did not go to UC, she was monitoring her symptoms and they had not worsened at the time.  By Sat night she started with fever, coughing, worsening b

## 2020-10-14 ENCOUNTER — TELEPHONE (OUTPATIENT)
Dept: FAMILY MEDICINE CLINIC | Facility: CLINIC | Age: 60
End: 2020-10-14

## 2020-10-14 NOTE — TELEPHONE ENCOUNTER
Patient  called stating she seen Dr. Aundrea Mills on 10/12/20 and had a pended COVID-19 test.   Patient states she got results back today and it was positive . Patient states she has a productive cough. Denies shortness of breath or diarrhea.      Patient state

## 2020-10-16 NOTE — TELEPHONE ENCOUNTER
Triage team will monitor patient . Please DO NOT close this encounter    What  was your temp today? - Did not check    How did you take your temp? Did not take      Are you feeling short of breath today?    No      Is the shortness of breath better, the sa

## 2020-10-17 ENCOUNTER — TELEMEDICINE (OUTPATIENT)
Dept: FAMILY MEDICINE CLINIC | Facility: CLINIC | Age: 60
End: 2020-10-17

## 2020-10-17 DIAGNOSIS — U07.1 COVID-19: Primary | ICD-10-CM

## 2020-10-17 PROCEDURE — 99213 OFFICE O/P EST LOW 20 MIN: CPT | Performed by: FAMILY MEDICINE

## 2020-10-17 NOTE — PROGRESS NOTES
Virtual/Video by [employer] Check-In    Otto George verbally consents to a Virtual/Video by [employer] on 10/17/20. Patient has been referred to the [facility] website at [email or web address] to review the yearly Consent to Treat document.   Pa 4 MM Does not apply Misc, 1 each by Does not apply route daily. , Disp: 100 each, Rfl: 3    •  CONTOUR NEXT TEST In Vitro Strip, Use to check sugars 4 times daily, Disp: 400 each, Rfl: 3    •  Budesonide-Formoterol Fumarate (SYMBICORT) 160-4.5 MCG/ACT Inhal Cream, MELVIN EXT AA BID, Disp: , Rfl: 0    •  cetirizine 10 MG Oral Tab, Take 10 mg by mouth daily. , Disp: , Rfl:     •  B Complex Vitamins (B COMPLEX OR), Take 1 tablet by mouth daily. , Disp: , Rfl:     No current facility-administered medications on file p History:  Social History    Tobacco Use      Smoking status: Former Smoker        Packs/day: 0.25        Years: 2.00        Pack years: .5        Types: Cigarettes        Quit date: 1/21/2017        Years since quitting: 3.7      Smokeless tobacco: Former

## 2020-10-19 RX ORDER — BENZONATATE 200 MG/1
200 CAPSULE ORAL 3 TIMES DAILY PRN
Qty: 30 CAPSULE | Refills: 0 | Status: SHIPPED | OUTPATIENT
Start: 2020-10-19 | End: 2020-10-27 | Stop reason: ALTCHOICE

## 2020-10-19 RX ORDER — ONDANSETRON 4 MG/1
4 TABLET, FILM COATED ORAL EVERY 8 HOURS PRN
Qty: 20 TABLET | Refills: 2 | Status: SHIPPED | OUTPATIENT
Start: 2020-10-19 | End: 2021-11-10 | Stop reason: ALTCHOICE

## 2020-10-19 NOTE — TELEPHONE ENCOUNTER
Triage team will monitor patient . Please DO NOT close this encounter    What  was your temp today? - did not take today, last fever 1 week ago    How did you take your temp?     with an oral thermometer    Are you feeling short of breath today?    No

## 2020-10-21 NOTE — TELEPHONE ENCOUNTER
Triage team will monitor patient . Please DO NOT close this encounter. What  was your temp today? - yes,  No temp 98.0    How did you take your temp? Temporal    Are you feeling short of breath today?    No      Is the shortness of breath better, th

## 2020-10-24 NOTE — TELEPHONE ENCOUNTER
Triage team will monitor patient . Please DO NOT close this encounter.        Left message to call back

## 2020-10-26 NOTE — TELEPHONE ENCOUNTER
Triage team will monitor patient . Please DO NOT close this encounter. What  was your temp today? - no, no fever    How did you take your temp? Are you feeling short of breath today?    No      Is the shortness of breath better, the same, or wo

## 2020-10-27 ENCOUNTER — TELEPHONE (OUTPATIENT)
Dept: FAMILY MEDICINE CLINIC | Facility: CLINIC | Age: 60
End: 2020-10-27

## 2020-10-27 ENCOUNTER — TELEMEDICINE (OUTPATIENT)
Dept: FAMILY MEDICINE CLINIC | Facility: CLINIC | Age: 60
End: 2020-10-27

## 2020-10-27 DIAGNOSIS — U07.1 COVID-19: Primary | ICD-10-CM

## 2020-10-27 PROCEDURE — 99213 OFFICE O/P EST LOW 20 MIN: CPT | Performed by: FAMILY MEDICINE

## 2020-10-27 NOTE — PROGRESS NOTES
Virtual/Video by [employer] Check-In    Wendy Oneil verbally consents to a Virtual/Video by [employer] on 10/27/20. Patient has been referred to the [facility] website at [email or web address] to review the yearly Consent to Treat document.   Pa SUBCUTANEOUS DAILY, Disp: 27 mL, Rfl: 3    •  Insulin Pen Needle (BD PEN NEEDLE ESTEVAN U/F) 32G X 4 MM Does not apply Misc, 1 each by Does not apply route daily. , Disp: 100 each, Rfl: 3    •  CONTOUR NEXT TEST In Vitro Strip, Use to check sugars 4 times michelle Apply 1 Application topically nightly., Disp: 1 Bottle, Rfl: 2    •  ketoconazole 2 % External Cream, MELVIN EXT AA BID, Disp: , Rfl: 0    •  cetirizine 10 MG Oral Tab, Take 10 mg by mouth daily. , Disp: , Rfl:     •  B Complex Vitamins (B COMPLEX OR), Take 1 • Viral infection characterized by skin and mucous membrane lesions       Social History:  Social History    Tobacco Use      Smoking status: Former Smoker        Packs/day: 0.25        Years: 2.00        Pack years: .5        Types: Cigarettes        Cm Spurr

## 2020-10-28 NOTE — TELEPHONE ENCOUNTER
Pt had virtual visit with Dr Duyen Gonzalez yesterday and was cleared to return to work. Closing encounter per department process.

## 2020-11-03 ENCOUNTER — TELEPHONE (OUTPATIENT)
Dept: FAMILY MEDICINE CLINIC | Facility: CLINIC | Age: 60
End: 2020-11-03

## 2020-11-03 NOTE — TELEPHONE ENCOUNTER
Okay to leave a detailed message on the vm    Pt wasn't feeling good x 2 days so she did not return to work, seeking a revised note and advice on sx's below.     Pt stts she needs a revised note, due to dizziness and lightheadedness, would like to return on

## 2020-11-03 NOTE — TELEPHONE ENCOUNTER
Verified name and . Spoke with patient- relayed message from Dr. Samy Lester below- verbalized understanding.   Set up virtual appoint:  Future Appointments   Date Time Provider Jonny Kendall   2020 10:45 AM Nakul Pak MD [facility] AD[facility]

## 2020-11-03 NOTE — TELEPHONE ENCOUNTER
Yes some symptoms can last several weeks like generalized fatigue and dizziness. She is not contagious any longer but I suggest a video visit with me on Thursday and will decide if ready to go back on Monday depending on how she feels.  Her symptoms may be

## 2020-11-05 ENCOUNTER — TELEPHONE (OUTPATIENT)
Dept: FAMILY MEDICINE CLINIC | Facility: CLINIC | Age: 60
End: 2020-11-05

## 2020-11-05 ENCOUNTER — TELEMEDICINE (OUTPATIENT)
Dept: FAMILY MEDICINE CLINIC | Facility: CLINIC | Age: 60
End: 2020-11-05

## 2020-11-05 VITALS
HEART RATE: 80 BPM | HEIGHT: 64 IN | SYSTOLIC BLOOD PRESSURE: 119 MMHG | DIASTOLIC BLOOD PRESSURE: 79 MMHG | WEIGHT: 197 LBS | BODY MASS INDEX: 33.63 KG/M2

## 2020-11-05 DIAGNOSIS — J44.1 CHRONIC OBSTRUCTIVE PULMONARY DISEASE WITH ACUTE EXACERBATION (HCC): Chronic | ICD-10-CM

## 2020-11-05 DIAGNOSIS — U07.1 COVID-19: Primary | ICD-10-CM

## 2020-11-05 PROCEDURE — 99213 OFFICE O/P EST LOW 20 MIN: CPT | Performed by: FAMILY MEDICINE

## 2020-11-05 RX ORDER — MECLIZINE HYDROCHLORIDE 25 MG/1
25 TABLET ORAL 3 TIMES DAILY PRN
Qty: 30 TABLET | Refills: 0 | Status: SHIPPED | OUTPATIENT
Start: 2020-11-05 | End: 2020-11-19

## 2020-11-05 RX ORDER — ONDANSETRON 4 MG/1
4 TABLET, ORALLY DISINTEGRATING ORAL EVERY 8 HOURS PRN
Qty: 20 TABLET | Refills: 0 | Status: SHIPPED | OUTPATIENT
Start: 2020-11-05 | End: 2021-11-10 | Stop reason: ALTCHOICE

## 2020-11-05 NOTE — PROGRESS NOTES
Virtual/Video by [employer] Check-In    Otto Vazquez verbally consents to a Virtual/Video by Gabriel Le on 11/05/20. Patient has been referred to the [facility] website at [email or web address] to review the yearly Consent to Treat document.   Pa Disp: 1 Inhaler, Rfl: 1    •  Fluticasone Propionate 50 MCG/ACT Nasal Suspension, use 2 sprays by Each nostrils route daily. , Disp: 48 g, Rfl: 2    •  ATORVASTATIN 40 MG Oral Tab, Take 1 tablet (40 mg total) by mouth once daily, Disp: 90 tablet, Rfl: 2 needed. , Disp: 45 g, Rfl: 0    •  Clobetasol Propionate 0.05 % External Ointment, Apply 1 Application topically daily as needed.  Apply to affected areas 1x/day as needed, Disp: 30 g, Rfl: 2    •  Ciclopirox 8 % External Solution, Apply 1 Application topica Type II or unspecified type diabetes mellitus without mention of complication, not stated as uncontrolled 2005   • Unspecified essential hypertension 2003   • Viral infection characterized by skin and mucous membrane lesions       Social History:  Social H

## 2020-11-06 DIAGNOSIS — J44.1 CHRONIC OBSTRUCTIVE PULMONARY DISEASE WITH ACUTE EXACERBATION (HCC): Chronic | ICD-10-CM

## 2020-11-06 RX ORDER — INSULIN GLARGINE 100 [IU]/ML
INJECTION, SOLUTION SUBCUTANEOUS
Qty: 27 ML | Refills: 3 | Status: SHIPPED | OUTPATIENT
Start: 2020-11-06 | End: 2021-08-04

## 2020-11-06 RX ORDER — BUDESONIDE AND FORMOTEROL FUMARATE DIHYDRATE 160; 4.5 UG/1; UG/1
2 AEROSOL RESPIRATORY (INHALATION) 2 TIMES DAILY
Qty: 1 INHALER | Refills: 1 | OUTPATIENT
Start: 2020-11-06

## 2020-11-06 RX ORDER — BUDESONIDE AND FORMOTEROL FUMARATE DIHYDRATE 160; 4.5 UG/1; UG/1
2 AEROSOL RESPIRATORY (INHALATION) 2 TIMES DAILY
Qty: 3 INHALER | Refills: 1 | Status: SHIPPED | OUTPATIENT
Start: 2020-11-06 | End: 2020-11-09 | Stop reason: ALTCHOICE

## 2020-11-09 ENCOUNTER — TELEPHONE (OUTPATIENT)
Dept: CASE MANAGEMENT | Age: 60
End: 2020-11-09

## 2020-11-09 RX ORDER — FLUTICASONE PROPIONATE AND SALMETEROL 50; 250 UG/1; UG/1
1 POWDER RESPIRATORY (INHALATION) EVERY 12 HOURS SCHEDULED
Qty: 3 EACH | Refills: 1 | Status: SHIPPED | OUTPATIENT
Start: 2020-11-09

## 2020-11-09 NOTE — TELEPHONE ENCOUNTER
Insurance prefers Advair Diskus inhaler over Symbicort. Spoke with patient stating ok to switch to Advair inhaler. She admits to not taking the Symbicort  exactly as prescribed because of the cost of the inhaler so she tries to make it last longer.  Thiago Ortega

## 2020-11-19 DIAGNOSIS — K22.719 BARRETT'S ESOPHAGUS WITH DYSPLASIA: ICD-10-CM

## 2020-11-20 DIAGNOSIS — K22.719 BARRETT'S ESOPHAGUS WITH DYSPLASIA: ICD-10-CM

## 2020-11-21 RX ORDER — MECLIZINE HYDROCHLORIDE 25 MG/1
25 TABLET ORAL 3 TIMES DAILY PRN
Qty: 30 TABLET | Refills: 0 | Status: SHIPPED | OUTPATIENT
Start: 2020-11-21 | End: 2021-02-04

## 2020-11-21 RX ORDER — DIAZEPAM 5 MG/1
5 TABLET ORAL EVERY 12 HOURS PRN
Qty: 30 TABLET | Refills: 2 | Status: SHIPPED | OUTPATIENT
Start: 2020-11-21 | End: 2021-11-12

## 2020-11-21 RX ORDER — LANSOPRAZOLE 15 MG/1
CAPSULE, DELAYED RELEASE ORAL
Qty: 270 CAPSULE | Refills: 0 | Status: SHIPPED | OUTPATIENT
Start: 2020-11-21 | End: 2021-03-22

## 2020-11-22 RX ORDER — MECLIZINE HYDROCHLORIDE 25 MG/1
25 TABLET ORAL 3 TIMES DAILY PRN
Qty: 30 TABLET | Refills: 0 | Status: SHIPPED | OUTPATIENT
Start: 2020-11-22 | End: 2020-12-17

## 2020-11-22 RX ORDER — LANSOPRAZOLE 15 MG/1
CAPSULE, DELAYED RELEASE ORAL
Qty: 270 CAPSULE | Refills: 0 | Status: SHIPPED | OUTPATIENT
Start: 2020-11-22 | End: 2021-02-04

## 2020-11-22 RX ORDER — MECLIZINE HYDROCHLORIDE 25 MG/1
25 TABLET ORAL 3 TIMES DAILY PRN
Qty: 30 TABLET | Refills: 0 | Status: SHIPPED | OUTPATIENT
Start: 2020-11-22 | End: 2021-02-04

## 2020-11-22 RX ORDER — DIAZEPAM 5 MG/1
5 TABLET ORAL EVERY 12 HOURS PRN
Qty: 30 TABLET | Refills: 2 | Status: SHIPPED | OUTPATIENT
Start: 2020-11-22 | End: 2021-02-04

## 2020-11-22 RX ORDER — LANSOPRAZOLE 15 MG/1
CAPSULE, DELAYED RELEASE ORAL
Qty: 270 CAPSULE | Refills: 0 | Status: SHIPPED | OUTPATIENT
Start: 2020-11-22 | End: 2021-09-11

## 2020-12-05 ENCOUNTER — TELEPHONE (OUTPATIENT)
Dept: FAMILY MEDICINE CLINIC | Facility: CLINIC | Age: 60
End: 2020-12-05

## 2020-12-05 NOTE — TELEPHONE ENCOUNTER
Dr Webster Credit, please advise. Patient went to get script sent, but was told by pharmacy that, once a year, the insurance needs statement from the PCP about why this particular dose/frequency is needed.  Patient said the letter or whatever you wrote last year w

## 2020-12-07 ENCOUNTER — PATIENT MESSAGE (OUTPATIENT)
Dept: FAMILY MEDICINE CLINIC | Facility: CLINIC | Age: 60
End: 2020-12-07

## 2020-12-07 NOTE — TELEPHONE ENCOUNTER
Prior authorization for Lansoprazole completed w/ [employer] on cover my meds Key: SP, turn around time 1-5 days.

## 2020-12-07 NOTE — TELEPHONE ENCOUNTER
From: Fernando Stockton  To: Shalini Schultz MD  Sent: 12/7/2020 2:30 PM CST  Subject: Prescription Question    Hi Dr. Muriel Leyden. I see your letter to the insurance company. I thought it was the lanzoprizole dosage that was being questioned this time around.  Per Sean Merritt

## 2020-12-08 NOTE — TELEPHONE ENCOUNTER
Message left on pharmacy voicemail indicating Lansoprazole has been approved. Monthly qty 90 per 30 days. Granted date 12/06/2020-12/06/2021.

## 2020-12-09 NOTE — TELEPHONE ENCOUNTER
Current Outpatient Medications:  Ketoconazole-Hydrocortisone 2 & 1 % External Kit Apply to affected area twice daily Disp: 1 kit Rfl: 0     Per pharmacy med not available pls send new RX for Alt. EP catheter inserted in the His.

## 2020-12-17 ENCOUNTER — PATIENT MESSAGE (OUTPATIENT)
Dept: FAMILY MEDICINE CLINIC | Facility: CLINIC | Age: 60
End: 2020-12-17

## 2020-12-17 DIAGNOSIS — J44.1 CHRONIC OBSTRUCTIVE PULMONARY DISEASE WITH ACUTE EXACERBATION (HCC): Chronic | ICD-10-CM

## 2020-12-17 RX ORDER — BUDESONIDE AND FORMOTEROL FUMARATE DIHYDRATE 160; 4.5 UG/1; UG/1
2 AEROSOL RESPIRATORY (INHALATION) 2 TIMES DAILY
Qty: 3 INHALER | Refills: 4 | Status: SHIPPED | OUTPATIENT
Start: 2020-12-17 | End: 2022-05-27

## 2020-12-17 RX ORDER — LOSARTAN POTASSIUM 25 MG/1
25 TABLET ORAL DAILY
Qty: 90 TABLET | Refills: 4 | Status: SHIPPED | OUTPATIENT
Start: 2020-12-17 | End: 2021-02-25

## 2020-12-17 RX ORDER — MECLIZINE HYDROCHLORIDE 25 MG/1
25 TABLET ORAL 3 TIMES DAILY PRN
Qty: 30 TABLET | Refills: 0 | Status: SHIPPED | OUTPATIENT
Start: 2020-12-17 | End: 2021-02-09

## 2020-12-17 RX ORDER — AMLODIPINE BESYLATE 2.5 MG/1
2.5 TABLET ORAL DAILY
Qty: 90 TABLET | Refills: 4 | Status: SHIPPED | OUTPATIENT
Start: 2020-12-17

## 2020-12-17 NOTE — TELEPHONE ENCOUNTER
From: Abel Quinn  To: Chris Jones MD  Sent: 12/17/2020 11:05 AM CST  Subject: Prescription Question    Meds.  Losartin and amlodapine    Need refills sent to [facility] in [place] please

## 2021-01-12 ENCOUNTER — OFFICE VISIT (OUTPATIENT)
Dept: OBGYN CLINIC | Facility: CLINIC | Age: 61
End: 2021-01-12

## 2021-01-12 VITALS
WEIGHT: 206 LBS | HEART RATE: 69 BPM | DIASTOLIC BLOOD PRESSURE: 74 MMHG | SYSTOLIC BLOOD PRESSURE: 116 MMHG | BODY MASS INDEX: 35 KG/M2

## 2021-01-12 DIAGNOSIS — Z01.419 ENCOUNTER FOR GYNECOLOGICAL EXAMINATION WITHOUT ABNORMAL FINDING: Primary | ICD-10-CM

## 2021-01-12 DIAGNOSIS — Z12.31 VISIT FOR SCREENING MAMMOGRAM: ICD-10-CM

## 2021-01-12 DIAGNOSIS — N90.89 VULVAR IRRITATION: ICD-10-CM

## 2021-01-12 PROCEDURE — 99396 PREV VISIT EST AGE 40-64: CPT | Performed by: OBSTETRICS & GYNECOLOGY

## 2021-01-12 PROCEDURE — 99212 OFFICE O/P EST SF 10 MIN: CPT | Performed by: OBSTETRICS & GYNECOLOGY

## 2021-01-12 PROCEDURE — G0438 PPPS, INITIAL VISIT: HCPCS | Performed by: OBSTETRICS & GYNECOLOGY

## 2021-01-12 PROCEDURE — 3074F SYST BP LT 130 MM HG: CPT | Performed by: OBSTETRICS & GYNECOLOGY

## 2021-01-12 PROCEDURE — 3078F DIAST BP <80 MM HG: CPT | Performed by: OBSTETRICS & GYNECOLOGY

## 2021-01-12 NOTE — PROGRESS NOTES
Shruti Jacobs is a 63year old female  No LMP recorded (lmp unknown). Patient is postmenopausal. who presents for Patient presents with:  Gyn Exam: annual  she c/o itchy rash on perineal area.     OBSTETRICS HISTORY:  OB History     T1   not stated as uncontrolled 2005   • Unspecified essential hypertension 2003   • Viral infection characterized by skin and mucous membrane lesions        SURGICAL HISTORY:  Past Surgical History:   Procedure Laterality Date   • ANTERIOR CERVICAL FUSION  & on phone: Not on file        Gets together: Not on file        Attends Hoahaoism service: Not on file        Active member of club or organization: Not on file        Attends meetings of clubs or organizations: Not on file        Relationship status: Not o Take 1 tablet (25 mg total) by mouth 3 (three) times daily as needed for Dizziness or Nausea., Disp: 30 tablet, Rfl: 0  •  diazepam (VALIUM) 5 MG Oral Tab, Take 1 tablet (5 mg total) by mouth every 12 (twelve) hours as needed. , Disp: 30 tablet, Rfl: 2  • Ammonium Lactate (LAC-HYDRIN) 12 % External Cream, Apply to affected areas 1-2x/day, Disp: 1 Tube, Rfl: 3  •  diphenhydrAMINE HCl 25 MG Oral Tab, Take 25 mg by mouth nightly., Disp: , Rfl:   •  Diclofenac Sodium 1 % Transdermal Gel, Apply 2 g topically 4 ( Rfl: 2  •  ketoconazole 2 % External Cream, MELVIN EXT AA BID, Disp: , Rfl: 0  •  cetirizine 10 MG Oral Tab, Take 10 mg by mouth daily. , Disp: , Rfl:   •  B Complex Vitamins (B COMPLEX OR), Take 1 tablet by mouth daily. , Disp: , Rfl:     ALLERGIES:    Liraglu bilaterally  Cardiovascular: regular rate and rhythm, no significant murmur  Abdomen:  soft, nontender, nondistended, no masses  Skin/Hair: no unusual rashes or bruises  Extremities: no edema, no cyanosis  Psychiatric:  Oriented to time, place, person and

## 2021-01-19 ENCOUNTER — NURSE TRIAGE (OUTPATIENT)
Dept: FAMILY MEDICINE CLINIC | Facility: CLINIC | Age: 61
End: 2021-01-19

## 2021-01-19 RX ORDER — AZITHROMYCIN 250 MG/1
TABLET, FILM COATED ORAL
Qty: 6 TABLET | Refills: 0 | Status: SHIPPED | OUTPATIENT
Start: 2021-01-19 | End: 2021-01-24

## 2021-01-19 NOTE — TELEPHONE ENCOUNTER
I sent in azithromycin.  If not improving, needs to go to [facility]

## 2021-01-19 NOTE — TELEPHONE ENCOUNTER
Action Requested: Summary for Provider     []  Critical Lab, Recommendations Needed  [x] Need Additional Advice  []   FYI    []   Need Orders  [x] Need Medications Sent to Pharmacy  []  Other     SUMMARY: Patient sent my chart message below.   Patient state

## 2021-02-04 ENCOUNTER — OFFICE VISIT (OUTPATIENT)
Dept: FAMILY MEDICINE CLINIC | Facility: CLINIC | Age: 61
End: 2021-02-04

## 2021-02-04 VITALS
DIASTOLIC BLOOD PRESSURE: 63 MMHG | OXYGEN SATURATION: 95 % | SYSTOLIC BLOOD PRESSURE: 112 MMHG | BODY MASS INDEX: 35.58 KG/M2 | WEIGHT: 208.38 LBS | HEIGHT: 64 IN | TEMPERATURE: 98 F | HEART RATE: 66 BPM

## 2021-02-04 DIAGNOSIS — L65.9 ALOPECIA: ICD-10-CM

## 2021-02-04 DIAGNOSIS — E55.9 VITAMIN D DEFICIENCY: ICD-10-CM

## 2021-02-04 DIAGNOSIS — F41.9 ANXIETY AND DEPRESSION: ICD-10-CM

## 2021-02-04 DIAGNOSIS — E11.8 DM (DIABETES MELLITUS) WITH COMPLICATIONS (HCC): ICD-10-CM

## 2021-02-04 DIAGNOSIS — F32.A ANXIETY AND DEPRESSION: ICD-10-CM

## 2021-02-04 DIAGNOSIS — L30.9 DERMATITIS: ICD-10-CM

## 2021-02-04 DIAGNOSIS — Z86.16 HISTORY OF COVID-19: Primary | ICD-10-CM

## 2021-02-04 DIAGNOSIS — I10 ESSENTIAL HYPERTENSION: ICD-10-CM

## 2021-02-04 DIAGNOSIS — R51.9 NONINTRACTABLE HEADACHE, UNSPECIFIED CHRONICITY PATTERN, UNSPECIFIED HEADACHE TYPE: ICD-10-CM

## 2021-02-04 LAB
CARTRIDGE LOT#: ABNORMAL NUMERIC
HEMOGLOBIN A1C: 7.3 % (ref 4.3–5.6)

## 2021-02-04 PROCEDURE — 3074F SYST BP LT 130 MM HG: CPT | Performed by: FAMILY MEDICINE

## 2021-02-04 PROCEDURE — 36416 COLLJ CAPILLARY BLOOD SPEC: CPT | Performed by: FAMILY MEDICINE

## 2021-02-04 PROCEDURE — 99215 OFFICE O/P EST HI 40 MIN: CPT | Performed by: FAMILY MEDICINE

## 2021-02-04 PROCEDURE — 3078F DIAST BP <80 MM HG: CPT | Performed by: FAMILY MEDICINE

## 2021-02-04 PROCEDURE — 83036 HEMOGLOBIN GLYCOSYLATED A1C: CPT | Performed by: FAMILY MEDICINE

## 2021-02-04 PROCEDURE — 3008F BODY MASS INDEX DOCD: CPT | Performed by: FAMILY MEDICINE

## 2021-02-04 RX ORDER — ERGOCALCIFEROL 1.25 MG/1
50000 CAPSULE ORAL WEEKLY
Qty: 12 CAPSULE | Refills: 4 | Status: SHIPPED | OUTPATIENT
Start: 2021-02-04 | End: 2021-03-06

## 2021-02-04 NOTE — PROGRESS NOTES
Shruti Jacobs is a 63year old female.  Patient presents with:  Hair/Scalp Problem: x 6 weeks  Headache: more frequent  Loss Of Smell Or Taste: no smell & 65% taste returned  Shortness Of Breath: lasting effect after exertion     HPI:   COVID infection was b TAKE 2 CAPSULES IN THE MORNING AND 1 CAPSULE IN THE EVENING, Disp: 270 capsule, Rfl: 0    •  ADVAIR DISKUS 250-50 MCG/DOSE Inhalation Aerosol Powder, Breath Activated, Inhale 1 puff into the lungs every 12 (twelve) hours. , Disp: 3 each, Rfl: 1    •  insuli Each nostrils route daily. , Disp: 48 g, Rfl: 2    •  ATORVASTATIN 40 MG Oral Tab, Take 1 tablet (40 mg total) by mouth once daily, Disp: 90 tablet, Rfl: 2    •  Albuterol Sulfate HFA (VENTOLIN HFA) 108 (90 Base) MCG/ACT Inhalation Aero Soln, Inhale 2 puffs 7/28/2015   • DUB (dysfunctional uterine bleeding) 2005    endometrial biopsy   • Esophageal reflux    • Exotropia    • Genital warts    • Heart murmur    • Herpes simplex    • High blood pressure    • High cholesterol    • History of pregnancy 1990, 1991 developed, well nourished,in no apparent distress  SKIN: right arm mild dryness and erythema   Thinning of hair.   Right turbinate swelling and mucous left normal.   NECK: supple,no adenopathy,no bruits  LUNGS: clear to auscultation  CARDIO: RRR without mur

## 2021-02-09 RX ORDER — MECLIZINE HYDROCHLORIDE 25 MG/1
25 TABLET ORAL 3 TIMES DAILY PRN
Qty: 30 TABLET | Refills: 0 | Status: SHIPPED | OUTPATIENT
Start: 2021-02-09 | End: 2021-03-22

## 2021-02-14 ENCOUNTER — PATIENT MESSAGE (OUTPATIENT)
Dept: ENDOCRINOLOGY CLINIC | Facility: CLINIC | Age: 61
End: 2021-02-14

## 2021-02-17 NOTE — TELEPHONE ENCOUNTER
RN responded to patient with below recommendation. Message flagged to alert office if message is not read by tomorrow.

## 2021-02-17 NOTE — TELEPHONE ENCOUNTER
Dr. Oscar Ng,     Called patient to inquire about diet she's following.    States she's eating healthier/healthier carb choices (more vegetables, substituting white bread to wheat) since 02/08/21 and noticed that there would be times her BG would go low to 80'

## 2021-02-17 NOTE — TELEPHONE ENCOUNTER
From: Akua Black  To: Cheyenne Heck MD  Sent: 2/14/2021 4:23 PM CST  Subject: Prescription Question    I'm trying to take on a healthier diet but every time I do my blood sugar drops too low. I'm assuming it has to do with my diabetes medicine.  Any idea w

## 2021-02-17 NOTE — TELEPHONE ENCOUNTER
Ok, noted. Lets have her stop the Glimepiride. Continue Lantus and Trulicity - please keep us updated on her BG levels. Thanks.

## 2021-02-25 ENCOUNTER — TELEPHONE (OUTPATIENT)
Dept: FAMILY MEDICINE CLINIC | Facility: CLINIC | Age: 61
End: 2021-02-25

## 2021-02-25 RX ORDER — LOSARTAN POTASSIUM 25 MG/1
25 TABLET ORAL 2 TIMES DAILY
Qty: 180 TABLET | Refills: 1 | Status: SHIPPED | OUTPATIENT
Start: 2021-02-25 | End: 2021-12-15

## 2021-02-25 NOTE — TELEPHONE ENCOUNTER
Pt states that Dr. Duyen Gonzalez prescribed the losartan-potassium medication for her to take once a day. Pt states that she takes it twice a day. Pt would like to know if a new script can be sent to the pharmacy to reflect this. Pt is out of medication.  Pharmacy:

## 2021-02-25 NOTE — TELEPHONE ENCOUNTER
Spoke with pt,  verified, pt stated originally she was on losartan 50 mg every day but taking 50 mg every day is too much so pt was referred to see Dr Cyndy Goddard (cardio at [facility]) and pt was put on 25 mg BID. Pt been taking BID for 6-12 months.

## 2021-03-09 ENCOUNTER — TELEPHONE (OUTPATIENT)
Dept: PULMONOLOGY | Facility: CLINIC | Age: 61
End: 2021-03-09

## 2021-03-09 DIAGNOSIS — G47.33 OSA (OBSTRUCTIVE SLEEP APNEA): Primary | ICD-10-CM

## 2021-03-09 NOTE — TELEPHONE ENCOUNTER
E requesting CPAP supplies authorization. Order pending. Patient LOV was on 6/2016   please sign off on pending order if agreeable.

## 2021-03-15 NOTE — TELEPHONE ENCOUNTER
Spoke with Shania Pham she states that they haven't rcvd fax will refax DME order. Shania Pham voiced understanding.

## 2021-03-16 NOTE — TELEPHONE ENCOUNTER
Fax sent and spoke with An Oliva she received the fax and if she need any other information she will call back. She voiced understanding.

## 2021-03-17 DIAGNOSIS — Z23 NEED FOR VACCINATION: ICD-10-CM

## 2021-03-19 DIAGNOSIS — K22.719 BARRETT'S ESOPHAGUS WITH DYSPLASIA: ICD-10-CM

## 2021-03-19 NOTE — TELEPHONE ENCOUNTER
Pharmacy adding another refill request.    Propranolol HCl 80 MG Oral Tab       Sig:   propranolol 80 mg tablet    Take 1 tablet(s) twice a day by oral route.      Route:   (none)     Class:   Historical     Order #:   [id number]

## 2021-03-22 ENCOUNTER — TELEPHONE (OUTPATIENT)
Dept: FAMILY MEDICINE CLINIC | Facility: CLINIC | Age: 61
End: 2021-03-22

## 2021-03-22 DIAGNOSIS — G47.33 OSA (OBSTRUCTIVE SLEEP APNEA): Primary | ICD-10-CM

## 2021-03-22 RX ORDER — LANSOPRAZOLE 15 MG/1
CAPSULE, DELAYED RELEASE ORAL
Qty: 270 CAPSULE | Refills: 0 | Status: SHIPPED | OUTPATIENT
Start: 2021-03-22 | End: 2021-06-12

## 2021-03-22 RX ORDER — MECLIZINE HYDROCHLORIDE 25 MG/1
25 TABLET ORAL 3 TIMES DAILY PRN
Qty: 30 TABLET | Refills: 0 | Status: SHIPPED | OUTPATIENT
Start: 2021-03-22 | End: 2021-09-22

## 2021-03-22 NOTE — TELEPHONE ENCOUNTER
Received a call from Lamar at [facility] requesting DME order for CPAP supplies. Dr. Kendy Castaneda, order has been pended.      Please reply to pool: [facility] - [facility]      Fax [phone]

## 2021-03-24 RX ORDER — PROPRANOLOL HYDROCHLORIDE 80 MG/1
80 TABLET ORAL 2 TIMES DAILY
Qty: 180 TABLET | Refills: 0 | Status: SHIPPED | OUTPATIENT
Start: 2021-03-24 | End: 2022-08-31

## 2021-03-29 ENCOUNTER — HOSPITAL ENCOUNTER (OUTPATIENT)
Dept: MAMMOGRAPHY | Age: 61
Discharge: HOME | End: 2021-03-29
Attending: OBSTETRICS & GYNECOLOGY
Payer: COMMERCIAL

## 2021-03-29 DIAGNOSIS — Z12.31 VISIT FOR SCREENING MAMMOGRAM: ICD-10-CM

## 2021-03-29 PROCEDURE — 77067 SCR MAMMO BI INCL CAD: CPT | Performed by: OBSTETRICS & GYNECOLOGY

## 2021-03-29 PROCEDURE — 77063 BREAST TOMOSYNTHESIS BI: CPT | Performed by: OBSTETRICS & GYNECOLOGY

## 2021-04-14 ENCOUNTER — TELEPHONE (OUTPATIENT)
Dept: FAMILY MEDICINE CLINIC | Facility: CLINIC | Age: 61
End: 2021-04-14

## 2021-04-14 NOTE — TELEPHONE ENCOUNTER
Sent a copy of the sleep study to [facility] fax# [phone].  Confirmation rec'd

## 2021-04-16 ENCOUNTER — TELEPHONE (OUTPATIENT)
Dept: FAMILY MEDICINE CLINIC | Facility: CLINIC | Age: 61
End: 2021-04-16

## 2021-04-16 DIAGNOSIS — I10 HYPERTENSION, UNSPECIFIED TYPE: Primary | ICD-10-CM

## 2021-04-27 ENCOUNTER — OFFICE VISIT (OUTPATIENT)
Dept: FAMILY MEDICINE CLINIC | Facility: CLINIC | Age: 61
End: 2021-04-27

## 2021-04-27 ENCOUNTER — HOSPITAL ENCOUNTER (OUTPATIENT)
Dept: ULTRASOUND IMAGING | Facility: HOSPITAL | Age: 61
Discharge: HOME | End: 2021-04-27
Attending: FAMILY MEDICINE
Payer: COMMERCIAL

## 2021-04-27 VITALS
SYSTOLIC BLOOD PRESSURE: 116 MMHG | WEIGHT: 208.19 LBS | HEART RATE: 68 BPM | BODY MASS INDEX: 35.54 KG/M2 | DIASTOLIC BLOOD PRESSURE: 72 MMHG | TEMPERATURE: 97 F | HEIGHT: 64 IN

## 2021-04-27 DIAGNOSIS — M79.652 LEFT THIGH PAIN: ICD-10-CM

## 2021-04-27 DIAGNOSIS — J44.1 CHRONIC OBSTRUCTIVE PULMONARY DISEASE WITH ACUTE EXACERBATION (HCC): ICD-10-CM

## 2021-04-27 DIAGNOSIS — M79.89 LEFT LEG SWELLING: ICD-10-CM

## 2021-04-27 DIAGNOSIS — R11.0 NAUSEA: ICD-10-CM

## 2021-04-27 DIAGNOSIS — M79.652 LEFT THIGH PAIN: Primary | ICD-10-CM

## 2021-04-27 DIAGNOSIS — E11.8 DM (DIABETES MELLITUS) WITH COMPLICATIONS (HCC): ICD-10-CM

## 2021-04-27 DIAGNOSIS — G47.33 SLEEP APNEA, OBSTRUCTIVE: ICD-10-CM

## 2021-04-27 PROCEDURE — 99214 OFFICE O/P EST MOD 30 MIN: CPT | Performed by: FAMILY MEDICINE

## 2021-04-27 PROCEDURE — 3078F DIAST BP <80 MM HG: CPT | Performed by: FAMILY MEDICINE

## 2021-04-27 PROCEDURE — 3008F BODY MASS INDEX DOCD: CPT | Performed by: FAMILY MEDICINE

## 2021-04-27 PROCEDURE — 93971 EXTREMITY STUDY: CPT | Performed by: FAMILY MEDICINE

## 2021-04-27 PROCEDURE — 3074F SYST BP LT 130 MM HG: CPT | Performed by: FAMILY MEDICINE

## 2021-04-27 NOTE — PROGRESS NOTES
Cydney Rincon is a 63year old female. Patient presents with:  Groin Pain: worsening x 4 days  Abdominal Pain: hypogastric x 3 days    HPI:   Reports pain in left inner thigh for months but last few days has been causing her more pain.  Reports pain is makin Powder, Breath Activated, Inhale 1 puff into the lungs every 12 (twelve) hours. , Disp: 3 each, Rfl: 1  insulin glargine (LANTUS SOLOSTAR) 100 UNIT/ML Subcutaneous Solution Pen-injector, INJECT 40 UNITS SUBCUTANEOUS DAILY, Disp: 27 mL, Rfl: 3  ondansetron 4 4  aspirin 81 MG Oral Tab, Take 81 mg by mouth daily. , Disp: , Rfl:   Dulaglutide (TRULICITY SC), Inject 1.5 mg into the skin once a week.  , Disp: , Rfl:   Dicyclomine HCl (BENTYL) 20 MG Oral Tab, Take 1 tablet (20 mg total) by mouth 4 (four) times daily 1991   • Hypercholesterolemia 2006   • Irregular menstrual cycle 2008    Neg endometrial biopsy   • MGD (meibomian gland dysfunction) 7/28/2015   • Ocular migraine 7/28/2015   • Other and unspecified hyperlipidemia    • Ovarian cyst 1980    Laparoscopic cy 1+ pitting edema. Tenderness on left inner thigh with possible cord palpated     ASSESSMENT AND PLAN:   1. Left thigh pain  R/o clot.  Suspect muscle strain but does have mild swelling in left leg compared to right.   - US VENOUS DOPPLER LEG LEFT - DIAG I

## 2021-04-28 NOTE — PROGRESS NOTES
Sourav Dodd - As you know, the ultrasound did not show a blood clot. I suspect this is a pulled muscle on your inner thigh.  I have placed a referral for physical therapy to help with this. - Dr. Vickey Austin

## 2021-05-04 ENCOUNTER — ORDER TRANSCRIPTION (OUTPATIENT)
Dept: SLEEP CENTER | Age: 61
End: 2021-05-04

## 2021-05-04 ENCOUNTER — TELEPHONE (OUTPATIENT)
Dept: OPTOMETRY | Facility: CLINIC | Age: 61
End: 2021-05-04

## 2021-05-04 DIAGNOSIS — G47.33 OBSTRUCTIVE SLEEP APNEA (ADULT) (PEDIATRIC): Primary | ICD-10-CM

## 2021-05-04 NOTE — TELEPHONE ENCOUNTER
Pt had covid in October last year - since then her vision has gotten progressively worse - asking to see

## 2021-05-05 NOTE — TELEPHONE ENCOUNTER
Pt called stating pt has not received a call back. Pt declined to schedule first available appointment. Pt wants to be seen sooner. Pt advised Dr Ruth Easley is not in the office on Wednesday and will return Thursday 5-6-21.   Please call

## 2021-05-07 ENCOUNTER — OFFICE VISIT (OUTPATIENT)
Dept: OPTOMETRY | Facility: CLINIC | Age: 61
End: 2021-05-07

## 2021-05-07 DIAGNOSIS — H25.13 AGE-RELATED NUCLEAR CATARACT OF BOTH EYES: Primary | ICD-10-CM

## 2021-05-07 DIAGNOSIS — H52.203 MYOPIA WITH ASTIGMATISM AND PRESBYOPIA, BILATERAL: ICD-10-CM

## 2021-05-07 DIAGNOSIS — H52.4 MYOPIA WITH ASTIGMATISM AND PRESBYOPIA, BILATERAL: ICD-10-CM

## 2021-05-07 DIAGNOSIS — H52.13 MYOPIA WITH ASTIGMATISM AND PRESBYOPIA, BILATERAL: ICD-10-CM

## 2021-05-07 PROCEDURE — 92015 DETERMINE REFRACTIVE STATE: CPT | Performed by: OPTOMETRIST

## 2021-05-07 PROCEDURE — 99212 OFFICE O/P EST SF 10 MIN: CPT | Performed by: OPTOMETRIST

## 2021-05-07 NOTE — PROGRESS NOTES
Josef Fountain is a 63year old female.     HPI:     HPI     Diabetic Eye Exam     Diabetes Type: Type 2, controlled with diet and taking oral medications    Duration: 13 years    Number of years diabetic: 13    Number of years on pills: 13    Number of years Hypercholesterolemia 2006   • Irregular menstrual cycle 2008    Neg endometrial biopsy   • MGD (meibomian gland dysfunction) 7/28/2015   • Ocular migraine 7/28/2015   • Other and unspecified hyperlipidemia    • Ovarian cyst 1980    Laparoscopic cystectomy drinks      Comment: Occasionally    Drug use: No      Medications:  Current Outpatient Medications   Medication Sig Dispense Refill   • Propranolol HCl 80 MG Oral Tab Take 1 tablet (80 mg total) by mouth 2 (two) times daily.  180 tablet 0   • Lansoprazole each 3   • CONTOUR NEXT TEST In Vitro Strip Use to check sugars 4 times daily 400 each 3   • triamcinolone acetonide 0.1 % External Cream Apply topically 2 (two) times daily as needed.  45 g 0   • Clobetasol Propionate 0.05 % External Ointment Apply 1 Appli SWELLING  Sulindac                HIVES  Acyclovir               DIZZINESS  Clarithromycin          NAUSEA AND VOMITING  Ciprofloxacin           UNKNOWN  Gabapentin              UNKNOWN  Methylprednisolone      PAIN  Metronidazole           UNKNOWN  Nap instructions:  No follow-ups on file.     5/7/2021  Scribed by: Tray Ball

## 2021-05-07 NOTE — PATIENT INSTRUCTIONS
Age-related nuclear cataract of both eyes  No treatment is required. Will continue to observe. Rx change most likely due to increasing cataract formations. Myopia with astigmatism and presbyopia, bilateral  New glasses RX given to update as needed.  Ingrid

## 2021-05-07 NOTE — ASSESSMENT & PLAN NOTE
No treatment is required. Will continue to observe. Rx change most likely due to increasing cataract formations.

## 2021-05-26 ENCOUNTER — PATIENT MESSAGE (OUTPATIENT)
Dept: ENDOCRINOLOGY CLINIC | Facility: CLINIC | Age: 61
End: 2021-05-26

## 2021-05-26 NOTE — TELEPHONE ENCOUNTER
Per mychart encounter 2/14/21, glimepiride was recommended to be stopped due to hypoglycemia. RN responded to patient with this information and asked for an update on blood sugar.

## 2021-05-26 NOTE — TELEPHONE ENCOUNTER
From: Jeannie Baez  To: Mike Paredes MD  Sent: 5/26/2021 11:46 AM CDT  Subject: Prescription Question    I need a refill on my glimepiride 1 mg taken every morning. For some reason it has been canceled.  Please call it into the [facility] in [place] or call

## 2021-05-27 RX ORDER — GLIMEPIRIDE 1 MG/1
1 TABLET ORAL
Qty: 90 TABLET | Refills: 0 | Status: SHIPPED | OUTPATIENT
Start: 2021-05-27 | End: 2021-08-23

## 2021-06-01 ENCOUNTER — PATIENT MESSAGE (OUTPATIENT)
Dept: FAMILY MEDICINE CLINIC | Facility: CLINIC | Age: 61
End: 2021-06-01

## 2021-06-01 ENCOUNTER — NURSE TRIAGE (OUTPATIENT)
Dept: FAMILY MEDICINE CLINIC | Facility: CLINIC | Age: 61
End: 2021-06-01

## 2021-06-02 RX ORDER — AZITHROMYCIN 250 MG/1
TABLET, FILM COATED ORAL
Qty: 6 TABLET | Refills: 0 | Status: SHIPPED | OUTPATIENT
Start: 2021-06-02 | End: 2021-06-07

## 2021-06-02 NOTE — TELEPHONE ENCOUNTER
----- Message from Genny Menjivar sent at 6/1/2021  5:30 PM CDT -----  Regarding: Prescription Question  Contact: [phone]  Hi Dr Vickey Austin,    I hope all is well. I have a sinus infection. Can you please call in a [facility] to the [facility] in [place]?  If it d

## 2021-06-02 NOTE — TELEPHONE ENCOUNTER
From: Abel Quinn  To: Chris Jones MD  Sent: 6/1/2021 5:30 PM CDT  Subject: Prescription Question    Hi Dr Geo Avila,    I hope all is well. I have a sinus infection. Can you please call in a [facility] to the [facility] in [place]?  If it doesn't go away five

## 2021-06-02 NOTE — TELEPHONE ENCOUNTER
Action Requested: Summary for Provider     []  Critical Lab, Recommendations Needed  [x] Need Additional Advice  []   FYI    []   Need Orders  [x] Need Medications Sent to Pharmacy  []  Other     SUMMARY: Patient calling with complaint of sinus pain on rig

## 2021-06-02 NOTE — TELEPHONE ENCOUNTER
Spoke with patient ( verified) and relayed Dr. Eric Nieves message below--patient verbalizes understanding and agreement. No further questions/concerns at this time.

## 2021-06-08 ENCOUNTER — LAB ENCOUNTER (OUTPATIENT)
Dept: LAB | Age: 61
End: 2021-06-08
Attending: FAMILY MEDICINE
Payer: COMMERCIAL

## 2021-06-08 DIAGNOSIS — G47.33 OBSTRUCTIVE SLEEP APNEA (ADULT) (PEDIATRIC): ICD-10-CM

## 2021-06-10 DIAGNOSIS — K22.719 BARRETT'S ESOPHAGUS WITH DYSPLASIA: ICD-10-CM

## 2021-06-11 ENCOUNTER — OFFICE VISIT (OUTPATIENT)
Dept: SLEEP CENTER | Age: 61
End: 2021-06-11
Attending: FAMILY MEDICINE
Payer: COMMERCIAL

## 2021-06-11 DIAGNOSIS — G47.33 SLEEP APNEA, OBSTRUCTIVE: ICD-10-CM

## 2021-06-11 PROCEDURE — 95811 POLYSOM 6/>YRS CPAP 4/> PARM: CPT

## 2021-06-12 ENCOUNTER — LAB ENCOUNTER (OUTPATIENT)
Dept: LAB | Facility: HOSPITAL | Age: 61
End: 2021-06-12
Attending: FAMILY MEDICINE
Payer: COMMERCIAL

## 2021-06-12 DIAGNOSIS — E55.9 VITAMIN D DEFICIENCY: ICD-10-CM

## 2021-06-12 DIAGNOSIS — L65.9 ALOPECIA: ICD-10-CM

## 2021-06-12 DIAGNOSIS — E11.8 DM (DIABETES MELLITUS) WITH COMPLICATIONS (HCC): ICD-10-CM

## 2021-06-12 PROCEDURE — 82570 ASSAY OF URINE CREATININE: CPT

## 2021-06-12 PROCEDURE — 82607 VITAMIN B-12: CPT

## 2021-06-12 PROCEDURE — 80053 COMPREHEN METABOLIC PANEL: CPT

## 2021-06-12 PROCEDURE — 85025 COMPLETE CBC W/AUTO DIFF WBC: CPT

## 2021-06-12 PROCEDURE — 82306 VITAMIN D 25 HYDROXY: CPT

## 2021-06-12 PROCEDURE — 84443 ASSAY THYROID STIM HORMONE: CPT

## 2021-06-12 PROCEDURE — 36415 COLL VENOUS BLD VENIPUNCTURE: CPT

## 2021-06-12 PROCEDURE — 84439 ASSAY OF FREE THYROXINE: CPT

## 2021-06-12 PROCEDURE — 80061 LIPID PANEL: CPT

## 2021-06-12 PROCEDURE — 3061F NEG MICROALBUMINURIA REV: CPT | Performed by: INTERNAL MEDICINE

## 2021-06-12 PROCEDURE — 82043 UR ALBUMIN QUANTITATIVE: CPT

## 2021-06-12 RX ORDER — MECOBALAMIN 5000 MCG
TABLET,DISINTEGRATING ORAL
Qty: 270 CAPSULE | Refills: 0 | Status: SHIPPED | OUTPATIENT
Start: 2021-06-12 | End: 2021-07-23

## 2021-06-15 ENCOUNTER — TELEPHONE (OUTPATIENT)
Dept: CASE MANAGEMENT | Age: 61
End: 2021-06-15

## 2021-06-15 NOTE — TELEPHONE ENCOUNTER
Juanpablo Encarnacion Erm,    I received the referral for pt to get a new machine. Pt received a machine in 2019. Please clarify if there is something wrong with the machine. Pt has a current referral for cpap supplies. Please advise on plan of care.

## 2021-06-16 NOTE — TELEPHONE ENCOUNTER
Patient returned our call, believes she needs to have adjustment to her CPAP machine    See TE with Miladt from Dr. Marii Hunt this encounter.

## 2021-06-16 NOTE — TELEPHONE ENCOUNTER
Left message for patient, please find out if she is having any issues with CPAP machine or was request for only CPAP supplies?

## 2021-06-16 NOTE — TELEPHONE ENCOUNTER
Sorry that was my mistake. Probably just needs resetting of her CPAP machine. [facility] about this and send copy of report. She was having issues with it - breathing issues.  Not sure if something wrong with the machine or settings

## 2021-06-21 NOTE — PROGRESS NOTES
Sourav Dodd - Your cholesterol was elevated this time. Are you taking a break from your statin? Your vitamin D levels are low. Please increase vitamin D supplement but 2000 units daily.  Finally your thyroid levels - TSH has been going back and forth for few y

## 2021-06-21 NOTE — PROGRESS NOTES
There is not a good alternative and can explain her constant fatigue. Most likely we would continue it long term unless she did not like it for some reason.

## 2021-07-08 NOTE — TELEPHONE ENCOUNTER
Dr Chava Dickens please see pending rx, previous rx , patient is going out of town tomorrow  Please reply to pool: EM TRIAGE SUPPORT

## 2021-07-08 NOTE — TELEPHONE ENCOUNTER
Rosaura/aVl [phone] is calling for status of the medication refill request. Pt is out of medication.

## 2021-07-09 RX ORDER — MONTELUKAST SODIUM 10 MG/1
10 TABLET ORAL DAILY
Qty: 90 TABLET | Refills: 0 | Status: SHIPPED | OUTPATIENT
Start: 2021-07-09 | End: 2021-11-12

## 2021-07-09 RX ORDER — ALBUTEROL SULFATE 90 UG/1
2 AEROSOL, METERED RESPIRATORY (INHALATION) EVERY 4 HOURS PRN
Qty: 25.5 G | Refills: 0 | Status: SHIPPED | OUTPATIENT
Start: 2021-07-09 | End: 2021-12-18

## 2021-07-23 PROCEDURE — 88305 TISSUE EXAM BY PATHOLOGIST: CPT | Performed by: INTERNAL MEDICINE

## 2021-08-04 ENCOUNTER — OFFICE VISIT (OUTPATIENT)
Dept: ENDOCRINOLOGY CLINIC | Facility: CLINIC | Age: 61
End: 2021-08-04

## 2021-08-04 ENCOUNTER — LAB ENCOUNTER (OUTPATIENT)
Dept: LAB | Age: 61
End: 2021-08-04
Attending: INTERNAL MEDICINE
Payer: COMMERCIAL

## 2021-08-04 VITALS
SYSTOLIC BLOOD PRESSURE: 125 MMHG | HEART RATE: 71 BPM | WEIGHT: 209 LBS | BODY MASS INDEX: 36 KG/M2 | DIASTOLIC BLOOD PRESSURE: 77 MMHG

## 2021-08-04 DIAGNOSIS — E03.8 SUBCLINICAL HYPOTHYROIDISM: ICD-10-CM

## 2021-08-04 DIAGNOSIS — Z79.4 UNCONTROLLED TYPE 2 DIABETES MELLITUS WITH COMPLICATION, WITH LONG-TERM CURRENT USE OF INSULIN (HCC): Primary | ICD-10-CM

## 2021-08-04 DIAGNOSIS — E55.9 VITAMIN D DEFICIENCY: ICD-10-CM

## 2021-08-04 DIAGNOSIS — E11.65 UNCONTROLLED TYPE 2 DIABETES MELLITUS WITH COMPLICATION, WITH LONG-TERM CURRENT USE OF INSULIN (HCC): Primary | ICD-10-CM

## 2021-08-04 DIAGNOSIS — E11.8 UNCONTROLLED TYPE 2 DIABETES MELLITUS WITH COMPLICATION, WITH LONG-TERM CURRENT USE OF INSULIN (HCC): Primary | ICD-10-CM

## 2021-08-04 LAB
CARTRIDGE LOT#: ABNORMAL NUMERIC
GLUCOSE BLOOD: 198
HEMOGLOBIN A1C: 7.4 % (ref 4.3–5.6)
T4 FREE SERPL-MCNC: 1 NG/DL (ref 0.8–1.7)
TEST STRIP LOT #: NORMAL NUMERIC
THYROPEROXIDASE AB SERPL-ACNC: 35 U/ML (ref ?–60)
TSI SER-ACNC: 1.74 MIU/ML (ref 0.36–3.74)
VIT D+METAB SERPL-MCNC: 37.5 NG/ML (ref 30–100)

## 2021-08-04 PROCEDURE — 82306 VITAMIN D 25 HYDROXY: CPT

## 2021-08-04 PROCEDURE — 86376 MICROSOMAL ANTIBODY EACH: CPT

## 2021-08-04 PROCEDURE — 3074F SYST BP LT 130 MM HG: CPT | Performed by: INTERNAL MEDICINE

## 2021-08-04 PROCEDURE — 82947 ASSAY GLUCOSE BLOOD QUANT: CPT | Performed by: INTERNAL MEDICINE

## 2021-08-04 PROCEDURE — 84439 ASSAY OF FREE THYROXINE: CPT

## 2021-08-04 PROCEDURE — 83036 HEMOGLOBIN GLYCOSYLATED A1C: CPT | Performed by: INTERNAL MEDICINE

## 2021-08-04 PROCEDURE — 36416 COLLJ CAPILLARY BLOOD SPEC: CPT | Performed by: INTERNAL MEDICINE

## 2021-08-04 PROCEDURE — 84443 ASSAY THYROID STIM HORMONE: CPT

## 2021-08-04 PROCEDURE — 3051F HG A1C>EQUAL 7.0%<8.0%: CPT | Performed by: INTERNAL MEDICINE

## 2021-08-04 PROCEDURE — 36415 COLL VENOUS BLD VENIPUNCTURE: CPT

## 2021-08-04 PROCEDURE — 99214 OFFICE O/P EST MOD 30 MIN: CPT | Performed by: INTERNAL MEDICINE

## 2021-08-04 PROCEDURE — 3078F DIAST BP <80 MM HG: CPT | Performed by: INTERNAL MEDICINE

## 2021-08-04 RX ORDER — INSULIN GLARGINE 100 [IU]/ML
INJECTION, SOLUTION SUBCUTANEOUS
Qty: 30 ML | Refills: 3 | Status: SHIPPED | OUTPATIENT
Start: 2021-08-04

## 2021-08-04 NOTE — PROGRESS NOTES
Name: Jeannie Baez  Date: 8/4/2021    Referring Physician: No ref. provider found    HISTORY OF PRESENT ILLNESS   Jeannie Baez is a 63year old female who presents for diabetes mellitus.      Since last visit she has started using edibles to control Migrain UNKNOWN  Doxycycline             RASH    Social History:   Social History    Socioeconomic History      Marital status:       Spouse name: Not on file      Number of children: Not on file      Years of education: Not on file      Highest education l Neg endometrial biopsy   • MGD (meibomian gland dysfunction) 7/28/2015   • Ocular migraine 7/28/2015   • Other and unspecified hyperlipidemia    • Ovarian cyst 1980    Laparoscopic cystectomy   • Pregnancy 1990, 1991, 1993   • Sleep apnea    • Sleep apnea, loss    ASSESSMENT/PLAN:      1.  Diabetes Mellitus Type 2, Uncontrolled  -Uncontrolled, HgA1c 7.4% -->slight increase    -Discussed importance of glycemic control to prevent complications of diabetes  -Discussed complications of diabetes include retinopath

## 2021-08-22 NOTE — TELEPHONE ENCOUNTER
Action Requested: Summary for Provider     []  Critical Lab, Recommendations Needed  [] Need Additional Advice  []   FYI    []   Need Orders  [] Need Medications Sent to Pharmacy  []  Other     SUMMARY:  Per protocol [facility] [___ yrs] : [unfilled] year(s) ago [6] : currently ~his/her~ pain is 6 out of 10 [Daily] : ~He/She~ states the symptoms seem to be occuring daily [None] : No relieving factors are noted [FreeTextEntry1] : 16-year-old female, otherwise healthy presents For followup regarding scoliosis. She reports that pain which occurs most days. she rates pain 6/10. Pain is usually not alleviated with rest or activity modifications. She denies activity restrictions related to pain. She does not take pain medication. She denies extremity numbness, tingling, weakness, bowel or bladder dysfunction. She was last in this office in July 2020 and scoliosis deformity correction surgery was discussed. She has been In consultation with a plastic surgeon regarding possible breast reduction surgery as recommended by pediatrician for alleviation of back pain. She is requesting a letter for plastic surgeon today. She presents today with mother for further management regarding the same

## 2021-08-23 RX ORDER — GLIMEPIRIDE 1 MG/1
1 TABLET ORAL
Qty: 90 TABLET | Refills: 0 | Status: SHIPPED | OUTPATIENT
Start: 2021-08-23 | End: 2021-12-03

## 2021-08-30 ENCOUNTER — ORDER TRANSCRIPTION (OUTPATIENT)
Dept: ADMINISTRATIVE | Facility: HOSPITAL | Age: 61
End: 2021-08-30

## 2021-08-30 DIAGNOSIS — Z13.6 ENCOUNTER FOR SCREENING FOR CARDIOVASCULAR DISORDERS: Primary | ICD-10-CM

## 2021-09-11 DIAGNOSIS — K22.719 BARRETT'S ESOPHAGUS WITH DYSPLASIA: ICD-10-CM

## 2021-09-11 RX ORDER — LANSOPRAZOLE 15 MG/1
CAPSULE, DELAYED RELEASE ORAL
Qty: 270 CAPSULE | Refills: 1 | Status: SHIPPED | OUTPATIENT
Start: 2021-09-11 | End: 2021-12-23

## 2021-09-11 NOTE — TELEPHONE ENCOUNTER
Dr Ap Diamond, please see override for allergy to nitrofurantoin with lansoprazole, rx pending      Refill passed per [facility], [facility] protocol.     Requested Prescriptions   Pending Prescriptions Disp Refills    LANSOPRAZOLE 15 MG Oral Capsule Delayed Release [Ph

## 2021-09-17 RX ORDER — ATORVASTATIN CALCIUM 40 MG/1
40 TABLET, FILM COATED ORAL
Qty: 90 TABLET | Refills: 1 | Status: SHIPPED | OUTPATIENT
Start: 2021-09-17

## 2021-09-17 NOTE — TELEPHONE ENCOUNTER
Refill passed per [facility] protocol.     Requested Prescriptions   Pending Prescriptions Disp Refills    ATORVASTATIN 40 MG Oral Tab [Pharmacy Med Name: Atorvastatin Calcium 40 Mg Tab Nort] 90 tablet 0     Sig: Take 1 tablet (40 mg total) by mouth on

## 2021-09-18 ENCOUNTER — PATIENT MESSAGE (OUTPATIENT)
Dept: FAMILY MEDICINE CLINIC | Facility: CLINIC | Age: 61
End: 2021-09-18

## 2021-09-18 ENCOUNTER — NURSE TRIAGE (OUTPATIENT)
Dept: FAMILY MEDICINE CLINIC | Facility: CLINIC | Age: 61
End: 2021-09-18

## 2021-09-18 RX ORDER — AZITHROMYCIN 250 MG/1
TABLET, FILM COATED ORAL
Qty: 6 TABLET | Refills: 0 | Status: SHIPPED | OUTPATIENT
Start: 2021-09-18 | End: 2021-09-23

## 2021-09-18 NOTE — TELEPHONE ENCOUNTER
Spoke with patient ( verified) and relayed Dr. Wing Scott message below--patient verbalizes understanding and agreement. No further questions/concerns at this time.

## 2021-09-18 NOTE — TELEPHONE ENCOUNTER
From: Richa Hughes  To: Chuy Amor MD  Sent: 9/18/2021 8:05 AM CDT  Subject: Prescription Request    I have a sinus infection mainly on the right side. I feel like crap.  Please send in a prescription for the [facility] for me to the [facility] in [address]

## 2021-09-18 NOTE — TELEPHONE ENCOUNTER
Action Requested: Summary for Provider     []  Critical Lab, Recommendations Needed  [x] Need Additional Advice  []   FYI    []   Need Orders  [] Need Medications Sent to Pharmacy  []  Other     SUMMARY: Dr. Manuel Banuelos:   Patient requesting abx. Leonides Kim.     Reason

## 2021-09-18 NOTE — TELEPHONE ENCOUNTER
Bob Alarcon MD 37 minutes ago (8:05 AM)     I have a sinus infection mainly on the right side. I feel like crap. Please send in a prescription for the ZPac for me to the [facility] in [place].   If it does not go away in a week I w

## 2021-09-21 RX ORDER — DULAGLUTIDE 1.5 MG/.5ML
1.5 INJECTION, SOLUTION SUBCUTANEOUS
Qty: 6 ML | Refills: 0 | Status: SHIPPED | OUTPATIENT
Start: 2021-09-21 | End: 2021-12-08

## 2021-09-22 RX ORDER — MECLIZINE HYDROCHLORIDE 25 MG/1
25 TABLET ORAL 3 TIMES DAILY PRN
Qty: 30 TABLET | Refills: 0 | Status: SHIPPED | OUTPATIENT
Start: 2021-09-22 | End: 2021-10-06

## 2021-09-29 ENCOUNTER — HOSPITAL ENCOUNTER (OUTPATIENT)
Dept: CT IMAGING | Facility: HOSPITAL | Age: 61
Discharge: HOME | End: 2021-09-29
Attending: FAMILY MEDICINE

## 2021-09-29 DIAGNOSIS — Z13.6 ENCOUNTER FOR SCREENING FOR CARDIOVASCULAR DISORDERS: ICD-10-CM

## 2021-09-29 RX ORDER — MECLIZINE HYDROCHLORIDE 25 MG/1
25 TABLET ORAL 3 TIMES DAILY PRN
Qty: 30 TABLET | Refills: 0 | OUTPATIENT
Start: 2021-09-29

## 2021-09-29 NOTE — TELEPHONE ENCOUNTER
Duplicate request, previously addressed. Spoke to patient to ask if she needed refill. Epic shows medication was last ordered on 9/22. Patient confirmed she does not need refill.      Appointment scheduled to discuss some headaches and right side face \

## 2021-09-30 ENCOUNTER — HOSPITAL ENCOUNTER (OUTPATIENT)
Dept: GENERAL RADIOLOGY | Age: 61
Discharge: HOME | End: 2021-09-30
Attending: FAMILY MEDICINE
Payer: COMMERCIAL

## 2021-09-30 ENCOUNTER — OFFICE VISIT (OUTPATIENT)
Dept: FAMILY MEDICINE CLINIC | Facility: CLINIC | Age: 61
End: 2021-09-30

## 2021-09-30 ENCOUNTER — LAB ENCOUNTER (OUTPATIENT)
Dept: LAB | Age: 61
End: 2021-09-30
Attending: FAMILY MEDICINE
Payer: COMMERCIAL

## 2021-09-30 ENCOUNTER — ORDER TRANSCRIPTION (OUTPATIENT)
Dept: ADMINISTRATIVE | Facility: HOSPITAL | Age: 61
End: 2021-09-30

## 2021-09-30 VITALS
HEART RATE: 64 BPM | HEIGHT: 64 IN | BODY MASS INDEX: 36.23 KG/M2 | WEIGHT: 212.19 LBS | SYSTOLIC BLOOD PRESSURE: 113 MMHG | DIASTOLIC BLOOD PRESSURE: 63 MMHG

## 2021-09-30 DIAGNOSIS — M54.50 ACUTE MIDLINE LOW BACK PAIN WITHOUT SCIATICA: ICD-10-CM

## 2021-09-30 DIAGNOSIS — Z13.6 ENCOUNTER FOR SCREENING FOR CARDIOVASCULAR DISORDERS: Primary | ICD-10-CM

## 2021-09-30 DIAGNOSIS — R42 DIZZINESS: ICD-10-CM

## 2021-09-30 DIAGNOSIS — H57.11 EYE PAIN, RIGHT: ICD-10-CM

## 2021-09-30 DIAGNOSIS — R41.3 MEMORY LOSS: ICD-10-CM

## 2021-09-30 DIAGNOSIS — Z86.73 HISTORY OF TIA (TRANSIENT ISCHEMIC ATTACK): Primary | ICD-10-CM

## 2021-09-30 DIAGNOSIS — R51.9 FACIAL PAIN: ICD-10-CM

## 2021-09-30 DIAGNOSIS — M79.604 PAIN IN BOTH LOWER EXTREMITIES: ICD-10-CM

## 2021-09-30 DIAGNOSIS — M79.605 PAIN IN BOTH LOWER EXTREMITIES: ICD-10-CM

## 2021-09-30 DIAGNOSIS — M79.10 MUSCLE PAIN: ICD-10-CM

## 2021-09-30 DIAGNOSIS — Z86.16 HISTORY OF COVID-19: ICD-10-CM

## 2021-09-30 DIAGNOSIS — IMO0002 CHRONIC MIGRAINE: ICD-10-CM

## 2021-09-30 PROCEDURE — 82550 ASSAY OF CK (CPK): CPT

## 2021-09-30 PROCEDURE — 3074F SYST BP LT 130 MM HG: CPT | Performed by: FAMILY MEDICINE

## 2021-09-30 PROCEDURE — 3078F DIAST BP <80 MM HG: CPT | Performed by: FAMILY MEDICINE

## 2021-09-30 PROCEDURE — 70220 X-RAY EXAM OF SINUSES: CPT | Performed by: FAMILY MEDICINE

## 2021-09-30 PROCEDURE — 3008F BODY MASS INDEX DOCD: CPT | Performed by: FAMILY MEDICINE

## 2021-09-30 PROCEDURE — 85025 COMPLETE CBC W/AUTO DIFF WBC: CPT

## 2021-09-30 PROCEDURE — 99215 OFFICE O/P EST HI 40 MIN: CPT | Performed by: FAMILY MEDICINE

## 2021-09-30 PROCEDURE — 36415 COLL VENOUS BLD VENIPUNCTURE: CPT

## 2021-09-30 PROCEDURE — 80053 COMPREHEN METABOLIC PANEL: CPT

## 2021-09-30 PROCEDURE — 83735 ASSAY OF MAGNESIUM: CPT

## 2021-09-30 NOTE — PROGRESS NOTES
Beba Aragon is a 63year old female. Patient presents with:  Headache: right side x 1 month  Arm Pain: biltateral x 6 months  Leg Pain: bilateral x 6 months    HPI:   Reports right sided headaches/face - has pressure and tingling at times for over month. SODIUM 10 MG Oral Tab, Take 1 tablet (10 mg total) by mouth daily. , Disp: 90 tablet, Rfl: 0  ALBUTEROL SULFATE  (90 Base) MCG/ACT Inhalation Aero Soln, INHALE 2 PUFFS INTO THE LUNGS EVERY 4 (FOUR) HOURS AS NEEDED FOR WHEEZING., Disp: 25.5 g, Rfl: 0 ESTEVAN U/F) 32G X 4 MM Does not apply Misc, 1 each by Does not apply route daily. , Disp: 100 each, Rfl: 3  CONTOUR NEXT TEST In Vitro Strip, Use to check sugars 4 times daily, Disp: 400 each, Rfl: 3  triamcinolone acetonide 0.1 % External Cream, Apply topica eye 1/27/2015   • COPD (chronic obstructive pulmonary disease) ([facility]    • Depression 2008   • Diabetes mellitus ([facility]    • Diabetes mellitus type 2 with complications ([facility] 7/05/2587   • Diabetic retinopathy of left eye ([facility] 1/27/2015   • Dry eyes 7/28/20 chest pain  GI: denies abdominal pain and denies heartburn  NEURO: pos headaches  Musculoskeletal: pos joint pain,pos back pain    EXAM:   /63   Pulse 64   Ht 5' 4\" (1.626 m)   Wt 212 lb 3.2 oz (96.3 kg)   LMP  (LMP Unknown)   BMI 36.42 kg/m²   GENE Sharmaine Herndon MD  9/30/2021  1:12 PM

## 2021-10-02 NOTE — TELEPHONE ENCOUNTER
Per pharmacy pt is requesting refill on the following medication. Diclofenac Sodium 1 % Transdermal Gel, Apply 2 g topically 4 (four) times daily. , Disp: 1 Tube, Rfl: 0

## 2021-10-06 RX ORDER — MECLIZINE HYDROCHLORIDE 25 MG/1
25 TABLET ORAL 3 TIMES DAILY PRN
Qty: 60 TABLET | Refills: 0 | Status: SHIPPED | OUTPATIENT
Start: 2021-10-06 | End: 2021-10-28

## 2021-10-06 NOTE — TELEPHONE ENCOUNTER
LR 9/22/21  Refill passed per [facility] protocol. Requested Prescriptions   Pending Prescriptions Disp Refills    MECLIZINE 25 MG Oral Tab [Pharmacy Med Name: Meclizine Hydrochloride 25 Mg Tab Zydu] 30 tablet 0     Sig: Take 1 tablet (25 mg total) by mouth 3 (three) times daily as needed for Dizziness or Nausea.         Gastrointestional Medication Protocol Passed - 10/5/2021  8:29 PM        Passed - Appointment in past 12 or next 3 months               Future Appointments         Provider Department Appt Notes    In 2 days [facility] RN RADIOLOGY 1; [address] [facility] per [place]            Recent Outpatient Visits              6 days ago History of TIA (transient ischemic attack)    [facility], [address], Jorge Moeller MD    Office Visit    2 months ago Uncontrolled type 2 diabetes mellitus with complication, with long-term current use of insulin [facility])    [facility], [address], Ganesh Can MD    Office Visit    3 months ago Irritable bowel syndrome with constipation    Gastroenterology - [place], MD    Office Visit    3 months ago Sleep apnea, obstructive    [address] [facility]    Office Visit    5 months ago Age-related nuclear cataract of both eyes    [facility] [place]    Office Visit

## 2021-10-08 ENCOUNTER — HOSPITAL ENCOUNTER (OUTPATIENT)
Dept: ULTRASOUND IMAGING | Age: 61
Discharge: HOME | End: 2021-10-08
Attending: FAMILY MEDICINE

## 2021-10-08 DIAGNOSIS — Z13.6 ENCOUNTER FOR SCREENING FOR CARDIOVASCULAR DISORDERS: ICD-10-CM

## 2021-10-21 ENCOUNTER — TELEPHONE (OUTPATIENT)
Dept: FAMILY MEDICINE CLINIC | Facility: CLINIC | Age: 61
End: 2021-10-21

## 2021-10-21 DIAGNOSIS — G47.33 OBSTRUCTIVE SLEEP APNEA (ADULT) (PEDIATRIC): Primary | ICD-10-CM

## 2021-10-21 NOTE — TELEPHONE ENCOUNTER
Received fax from [facility], requesting referral for supplies. If in agreement, please sign.      Thank you,

## 2021-10-22 ENCOUNTER — HOSPITAL ENCOUNTER (OUTPATIENT)
Dept: MRI IMAGING | Age: 61
Discharge: HOME | End: 2021-10-22
Attending: FAMILY MEDICINE
Payer: COMMERCIAL

## 2021-10-22 DIAGNOSIS — Z86.16 HISTORY OF COVID-19: ICD-10-CM

## 2021-10-22 DIAGNOSIS — R41.3 MEMORY LOSS: ICD-10-CM

## 2021-10-22 DIAGNOSIS — Z86.73 HISTORY OF TIA (TRANSIENT ISCHEMIC ATTACK): ICD-10-CM

## 2021-10-22 DIAGNOSIS — IMO0002 CHRONIC MIGRAINE: ICD-10-CM

## 2021-10-22 DIAGNOSIS — H57.11 EYE PAIN, RIGHT: ICD-10-CM

## 2021-10-22 PROCEDURE — A9575 INJ GADOTERATE MEGLUMI 0.1ML: HCPCS | Performed by: FAMILY MEDICINE

## 2021-10-22 PROCEDURE — 70553 MRI BRAIN STEM W/O & W/DYE: CPT | Performed by: FAMILY MEDICINE

## 2021-10-22 PROCEDURE — 70544 MR ANGIOGRAPHY HEAD W/O DYE: CPT | Performed by: FAMILY MEDICINE

## 2021-10-26 NOTE — PROGRESS NOTES
KYLAH Byrd -I reviewed the MRI. Please continue with plan to see neurology for now as changes are consistent with chronic headaches.   The mild effusions in the mastoid sinuses can be chronic.-Dr. Samy Lester

## 2021-10-28 RX ORDER — MECLIZINE HYDROCHLORIDE 25 MG/1
25 TABLET ORAL 3 TIMES DAILY PRN
Qty: 60 TABLET | Refills: 0 | Status: SHIPPED | OUTPATIENT
Start: 2021-10-28 | End: 2021-12-02

## 2021-10-28 NOTE — TELEPHONE ENCOUNTER
Refill passed per [facility] protocol.     Requested Prescriptions   Pending Prescriptions Disp Refills    MECLIZINE 25 MG Oral Tab [Pharmacy Med Name: Meclizine Hydrochloride 25 Mg Tab Zydu] 60 tablet 0     Sig: Take 1 tablet (25 mg total) by mouth 3

## 2021-11-08 ENCOUNTER — TELEPHONE (OUTPATIENT)
Dept: NEUROLOGY | Facility: CLINIC | Age: 61
End: 2021-11-08

## 2021-11-08 NOTE — TELEPHONE ENCOUNTER
Patient calling to inform that she is in a lot of lower back & leg pain she has been schedule for 2/14/22 but will like to see if  can schedule her for injections as this have help in the past.

## 2021-11-09 NOTE — TELEPHONE ENCOUNTER
LOV 9/12/19   had Bilateral L5 TFESIs 9/27/2019 with significant relief. Patient reports severe lbp radiating into bilateral buttocks radiating down both calves. Denies n+t but notes weakness in both legs. Pain score of 8/10.  Wears compression stockings

## 2021-11-10 ENCOUNTER — OFFICE VISIT (OUTPATIENT)
Dept: PHYSICAL MEDICINE AND REHAB | Facility: CLINIC | Age: 61
End: 2021-11-10

## 2021-11-10 VITALS
DIASTOLIC BLOOD PRESSURE: 80 MMHG | WEIGHT: 212 LBS | BODY MASS INDEX: 36.19 KG/M2 | HEIGHT: 64 IN | SYSTOLIC BLOOD PRESSURE: 128 MMHG

## 2021-11-10 DIAGNOSIS — M51.9 LUMBAR DISC DISEASE: ICD-10-CM

## 2021-11-10 DIAGNOSIS — E11.9 TYPE 2 DIABETES MELLITUS WITHOUT COMPLICATION, UNSPECIFIED WHETHER LONG TERM INSULIN USE (HCC): ICD-10-CM

## 2021-11-10 DIAGNOSIS — M54.16 LUMBAR RADICULOPATHY: Primary | ICD-10-CM

## 2021-11-10 DIAGNOSIS — M47.816 LUMBAR SPONDYLOSIS: ICD-10-CM

## 2021-11-10 PROBLEM — E66.01 MORBID (SEVERE) OBESITY DUE TO EXCESS CALORIES (HCC): Status: ACTIVE | Noted: 2021-11-10

## 2021-11-10 PROCEDURE — 3074F SYST BP LT 130 MM HG: CPT | Performed by: PHYSICAL MEDICINE & REHABILITATION

## 2021-11-10 PROCEDURE — 3008F BODY MASS INDEX DOCD: CPT | Performed by: PHYSICAL MEDICINE & REHABILITATION

## 2021-11-10 PROCEDURE — 3079F DIAST BP 80-89 MM HG: CPT | Performed by: PHYSICAL MEDICINE & REHABILITATION

## 2021-11-10 PROCEDURE — 99214 OFFICE O/P EST MOD 30 MIN: CPT | Performed by: PHYSICAL MEDICINE & REHABILITATION

## 2021-11-11 ENCOUNTER — TELEPHONE (OUTPATIENT)
Dept: PHYSICAL MEDICINE AND REHAB | Facility: CLINIC | Age: 61
End: 2021-11-11

## 2021-11-11 NOTE — PATIENT INSTRUCTIONS
Plan  I will perform bilateral L5 TFESI(s) with 10 mg of Dexamethasone per side. If the above does not help, then she will need to have a new MRI of the lumbar spine.     The patient will continue with her home exercise program.    She will continue wi

## 2021-11-11 NOTE — PROGRESS NOTES
Low Back Pain H & P    Chief Complaint: Patient presents with:  Back Pain: LOV 9/27/2019 For back pain pain travels down to feet since Feb 2021    Nursing note reviewed and verified. Patient was last seen on 9/17/2019.   I did bilateral L5 TFEI's on 9/27 Depression 2008   • Diabetes mellitus ([facility]    • Diabetes mellitus type 2 with complications ([facility] 9/91/9223   • Diabetic retinopathy of left eye ([facility] 1/27/2015   • Dry eyes 7/28/2015   • DUB (dysfunctional uterine bleeding) 2005    endometrial biopsy   • coronary artery disease   • Hypertension Mother    • Heart Disease Father         coronary artery disease   • Diabetes Father    • Glaucoma Sister    • Other (Other) Sister         Vascular necrosis   • Ovarian Cancer Maternal Aunt        Social History file  Social Connections: Not on file  Intimate Partner Violence: Not on file  Housing Stability: Not on file    Review of Systems  No patient-reported data collected this visit. PE:  The patient does appear in her stated age in no distress.   The pat absent  Left medial hamstring which are absent     Neural Tension Tests Lumbar Spine:  Sitting straight leg raise-RIGHT Negative for pain   Sitting straight leg raise-LEFT Negative for pain   Slump test-RIGHT Positive for left low back pain with neck exten

## 2021-11-11 NOTE — TELEPHONE ENCOUNTER
Submitted request to Whit Garrido for Bilateral L5 TFESIs CPT [id number] to be done at [facility]    Status: pending [facility] clinical review

## 2021-11-12 ENCOUNTER — TELEPHONE (OUTPATIENT)
Dept: FAMILY MEDICINE CLINIC | Facility: CLINIC | Age: 61
End: 2021-11-12

## 2021-11-12 RX ORDER — MONTELUKAST SODIUM 10 MG/1
10 TABLET ORAL DAILY
Qty: 90 TABLET | Refills: 1 | Status: SHIPPED | OUTPATIENT
Start: 2021-11-12

## 2021-11-12 NOTE — TELEPHONE ENCOUNTER
Please review; protocol failed. Requested Prescriptions   Pending Prescriptions Disp Refills    Diclofenac Sodium 1 % Transdermal Gel 1 Tube 0     Sig: Apply 2 g topically 4 (four) times daily.         There is no refill protocol information for this order           Future Appointments         Provider Department Appt Notes    In 1 month Harsha Ge, [address] by Dr. Jazmín Holloway and Memory loss  **MRIs done 10/22-[facility]    In 3 months Aide Villegas  [facility]-Physiatr Lower Back & Severe Leg Pain            Recent Outpatient Visits              2 days ago left > right L5-S1 radiculopathy    Abbe Gipson MD    Office Visit    1 month ago History of TIA (transient ischemic attack)    [address] Soriano MD    Office Visit    3 months ago Uncontrolled type 2 diabetes mellitus with complication, with long-term current use of insulin [facility])    [address] [facility] [place] [place] [address], Susanna Singh MD    Office Visit    4 months ago Irritable bowel syndrome with constipation    Gastroenterology - Gemma Quiros [place] Robby Mejia MD    Office Visit    5 months ago Sleep apnea, obstructive    [address] [facility]    Office Visit

## 2021-11-12 NOTE — TELEPHONE ENCOUNTER
Please review; protocol failed. Requested Prescriptions   Pending Prescriptions Disp Refills    diazePAM (VALIUM) 5 MG Oral Tab 30 tablet 2     Sig: Take 1 tablet (5 mg total) by mouth every 12 (twelve) hours as needed.         There is no refill protoco

## 2021-11-12 NOTE — TELEPHONE ENCOUNTER
Refill passed per [facility] protocol. Requested Prescriptions   Pending Prescriptions Disp Refills    diazePAM (VALIUM) 5 MG Oral Tab 30 tablet 2     Sig: Take 1 tablet (5 mg total) by mouth every 12 (twelve) hours as needed.         There is no re

## 2021-11-12 NOTE — TELEPHONE ENCOUNTER
----- Message from Abel Quinn sent at 11/12/2021  1:51 PM CST -----  Regarding: Fibromyalgia  My fibromyalgia is overtaking my body. I'm in constant pain from head to toe. I do not see the neurologist until 12/15/2021. Ibuprofen is not working.   Can you

## 2021-11-13 NOTE — TELEPHONE ENCOUNTER
Patient called back stated with the weather her Fibromyalgia is ready bad full body ache, with some occasional migraine headache.     Take Ibuprofen and Tylenol with no relief    Pharmacy verified    Requesting Rx    Sending

## 2021-11-13 NOTE — TELEPHONE ENCOUNTER
She will have to wait for Dr. Selwyn Mullins. I am not going to send in pain medications on the weekend. She can go to the immediate care if she wishes.

## 2021-11-15 RX ORDER — DULOXETIN HYDROCHLORIDE 20 MG/1
20 CAPSULE, DELAYED RELEASE ORAL DAILY
Qty: 30 CAPSULE | Refills: 1 | Status: SHIPPED | OUTPATIENT
Start: 2021-11-15 | End: 2021-12-15

## 2021-11-15 RX ORDER — DIAZEPAM 5 MG/1
5 TABLET ORAL EVERY 12 HOURS PRN
Qty: 30 TABLET | Refills: 2 | Status: SHIPPED | OUTPATIENT
Start: 2021-11-15

## 2021-11-15 RX ORDER — CYCLOBENZAPRINE HCL 5 MG
5 TABLET ORAL 3 TIMES DAILY PRN
Qty: 30 TABLET | Refills: 0 | Status: SHIPPED | OUTPATIENT
Start: 2021-11-15 | End: 2021-11-27

## 2021-11-15 NOTE — TELEPHONE ENCOUNTER
Patient advised of scripts sent to pharmacy, she will follow up if no improvement. Patient agrees to start Cymbalta 20mg, has never taken before, no known allergy to med. Script pended.     Patient also wanted to let provider know she continues to have migr

## 2021-11-15 NOTE — TELEPHONE ENCOUNTER
Script sent for cymbalta. Should start in evenings about hour before bed as can cause fatigue for some patients.

## 2021-11-15 NOTE — TELEPHONE ENCOUNTER
I sent flexeril - muscle relaxant to start. Pt to consider starting cymbalta.  I do not see it on her allergy med list - if willing, I send in 20 mg daily to start

## 2021-11-17 ENCOUNTER — MED REC SCAN ONLY (OUTPATIENT)
Dept: PHYSICAL MEDICINE AND REHAB | Facility: CLINIC | Age: 61
End: 2021-11-17

## 2021-11-18 NOTE — TELEPHONE ENCOUNTER
Received notice of Approval from [id number] [address] at [facility] for Bilateral L5 TFESIs valid until 2/8/22 to be done at [address] [place] [place] [address]

## 2021-11-22 NOTE — TELEPHONE ENCOUNTER
Patient has been scheduled for Bilateral L5 TFESI on 11/30/21 at the [facility] with . -Anesthesia type: LOCAL.   -If receiving MAC or IVC sedation patient will need to get COVID tested 3 days prior even if already vaccinated (order placed by [facility].)  -P

## 2021-11-26 ENCOUNTER — PATIENT MESSAGE (OUTPATIENT)
Dept: FAMILY MEDICINE CLINIC | Facility: CLINIC | Age: 61
End: 2021-11-26

## 2021-11-26 NOTE — TELEPHONE ENCOUNTER
From: Ede Montelongo  To: Shelly Maurre MD  Sent: 11/26/2021 11:54 AM CST  Subject: Prescription Refill    Hi Dr Ailyn Ball gave me a prescription for muscle spasms, cyclobenzaprine. It's not working at all.  I have an old script for methocarbamol 750 m

## 2021-11-27 RX ORDER — METHOCARBAMOL 750 MG/1
750 TABLET, FILM COATED ORAL 3 TIMES DAILY PRN
Qty: 90 TABLET | Refills: 0 | Status: SHIPPED | OUTPATIENT
Start: 2021-11-27

## 2021-11-29 ENCOUNTER — TELEPHONE (OUTPATIENT)
Dept: PHYSICAL MEDICINE AND REHAB | Facility: CLINIC | Age: 61
End: 2021-11-29

## 2021-11-29 ENCOUNTER — TELEPHONE (OUTPATIENT)
Dept: NEUROLOGY | Facility: CLINIC | Age: 61
End: 2021-11-29

## 2021-11-29 NOTE — TELEPHONE ENCOUNTER
Patient has been RESCHEDULED for a Bilateral L5 TFESIs on 12/7 at the [facility]. Medications and allergies reviewed. Patient informed to hold aspirins, nsaids, blood thinners, multivitamins, phentermine, vitamin E and fish oils 3-7 days prior to procedure.  Pa

## 2021-11-29 NOTE — TELEPHONE ENCOUNTER
Pt calling to confirm that the medication for her injection tomorrow 11/30/21 has been order ( Dexamethasone 20 MG ) as she is allergic to all the other medications.  She just wants to confirm that it has been order

## 2021-11-30 NOTE — TELEPHONE ENCOUNTER
Received confirmation, dexamethasone in stock at [facility] and will be available on day of injection. Remarks added to casetabs. Patient notified via [employer].

## 2021-12-01 NOTE — TELEPHONE ENCOUNTER
See patient message encounter 11/30. Pt notified. Dexamethasone will be available on day of injection per [facility] staff.

## 2021-12-02 RX ORDER — MECLIZINE HYDROCHLORIDE 25 MG/1
25 TABLET ORAL 3 TIMES DAILY PRN
Qty: 60 TABLET | Refills: 0 | Status: SHIPPED | OUTPATIENT
Start: 2021-12-02

## 2021-12-03 RX ORDER — GLIMEPIRIDE 1 MG/1
1 TABLET ORAL
Qty: 90 TABLET | Refills: 0 | Status: SHIPPED | OUTPATIENT
Start: 2021-12-03 | End: 2022-02-07

## 2021-12-06 ENCOUNTER — TELEPHONE (OUTPATIENT)
Dept: ENDOCRINOLOGY CLINIC | Facility: CLINIC | Age: 61
End: 2021-12-06

## 2021-12-07 ENCOUNTER — OFFICE VISIT (OUTPATIENT)
Dept: SURGERY | Facility: CLINIC | Age: 61
End: 2021-12-07

## 2021-12-07 DIAGNOSIS — M51.9 LUMBAR DISC DISEASE: ICD-10-CM

## 2021-12-07 DIAGNOSIS — M54.16 LUMBAR RADICULOPATHY: Primary | ICD-10-CM

## 2021-12-07 PROCEDURE — 64483 NJX AA&/STRD TFRM EPI L/S 1: CPT | Performed by: PHYSICAL MEDICINE & REHABILITATION

## 2021-12-07 NOTE — PROCEDURES
[facility] [address]    BILATERAL LUMBAR TRANSFORAMINAL   NAME:  Akua Black    MR #:    [id number] :  10/15/1960     PHYSICIAN:  Matheus Kirby MD        Operative Report    DATE OF PROCEDURE: 2021   PREOPERATIVE DIAGNOSES: 1. left needle was removed. Then  fluoroscopy was right anterior obliqued opening up the left L5-S1 intervertebral foramen. At this point in time, the patient's skin was anesthetized with 1 to 2 cc of 1% PF lidocaine without epinephrine.   Then, a 5 inch, 22-gaug

## 2021-12-08 RX ORDER — DULAGLUTIDE 1.5 MG/.5ML
1.5 INJECTION, SOLUTION SUBCUTANEOUS
Qty: 6 ML | Refills: 0 | Status: SHIPPED | OUTPATIENT
Start: 2021-12-08

## 2021-12-08 NOTE — TELEPHONE ENCOUNTER
Dr. Bo Ardon,  Patient states BG was 253 at 11am when she woke up  Ate breakfast (fried egg, 1/2 bagel and orange) at noon  BG at 1pm 272    Patient stated she took additional 1mg glimepiride before bed last night (total for day: 2mg)    Please advi

## 2021-12-08 NOTE — TELEPHONE ENCOUNTER
Spoke to patient to relay message below - patient stated understanding to take 2mg glimepiride today

## 2021-12-08 NOTE — TELEPHONE ENCOUNTER
Called patient to review overnight and current BG readings to advise on further recommendations regarding her medication regimen. No answer. LMTCB.      Current medication regimen:   Glimepiride 1mg PO daily  Trulicity 3.0DR SQ weekly  Lantus 44 units S

## 2021-12-09 RX ORDER — FLUTICASONE PROPIONATE 50 MCG
SPRAY, SUSPENSION (ML) NASAL
Qty: 48 G | Refills: 1 | Status: SHIPPED | OUTPATIENT
Start: 2021-12-09

## 2021-12-09 NOTE — TELEPHONE ENCOUNTER
Dr. Annabel Morales, please review refill. I cannot override the \"high alert\"      High    Allergy/Contraindication: fluticasone propionate  Reactions: PAIN. Reaction type: Side effect. User documented allergy severity: Medium.   Level 2 with METHYLPREDNISOLONE (Cla

## 2021-12-09 NOTE — TELEPHONE ENCOUNTER
Refill passed per [facility] protocol.     Requested Prescriptions   Pending Prescriptions Disp Refills    FLUTICASONE PROPIONATE 50 MCG/ACT Nasal Suspension [Pharmacy Med Name: Fluticasone Propionate 50 Mcg/Act Spr Hikm] 48 g 0     Sig: use 2 sprays b

## 2021-12-11 ENCOUNTER — PATIENT MESSAGE (OUTPATIENT)
Dept: FAMILY MEDICINE CLINIC | Facility: CLINIC | Age: 61
End: 2021-12-11

## 2021-12-11 ENCOUNTER — PATIENT MESSAGE (OUTPATIENT)
Dept: ENDOCRINOLOGY CLINIC | Facility: CLINIC | Age: 61
End: 2021-12-11

## 2021-12-11 DIAGNOSIS — E11.9 CONTROLLED TYPE 2 DIABETES MELLITUS WITHOUT COMPLICATION, WITHOUT LONG-TERM CURRENT USE OF INSULIN (HCC): Primary | ICD-10-CM

## 2021-12-11 DIAGNOSIS — R30.0 DYSURIA: ICD-10-CM

## 2021-12-11 NOTE — TELEPHONE ENCOUNTER
From: Cydney Rincon  To: Vickie Muniz MD  Sent: 12/11/2021 10:47 AM CST  Subject: Medication Lanzoprozole    Once a year my insurance denies refills for Lanzoprozole.  Once your office sends in paperwork as to why I need this med (30 MG in the AM and 15

## 2021-12-13 NOTE — TELEPHONE ENCOUNTER
Dr. Treva Skaggs    Please see patient message.  She also replied to other messages regarding blood sugar and steroids:    \"Update-By increasing the glimepiride to twice a day and increasing the Lantus by 20 CC my blood sugar went back to normal within 2 days kartik

## 2021-12-13 NOTE — TELEPHONE ENCOUNTER
From: Anurag Devine  To: Kita Modi MD  Sent: 12/11/2021 10:52 AM CST  Subject: Blood Work    I have been having issues with not urinating and I am getting concerned.  Can you please put in an order for blood work for the kidneys and any other relevant luca

## 2021-12-15 ENCOUNTER — LAB ENCOUNTER (OUTPATIENT)
Dept: LAB | Facility: HOSPITAL | Age: 61
End: 2021-12-15
Attending: Other
Payer: COMMERCIAL

## 2021-12-15 ENCOUNTER — OFFICE VISIT (OUTPATIENT)
Dept: NEUROLOGY | Facility: CLINIC | Age: 61
End: 2021-12-15

## 2021-12-15 ENCOUNTER — TELEPHONE (OUTPATIENT)
Dept: NEUROLOGY | Facility: CLINIC | Age: 61
End: 2021-12-15

## 2021-12-15 VITALS
WEIGHT: 212 LBS | SYSTOLIC BLOOD PRESSURE: 112 MMHG | HEIGHT: 64 IN | BODY MASS INDEX: 36.19 KG/M2 | DIASTOLIC BLOOD PRESSURE: 74 MMHG | HEART RATE: 64 BPM

## 2021-12-15 DIAGNOSIS — E11.9 CONTROLLED TYPE 2 DIABETES MELLITUS WITHOUT COMPLICATION, WITHOUT LONG-TERM CURRENT USE OF INSULIN (HCC): ICD-10-CM

## 2021-12-15 DIAGNOSIS — R41.3 MEMORY PROBLEM: ICD-10-CM

## 2021-12-15 DIAGNOSIS — G43.119 INTRACTABLE MIGRAINE WITH AURA WITHOUT STATUS MIGRAINOSUS: ICD-10-CM

## 2021-12-15 DIAGNOSIS — R41.3 MEMORY PROBLEM: Primary | ICD-10-CM

## 2021-12-15 DIAGNOSIS — R30.0 DYSURIA: ICD-10-CM

## 2021-12-15 DIAGNOSIS — G43.109 MIGRAINE AURA WITHOUT HEADACHE: ICD-10-CM

## 2021-12-15 DIAGNOSIS — G62.9 LENGTH-DEPENDENT PERIPHERAL NEUROPATHY: ICD-10-CM

## 2021-12-15 PROCEDURE — 3078F DIAST BP <80 MM HG: CPT | Performed by: OTHER

## 2021-12-15 PROCEDURE — 3008F BODY MASS INDEX DOCD: CPT | Performed by: OTHER

## 2021-12-15 PROCEDURE — 84439 ASSAY OF FREE THYROXINE: CPT

## 2021-12-15 PROCEDURE — 84207 ASSAY OF VITAMIN B-6: CPT

## 2021-12-15 PROCEDURE — 3074F SYST BP LT 130 MM HG: CPT | Performed by: OTHER

## 2021-12-15 PROCEDURE — 82525 ASSAY OF COPPER: CPT

## 2021-12-15 PROCEDURE — 82140 ASSAY OF AMMONIA: CPT

## 2021-12-15 PROCEDURE — 84425 ASSAY OF VITAMIN B-1: CPT

## 2021-12-15 PROCEDURE — 86334 IMMUNOFIX E-PHORESIS SERUM: CPT

## 2021-12-15 PROCEDURE — 84443 ASSAY THYROID STIM HORMONE: CPT

## 2021-12-15 PROCEDURE — 80048 BASIC METABOLIC PNL TOTAL CA: CPT

## 2021-12-15 PROCEDURE — 84446 ASSAY OF VITAMIN E: CPT

## 2021-12-15 PROCEDURE — 99205 OFFICE O/P NEW HI 60 MIN: CPT | Performed by: OTHER

## 2021-12-15 PROCEDURE — 36415 COLL VENOUS BLD VENIPUNCTURE: CPT

## 2021-12-15 PROCEDURE — 83883 ASSAY NEPHELOMETRY NOT SPEC: CPT

## 2021-12-15 RX ORDER — ERGOCALCIFEROL 1.25 MG/1
1 CAPSULE ORAL
COMMUNITY
Start: 2021-11-21

## 2021-12-15 RX ORDER — CYANOCOBALAMIN (VITAMIN B-12) 1000 MCG
1000 TABLET ORAL DAILY
Qty: 90 TABLET | Refills: 0 | Status: SHIPPED | OUTPATIENT
Start: 2021-12-15 | End: 2022-03-15

## 2021-12-15 RX ORDER — LOSARTAN POTASSIUM 50 MG/1
1 TABLET ORAL 2 TIMES DAILY
COMMUNITY

## 2021-12-15 RX ORDER — TIZANIDINE 2 MG/1
1 TABLET ORAL EVERY 8 HOURS PRN
Qty: 45 TABLET | Refills: 0 | Status: SHIPPED | OUTPATIENT
Start: 2021-12-15 | End: 2022-01-14

## 2021-12-15 NOTE — PATIENT INSTRUCTIONS
-Ophthalmology evaluation   -MRV head   -Please obtain laboratory blood work   -Please do not combine Tizanidine with other sedating medications   -Start B12 supplements 1000 mcg and Magnesium supplements   -Follow up in 1 month or sooner if needed.     -If useful or not. If it is not helping at all by this time, then we will discuss other medications to try. Supplements may take 3-6 months until you see full effect.      Natural supplements:  Magnesium Oxide 400 mg at bedtime   Vitamin B2- 400mg in the mornin for headaches including cluster headache. Higher doses up to 15 mg has been reviewed for use in Cluster headache and have been used.   The rationale behind using melatonin for cluster is that many theories regarding the cause of Cluster headache center arou convincingly provoke headaches. Headache Prevention Strategies:    1. Maintain a headache diary; learn to identify and avoid triggers.  Common triggers include:    Emotional triggers:  Emotional/Upset family or friends  Emotional/Upset occupation  Busine steffen called [employer] will help walk you through mindfulness. Cold compresses. 5. Don't wait!! Take the maximum allowable dosage of prescribed medication at the first sign of migraine. 6. Compliance:   Take prescribed medication regularly as directed fluctuate are unavoidable. There is no quick fix for most headaches. Furthermore, the longer you have had high frequency headaches (such as chronic daily headache), the longer it will likely take to expect any improvement.   In fact, some people will n frequency and/or severity of headache. That is our goal, and any additional benefit is considered a bonus. Some people do significantly better than this, others do not get close to this.   Therefore, if your headaches are not improving by at least 3 month You should call at least 1 week in advance of needing a refill to ensure you do not run out of medication. Keep in mind that refill requests on Fridays may not be filled until the following week.

## 2021-12-15 NOTE — PROGRESS NOTES
[address]  [address]  [phone]              Date: December 15, 2021  Patient Name: Fabian Edwards   MRN: [id number]    Reason for Evaluation: Headaches and memory loss    HPI:     Fabian Edwards Treating: Excedrin (will help but not resolve), laying down without significant improvement. Cannot function with migraine. Sometimes there is provocation with Valsalva (like if she blows her nose too hard).       She thinks there may be association w Apply 2 g topically 4 (four) times daily. , Disp: 1 Tube, Rfl: 0  montelukast 10 MG Oral Tab, Take 1 tablet (10 mg total) by mouth daily. , Disp: 90 tablet, Rfl: 1  Diclofenac Sodium 1 % External Gel, Apply 2 g topically 4 (four) times daily. , Disp: 100 g, R Misc, 1 each by Does not apply route daily. , Disp: 100 each, Rfl: 3  CONTOUR NEXT TEST In Vitro Strip, Use to check sugars 4 times daily, Disp: 400 each, Rfl: 3  triamcinolone acetonide 0.1 % External Cream, Apply topically 2 (two) times daily as needed. , endometrial biopsy   • Esophageal reflux    • Exotropia    • Genital warts    • Heart murmur    • Herpes simplex    • High blood pressure    • High cholesterol    • History of pregnancy 1990, 1991   • Hypercholesterolemia 2006   • Irregular menstrual cycle Glaucoma Sister    • Other (Other) Sister         Vascular necrosis   • Ovarian Cancer Maternal Aunt        ALLERGIES    Liraglutide             SWELLING  Meloxicam               PALPITATIONS  Penicillins             SWELLING  Sulindac                HIVES extremities   Involuntary movements: none    Strength:       Deltoid  C5 Biceps  C5-6  Triceps  C7 Wrist extensors  C7-8 Wrist flexors  C7 Finger flexors  C6-8 Finger extensors   C8 Finger abductors  C8   Thumb abductors  T1    R 5 5 5 5 5 5 5 5 5   L 5 5 ineffectively treated with Excedrin. Is been hesitant to start Cymbalta due to potential side effects.   We will have her evaluated by ophthalmology to make sure there is no ocular pathology leading to her symptoms as it is usually involves monocular right methylmalonic acid  –Encephalopathy and neuropathy labs  –Future consideration: Cognitive speech therapy, neuropsychological testing, lumbar puncture, podiatry referral/PT for neuropathy     TIA  –Continue Aspirin 81 mg daily and Lipitor 40 mg daily   –No

## 2021-12-15 NOTE — TELEPHONE ENCOUNTER
Request for -Ophthalmology evaluation - Dr. Thurman     Per new [facility] policy effective 87/78/00, specialist can not refer to another specialist without patient first being seen by PCP. Referral # [id number]  is closed.     Left vm for patient toNotified of a

## 2021-12-16 ENCOUNTER — LAB ENCOUNTER (OUTPATIENT)
Dept: LAB | Age: 61
End: 2021-12-16
Attending: INTERNAL MEDICINE
Payer: COMMERCIAL

## 2021-12-16 DIAGNOSIS — R30.0 DYSURIA: ICD-10-CM

## 2021-12-16 DIAGNOSIS — E11.9 DM TYPE 2 (DIABETES MELLITUS, TYPE 2) (HCC): ICD-10-CM

## 2021-12-16 DIAGNOSIS — R41.3 MEMORY LOSS: ICD-10-CM

## 2021-12-16 PROCEDURE — 82570 ASSAY OF URINE CREATININE: CPT

## 2021-12-16 PROCEDURE — 36415 COLL VENOUS BLD VENIPUNCTURE: CPT

## 2021-12-16 PROCEDURE — 82043 UR ALBUMIN QUANTITATIVE: CPT

## 2021-12-16 PROCEDURE — 82140 ASSAY OF AMMONIA: CPT

## 2021-12-16 PROCEDURE — 81003 URINALYSIS AUTO W/O SCOPE: CPT

## 2021-12-16 RX ORDER — LEVOTHYROXINE SODIUM 0.05 MG/1
50 TABLET ORAL
Qty: 90 TABLET | Refills: 1 | Status: SHIPPED | OUTPATIENT
Start: 2021-12-16 | End: 2021-12-16

## 2021-12-16 NOTE — TELEPHONE ENCOUNTER
Refill passed per [facility] [place] protocol.    Requested Prescriptions   Pending Prescriptions Disp Refills    LEVOTHYROXINE 50 MCG Oral Tab [Pharmacy Med Name: Levothyroxine Sodium 50 Mcg Tab Lupi] 90 tablet 0     Sig: Take 1 tablet (50 mcg total) by mouth

## 2021-12-17 ENCOUNTER — TELEPHONE (OUTPATIENT)
Dept: OTHER | Age: 61
End: 2021-12-17

## 2021-12-18 RX ORDER — ACYCLOVIR 50 MG/G
1 OINTMENT TOPICAL
Qty: 30 G | Refills: 0 | Status: SHIPPED | OUTPATIENT
Start: 2021-12-18 | End: 2022-02-15

## 2021-12-18 RX ORDER — ALBUTEROL SULFATE 90 UG/1
2 AEROSOL, METERED RESPIRATORY (INHALATION) EVERY 4 HOURS PRN
Qty: 25.5 G | Refills: 0 | Status: SHIPPED | OUTPATIENT
Start: 2021-12-18 | End: 2022-12-25

## 2021-12-20 ENCOUNTER — PATIENT MESSAGE (OUTPATIENT)
Dept: NEUROLOGY | Facility: CLINIC | Age: 61
End: 2021-12-20

## 2021-12-20 DIAGNOSIS — R79.89 INCREASED AMMONIA LEVEL: Primary | ICD-10-CM

## 2021-12-22 NOTE — TELEPHONE ENCOUNTER
[facility], [address] [place] [address]    12/17/21 11:03 AM  Note  PA Authorizations started fr Lansoprazole.  Fax form to [address] [place] [address],[address]

## 2021-12-27 RX ORDER — BLOOD SUGAR DIAGNOSTIC
STRIP MISCELLANEOUS
Qty: 200 STRIP | Refills: 0 | Status: SHIPPED | OUTPATIENT
Start: 2021-12-27

## 2021-12-27 NOTE — TELEPHONE ENCOUNTER
Spoke to [place] from [employer] to follow up on lansoprazole and it was approved for 90 capsules per 30 days from  12-13-21 through 12-13-22. I have asked them to fax over the approval letter.      [employer] message sent

## 2021-12-27 NOTE — TELEPHONE ENCOUNTER
LOV: 8/4/21  RTC: 6 Months - FU Appt Scheduled 3/23/22    Per Dr. Zita BELL Note Pt is now checking 1-2 times a day Detail Level: Detailed

## 2022-01-06 ENCOUNTER — OFFICE VISIT (OUTPATIENT)
Dept: OTOLARYNGOLOGY | Facility: CLINIC | Age: 62
End: 2022-01-06
Payer: COMMERCIAL

## 2022-01-06 VITALS — WEIGHT: 212 LBS | BODY MASS INDEX: 36.19 KG/M2 | HEIGHT: 64 IN

## 2022-01-06 DIAGNOSIS — J34.3 NASAL TURBINATE HYPERTROPHY: Primary | ICD-10-CM

## 2022-01-06 DIAGNOSIS — G47.33 OBSTRUCTIVE SLEEP APNEA: ICD-10-CM

## 2022-01-06 PROCEDURE — 99243 OFF/OP CNSLTJ NEW/EST LOW 30: CPT | Performed by: SPECIALIST

## 2022-01-06 PROCEDURE — 3008F BODY MASS INDEX DOCD: CPT | Performed by: SPECIALIST

## 2022-01-06 RX ORDER — DULOXETIN HYDROCHLORIDE 20 MG/1
20 CAPSULE, DELAYED RELEASE ORAL DAILY
Qty: 90 CAPSULE | Refills: 0 | Status: SHIPPED | OUTPATIENT
Start: 2022-01-06 | End: 2022-03-23

## 2022-01-06 RX ORDER — AZELASTINE 1 MG/ML
2 SPRAY, METERED NASAL 2 TIMES DAILY
Qty: 30 ML | Refills: 5 | Status: SHIPPED | OUTPATIENT
Start: 2022-01-06

## 2022-01-06 NOTE — PROGRESS NOTES
Mare Osgood is a 57year old female. Patient presents with:  Nasal Congestion: pt was reffered over by nasal congestion w/ headaches. pt has been using cpap machine, and unable to sleep at night. HPI:   Here with sinus headaches.   She is also having m mouth 3 (three) times daily as needed. 90 tablet 0   • diazePAM (VALIUM) 5 MG Oral Tab Take 1 tablet (5 mg total) by mouth every 12 (twelve) hours as needed. 30 tablet 2   • Diclofenac Sodium 1 % Transdermal Gel Apply 2 g topically 4 (four) times daily.  1 • Dicyclomine HCl (BENTYL) 20 MG Oral Tab Take 1 tablet (20 mg total) by mouth 4 (four) times daily as needed. 120 tablet 2   • Ciclopirox 8 % External Solution Apply 1 Application topically nightly.  1 Bottle 2   • ketoconazole 2 % External Cream MELVIN EXT essential hypertension 2003   • Viral infection characterized by skin and mucous membrane lesions       Social History:  Social History    Tobacco Use      Smoking status: Former Smoker        Packs/day: 0.25        Years: 2.00        Pack years: .5 will add Astelin nasal spray 2 the Zyrtec, Benadryl, Flonase, and Singulair. 2. Obstructive sleep apnea  I think the patient would benefit from seeing Dr. Lc Nelson and possibly getting an AutoPap machine.       The patient indicates understanding of these i

## 2022-01-06 NOTE — PATIENT INSTRUCTIONS
Add Astelin to your daily allergy treatment regimen. He would benefit from seeing Dr. Gregory Lei and considering an AutoPap machine. We reviewed your MRI and there was no evidence of sinusitis. With using the Afrin on a regular basis.

## 2022-01-17 ENCOUNTER — PATIENT MESSAGE (OUTPATIENT)
Dept: OTOLARYNGOLOGY | Facility: CLINIC | Age: 62
End: 2022-01-17

## 2022-01-17 DIAGNOSIS — G47.33 OSA (OBSTRUCTIVE SLEEP APNEA): Primary | ICD-10-CM

## 2022-01-19 NOTE — TELEPHONE ENCOUNTER
From: David Rodríguez  To: Wilberto Toro MD  Sent: 1/17/2022 12:31 PM CST  Subject: Referral for pulmonologist    Per my last visit with Dr Delmer Fields, I am to see Dr Geraldine Quiles regarding a change in my CPAP machine. Do I need a referral in order to see him?

## 2022-01-31 ENCOUNTER — TELEPHONE (OUTPATIENT)
Dept: FAMILY MEDICINE CLINIC | Facility: CLINIC | Age: 62
End: 2022-01-31

## 2022-01-31 DIAGNOSIS — G47.33 OSA (OBSTRUCTIVE SLEEP APNEA): Primary | ICD-10-CM

## 2022-01-31 NOTE — TELEPHONE ENCOUNTER
Dr. Sneha Pelaez has referred patient to Dr. Maria Fernanda Gayle. IHP requires that referral come from the PCP. Pended referral. Please review diagnosis and sign off if you agree.     Thank you,  [facility] [phone]

## 2022-02-04 ENCOUNTER — PATIENT MESSAGE (OUTPATIENT)
Dept: ENDOCRINOLOGY CLINIC | Facility: CLINIC | Age: 62
End: 2022-02-04

## 2022-02-04 RX ORDER — BLOOD-GLUCOSE METER
1 EACH MISCELLANEOUS DAILY
Qty: 1 KIT | Refills: 0 | Status: SHIPPED | OUTPATIENT
Start: 2022-02-04

## 2022-02-04 NOTE — TELEPHONE ENCOUNTER
From: Jazmin Colmenares  To: Leo Ko MD  Sent: 2/4/2022 6:30 AM CST  Subject: Blood Sugar Machine    My finger poker broke. Can you please put in a prescription for a new Andrea Contour Next blood sugar kit?

## 2022-02-05 ENCOUNTER — PATIENT MESSAGE (OUTPATIENT)
Dept: NEUROLOGY | Facility: CLINIC | Age: 62
End: 2022-02-05

## 2022-02-07 RX ORDER — GLIMEPIRIDE 1 MG/1
TABLET ORAL
Qty: 90 TABLET | Refills: 0 | Status: SHIPPED | OUTPATIENT
Start: 2022-02-07 | End: 2022-03-23

## 2022-02-07 NOTE — TELEPHONE ENCOUNTER
From: Jazmin Colmenares  To: Anant Hay DO  Sent: 2/5/2022 10:46 AM CST  Subject: Blood work    I have to go for my blood work before my next visit with you. Can you put in an order to check my levels of DHA?

## 2022-02-09 NOTE — TELEPHONE ENCOUNTER
LOV 8/4/2021 --> f/u 6mo  a1c of 7.4%  Upcoming appt 7/27/9826    Trulicity 2.4RR/2.4TM routed to provider to review and sign

## 2022-02-10 RX ORDER — DULAGLUTIDE 1.5 MG/.5ML
1.5 INJECTION, SOLUTION SUBCUTANEOUS
Qty: 6 ML | Refills: 0 | Status: SHIPPED | OUTPATIENT
Start: 2022-02-10 | End: 2022-03-23

## 2022-02-15 ENCOUNTER — PATIENT MESSAGE (OUTPATIENT)
Dept: NEUROLOGY | Facility: CLINIC | Age: 62
End: 2022-02-15

## 2022-02-15 ENCOUNTER — LAB ENCOUNTER (OUTPATIENT)
Dept: LAB | Age: 62
End: 2022-02-15
Attending: Other
Payer: COMMERCIAL

## 2022-02-15 ENCOUNTER — HOSPITAL ENCOUNTER (OUTPATIENT)
Dept: MRI IMAGING | Age: 62
Discharge: HOME | End: 2022-02-15
Attending: Other
Payer: COMMERCIAL

## 2022-02-15 DIAGNOSIS — G43.119 INTRACTABLE MIGRAINE WITH AURA WITHOUT STATUS MIGRAINOSUS: ICD-10-CM

## 2022-02-15 DIAGNOSIS — R79.89 INCREASED AMMONIA LEVEL: ICD-10-CM

## 2022-02-15 DIAGNOSIS — G43.109 MIGRAINE AURA WITHOUT HEADACHE: ICD-10-CM

## 2022-02-15 DIAGNOSIS — R41.3 MEMORY PROBLEM: ICD-10-CM

## 2022-02-15 LAB
ALBUMIN SERPL-MCNC: 3.8 G/DL (ref 3.4–5)
ALP LIVER SERPL-CCNC: 83 U/L
ALT SERPL-CCNC: 30 U/L
AMMONIA PLAS-MCNC: 40 UMOL/L (ref 11–32)
AST SERPL-CCNC: 16 U/L (ref 15–37)
BILIRUB DIRECT SERPL-MCNC: <0.1 MG/DL (ref 0–0.2)
BILIRUB SERPL-MCNC: 0.2 MG/DL (ref 0.1–2)
PROT SERPL-MCNC: 6.8 G/DL (ref 6.4–8.2)

## 2022-02-15 PROCEDURE — 82140 ASSAY OF AMMONIA: CPT

## 2022-02-15 PROCEDURE — 80076 HEPATIC FUNCTION PANEL: CPT

## 2022-02-15 PROCEDURE — 36415 COLL VENOUS BLD VENIPUNCTURE: CPT

## 2022-02-15 PROCEDURE — 70544 MR ANGIOGRAPHY HEAD W/O DYE: CPT | Performed by: OTHER

## 2022-02-15 RX ORDER — ACYCLOVIR 50 MG/G
1 OINTMENT TOPICAL
Qty: 30 G | Refills: 0 | Status: SHIPPED | OUTPATIENT
Start: 2022-02-15

## 2022-02-16 ENCOUNTER — PATIENT MESSAGE (OUTPATIENT)
Dept: FAMILY MEDICINE CLINIC | Facility: CLINIC | Age: 62
End: 2022-02-16

## 2022-02-16 NOTE — PROGRESS NOTES
Her kidney function is normal. No reason to see nephrologist. Alcohol use can also increase ammonia levels. Make sure amount per day/week is within normal limits.  If further questions, should schedule an appointment with me - in office or video ok

## 2022-02-16 NOTE — TELEPHONE ENCOUNTER
Dean Black RN 2/16/2022 2:35 PM CST      ----- Message -----  From: Namrata Victoria  Sent: 2/16/2022 2:05 PM CST  To: Em Rn Triage  Subject: Test Results     I am returning the nurse's call regarding high ammonia levels. When I called back the wait time is too long. When you get a chance please call me at the phone number on file.

## 2022-02-16 NOTE — TELEPHONE ENCOUNTER
From: Nila Chambers  To: Britni Castillo DO  Sent: 2/15/2022 5:10 PM CST  Subject: Blood Work    At my last visit you had me start taking B12 and Magnesium oxide because my last blood work results were low and to see if that would help with my headaches. I just went to complete the blood work to recheck results from previous testing and B12 and Magnesium oxide we're not among the retest.   How are we to know if my numbers improved? I have a follow up visit with you next week. Please advise.

## 2022-02-16 NOTE — PROGRESS NOTES
Staff please reach out to Patricia Gutierrez. I believe she sees outside GI Dr. Juma Ellis. She should follow up with him/her regarding the elevated ammonia levels.

## 2022-02-17 NOTE — PROGRESS NOTES
Called pt and discussed need to stop drinking alcohol at all. Most likely causing the increased ammonia. And especially has fatty liver disease. Pt agrees and understands. Also alcohol not helping her diabetes.

## 2022-03-09 ENCOUNTER — PATIENT MESSAGE (OUTPATIENT)
Dept: PULMONOLOGY | Facility: CLINIC | Age: 62
End: 2022-03-09

## 2022-03-09 RX ORDER — AMLODIPINE BESYLATE 2.5 MG/1
2.5 TABLET ORAL DAILY
Qty: 90 TABLET | Refills: 1 | Status: SHIPPED | OUTPATIENT
Start: 2022-03-09 | End: 2022-03-23

## 2022-03-09 NOTE — TELEPHONE ENCOUNTER
Dr. Giuliano Andrew- patient was last seen 2016. She states she is having troubles with gasping despite using CPAP (per download report AHI is 1) and had COVID 10/2020 with burning sensation in chest and lungs. She is requesting sooner appointment. Please advise if okay and where to double book.

## 2022-03-09 NOTE — TELEPHONE ENCOUNTER
Refill passed per [facility] protocol   Requested Prescriptions   Pending Prescriptions Disp Refills    AMLODIPINE 2.5 MG Oral Tab [Pharmacy Med Name: Amlodipine Besylate 2.5 Mg Tab Asce] 90 tablet 0     Sig: Take 1 tablet (2.5 mg total) by mouth daily.         Hypertensive Medications Protocol Passed - 3/9/2022  7:45 AM        Passed - CMP or BMP in past 12 months        Passed - Appointment in past 6 or next 3 months        Passed - GFR Non- > 50     Lab Results   Component Value Date    GFRNAA 95 12/15/2021

## 2022-03-10 NOTE — TELEPHONE ENCOUNTER
Spoke with patient, she accepted 3/23/22 10:30 appointment. Provided office address and where to park. Patient verbalized her appreciation for sooner appointment.

## 2022-03-11 ENCOUNTER — TELEPHONE (OUTPATIENT)
Dept: FAMILY MEDICINE CLINIC | Facility: CLINIC | Age: 62
End: 2022-03-11

## 2022-03-11 NOTE — TELEPHONE ENCOUNTER
Pharmacy indicates did not receive refill auth for amlodopine. Chart review shows eprescribed on 3/9/22. Verbal authorization given.

## 2022-03-17 RX ORDER — ATORVASTATIN CALCIUM 40 MG/1
40 TABLET, FILM COATED ORAL
Qty: 90 TABLET | Refills: 1 | Status: SHIPPED | OUTPATIENT
Start: 2022-03-17 | End: 2022-09-10

## 2022-03-17 NOTE — TELEPHONE ENCOUNTER
Refill passed per [facility] protocol, but shows allergy/contraindication    Please send, if appropriate       Requested Prescriptions   Pending Prescriptions Disp Refills    ATORVASTATIN 40 MG Oral Tab [Pharmacy Med Name: Atorvastatin Calcium 40 Mg Tab Nort] 90 tablet 0     Sig: Take 1 tablet (40 mg total) by mouth once daily.         Cholesterol Medication Protocol Passed - 3/17/2022  1:31 AM        Passed - ALT in past 12 months        Passed - LDL in past 12 months        Passed - Last ALT < 80       Lab Results   Component Value Date    ALT 30 02/15/2022             Passed - Last LDL < 130     Lab Results   Component Value Date     (H) 06/12/2021               Passed - Appointment in past 12 or next 3 months                Recent Outpatient Visits              2 months ago Nasal turbinate hypertrophy    [address] Soriano MD    Office Visit    3 months ago Memory problem    [address] [facility], [address] [facility], [place]    Office Visit    3 months ago left > right L5-S1 radiculopathy    [facility] [place], Isauro Neves MD    Office Visit    4 months ago left > right L5-S1 radiculopathy    [address] [facility] [address], Isauro Neves MD    Office Visit    5 months ago History of TIA (transient ischemic attack)    [address] Soriano Redia Corona, MD    Office Visit             Future Appointments         Provider Department Appt Notes    In 6 days FAY Pope Blood work and migraines    In 6 days Anner Duane, MD [address], [address] okay per MM - consult, [address] [facility] 2016, troubles with CPAP and post COVID    In 6 days Vinicio Owens MD [facility], [address], [place] Problems urinating    In 1 month Anner Duane, MD Tedra Angelucci, [address] burning in lungs since covid, and sleep apnea/ informed of policies

## 2022-03-18 ENCOUNTER — LAB ENCOUNTER (OUTPATIENT)
Dept: LAB | Age: 62
End: 2022-03-18
Attending: Other
Payer: COMMERCIAL

## 2022-03-18 DIAGNOSIS — G43.119 INTRACTABLE MIGRAINE WITH AURA WITHOUT STATUS MIGRAINOSUS: ICD-10-CM

## 2022-03-18 DIAGNOSIS — E53.8 B12 DEFICIENCY: ICD-10-CM

## 2022-03-18 LAB
MAGNESIUM SERPL-MCNC: 1.7 MG/DL (ref 1.6–2.6)
VIT B12 SERPL-MCNC: 860 PG/ML (ref 193–986)

## 2022-03-18 PROCEDURE — 83735 ASSAY OF MAGNESIUM: CPT

## 2022-03-18 PROCEDURE — 36415 COLL VENOUS BLD VENIPUNCTURE: CPT

## 2022-03-18 PROCEDURE — 83921 ORGANIC ACID SINGLE QUANT: CPT

## 2022-03-18 PROCEDURE — 82607 VITAMIN B-12: CPT

## 2022-03-21 ENCOUNTER — PATIENT MESSAGE (OUTPATIENT)
Dept: FAMILY MEDICINE CLINIC | Facility: CLINIC | Age: 62
End: 2022-03-21

## 2022-03-22 NOTE — TELEPHONE ENCOUNTER
PA approved effective 1/31/2018-1/31/2019; patient notified via [address]
PA for Dexilant 60 mg cap completed with [email or web address] via CMM response time 3-5 business days [address] [facility].
REQUESTING PA :Diana Downing
PAST MEDICAL HISTORY:  Redundant foreskin

## 2022-03-22 NOTE — TELEPHONE ENCOUNTER
From: Tran Powers  To: Jameel Carvalho MD  Sent: 3/21/2022 4:19 PM CDT  Subject: Script    I have herpes sores that continually pop up on my hips. I am getting them more frequently and they are very very painful. I am using ACV to dry them up and acyclovir to take the pain away as well as dry them up. I am looking for a prescription to keep them from popping up all together.

## 2022-03-23 ENCOUNTER — TELEPHONE (OUTPATIENT)
Dept: PULMONOLOGY | Facility: CLINIC | Age: 62
End: 2022-03-23

## 2022-03-23 ENCOUNTER — OFFICE VISIT (OUTPATIENT)
Dept: PULMONOLOGY | Facility: CLINIC | Age: 62
End: 2022-03-23
Payer: COMMERCIAL

## 2022-03-23 ENCOUNTER — OFFICE VISIT (OUTPATIENT)
Dept: NEUROLOGY | Facility: CLINIC | Age: 62
End: 2022-03-23
Payer: COMMERCIAL

## 2022-03-23 ENCOUNTER — TELEPHONE (OUTPATIENT)
Dept: PHYSICAL MEDICINE AND REHAB | Facility: CLINIC | Age: 62
End: 2022-03-23

## 2022-03-23 ENCOUNTER — OFFICE VISIT (OUTPATIENT)
Dept: FAMILY MEDICINE CLINIC | Facility: CLINIC | Age: 62
End: 2022-03-23
Payer: COMMERCIAL

## 2022-03-23 ENCOUNTER — OFFICE VISIT (OUTPATIENT)
Dept: ENDOCRINOLOGY CLINIC | Facility: CLINIC | Age: 62
End: 2022-03-23
Payer: COMMERCIAL

## 2022-03-23 ENCOUNTER — HOSPITAL ENCOUNTER (OUTPATIENT)
Dept: GENERAL RADIOLOGY | Age: 62
Discharge: HOME | End: 2022-03-23
Attending: INTERNAL MEDICINE
Payer: COMMERCIAL

## 2022-03-23 VITALS
SYSTOLIC BLOOD PRESSURE: 120 MMHG | DIASTOLIC BLOOD PRESSURE: 82 MMHG | HEART RATE: 80 BPM | WEIGHT: 212 LBS | BODY MASS INDEX: 36.19 KG/M2 | HEIGHT: 64 IN

## 2022-03-23 VITALS
DIASTOLIC BLOOD PRESSURE: 72 MMHG | OXYGEN SATURATION: 98 % | HEART RATE: 59 BPM | SYSTOLIC BLOOD PRESSURE: 128 MMHG | WEIGHT: 210 LBS | BODY MASS INDEX: 35.85 KG/M2 | HEIGHT: 64 IN | RESPIRATION RATE: 14 BRPM

## 2022-03-23 VITALS
SYSTOLIC BLOOD PRESSURE: 128 MMHG | BODY MASS INDEX: 36.37 KG/M2 | HEART RATE: 64 BPM | DIASTOLIC BLOOD PRESSURE: 74 MMHG | HEIGHT: 64 IN | WEIGHT: 213 LBS

## 2022-03-23 VITALS
WEIGHT: 212.38 LBS | HEART RATE: 67 BPM | DIASTOLIC BLOOD PRESSURE: 80 MMHG | SYSTOLIC BLOOD PRESSURE: 145 MMHG | BODY MASS INDEX: 36 KG/M2

## 2022-03-23 DIAGNOSIS — G47.33 OSA (OBSTRUCTIVE SLEEP APNEA): ICD-10-CM

## 2022-03-23 DIAGNOSIS — R06.00 DOE (DYSPNEA ON EXERTION): Primary | ICD-10-CM

## 2022-03-23 DIAGNOSIS — G43.119 INTRACTABLE MIGRAINE WITH AURA WITHOUT STATUS MIGRAINOSUS: ICD-10-CM

## 2022-03-23 DIAGNOSIS — R41.3 MEMORY PROBLEM: ICD-10-CM

## 2022-03-23 DIAGNOSIS — A60.09 HERPES SIMPLEX INFECTION OF OTHER SITE OF GENITOURINARY TRACT: ICD-10-CM

## 2022-03-23 DIAGNOSIS — E03.8 SUBCLINICAL HYPOTHYROIDISM: ICD-10-CM

## 2022-03-23 DIAGNOSIS — Z86.73 HISTORY OF TIA (TRANSIENT ISCHEMIC ATTACK): ICD-10-CM

## 2022-03-23 DIAGNOSIS — G43.109 MIGRAINE AURA WITHOUT HEADACHE: Primary | ICD-10-CM

## 2022-03-23 DIAGNOSIS — E11.9 TYPE 2 DIABETES MELLITUS WITHOUT COMPLICATION, UNSPECIFIED WHETHER LONG TERM INSULIN USE (HCC): Primary | ICD-10-CM

## 2022-03-23 DIAGNOSIS — G62.9 LENGTH-DEPENDENT PERIPHERAL NEUROPATHY: ICD-10-CM

## 2022-03-23 DIAGNOSIS — E53.8 B12 DEFICIENCY: ICD-10-CM

## 2022-03-23 DIAGNOSIS — R06.00 DOE (DYSPNEA ON EXERTION): ICD-10-CM

## 2022-03-23 DIAGNOSIS — J43.2 CENTRILOBULAR EMPHYSEMA (HCC): ICD-10-CM

## 2022-03-23 DIAGNOSIS — U07.1 COVID-19: ICD-10-CM

## 2022-03-23 DIAGNOSIS — E11.9 CONTROLLED TYPE 2 DIABETES MELLITUS WITHOUT COMPLICATION, WITHOUT LONG-TERM CURRENT USE OF INSULIN (HCC): Primary | ICD-10-CM

## 2022-03-23 LAB
CARTRIDGE LOT#: ABNORMAL NUMERIC
GLUCOSE BLOOD: 151
HEMOGLOBIN A1C: 7 % (ref 4.3–5.6)
TEST STRIP LOT #: NORMAL NUMERIC

## 2022-03-23 PROCEDURE — 3079F DIAST BP 80-89 MM HG: CPT | Performed by: INTERNAL MEDICINE

## 2022-03-23 PROCEDURE — 3051F HG A1C>EQUAL 7.0%<8.0%: CPT | Performed by: INTERNAL MEDICINE

## 2022-03-23 PROCEDURE — 3074F SYST BP LT 130 MM HG: CPT | Performed by: FAMILY MEDICINE

## 2022-03-23 PROCEDURE — 36416 COLLJ CAPILLARY BLOOD SPEC: CPT | Performed by: INTERNAL MEDICINE

## 2022-03-23 PROCEDURE — 71046 X-RAY EXAM CHEST 2 VIEWS: CPT | Performed by: INTERNAL MEDICINE

## 2022-03-23 PROCEDURE — 99214 OFFICE O/P EST MOD 30 MIN: CPT | Performed by: INTERNAL MEDICINE

## 2022-03-23 PROCEDURE — 99244 OFF/OP CNSLTJ NEW/EST MOD 40: CPT | Performed by: INTERNAL MEDICINE

## 2022-03-23 PROCEDURE — 82947 ASSAY GLUCOSE BLOOD QUANT: CPT | Performed by: INTERNAL MEDICINE

## 2022-03-23 PROCEDURE — 3074F SYST BP LT 130 MM HG: CPT | Performed by: OTHER

## 2022-03-23 PROCEDURE — 3077F SYST BP >= 140 MM HG: CPT | Performed by: INTERNAL MEDICINE

## 2022-03-23 PROCEDURE — 3079F DIAST BP 80-89 MM HG: CPT | Performed by: OTHER

## 2022-03-23 PROCEDURE — 3008F BODY MASS INDEX DOCD: CPT | Performed by: FAMILY MEDICINE

## 2022-03-23 PROCEDURE — 99214 OFFICE O/P EST MOD 30 MIN: CPT | Performed by: FAMILY MEDICINE

## 2022-03-23 PROCEDURE — 3008F BODY MASS INDEX DOCD: CPT | Performed by: INTERNAL MEDICINE

## 2022-03-23 PROCEDURE — 3078F DIAST BP <80 MM HG: CPT | Performed by: INTERNAL MEDICINE

## 2022-03-23 PROCEDURE — 83036 HEMOGLOBIN GLYCOSYLATED A1C: CPT | Performed by: INTERNAL MEDICINE

## 2022-03-23 PROCEDURE — 3008F BODY MASS INDEX DOCD: CPT | Performed by: OTHER

## 2022-03-23 PROCEDURE — 3078F DIAST BP <80 MM HG: CPT | Performed by: FAMILY MEDICINE

## 2022-03-23 PROCEDURE — 3051F HG A1C>EQUAL 7.0%<8.0%: CPT | Performed by: FAMILY MEDICINE

## 2022-03-23 PROCEDURE — 3074F SYST BP LT 130 MM HG: CPT | Performed by: INTERNAL MEDICINE

## 2022-03-23 PROCEDURE — 99214 OFFICE O/P EST MOD 30 MIN: CPT | Performed by: OTHER

## 2022-03-23 RX ORDER — MAGNESIUM OXIDE 400 MG (241.3 MG MAGNESIUM) TABLET
400 TABLET DAILY
COMMUNITY

## 2022-03-23 RX ORDER — GLIMEPIRIDE 1 MG/1
1 TABLET ORAL
Qty: 90 TABLET | Refills: 1 | Status: SHIPPED | OUTPATIENT
Start: 2022-03-23

## 2022-03-23 RX ORDER — DULAGLUTIDE 1.5 MG/.5ML
1.5 INJECTION, SOLUTION SUBCUTANEOUS
Qty: 6 ML | Refills: 0 | Status: SHIPPED | OUTPATIENT
Start: 2022-03-23

## 2022-03-23 RX ORDER — MELATONIN
1000 DAILY
COMMUNITY

## 2022-03-23 RX ORDER — VALACYCLOVIR HYDROCHLORIDE 500 MG/1
500 TABLET, FILM COATED ORAL DAILY
Qty: 90 TABLET | Refills: 1 | Status: SHIPPED | OUTPATIENT
Start: 2022-03-23

## 2022-03-23 RX ORDER — BLOOD SUGAR DIAGNOSTIC
STRIP MISCELLANEOUS
Qty: 200 STRIP | Refills: 0 | Status: SHIPPED | OUTPATIENT
Start: 2022-03-23

## 2022-03-23 RX ORDER — TIZANIDINE 2 MG/1
1-2 TABLET ORAL EVERY 8 HOURS PRN
Qty: 45 TABLET | Refills: 0 | Status: SHIPPED | OUTPATIENT
Start: 2022-03-23 | End: 2022-04-22

## 2022-03-23 RX ORDER — PEN NEEDLE, DIABETIC 32GX 5/32"
1 NEEDLE, DISPOSABLE MISCELLANEOUS DAILY
Qty: 100 EACH | Refills: 3 | Status: SHIPPED | OUTPATIENT
Start: 2022-03-23

## 2022-03-23 RX ORDER — INSULIN GLARGINE 100 [IU]/ML
INJECTION, SOLUTION SUBCUTANEOUS
Qty: 30 ML | Refills: 3 | Status: SHIPPED | OUTPATIENT
Start: 2022-03-23

## 2022-03-23 RX ORDER — UBROGEPANT 100 MG/1
TABLET ORAL
Qty: 10 TABLET | Refills: 0 | Status: SHIPPED | OUTPATIENT
Start: 2022-03-23 | End: 2022-04-22

## 2022-03-23 NOTE — TELEPHONE ENCOUNTER
Patient returned call to office. Scheduled for office visit at 1:15 today (open appt slot)  Will follow up with provider in office regarding preventative medication.

## 2022-03-23 NOTE — TELEPHONE ENCOUNTER
I am booked around that time. I can see her later in the day - res 24 at 1:45.  I can start her on daily prevention pill but that is valtrex or oral acyclovir which is listed as allergy on her list.

## 2022-03-23 NOTE — TELEPHONE ENCOUNTER
Pt is at [address] [place] [address] for chest xray and no order in system. Call transferred to RN.

## 2022-03-23 NOTE — TELEPHONE ENCOUNTER
Submitted request to [facility] for Speech Therapy CPT [id number]     Status: pending [facility] clinical review

## 2022-03-23 NOTE — PATIENT INSTRUCTIONS
-Please do not mix Tizanidine with Robaxin or other sedating medication   -We will send for insurance approval for Bianca Garrison as a rescue medication   -cognitive speech therapy   -Follow up in 3 months or sooner if needed. -If you develop sudden onset loss of vision, double vision, room spinning/world spinning sensation, inability to speak or understand others who are speaking to you, slurred speech, balance problems, weakness on one side of your body, numbness on one side of your body or worst headache you ever had, please seek out emergency medical attention via 911 for the nearest emergency room to be evaluated for possible stroke. -MyChart or call office with any questions or concerns. Thank you for coming to see me today. The easiest way to communicate with me is via [employer]. [employer] is meant for simple questions regarding medications, possible side effects, or other simple straight forward questions in limited sentences, rather than multiple paragraphs of discussion. [employer] is not meant for, or efficient for these complex questions, extensive questions, extensive medication adjustments, complex new symptoms or concerns. These issues beyond simple questions require a follow up visit with myself. Refills:  Please pay attention to when your refills will need to be renewed. Due to the volume of phone calls daily, this could potentially take a few days, although we certainly try to honor your refill requests as soon as we can. You should call at least 1 week in advance of needing a refill to ensure you do not run out of medication. Keep in mind that refill requests on Fridays may not be filled until the following week.

## 2022-03-24 ENCOUNTER — TELEPHONE (OUTPATIENT)
Dept: NEUROLOGY | Facility: CLINIC | Age: 62
End: 2022-03-24

## 2022-03-24 LAB — MMA: 0.13 UMOL/L

## 2022-03-24 NOTE — TELEPHONE ENCOUNTER
PA approval letter received.     Ubrelvy 100mg tablets up to 16 per 30 days approved from 2/72/56-6/82/84    Certification number [id number]    Letter placed in scanning

## 2022-04-01 ENCOUNTER — TELEPHONE (OUTPATIENT)
Dept: ENDOCRINOLOGY CLINIC | Facility: CLINIC | Age: 62
End: 2022-04-01

## 2022-04-01 NOTE — TELEPHONE ENCOUNTER
Medication PA Requested:  TRULICITY 1.5 EO/5.4AJ Subcutaneous Solution Pen-injector                                                      CoverMyMeds Used:  Key:  Quantity:Disp: 6 mL, Rfl: 0   Day Supply:  Sig: Inject 1.5 mg into the skin every 7 days.   DX Code: E11.9                                   CPT code (if applicable):   Case Number/Pending Ref#:

## 2022-04-04 NOTE — TELEPHONE ENCOUNTER
Medication PA Requested:  TRULICITY 1.5 BC/6.6AY Subcutaneous Solution Pen-injector                                                      CoverMyMeds Used:  Key:  Quantity:Disp: 6 mL, Rfl: 0   Day Supply:  Sig: Inject 1.5 mg into the skin every 7 days.   DX Code: E11.9          Faxed [employer] PA form with OV/A1c 3/23/2022

## 2022-04-06 RX ORDER — BLOOD SUGAR DIAGNOSTIC
STRIP MISCELLANEOUS
Qty: 200 STRIP | Refills: 0 | Status: SHIPPED | OUTPATIENT
Start: 2022-04-06

## 2022-04-06 NOTE — TELEPHONE ENCOUNTER
Looks like this was discontinued as it is not on her medlist.  Left message to call back to verify whether or not she is taking this. 1st attempt.

## 2022-04-07 RX ORDER — DULOXETIN HYDROCHLORIDE 20 MG/1
20 CAPSULE, DELAYED RELEASE ORAL DAILY
Qty: 90 CAPSULE | Refills: 0 | OUTPATIENT
Start: 2022-04-07

## 2022-04-08 NOTE — TELEPHONE ENCOUNTER
Received faxed prior authorization notification from [facility] [address] [place] [address],[address] for Trulicity 7.6CK/9.1DV pen-injectors.     Case number: [id number]  Approval granted 4/5/22 - 4/5/23

## 2022-04-11 NOTE — TELEPHONE ENCOUNTER
Speech Therapy Approved by Minoo Gutierrez at [facility] that is valid until 7/8/22 to be done at [address] [place] [address]    Patient notified via [facility]

## 2022-04-13 RX ORDER — MECLIZINE HYDROCHLORIDE 25 MG/1
25 TABLET ORAL 3 TIMES DAILY PRN
Qty: 30 TABLET | Refills: 0 | Status: SHIPPED | OUTPATIENT
Start: 2022-04-13

## 2022-04-13 RX ORDER — UBROGEPANT 100 MG/1
TABLET ORAL
Qty: 10 TABLET | Refills: 0 | Status: SHIPPED | OUTPATIENT
Start: 2022-04-13

## 2022-04-20 ENCOUNTER — TELEPHONE (OUTPATIENT)
Dept: NEUROLOGY | Facility: CLINIC | Age: 62
End: 2022-04-20

## 2022-04-20 RX ORDER — ACETAZOLAMIDE 250 MG/1
TABLET ORAL
Qty: 215 TABLET | Refills: 0 | Status: SHIPPED | OUTPATIENT
Start: 2022-04-20

## 2022-04-20 RX ORDER — MECLIZINE HYDROCHLORIDE 25 MG/1
25 TABLET ORAL 3 TIMES DAILY PRN
Qty: 30 TABLET | Refills: 0 | OUTPATIENT
Start: 2022-04-20

## 2022-04-20 NOTE — TELEPHONE ENCOUNTER
Hello,    Can you please call patient and let her know that the eye doctor reached out to me and noted she has increased pressure in her eyes? It might be coming from pressure inside her head and we need to do a few more tests to look into this. She has had an MRI and MRV of her brain that looked normal but I think these ought to be repeated to make sure no new lesions or blood clots have developed in the interim. We may also recommend doing a lumbar puncture to measure the pressure in the brain but first an MRI should be done to make sure the LP is safe to do. I will also send in a prescription of Diamox to help reduce pressure and hopefully help with headaches. This can help prevent vision loss from the increased pressure in the brain so I recommend starting it right away. Please ask her to monitor for any side effects from Diamox including fatigue, confusion, kidney stones, numbness/tingling. Please let me know if she has questions    Thanks!

## 2022-04-20 NOTE — TELEPHONE ENCOUNTER
Spoke to patient and informed of note below by Dr. Brionna Oliveira. Patient will  medication and start taking immediately. In regards to the MRI and MRV patient is going to call insurance first to make sure it will be covered since she just had an MRV done in February of 2022. Once she gets clarification from insurance patient will call and schedule scans. Phone number for scans provide to patient.

## 2022-04-22 ENCOUNTER — ORDER TRANSCRIPTION (OUTPATIENT)
Dept: ADMINISTRATIVE | Facility: HOSPITAL | Age: 62
End: 2022-04-22

## 2022-04-26 RX ORDER — UBROGEPANT 100 MG/1
TABLET ORAL
Qty: 10 TABLET | Refills: 0 | OUTPATIENT
Start: 2022-04-26

## 2022-05-03 NOTE — TELEPHONE ENCOUNTER
There is a high alert notification that I am not permitted to override due ot drug interaction, see below comment. Significance: Level 1     Allergen: NITROFURANTOIN MACROCRYSTAL  Type: None specified  Severity: None specified  Reactions: UNKNOWN    Allergy last edited on: 11/27/18 at 1002 by Deana Sykes last reviewed on: 3/23/22 at 1310 by Mirella Harper CMA    Reason: YELLOW DYE is an ingredient in the listed allergen and medication.           Refill passed per [facility] protocol   Requested Prescriptions   Pending Prescriptions Disp Refills    MONTELUKAST 10 MG Oral Tab [Pharmacy Med Name: Montelukast Sodium 10 Mg Tab [facility]] 90 tablet 0     Sig: TAKE ONE TABLET BY MOUTH ONE TIME DAILY        Asthma & COPD Medication Protocol Passed - 5/3/2022  1:25 PM        Passed - Appointment in past 6 or next 3 months

## 2022-05-04 RX ORDER — MONTELUKAST SODIUM 10 MG/1
10 TABLET ORAL DAILY
Qty: 90 TABLET | Refills: 1 | Status: SHIPPED | OUTPATIENT
Start: 2022-05-04 | End: 2022-11-02

## 2022-05-05 ENCOUNTER — TELEPHONE (OUTPATIENT)
Dept: FAMILY MEDICINE CLINIC | Facility: CLINIC | Age: 62
End: 2022-05-05

## 2022-05-05 DIAGNOSIS — G47.33 OBSTRUCTIVE SLEEP APNEA (ADULT) (PEDIATRIC): Primary | ICD-10-CM

## 2022-05-23 ENCOUNTER — PATIENT MESSAGE (OUTPATIENT)
Dept: FAMILY MEDICINE CLINIC | Facility: CLINIC | Age: 62
End: 2022-05-23

## 2022-05-23 ENCOUNTER — OFFICE VISIT (OUTPATIENT)
Dept: FAMILY MEDICINE CLINIC | Facility: CLINIC | Age: 62
End: 2022-05-23
Payer: COMMERCIAL

## 2022-05-23 VITALS
SYSTOLIC BLOOD PRESSURE: 114 MMHG | HEIGHT: 64 IN | BODY MASS INDEX: 36.05 KG/M2 | HEART RATE: 63 BPM | DIASTOLIC BLOOD PRESSURE: 73 MMHG | TEMPERATURE: 98 F | WEIGHT: 211.19 LBS

## 2022-05-23 DIAGNOSIS — J98.8 RESPIRATORY INFECTION: ICD-10-CM

## 2022-05-23 DIAGNOSIS — J44.1 CHRONIC OBSTRUCTIVE PULMONARY DISEASE WITH ACUTE EXACERBATION (HCC): ICD-10-CM

## 2022-05-23 DIAGNOSIS — B34.9 ACUTE VIRAL SYNDROME: Primary | ICD-10-CM

## 2022-05-23 PROCEDURE — 87637 SARSCOV2&INF A&B&RSV AMP PRB: CPT | Performed by: FAMILY MEDICINE

## 2022-05-23 RX ORDER — CODEINE PHOSPHATE AND GUAIFENESIN 10; 100 MG/5ML; MG/5ML
5 SOLUTION ORAL EVERY 6 HOURS PRN
Qty: 120 ML | Refills: 1 | Status: SHIPPED | OUTPATIENT
Start: 2022-05-23

## 2022-05-23 RX ORDER — AZITHROMYCIN 250 MG/1
TABLET, FILM COATED ORAL
Qty: 6 TABLET | Refills: 0 | Status: SHIPPED | OUTPATIENT
Start: 2022-05-23 | End: 2022-05-28

## 2022-05-23 NOTE — TELEPHONE ENCOUNTER
Pt was called and she stated that she was able to get in to see Dr. Teri Cummings today . Per pt she took a covid test and it was negative.      Future Appointments   Date Time Provider Jonny Kendall   5/23/2022  1:30 PM [facility]

## 2022-05-24 LAB
FLUAV + FLUBV RNA SPEC NAA+PROBE: NOT DETECTED
FLUAV + FLUBV RNA SPEC NAA+PROBE: NOT DETECTED
RSV RNA SPEC NAA+PROBE: NOT DETECTED
SARS-COV-2 RNA RESP QL NAA+PROBE: NOT DETECTED

## 2022-05-24 RX ORDER — MECLIZINE HYDROCHLORIDE 25 MG/1
25 TABLET ORAL 3 TIMES DAILY PRN
Qty: 30 TABLET | Refills: 0 | Status: SHIPPED | OUTPATIENT
Start: 2022-05-24

## 2022-05-26 DIAGNOSIS — J44.1 CHRONIC OBSTRUCTIVE PULMONARY DISEASE WITH ACUTE EXACERBATION (HCC): Chronic | ICD-10-CM

## 2022-05-27 NOTE — TELEPHONE ENCOUNTER
Refill passed per [facility] [place] protocol. High allergy warning    Requested Prescriptions   Pending Prescriptions Disp Refills    SYMBICORT 160-4.5 MCG/ACT Inhalation Aerosol [Pharmacy Med Name: Symbicort 160-4.5 Mcg/Act Aer Astr] 30.6 g 0     Sig: Inhale 2 puffs into the lungs 2 (two) times daily.         Asthma & COPD Medication Protocol Passed - 5/26/2022  9:19 PM        Passed - Appointment in past 6 or next 3 months              Recent Outpatient Visits              4 days ago Acute viral syndrome    [address] Soriano MD    Office Visit    2 months ago Type 2 diabetes mellitus without complication, unspecified whether long term insulin use [facility])    [address] Redia Corona, MD    Office Visit    2 months ago OWODARD (dyspnea on exertion)    Samantha Lino MD    Office Visit    2 months ago Migraine aura without headache    [facility], [address] [facility], [place]    Office Visit    2 months ago Controlled type 2 diabetes mellitus without complication, without long-term current use of insulin [facility])    [address] MD    Office Visit

## 2022-05-31 ENCOUNTER — TELEPHONE (OUTPATIENT)
Dept: FAMILY MEDICINE CLINIC | Facility: CLINIC | Age: 62
End: 2022-05-31

## 2022-05-31 RX ORDER — BUDESONIDE AND FORMOTEROL FUMARATE DIHYDRATE 160; 4.5 UG/1; UG/1
2 AEROSOL RESPIRATORY (INHALATION) 2 TIMES DAILY
Qty: 30.6 G | Refills: 0 | Status: SHIPPED | OUTPATIENT
Start: 2022-05-31

## 2022-06-02 RX ORDER — MECLIZINE HYDROCHLORIDE 25 MG/1
TABLET ORAL
Qty: 30 TABLET | Refills: 0 | OUTPATIENT
Start: 2022-06-02

## 2022-06-16 ENCOUNTER — OFFICE VISIT (OUTPATIENT)
Dept: INTERNAL MEDICINE CLINIC | Facility: CLINIC | Age: 62
End: 2022-06-16
Payer: COMMERCIAL

## 2022-06-16 ENCOUNTER — NURSE TRIAGE (OUTPATIENT)
Dept: FAMILY MEDICINE CLINIC | Facility: CLINIC | Age: 62
End: 2022-06-16

## 2022-06-16 VITALS
DIASTOLIC BLOOD PRESSURE: 64 MMHG | HEIGHT: 64 IN | WEIGHT: 213 LBS | SYSTOLIC BLOOD PRESSURE: 110 MMHG | BODY MASS INDEX: 36.37 KG/M2 | HEART RATE: 67 BPM | OXYGEN SATURATION: 99 %

## 2022-06-16 DIAGNOSIS — M47.12 CERVICAL SPONDYLOSIS WITH MYELOPATHY: Primary | ICD-10-CM

## 2022-06-16 DIAGNOSIS — M25.511 ACUTE PAIN OF RIGHT SHOULDER: ICD-10-CM

## 2022-06-16 PROCEDURE — 3074F SYST BP LT 130 MM HG: CPT | Performed by: NURSE PRACTITIONER

## 2022-06-16 PROCEDURE — 99214 OFFICE O/P EST MOD 30 MIN: CPT | Performed by: NURSE PRACTITIONER

## 2022-06-16 PROCEDURE — 3008F BODY MASS INDEX DOCD: CPT | Performed by: NURSE PRACTITIONER

## 2022-06-16 PROCEDURE — 3078F DIAST BP <80 MM HG: CPT | Performed by: NURSE PRACTITIONER

## 2022-06-16 RX ORDER — PREDNISONE 20 MG/1
TABLET ORAL
Qty: 5 TABLET | Refills: 0 | Status: SHIPPED | OUTPATIENT
Start: 2022-06-16

## 2022-06-22 ENCOUNTER — HOSPITAL ENCOUNTER (OUTPATIENT)
Dept: GENERAL RADIOLOGY | Age: 62
Discharge: HOME | End: 2022-06-22
Attending: NURSE PRACTITIONER
Payer: COMMERCIAL

## 2022-06-22 DIAGNOSIS — M25.511 ACUTE PAIN OF RIGHT SHOULDER: ICD-10-CM

## 2022-06-22 PROCEDURE — 73030 X-RAY EXAM OF SHOULDER: CPT | Performed by: NURSE PRACTITIONER

## 2022-06-22 PROCEDURE — 72040 X-RAY EXAM NECK SPINE 2-3 VW: CPT | Performed by: NURSE PRACTITIONER

## 2022-06-23 ENCOUNTER — PATIENT MESSAGE (OUTPATIENT)
Dept: INTERNAL MEDICINE CLINIC | Facility: CLINIC | Age: 62
End: 2022-06-23

## 2022-06-23 DIAGNOSIS — M47.12 CERVICAL SPONDYLOSIS WITH MYELOPATHY: Primary | ICD-10-CM

## 2022-06-24 NOTE — TELEPHONE ENCOUNTER
From: Eliot Severs  To: ANGELINA Syed  Sent: 6/23/2022 6:54 PM CDT  Subject: Question regarding X-Ray Shoulder    I am still having neck, shoulder and arm pain. What's the next plan of attack to help me feel better?

## 2022-06-27 ENCOUNTER — MED REC SCAN ONLY (OUTPATIENT)
Dept: FAMILY MEDICINE CLINIC | Facility: CLINIC | Age: 62
End: 2022-06-27

## 2022-07-10 DIAGNOSIS — G62.9 LENGTH-DEPENDENT PERIPHERAL NEUROPATHY: ICD-10-CM

## 2022-07-10 DIAGNOSIS — Z86.73 HISTORY OF TIA (TRANSIENT ISCHEMIC ATTACK): ICD-10-CM

## 2022-07-10 DIAGNOSIS — R41.3 MEMORY PROBLEM: ICD-10-CM

## 2022-07-10 DIAGNOSIS — G43.109 MIGRAINE AURA WITHOUT HEADACHE: Primary | ICD-10-CM

## 2022-07-10 DIAGNOSIS — E53.8 B12 DEFICIENCY: ICD-10-CM

## 2022-07-10 DIAGNOSIS — G43.119 INTRACTABLE MIGRAINE WITH AURA WITHOUT STATUS MIGRAINOSUS: ICD-10-CM

## 2022-07-11 RX ORDER — ACETAZOLAMIDE 250 MG/1
TABLET ORAL
Qty: 120 TABLET | Refills: 0 | Status: SHIPPED | OUTPATIENT
Start: 2022-07-11

## 2022-07-11 NOTE — TELEPHONE ENCOUNTER
acetaZOLAMIDE 250 MG Oral Tab  Take 2 tablets by mouth twice daily.    120, no refills    LOV: 3/23/22  NOV: None scheduled   Last refilled: 4/20/22

## 2022-07-17 ENCOUNTER — TELEPHONE (OUTPATIENT)
Dept: FAMILY MEDICINE CLINIC | Facility: CLINIC | Age: 62
End: 2022-07-17

## 2022-07-18 RX ORDER — ACYCLOVIR 50 MG/G
1 OINTMENT TOPICAL
Qty: 30 G | Refills: 0 | OUTPATIENT
Start: 2022-07-18

## 2022-07-18 RX ORDER — ACYCLOVIR 50 MG/G
1 OINTMENT TOPICAL
Qty: 30 G | Refills: 2 | Status: SHIPPED | OUTPATIENT
Start: 2022-07-18

## 2022-07-18 NOTE — TELEPHONE ENCOUNTER
Pharmacy requesting Acyclovir ointment refill       Name from pharmacy: Acyclovir 5 % Oin Itzelta          Will file in chart as: ACYCLOVIR 5 % External Ointment    The original prescription was discontinued on 5/23/2022 by Sohan Norman MD. Renewing this prescription may not be appropriate.

## 2022-07-19 ENCOUNTER — APPOINTMENT (OUTPATIENT)
Dept: ENDOCRINOLOGY | Facility: HOSPITAL | Age: 62
End: 2022-07-19
Attending: INTERNAL MEDICINE
Payer: COMMERCIAL

## 2022-07-20 ENCOUNTER — PATIENT MESSAGE (OUTPATIENT)
Dept: ENDOCRINOLOGY CLINIC | Facility: CLINIC | Age: 62
End: 2022-07-20

## 2022-07-20 ENCOUNTER — TELEPHONE (OUTPATIENT)
Dept: ENDOCRINOLOGY CLINIC | Facility: CLINIC | Age: 62
End: 2022-07-20

## 2022-07-20 RX ORDER — INSULIN GLARGINE 100 [IU]/ML
INJECTION, SOLUTION SUBCUTANEOUS
Qty: 45 ML | Refills: 3 | Status: SHIPPED | OUTPATIENT
Start: 2022-07-20

## 2022-07-20 NOTE — TELEPHONE ENCOUNTER
From: Jacqui Powers  To: Blake Dailey MD  Sent: 7/20/2022 1:14 PM CDT  Subject: Lantus Dosage Change    I increased my daily dosage to 50cc at night since after taking the migraine med Vola Dima) I could not get my morning numbers back down to normal. It has been working. Please change the script to 50 cc and send new script to the pharmacy at the [facility] in [place] as I am almost out.

## 2022-07-21 NOTE — TELEPHONE ENCOUNTER
Spoke to pharmacist to see if alternatives covered - pharmacist stated no alternatives  Listed - pharmacist to send fax with explanation

## 2022-07-27 DIAGNOSIS — K22.719 BARRETT'S ESOPHAGUS WITH DYSPLASIA: ICD-10-CM

## 2022-07-27 RX ORDER — INSULIN GLARGINE-YFGN 100 [IU]/ML
50 INJECTION, SOLUTION SUBCUTANEOUS DAILY
Qty: 45 ML | Refills: 1 | Status: SHIPPED | OUTPATIENT
Start: 2022-07-27

## 2022-07-27 RX ORDER — MECOBALAMIN 5000 MCG
TABLET,DISINTEGRATING ORAL
Qty: 270 CAPSULE | Refills: 0 | Status: SHIPPED | OUTPATIENT
Start: 2022-07-27 | End: 2022-07-27

## 2022-07-27 NOTE — TELEPHONE ENCOUNTER
Per chart review provider sent the script on 7/20/22 with requested dose. Responded to patient with this information. Pt was advised to contact the office if there were any issues.

## 2022-07-27 NOTE — TELEPHONE ENCOUNTER
Dr. Daron Nichole,     Please advise on below.    Also looked into Toujeo and it is also preferred at tier 2 just like semglee and basaglar

## 2022-08-01 ENCOUNTER — TELEPHONE (OUTPATIENT)
Dept: ENDOCRINOLOGY CLINIC | Facility: CLINIC | Age: 62
End: 2022-08-01

## 2022-08-01 NOTE — TELEPHONE ENCOUNTER
Pt jonathan called stating Semglee medication pt does not like vials would like pen injector instead,    NDC: [id number]

## 2022-08-01 NOTE — TELEPHONE ENCOUNTER
rn called pharmacy and explained we are trying to send pens but system would not give us option.   Verbalized understanding and will dispense pens

## 2022-08-29 RX ORDER — DULAGLUTIDE 1.5 MG/.5ML
1.5 INJECTION, SOLUTION SUBCUTANEOUS
Qty: 6 ML | Refills: 0 | Status: SHIPPED | OUTPATIENT
Start: 2022-08-29

## 2022-08-29 RX ORDER — DULAGLUTIDE 1.5 MG/.5ML
1.5 INJECTION, SOLUTION SUBCUTANEOUS
Qty: 6 ML | Refills: 0 | Status: CANCELLED | OUTPATIENT
Start: 2022-08-29

## 2022-08-29 RX ORDER — PEN NEEDLE, DIABETIC 32GX 5/32"
1 NEEDLE, DISPOSABLE MISCELLANEOUS DAILY
Qty: 100 EACH | Refills: 3 | Status: SHIPPED | OUTPATIENT
Start: 2022-08-29

## 2022-09-01 ENCOUNTER — PATIENT MESSAGE (OUTPATIENT)
Dept: FAMILY MEDICINE CLINIC | Facility: CLINIC | Age: 62
End: 2022-09-01

## 2022-09-01 NOTE — TELEPHONE ENCOUNTER
From: Shaq Martinez  To: Amanda Weller MD  Sent: 9/1/2022 9:35 AM CDT  Subject: Propranolol 80 mg twice daily    Can you please send in a refill request for my maintenance blood pressure medicine propranolol, 80 mg, twice a day, for 90 days to the [facility] in [address] [place] [address]?

## 2022-09-01 NOTE — TELEPHONE ENCOUNTER
Please review; protocol failed/no protocol. Requested Prescriptions   Pending Prescriptions Disp Refills    Propranolol HCl 80 MG Oral Tab 180 tablet 0     Sig: Take 1 tablet (80 mg total) by mouth 2 (two) times daily. Hypertensive Medications Protocol Failed - 9/1/2022  5:27 PM        Failed - CMP or BMP in past 6 months     No results found for this or any previous visit (from the past 4392 hour(s)).               Passed - In person appointment in the past 12 or next 3 months       Recent Outpatient Visits              2 months ago Cervical spondylosis with myelopathy    [facility], [address], CiroDaren Kirby, APRN    Office Visit    3 months ago Acute viral syndrome    [place] Guo MD    Office Visit    5 months ago Type 2 diabetes mellitus without complication, unspecified whether long term insulin use [facility])    [place] [facility], [address], Carlos Eduardo Shannon MD    Office Visit    5 months ago WOODARD (dyspnea on exertion)    [address], Ella Beasley MD    Office Visit    5 months ago Migraine aura without headache    [address] [place], [place]    Office Visit     Future Appointments         Provider Department Appt Notes    In 2 weeks ADO MRI RM1 (1.5T) [facility] in [place]     In 2 weeks ADO MRI RM1 (1.5T) [facility] [place]                Passed - Last BP reading less than 140/90     BP Readings from Last 1 Encounters:  06/16/22 : 110/64                Passed - In person appointment or virtual visit in the past 6 months       Recent Outpatient Visits              2 months ago Cervical spondylosis with myelopathy    [facility], [address], CiroDaren Kirby, APRN    Office Visit    3 months ago Acute viral syndrome    Kevin Guo MD    Office Visit    5 months ago Type 2 diabetes mellitus without complication, unspecified whether long term insulin use [facility])    [facility], [address], Garvindena Josue MD    Office Visit    5 months ago WOODARD (dyspnea on exertion)    [facility], [facility]    Office Visit    5 months ago Migraine aura without headache    [address] [facility], [place], [place]    Office Visit     Future Appointments         Provider Department Appt Notes    In 2 weeks ADO MRI RM1 (1.5T) [facility] in [place]     In 2 weeks ADO MRI RM1 (1.5T) [facility] in [address] [place] [address] - GFR > 50     No results found for: [facility]                     Recent Outpatient Visits              2 months ago Cervical spondylosis with myelopathy    [facility], [address] [facility] [address], Moses Lopez APRN    Office Visit    3 months ago Acute viral syndrome    [place] Yu MD    Office Visit    5 months ago Type 2 diabetes mellitus without complication, unspecified whether long term insulin use [facility])    [facility], [address], [address] [facility], MD    Office Visit    5 months ago WOODARD (dyspnea on exertion)    [facility], [address] [facility], Sean Gupta MD    Office Visit    5 months ago Migraine aura without headache    [place], [place]    Office Visit             Future Appointments         Provider Department Appt Notes    In 2 weeks ADO MRI RM1 (1.5T) [facility] in [place]     In 2 weeks ADO MRI RM1 (1.5T) [facility] in [place]

## 2022-09-03 RX ORDER — PROPRANOLOL HYDROCHLORIDE 80 MG/1
80 TABLET ORAL 2 TIMES DAILY
Qty: 180 TABLET | Refills: 4 | Status: SHIPPED | OUTPATIENT
Start: 2022-09-03

## 2022-09-06 RX ORDER — DIAZEPAM 5 MG/1
5 TABLET ORAL EVERY 12 HOURS PRN
Qty: 30 TABLET | Refills: 0 | Status: SHIPPED | OUTPATIENT
Start: 2022-09-06

## 2022-09-07 ENCOUNTER — PATIENT MESSAGE (OUTPATIENT)
Dept: PHYSICAL MEDICINE AND REHAB | Facility: CLINIC | Age: 62
End: 2022-09-07

## 2022-09-07 NOTE — TELEPHONE ENCOUNTER
From: Nila Chambers  To: Ethelle Service A. Naoma Dance, MD  Sent: 9/7/2022 1:12 PM CDT  Subject: Injections    I get sciatica injections around this time every year. I am once again in desperate need. Do I need an office visit or can we just schedule an appointment for injections?

## 2022-09-10 RX ORDER — ATORVASTATIN CALCIUM 40 MG/1
40 TABLET, FILM COATED ORAL DAILY
Qty: 90 TABLET | Refills: 1 | Status: SHIPPED | OUTPATIENT
Start: 2022-09-10 | End: 2023-03-09

## 2022-09-10 NOTE — TELEPHONE ENCOUNTER
Protocol failed or has No Protocol, please review  Requested Prescriptions   Pending Prescriptions Disp Refills    ATORVASTATIN 40 MG Oral Tab [Pharmacy Med Name: Atorvastatin Calcium 40 Mg Tab [facility]] 90 tablet 0     Sig: TAKE ONE TABLET BY MOUTH ONE TIME DAILY        Cholesterol Medication Protocol Failed - 9/10/2022  1:30 AM        Failed - LDL in past 12 months        Failed - Last LDL < 130     Lab Results   Component Value Date     (H) 06/12/2021               Passed - ALT in past 12 months        Passed - Last ALT < 80       Lab Results   Component Value Date    ALT 30 02/15/2022             Passed - In person appointment or virtual visit in the past 12 mos or appointment in next 3 mos       Recent Outpatient Visits              2 months ago Cervical spondylosis with myelopathy    [facility], [address] [facility] [address] [facility], Daren Lopez APRN    Office Visit    3 months ago Acute viral syndrome    [place] Gonzalez MD    Office Visit    5 months ago Type 2 diabetes mellitus without complication, unspecified whether long term insulin use [facility])    [facility], [address], Chelsey Oneil MD    Office Visit    5 months ago WOODARD (dyspnea on exertion)    [facility], [address] [place] [address], Fouzia Burleson MD    Office Visit    5 months ago Migraine aura without headache    [address] [place], [place]    Office Visit     Future Appointments         Provider Department Appt Notes    In 1 week ADO MRI RM1 (1.5T) [facility] in [address] [place] [address]     In 1 week ADO MRI RM1 (1.5T) [facility] in [address] [place] [address]     In 2 months C[place], Dorean Fothergill,  [facility], [place] f/u back pain                   Future Appointments         Provider Department Appt Notes    In 1 week ADO MRI RM1 (1.5T) [facility] in [place]     In 1 week ADO MRI RM1 (1.5T) [facility] in [address] [place] [address]     In 2 months Abisai Villegas  [facility], [place] f/u back pain          Recent Outpatient Visits              2 months ago Cervical spondylosis with myelopathy    [facility], Daren Lopez APRN    Office Visit    3 months ago Acute viral syndrome    [address] [place] Soriano MD    Office Visit    5 months ago Type 2 diabetes mellitus without complication, unspecified whether long term insulin use [facility])    [facility], [facility], [address], Robby Ridley MD    Office Visit    5 months ago WOODARD (dyspnea on exertion)    [facility], [facility], [facility] MD Zan    Office Visit    5 months ago Migraine aura without headache    [place], [place]    Office Visit

## 2022-09-14 NOTE — TELEPHONE ENCOUNTER
PRIOR AUTHORIZATION SUBMITTED THROUGH COVER MY MEDS KEY Key: [id number] WITH EXPECTED RESPONSE IN 3-5 BUSINESS DAYS Libtayo Pregnancy And Lactation Text: This medication is contraindicated in pregnancy and when breast feeding.

## 2022-09-19 ENCOUNTER — PATIENT MESSAGE (OUTPATIENT)
Dept: PHYSICAL MEDICINE AND REHAB | Facility: CLINIC | Age: 62
End: 2022-09-19

## 2022-09-19 ENCOUNTER — PATIENT MESSAGE (OUTPATIENT)
Dept: FAMILY MEDICINE CLINIC | Facility: CLINIC | Age: 62
End: 2022-09-19

## 2022-09-19 NOTE — TELEPHONE ENCOUNTER
From: Susanna Haney  Sent: 9/19/2022 9:46 AM CDT  To: Luke Villegas Nurse  Subject: Injections    I cannot get in for a follow up visit until December. Which means with the holidays I won't be able to get relief until January if I'm alma. Would you please ask Dr. Ronnell Moran if he'll make an exception and maybe do a video visit for the re-evaluation so I can get the shots and get out of pain sometime soon?

## 2022-09-22 ENCOUNTER — NURSE TRIAGE (OUTPATIENT)
Dept: FAMILY MEDICINE CLINIC | Facility: CLINIC | Age: 62
End: 2022-09-22

## 2022-09-23 ENCOUNTER — LAB ENCOUNTER (OUTPATIENT)
Dept: LAB | Age: 62
End: 2022-09-23
Attending: NURSE PRACTITIONER

## 2022-09-23 ENCOUNTER — HOSPITAL ENCOUNTER (OUTPATIENT)
Dept: CT IMAGING | Age: 62
Discharge: HOME | End: 2022-09-23
Attending: NURSE PRACTITIONER

## 2022-09-23 ENCOUNTER — OFFICE VISIT (OUTPATIENT)
Dept: FAMILY MEDICINE CLINIC | Facility: CLINIC | Age: 62
End: 2022-09-23

## 2022-09-23 VITALS
DIASTOLIC BLOOD PRESSURE: 74 MMHG | SYSTOLIC BLOOD PRESSURE: 121 MMHG | WEIGHT: 210 LBS | BODY MASS INDEX: 35.85 KG/M2 | HEART RATE: 60 BPM | HEIGHT: 64 IN

## 2022-09-23 DIAGNOSIS — E11.9 TYPE 2 DIABETES MELLITUS WITHOUT COMPLICATION, WITH LONG-TERM CURRENT USE OF INSULIN (HCC): ICD-10-CM

## 2022-09-23 DIAGNOSIS — R10.31 RLQ ABDOMINAL PAIN: Primary | ICD-10-CM

## 2022-09-23 DIAGNOSIS — R10.31 ABDOMINAL PAIN, RLQ: Primary | ICD-10-CM

## 2022-09-23 DIAGNOSIS — R10.31 ABDOMINAL PAIN, RLQ: ICD-10-CM

## 2022-09-23 DIAGNOSIS — R10.31 RLQ ABDOMINAL PAIN: ICD-10-CM

## 2022-09-23 DIAGNOSIS — Z79.4 TYPE 2 DIABETES MELLITUS WITHOUT COMPLICATION, WITH LONG-TERM CURRENT USE OF INSULIN (HCC): ICD-10-CM

## 2022-09-23 DIAGNOSIS — K43.9 VENTRAL HERNIA WITHOUT OBSTRUCTION OR GANGRENE: ICD-10-CM

## 2022-09-23 LAB
CREAT BLD-MCNC: 0.7 MG/DL
DEPRECATED RDW RBC AUTO: 44.5 FL (ref 35.1–46.3)
ERYTHROCYTE [DISTWIDTH] IN BLOOD BY AUTOMATED COUNT: 13.7 % (ref 11–15)
GFR SERPLBLD BASED ON 1.73 SQ M-ARVRAT: 98 ML/MIN/1.73M2 (ref 60–?)
HCT VFR BLD AUTO: 39.6 %
HGB BLD-MCNC: 12.2 G/DL
MCH RBC QN AUTO: 27.3 PG (ref 26–34)
MCHC RBC AUTO-ENTMCNC: 30.8 G/DL (ref 31–37)
MCV RBC AUTO: 88.6 FL
PLATELET # BLD AUTO: 259 10(3)UL (ref 150–450)
RBC # BLD AUTO: 4.47 X10(6)UL
WBC # BLD AUTO: 8.2 X10(3) UL (ref 4–11)

## 2022-09-23 PROCEDURE — 85027 COMPLETE CBC AUTOMATED: CPT

## 2022-09-23 PROCEDURE — 36415 COLL VENOUS BLD VENIPUNCTURE: CPT

## 2022-09-23 PROCEDURE — 3078F DIAST BP <80 MM HG: CPT | Performed by: NURSE PRACTITIONER

## 2022-09-23 PROCEDURE — 3008F BODY MASS INDEX DOCD: CPT | Performed by: NURSE PRACTITIONER

## 2022-09-23 PROCEDURE — 99214 OFFICE O/P EST MOD 30 MIN: CPT | Performed by: NURSE PRACTITIONER

## 2022-09-23 PROCEDURE — 3074F SYST BP LT 130 MM HG: CPT | Performed by: NURSE PRACTITIONER

## 2022-09-23 PROCEDURE — 74177 CT ABD & PELVIS W/CONTRAST: CPT | Performed by: NURSE PRACTITIONER

## 2022-09-23 PROCEDURE — 82565 ASSAY OF CREATININE: CPT

## 2022-09-23 NOTE — ASSESSMENT & PLAN NOTE
Stat cbc and ct abd pelvis  . Discussed w pt red flag symptoms that if they occur, pt should immediately go to ER. Pt states understanding.

## 2022-09-26 ENCOUNTER — PATIENT MESSAGE (OUTPATIENT)
Dept: FAMILY MEDICINE CLINIC | Facility: CLINIC | Age: 62
End: 2022-09-26

## 2022-09-26 NOTE — TELEPHONE ENCOUNTER
From: Sharon Fox  To: ANGELINA Babin  Sent: 9/26/2022 11:07 AM CDT  Subject: Question regarding CT Scan Abdomen Pelvis    Thanks for the appendix test results. There are other items of concern from the CT scan. Can you call me to discuss the other findings they came up with please?

## 2022-10-01 NOTE — TELEPHONE ENCOUNTER
Pt contacted  Still having some pain in lower abd in right and lower area   Has chronic constipation-takes 2 lax/stool softeners nightly-struggles daily. Sometimes will take lax/softener in am  miralax will bloat her  Had tried linzess which caused severe diarrhea    Will add metamucil in am    Will try working on whole diet.

## 2022-10-04 ENCOUNTER — PATIENT MESSAGE (OUTPATIENT)
Dept: ENDOCRINOLOGY CLINIC | Facility: CLINIC | Age: 62
End: 2022-10-04

## 2022-10-05 ENCOUNTER — PATIENT MESSAGE (OUTPATIENT)
Dept: ENDOCRINOLOGY CLINIC | Facility: CLINIC | Age: 62
End: 2022-10-05

## 2022-10-05 RX ORDER — GLIMEPIRIDE 1 MG/1
2 TABLET ORAL
Qty: 180 TABLET | Refills: 1 | Status: SHIPPED | OUTPATIENT
Start: 2022-10-05

## 2022-10-05 NOTE — TELEPHONE ENCOUNTER
Dr. Abe Kinsey -- please see below and advise if ok to proceed with switching back to lantus and doing PA. Thank you.

## 2022-10-07 RX ORDER — INSULIN GLARGINE 100 [IU]/ML
INJECTION, SOLUTION SUBCUTANEOUS
Qty: 45 ML | Refills: 3 | Status: SHIPPED | OUTPATIENT
Start: 2022-10-07

## 2022-10-07 NOTE — TELEPHONE ENCOUNTER
Medication PA Requested: insulin glargine (LANTUS SOLOSTAR) 100 UNIT/ML Subcutaneous Solution Pen-injector                                                         CoverMyMeds Used:  Key:  Quantity:45 mL Rfls 3  Day Supply:  Sig: INJECT 50 UNITS SUBCUTANEOUS DAILY  DX Code: E11.9       Faxed [employer] PA form with LOV, a1c  3/23/2022, TE note 10/5 with notation of patient's reaction to [facility].    Awaiting determination

## 2022-10-07 NOTE — TELEPHONE ENCOUNTER
Medication PA Requested: insulin glargine (LANTUS SOLOSTAR) 100 UNIT/ML Subcutaneous Solution Pen-injector                                                         CoverMyMeds Used:  Key:  Quantity:45 mL Rfls 3  Day Supply:  Sig: INJECT 50 UNITS SUBCUTANEOUS DAILY  DX Code: E11.9                                     CPT code (if applicable):   Case Number/Pending Ref#:

## 2022-10-10 NOTE — TELEPHONE ENCOUNTER
After informed consent, the patient's right and left knees were marked, locally anesthetized with skin refrigerant, prepped with topical antiseptic, and injected with a mixture of 1mL 40mg/mL Kenalog, 2mL 1% lidocaine and 2mL 0.5% marcaine through the inferolateral portal.  A band-aid was applied. The patient tolerated the procedure well.     Sohail Knutson MD, [address] [facility]  Phone [phone]  Fax [phone] Received approval letter from [employer] that Lantus is covered 10/7/22-10/7/23. Patient has been notified. Approval letter sent to scanning.

## 2022-10-11 RX ORDER — PEN NEEDLE, DIABETIC 32GX 5/32"
1 NEEDLE, DISPOSABLE MISCELLANEOUS DAILY
Qty: 90 EACH | Refills: 0 | Status: SHIPPED | OUTPATIENT
Start: 2022-10-11

## 2022-10-13 ENCOUNTER — TELEPHONE (OUTPATIENT)
Dept: FAMILY MEDICINE CLINIC | Facility: CLINIC | Age: 62
End: 2022-10-13

## 2022-10-13 ENCOUNTER — PATIENT MESSAGE (OUTPATIENT)
Dept: FAMILY MEDICINE CLINIC | Facility: CLINIC | Age: 62
End: 2022-10-13

## 2022-10-14 ENCOUNTER — PATIENT MESSAGE (OUTPATIENT)
Dept: FAMILY MEDICINE CLINIC | Facility: CLINIC | Age: 62
End: 2022-10-14

## 2022-10-14 LAB — AMB EXT COVID-19 RESULT: DETECTED

## 2022-10-14 NOTE — TELEPHONE ENCOUNTER
From: Demetrio Levine  To: Kori Seth MD  Sent: 10/13/2022 7:46 PM CDT  Subject: Possible Covid    I was exposed to Covid on Sunday, 10/9 and since Wednesday, 10/12, I've been in bed with a persistent dry cough, headache, body aches, low grade fever. I need a video call with Dr. Misael Watts, a note for work and a covid test which I will get at a facility close to home. Please call me Friday 10/14 to see if we can set up a call.    [phone]

## 2022-10-14 NOTE — TELEPHONE ENCOUNTER
From: Vinicius Weaver  To: Lang Stockton MD  Sent: 10/13/2022 7:46 PM CDT  Subject: Possible Covid    I was exposed to Covid on Sunday, 10/9 and since Wednesday, 10/12, I've been in bed with a persistent dry cough, headache, body aches, low grade fever. I need a video call with Dr. Vidal Gamez, a note for work and a covid test which I will get at a facility close to home. Please call me Friday 10/14 to see if we can set up a call.    [phone]

## 2022-10-14 NOTE — TELEPHONE ENCOUNTER
Action Requested: Summary for Provider     []  Critical Lab, Recommendations Needed  [] Need Additional Advice  []   FYI    []   Need Orders  [] Need Medications Sent to Pharmacy  []  Other     SUMMARY: Pt going to IC for eval.    Pt reports  dry cough, headache, body aches, low grade fever \"99.3\"    Pt denies diarrhea, nausea, chest pain, wheezing, SOB. No openings in IM or FM and advised pt go to IC and pt agrees to plan.       Reason for call: Acute  Onset: Data Unavailable

## 2022-10-14 NOTE — TELEPHONE ENCOUNTER
Dr. Law Chavez do you want to use a SDS slot?
Go use SDS next tuesday
Pt is scheduled to have surgery on 2-20-17 on her neck for spinal stenosis. Pt needs pre op clearance and does not want to see anyone else other than Dr. Susannah Herrera.
Talked to pt appt made.
DISPLAY PLAN FREE TEXT
- - -

## 2022-10-17 ENCOUNTER — PATIENT MESSAGE (OUTPATIENT)
Dept: FAMILY MEDICINE CLINIC | Facility: CLINIC | Age: 62
End: 2022-10-17

## 2022-10-18 NOTE — TELEPHONE ENCOUNTER
Dylon Galan RN 10/18/2022 11:27 AM CDT        ----- Message -----  From: Lia Keller  Sent: 10/17/2022 6:13 PM CDT  To: Em Rn Triage  Subject: Possible Covid     Please make a note in my file that as of [facility] I still have positive covid testing and I also have pneumonia. They prescribed prednisone and doxycycline. I am to follow up with them on Wednesday or Thursday of this week. I will send you new results as of then. Thank you.

## 2022-10-19 ENCOUNTER — TELEPHONE (OUTPATIENT)
Dept: FAMILY MEDICINE CLINIC | Facility: CLINIC | Age: 62
End: 2022-10-19

## 2022-10-19 NOTE — TELEPHONE ENCOUNTER
Pt went to [facility] on Monday 10/17. Tested positive for COVID on 10/14. Pt states she developed pneumonia-had xrays done at . Pt was prescribed prednisone and doxycycline. Last dose will be this Friday. Pt states she is feeling better. Denies shortness of breath, no chest pain. Pt states she has been off of work for about 1 week and a half. Pt would like a repeat CXR done this Friday, and a letter to return to work on Monday. Pt requesting for video visit with Dr. Estrellita Lyons or Kendall Howard this week. Please advise.

## 2022-10-19 NOTE — TELEPHONE ENCOUNTER
No reason to repeat the CXR. Pneumonia can take weeks to clear on a CXR = if symptoms are improving, no reason to recheck it. Can give letter to return to work.  Can schedule with Kit Becker if opening

## 2022-10-19 NOTE — TELEPHONE ENCOUNTER
Chief Complaint  Establish Care (Last PCP [facility] - phycologist Amy CASTILLO at [facility] ( who was writing medication ) )    Subjective  Patient is a 23-year-old male who is here for his first visit at this clinic.  He is accompanied by his father.  Patient has autism and severe intellectual disability.  He does verbalize a few words and appetite and sleep are normal.  He has been taking Risperdal for behavior for several years with good results.  He has been treated by local pediatrician and mental health care provider but has aged out of that practice.  Discontinuation of respite all was attempted previously within the last few years.  He lost 40 pounds but behavior severely worsened and acted out.  He has history of biting himself and others.  They have been through multiple evaluations through  of  for management of symptoms.  They have tried sending him to active day but father worries about risk of patient behavior on others.  He does well if he is in a routine.  Patient's sister takes him out to [employer] or similar outings 2-3 times per week.  Dr. Mckeon started him on metformin as a preventive measure due to his weight.  He has never been diagnosed with diabetes or prediabetes.  His appetite is very picky so his diet is not the healthiest.  Father needs a new provider to continue prescribing metformin and respite home.  Reports that symptoms are stable on current treatment.  Dental cleanings require anesthesia.  Cannot recall exactly when labs were done last prefers not to have labs done on a routine basis.  Last vaccinations received in around age 4.  Patient's mother was antivaccine.  Now it is the point of his acting out if he were to receive vaccinations.  Does not want vaccinations.        Trent Kellogg presents to [facility]          Objective   Vital Signs:   Vitals:    09/27/22 1429   BP: 117/83   BP Location: Right  Morgan  Active and  message sent with provider's recommendation. CSS=please call and assists to schedule to do video to any FM provider. OFFICE STAFF =please assist with the return to work letter.        Future Appointments   Date Time Provider Jonny Kendall   12/8/2022  3:00 PM William Otto MD [facility] "arm   Patient Position: Sitting   Cuff Size: Large Adult   Pulse: 106   SpO2: 97%   Weight: 114 kg (252 lb)   Height: 179.1 cm (70.5\")      Body mass index is 35.65 kg/m².  Physical Exam  Vitals reviewed.   Constitutional:       General: He is not in acute distress.     Appearance: Normal appearance. He is well-developed. He is obese.   Neck:      Thyroid: No thyroid mass, thyromegaly or thyroid tenderness.   Cardiovascular:      Rate and Rhythm: Normal rate and regular rhythm.      Pulses:           Posterior tibial pulses are 2+ on the right side and 2+ on the left side.      Heart sounds: Normal heart sounds.   Pulmonary:      Effort: Pulmonary effort is normal.      Breath sounds: Normal breath sounds.   Musculoskeletal:      Right lower leg: No edema.      Left lower leg: No edema.   Skin:     General: Skin is warm and dry.   Neurological:      General: No focal deficit present.      Mental Status: He is alert.   Psychiatric:         Attention and Perception: Attention normal.         Mood and Affect: Mood and affect normal.         Behavior: Behavior normal.          Result Review :                Assessment and Plan    Diagnoses and all orders for this visit:    1. Autism (Primary)  Assessment & Plan:  Stable on current treatment long-term.  Dad is aware of risks with long-term Risperdal usage.  He feels as if the benefits far outweigh the potential risks or side effects.  I do not routinely prescribe risperidone.  If patient is not stable on current risperidone dosage at any time, I will then recommend referral to a specialist for further evaluation.  Father verbalizes understanding.  The goal right now is to maintain him at his current status.    Orders:  -     risperiDONE (risperDAL) 1 MG/ML oral solution; 1 ml twice daily  Dispense: 60 mL; Refill: 5    2. Intellectual disability  -     risperiDONE (risperDAL) 1 MG/ML oral solution; 1 ml twice daily  Dispense: 60 mL; Refill: 5    3. Class 2 obesity without " serious comorbidity with body mass index (BMI) of 35.0 to 35.9 in adult, unspecified obesity type  Assessment & Plan:  Can only take liquid medication.  Does not need metformin refills at this time.  Will refill metformin when needed.  Dad will continue collaborative discussion and determination of when and if healthcare labs could be done or indicated.  He is advised to monitor for any signs or symptoms of diabetes.        Follow Up    Return in about 6 months (around 3/27/2023).  Patient was given instructions and counseling regarding his condition or for health maintenance advice. Please see specific information pulled into the AVS if appropriate.

## 2022-10-28 DIAGNOSIS — J44.1 CHRONIC OBSTRUCTIVE PULMONARY DISEASE WITH ACUTE EXACERBATION (HCC): Chronic | ICD-10-CM

## 2022-10-30 RX ORDER — BUDESONIDE AND FORMOTEROL FUMARATE DIHYDRATE 160; 4.5 UG/1; UG/1
2 AEROSOL RESPIRATORY (INHALATION) 2 TIMES DAILY
Qty: 30.6 G | Refills: 1 | Status: SHIPPED | OUTPATIENT
Start: 2022-10-30

## 2022-11-02 RX ORDER — MONTELUKAST SODIUM 10 MG/1
10 TABLET ORAL DAILY
Qty: 90 TABLET | Refills: 1 | Status: SHIPPED | OUTPATIENT
Start: 2022-11-02

## 2022-11-02 NOTE — TELEPHONE ENCOUNTER
Refill passed per [place] [employer] [facility] protocol. Dr. Oliver Hernandez, please review HIGH allergy warning. Requested Prescriptions   Pending Prescriptions Disp Refills    MONTELUKAST 10 MG Oral Tab [Pharmacy Med Name: Montelukast Sodium 10 Mg Tab Auro] 90 tablet 0     Sig: Take 1 tablet (10 mg total) by mouth daily.        Asthma & COPD Medication Protocol Passed - 11/1/2022  8:33 PM        Passed - In person appointment or virtual visit in the past 6 mos or appointment in next 3 mos     Recent Outpatient Visits              1 month ago Abdominal pain, RLQ    [address] APRN    Office Visit    4 months ago Cervical spondylosis with myelopathy    [facility] [facility], [address], Daren Lopez, ANGELINA    Office Visit    5 months ago Acute viral syndrome    [address] MD    Office Visit    7 months ago Type 2 diabetes mellitus without complication, unspecified whether long term insulin use [facility])    [facility] [facility], Chayito Dowell, Lucila Huizar MD    Office Visit    7 months ago WOODARD (dyspnea on exertion)    [place], [address] [facility], Ousmane Hernandez MD    Office Visit          Future Appointments         Provider Department Appt Notes    In 1 month Isauro Villegas  [facility], [place]-Physiatry f/u back pain                   Recent Outpatient Visits              1 month ago Abdominal pain, RLQ    [facility] [facility], [address], ANGELINA Lara    Office Visit    4 months ago Cervical spondylosis with myelopathy    [facility] [facility], [address], Daren Lopez, ANGELINA    Office Visit    5 months ago Acute viral syndrome    [address] MD    Office Visit    7 months ago Type 2 diabetes mellitus without complication, unspecified whether long term insulin [facility]    [place] Kyle Rahman MD    Office Visit    7 months ago WOODARD (dyspnea on exertion)    [place], [address] [facility] Phoenix Simmons MD    Office Visit           Future Appointments         Provider Department Appt Notes    In 1 month Armen Villegas  [facility]-Physiatry f/u back pain

## 2022-11-04 ENCOUNTER — TELEPHONE (OUTPATIENT)
Dept: FAMILY MEDICINE CLINIC | Facility: CLINIC | Age: 62
End: 2022-11-04

## 2022-11-04 ENCOUNTER — OFFICE VISIT (OUTPATIENT)
Dept: INTERNAL MEDICINE CLINIC | Facility: CLINIC | Age: 62
End: 2022-11-04
Payer: COMMERCIAL

## 2022-11-04 ENCOUNTER — PATIENT MESSAGE (OUTPATIENT)
Dept: FAMILY MEDICINE CLINIC | Facility: CLINIC | Age: 62
End: 2022-11-04

## 2022-11-04 ENCOUNTER — NURSE TRIAGE (OUTPATIENT)
Dept: FAMILY MEDICINE CLINIC | Facility: CLINIC | Age: 62
End: 2022-11-04

## 2022-11-04 ENCOUNTER — HOSPITAL ENCOUNTER (OUTPATIENT)
Dept: GENERAL RADIOLOGY | Age: 62
Discharge: HOME | End: 2022-11-04
Attending: NURSE PRACTITIONER
Payer: COMMERCIAL

## 2022-11-04 VITALS
HEIGHT: 64 IN | OXYGEN SATURATION: 99 % | SYSTOLIC BLOOD PRESSURE: 124 MMHG | HEART RATE: 66 BPM | DIASTOLIC BLOOD PRESSURE: 78 MMHG | BODY MASS INDEX: 35.62 KG/M2 | WEIGHT: 208.63 LBS

## 2022-11-04 DIAGNOSIS — U09.9 POST-ACUTE COVID-19 SYNDROME: ICD-10-CM

## 2022-11-04 DIAGNOSIS — J44.1 CHRONIC OBSTRUCTIVE PULMONARY DISEASE WITH ACUTE EXACERBATION (HCC): ICD-10-CM

## 2022-11-04 DIAGNOSIS — Z79.4 TYPE 2 DIABETES MELLITUS WITHOUT COMPLICATION, WITH LONG-TERM CURRENT USE OF INSULIN (HCC): ICD-10-CM

## 2022-11-04 DIAGNOSIS — E78.5 DYSLIPIDEMIA: ICD-10-CM

## 2022-11-04 DIAGNOSIS — E11.9 TYPE 2 DIABETES MELLITUS WITHOUT COMPLICATION, WITH LONG-TERM CURRENT USE OF INSULIN (HCC): ICD-10-CM

## 2022-11-04 DIAGNOSIS — I10 ESSENTIAL HYPERTENSION: Primary | ICD-10-CM

## 2022-11-04 DIAGNOSIS — K59.00 CONSTIPATION, UNSPECIFIED CONSTIPATION TYPE: ICD-10-CM

## 2022-11-04 DIAGNOSIS — F32.A DEPRESSIVE DISORDER: ICD-10-CM

## 2022-11-04 PROCEDURE — 99214 OFFICE O/P EST MOD 30 MIN: CPT | Performed by: NURSE PRACTITIONER

## 2022-11-04 PROCEDURE — 3078F DIAST BP <80 MM HG: CPT | Performed by: NURSE PRACTITIONER

## 2022-11-04 PROCEDURE — 3074F SYST BP LT 130 MM HG: CPT | Performed by: NURSE PRACTITIONER

## 2022-11-04 PROCEDURE — 71046 X-RAY EXAM CHEST 2 VIEWS: CPT | Performed by: NURSE PRACTITIONER

## 2022-11-04 PROCEDURE — 3008F BODY MASS INDEX DOCD: CPT | Performed by: NURSE PRACTITIONER

## 2022-11-04 RX ORDER — DULOXETIN HYDROCHLORIDE 20 MG/1
20 CAPSULE, DELAYED RELEASE ORAL DAILY
Qty: 30 CAPSULE | Refills: 1 | Status: SHIPPED | OUTPATIENT
Start: 2022-11-04

## 2022-11-04 NOTE — TELEPHONE ENCOUNTER
NO DX depression , please call and triage,thanks           ----- Message from Roberto Santiago RN sent at 11/4/2022  8:58 AM CDT -----  Regarding: FW: Antidepressant needed      ----- Message -----  From: Georgette Gaitan  Sent: 11/4/2022   8:53 AM CDT  To: Em Rn Triage  Subject: Antidepressant needed                            I have been struggling with severe depression for about a year now and can't seem to get myself out of it like I have in the past. I called to see a therapist but they are booked out for months. I need help. Maybe an antidepressant? Since I have reactions to so many meds I have refused them in the past, but I am ready to try something now. Please advise.

## 2022-11-04 NOTE — TELEPHONE ENCOUNTER
See acute encounter 11/4/22. Veena Hoskins, RN 11/4/2022  8:58 AM CDT        ----- Message -----  From: Maggie Jose  Sent: 11/4/2022   8:53 AM CDT  To: María Rn Triage  Subject: Antidepressant needed                            I have been struggling with severe depression for about a year now and can't seem to get myself out of it like I have in the past. I called to see a therapist but they are booked out for months. I need help. Maybe an antidepressant? Since I have reactions to so many meds I have refused them in the past, but I am ready to try something now. Please advise.

## 2022-11-04 NOTE — PATIENT INSTRUCTIONS
ASSESSMENT/PLAN:   Essential hypertension  (primary encounter diagnosis)  Careful with diet and excercise at least 30 minutes 3-4 times a week. Check blood pressures at different times on different days. Can purchase own blood pressure monitor. If not, check at local pharmacy. Bake foods more and grill occasionally. Avoid fried foods. No salt. Use other seasonings. Type 2 diabetes mellitus without complication, with long-term current use of insulin (hcc)  Careful with diet and excercise at least 30 minutes 3-4 times a week. Check sugars at different times on different dates. Careful with low sugars. Carry something with you and check sugar if can. Can carry nhung cracker, etc. Decrease carbohydrates. But also, careful with fruits and natural sugars. One serving a day and no more than 1 handful every day. Check feet  every AM and careful with sores and ulcers on feet bilaterally. Check eyes every year with dilated eye exam.  Check sugars. 2-hour postmeal should be less than 140s. Pre-meal should be 's. Both equally affected A1c.   Discussed importance of glycemic control to prevent complications of diabetes  -Discussed complications of diabetes include retinopathy, neuropathy, nephropathy and cardiovascular disease  -Discussed ABCs of DM  -Discussed importance of SBGM  -Discussed importance of low CHO diet, recommend 45gm per meal or 135gm per day  -Follow up with Dr. Virgene Ganser Endocrinologist    Chronic obstructive pulmonary disease with acute exacerbation (hcc)  Continue with Symbicort as prescribed  Use albuterol inhaler as needed  Schedule pulmonary function tests as recommended by pulmonologist  Follow-up with pulmonology    Dyslipidemia  Check blood    Depressive disorder  Start Cymbalta as prescribed  Follow-up with Bhargavi Armstrong referral entered    Constipation, unspecified constipation type  For constipation, I recommend:  - consume at least 64-72 ounces of water each day  - movement is key - try going for a walk or getting up from your desk at work every thirty minutes; movement can help to stimulate the bowels  CONSTIPATION  Increase fluids  Recommend increasing water, decreasing amount of milk and dairy daily  Fiber fruits:  Peaches, pears, nectarines, mangos, prunes  Fiber veges:  Broccoli, asparagus, cauliflower, cabbage  Probiotic supplemented yogurt, activia, kefir  Fiber gummies 2-3 daily for next few weeks to help keep stools soft    Dietary fiber is del rosario to maintaining regular, soft stools and good bowel health. Call if concerns or still with constipation issues  - [employer] (YouTube this)    Purchase a \"Squatty potty\", 7 or 9 inch version from either:    1. [employer] [address] [facility] [address]    2. Squattypotty. com    Use with each attempt at bowel movements. Post-acute covid-19 syndrome  Ordered chest x-ray  Follow-up with pulmonary    Orders Placed This Encounter      Hemoglobin A1C      Comp Metabolic Panel (14)      Lipid Panel    Follow-up with primary care in 1 month or sooner if needed. Meds This Visit:  Requested Prescriptions     Signed Prescriptions Disp Refills    DULoxetine (CYMBALTA) 20 MG Oral Cap DR Particles 30 capsule 1     Sig: Take 1 capsule (20 mg total) by mouth in the morning.        Imaging & Referrals:   NAVIGATOR  XR CHEST PA + LAT CHEST (CPT=[id number])

## 2022-11-07 ENCOUNTER — PATIENT MESSAGE (OUTPATIENT)
Dept: INTERNAL MEDICINE CLINIC | Facility: CLINIC | Age: 62
End: 2022-11-07

## 2022-11-07 NOTE — TELEPHONE ENCOUNTER
From: Deepika Estrella  To: ANGELINA Inman  Sent: 11/7/2022 12:41 PM CST  Subject: Cymbalta    I took the cymbalt Friday night November 4th. Within the hour my hands, feet and calves were all tingly. Within the hour my lips joined in. That continued all weekend as well as my joints have been very stiff all weekend. My blood sugar has been high since I started the cymbalta as well. Needless to say I discontinued taking it. I cannot take Aleve or Motrin because I have the same reaction. I'm thinking there's got to be an ingredient that's the same in all of them. Can you please prescribe me another antidepressant and we can see if that one works?

## 2022-11-08 ENCOUNTER — TELEPHONE (OUTPATIENT)
Dept: FAMILY MEDICINE CLINIC | Facility: CLINIC | Age: 62
End: 2022-11-08

## 2022-11-08 DIAGNOSIS — F32.A DEPRESSIVE DISORDER: Primary | ICD-10-CM

## 2022-11-08 NOTE — TELEPHONE ENCOUNTER
Spoke to patient, verified Name and . She states she stopped taking Cymbalta on Friday night  and it took until yesterday afternoon  for the reaction to completely go away. She is feeling much better since with no complaints. That being said, she is still interested in starting a new antidepressant in the hopes that she will tolerate it and will not give her any reaction this time. Ceci Crane please advise.

## 2022-11-08 NOTE — TELEPHONE ENCOUNTER
Pt states that she has been contacted by Yannick Munguia and has an appt for next week. Will f/u with them regarding a new medication.

## 2022-11-08 NOTE — TELEPHONE ENCOUNTER
Due to increased risk of reactions would recommend following up with psych. Referral already entered. If pt has not received call from [facility] yet please provide pt with the number to initiate.

## 2022-11-09 ENCOUNTER — TELEPHONE (OUTPATIENT)
Dept: INTERNAL MEDICINE CLINIC | Facility: CLINIC | Age: 62
End: 2022-11-09

## 2022-11-09 NOTE — TELEPHONE ENCOUNTER
Lainey Yeh, ANGELINA Pelaez,     I scheduled a therapy appointment for your patient with [facility] with Cali Villatoro for 11/17, 1pm. I also provided her with the following therapy and psychiatry resources:     Lalo Haile   [address], [place]   Phone: [phone]     Phylicia Hernandezier   [address] [place], [address] [facility] [place]   Phone: [phone], [address]   Phone: [phone]     Maria Isabel Lucas, [address] [facility], [address] [facility]   Phone: Raymond Osman MD   [facility], [address] [facility]   Phone: [phone]     I have provided my contact information if additional resources are needed. I am closing the order at this time. Please feel free to re-refer the patient for navigation as needed. Thank you,     Iilana Perez M.S., [facility], [address]   [email or web address]

## 2022-11-10 ENCOUNTER — PATIENT MESSAGE (OUTPATIENT)
Dept: FAMILY MEDICINE CLINIC | Facility: CLINIC | Age: 62
End: 2022-11-10

## 2022-11-10 DIAGNOSIS — E11.9 DIABETES MELLITUS WITHOUT COMPLICATION (HCC): Primary | ICD-10-CM

## 2022-11-11 NOTE — TELEPHONE ENCOUNTER
please see pt Qrikett message below and advise. I have pended the referral.         ----- Message -----  From: Namrata Victoria  Sent: 11/10/2022   4:18 PM CST  To: Em Rn Triage  Subject: Referral Needed                                  To see Dr. Cyrus Hsu for my annual visit. I thought referrals were no longer needed but they tell me I need one for this visit. Can you please put one in? My appointment is not until March so it's no hurry.

## 2022-11-14 ENCOUNTER — NURSE TRIAGE (OUTPATIENT)
Dept: FAMILY MEDICINE CLINIC | Facility: CLINIC | Age: 62
End: 2022-11-14

## 2022-11-14 ENCOUNTER — OFFICE VISIT (OUTPATIENT)
Dept: INTERNAL MEDICINE CLINIC | Facility: CLINIC | Age: 62
End: 2022-11-14
Payer: COMMERCIAL

## 2022-11-14 ENCOUNTER — TELEPHONE (OUTPATIENT)
Dept: INTERNAL MEDICINE CLINIC | Facility: CLINIC | Age: 62
End: 2022-11-14

## 2022-11-14 VITALS
HEIGHT: 64 IN | OXYGEN SATURATION: 99 % | SYSTOLIC BLOOD PRESSURE: 128 MMHG | HEART RATE: 70 BPM | WEIGHT: 212 LBS | DIASTOLIC BLOOD PRESSURE: 76 MMHG | BODY MASS INDEX: 36.19 KG/M2

## 2022-11-14 DIAGNOSIS — M47.12 CERVICAL SPONDYLOSIS WITH MYELOPATHY: ICD-10-CM

## 2022-11-14 DIAGNOSIS — I10 ESSENTIAL HYPERTENSION: Primary | ICD-10-CM

## 2022-11-14 DIAGNOSIS — M79.601 RIGHT ARM PAIN: ICD-10-CM

## 2022-11-14 DIAGNOSIS — E11.9 TYPE 2 DIABETES MELLITUS WITHOUT COMPLICATION, UNSPECIFIED WHETHER LONG TERM INSULIN USE (HCC): ICD-10-CM

## 2022-11-14 DIAGNOSIS — E78.2 MIXED HYPERLIPIDEMIA: ICD-10-CM

## 2022-11-14 LAB
ALBUMIN SERPL-MCNC: 3.5 G/DL (ref 3.4–5)
ALBUMIN/GLOB SERPL: 1.1 {RATIO} (ref 1–2)
ALP LIVER SERPL-CCNC: 71 U/L
ALT SERPL-CCNC: 37 U/L
ANION GAP SERPL CALC-SCNC: 5 MMOL/L (ref 0–18)
AST SERPL-CCNC: 15 U/L (ref 15–37)
BILIRUB SERPL-MCNC: 0.2 MG/DL (ref 0.1–2)
BUN BLD-MCNC: 12 MG/DL (ref 7–18)
BUN/CREAT SERPL: 15.8 (ref 10–20)
CALCIUM BLD-MCNC: 9.1 MG/DL (ref 8.5–10.1)
CHLORIDE SERPL-SCNC: 110 MMOL/L (ref 98–112)
CHOLEST SERPL-MCNC: 174 MG/DL (ref ?–200)
CO2 SERPL-SCNC: 26 MMOL/L (ref 21–32)
CREAT BLD-MCNC: 0.76 MG/DL
EST. AVERAGE GLUCOSE BLD GHB EST-MCNC: 197 MG/DL (ref 68–126)
FASTING PATIENT LIPID ANSWER: NO
FASTING STATUS PATIENT QL REPORTED: NO
GFR SERPLBLD BASED ON 1.73 SQ M-ARVRAT: 88 ML/MIN/1.73M2 (ref 60–?)
GLOBULIN PLAS-MCNC: 3.3 G/DL (ref 2.8–4.4)
GLUCOSE BLD-MCNC: 175 MG/DL (ref 70–99)
HBA1C MFR BLD: 8.5 % (ref ?–5.7)
HDLC SERPL-MCNC: 43 MG/DL (ref 40–59)
LDLC SERPL CALC-MCNC: 62 MG/DL (ref ?–100)
NONHDLC SERPL-MCNC: 131 MG/DL (ref ?–130)
OSMOLALITY SERPL CALC.SUM OF ELEC: 296 MOSM/KG (ref 275–295)
POTASSIUM SERPL-SCNC: 4 MMOL/L (ref 3.5–5.1)
PROT SERPL-MCNC: 6.8 G/DL (ref 6.4–8.2)
SODIUM SERPL-SCNC: 141 MMOL/L (ref 136–145)
TRIGL SERPL-MCNC: 454 MG/DL (ref 30–149)
VLDLC SERPL CALC-MCNC: 68 MG/DL (ref 0–30)

## 2022-11-14 PROCEDURE — 3008F BODY MASS INDEX DOCD: CPT | Performed by: NURSE PRACTITIONER

## 2022-11-14 PROCEDURE — 3074F SYST BP LT 130 MM HG: CPT | Performed by: NURSE PRACTITIONER

## 2022-11-14 PROCEDURE — 3052F HG A1C>EQUAL 8.0%<EQUAL 9.0%: CPT | Performed by: FAMILY MEDICINE

## 2022-11-14 PROCEDURE — 99214 OFFICE O/P EST MOD 30 MIN: CPT | Performed by: NURSE PRACTITIONER

## 2022-11-14 PROCEDURE — 3078F DIAST BP <80 MM HG: CPT | Performed by: NURSE PRACTITIONER

## 2022-11-14 PROCEDURE — 36415 COLL VENOUS BLD VENIPUNCTURE: CPT | Performed by: NURSE PRACTITIONER

## 2022-11-14 RX ORDER — FLUTICASONE PROPIONATE 50 MCG
SPRAY, SUSPENSION (ML) NASAL
Qty: 48 G | Refills: 1 | Status: SHIPPED | OUTPATIENT
Start: 2022-11-14

## 2022-11-14 RX ORDER — PREDNISONE 20 MG/1
TABLET ORAL
Qty: 5 TABLET | Refills: 0 | Status: SHIPPED | OUTPATIENT
Start: 2022-11-14

## 2022-11-14 RX ORDER — METHOCARBAMOL 750 MG/1
750 TABLET, FILM COATED ORAL NIGHTLY PRN
Qty: 7 TABLET | Refills: 0 | Status: SHIPPED | OUTPATIENT
Start: 2022-11-14

## 2022-11-14 NOTE — PATIENT INSTRUCTIONS
ASSESSMENT/PLAN:   Essential hypertension  (primary encounter diagnosis)  Careful with diet and excercise at least 30 minutes 3-4 times a week. Check blood pressures at different times on different days. Can purchase own blood pressure monitor. If not, check at local pharmacy. Bake foods more and grill occasionally. Avoid fried foods. No salt. Use other seasonings. Mixed hyperlipidemia  Check blood    Type 2 diabetes mellitus without complication, unspecified whether long term insulin use (hcc)  Careful with diet and excercise at least 30 minutes 3-4 times a week. Check sugars at different times on different dates. Careful with low sugars. Carry something with you and check sugar if can. Can carry nhung cracker, etc. Decrease carbohydrates. But also, careful with fruits and natural sugars. One serving a day and no more than 1 handful every day. Check feet  every AM and careful with sores and ulcers on feet bilaterally. Check eyes every year with dilated eye exam.  Check sugars. 2-hour postmeal should be less than 140s. Pre-meal should be 's. Both equally affected A1c. Cervical spondylosis with myelopathy  Follow up with physiatrist as scheduled  Follow-up with physical therapy as scheduled    Right arm pain  Take prednisone as prescribed with food  Follow up with physiatrist as scheduled  Follow-up with physical therapy as scheduled        No orders of the defined types were placed in this encounter. Follow-up with primary care for physical or sooner if needed  Meds This Visit:  Requested Prescriptions     Signed Prescriptions Disp Refills    methocarbamol 750 MG Oral Tab 7 tablet 0     Sig: Take 1 tablet (750 mg total) by mouth nightly as needed. predniSONE 20 MG Oral Tab 5 tablet 0     Sig: Take 2 by mouth at same time daily for 5 days.  (Best taken at [facility] or [facility])       Imaging & Referrals:  None

## 2022-11-14 NOTE — TELEPHONE ENCOUNTER
Refill passed per [facility] protocol    Requested Prescriptions   Pending Prescriptions Disp Refills    Diclofenac Sodium 1 % Transdermal Gel  0     Sig: Apply 2 g topically 4 (four) times daily. There is no refill protocol information for this order       fluticasone propionate 50 MCG/ACT Nasal Suspension 48 g 1     Sig: use 2 sprays by Each nostrils route daily.        Allergy Medication Protocol Passed - 11/10/2022 12:45 PM        Passed - In person appointment or virtual visit in the past 12 mos or appointment in next 3 mos     Recent Outpatient Visits              1 week ago Essential hypertension    [facility], [address], CiroDaren Kirby, [address]    Office Visit    1 month ago Abdominal pain, RLQ    [facility], [address]ay[address] Dowell, ANGELINA Gutierres    Office Visit    5 months ago Cervical spondylosis with myelopathy    [facility], [address], Daren Lopez, ANGELINA    Office Visit    5 months ago Acute viral syndrome    [address] MD    Office Visit    7 months ago Type 2 diabetes mellitus without complication, unspecified whether long term insulin use [facility])    [facility], [address]etrius[address]zita Dowell, Leonardo Chao MD    Office Visit          Future Appointments         Provider Department Appt Notes    Today [facility], [address] [facility], [address], [place] hand tingling and arm pain since 11/4/22    In 3 weeks Lalo Cordova, [address] HMO    In 3 weeks Emery Olivares O    In 3 weeks [address], Dulce Agee  [facility] [place]-Physiatry f/u back pain    In 4 weeks Emery Olivares O    In 1 month Alonso Olivares. [address] [facility] [address] HMO    In 1 month Rhesa Lee, [place] H[place]    In 1 month Rhesa Lee, [place] HMO    In 1 month Rhesa Lee, [place] HMO    In 1 month Rhesa Lee, [address] [facility] [address] HMO    In 3 months Vasyl Arcos MD [facility] for Health, [address] right and left sides    In 4 months Andre Smith MD [place] [facility] [employer], [employer], [address], Edgar Stomach pains                    Future Appointments         Provider Department Appt Notes    Today Jayden Age, [address] [facility], [address], [place] hand tingling and arm pain since 11/4/22    In 3 weeks Rhesa Lee, [address] HMO    In 3 weeks Rhesa Lee, [place] HMO    In 3 weeks [address], David Wylie  [facility], [place]-Physiatry f/u back pain    In 4 weeks Rhesa Lee, [place] HMO    In 1 month Rhesa Lee, [place] HMO    In 1 month Rhesa Lee, [place] HMO    In 1 month Rhesa Lee, [place] HMO    In 1 month Rhesa Lee, [place] HMO    In 1 month Rhesa Lee, [place] HMO    In 3 months Vasyl Arcos MD [facility] for Health, [address], [place] Pains right and left sides    In 4 months Andre Smith MD [employer], [employer], [address], [place] Stomach pains            Recent Outpatient Visits              1 week ago Essential hypertension    [address], [address], Daren Lopez, [employer] Visit    1 month ago Abdominal pain, RLQ    [address], ANGELINA    Office Visit    5 months ago Cervical spondylosis with myelopathy    [address] [place], [address], Daren Lopez, APRN    Office Visit    5 months ago Acute viral syndrome    [address] MD    Office Visit    7 months ago Type 2 diabetes mellitus without complication, unspecified whether long term insulin use [facility])    [address] Redia Corona, MD    Office Visit

## 2022-11-16 NOTE — TELEPHONE ENCOUNTER
Message #          2022 06:30p   [ISABELD]  To:  Ellen Solano  From:  Hayden Doty MD:  Phone#:  [phone]  ----------------------------------------------------------------------  RE PT [facility],  [birth year], RX CLARIFICATION          (Message Delivered)   D E L I V E R I E S :  2022 06:33p           ISABELD       Delivered  2022 06:33p           ISABELD       Canceled  ------------ F O L L O W - U P------------- :  2022 06:34p SLAVA  PAGED TO     Message # [id number]         2022 06:33p   [ISABELD]  To:  From:  JACE  Primary MD:  Phone#:  ----------------------------------------------------------------------  [address],  [birth year], Janae Rodriguez 51, [phone]  Paged at number :  PAGE: [phone] at : OQA-  18:33          (Message Delivered)   D E L I V E R I E S :  2022 06:33p           ISABELD       Delivered

## 2022-11-17 ENCOUNTER — PATIENT MESSAGE (OUTPATIENT)
Dept: FAMILY MEDICINE CLINIC | Facility: CLINIC | Age: 62
End: 2022-11-17

## 2022-11-18 NOTE — TELEPHONE ENCOUNTER
From: Jazmin Colmenares  To: Berhane Burroughs MD  Sent: 11/17/2022 10:27 PM CST  Subject: Depression Medication     On November 4th I took duloxetine for the first time. Within an hour I had adverse reactions to it. .Tingling of hands and feet, dizziness, blurred vision, feeling of being high, headache full body aches, high blood sugar (177) 3 Days later I was still experiencing side effects. I talked with a therapist today, November 17th at 1:00 p.m. . Since she is unable to subscribe medication she asked me to call you as you know my history better to prescribe a depression Medication. She feels I need to be on it sooner than later since my depression has escalated. You may be able to see her notes from our visit today. The depression is worse now than I have ever had it before. Can we either set up a video conference call or if you have a medication in mind can you please call it in. I am seeing the therapist again on Saturday November 26th.

## 2022-11-20 NOTE — TELEPHONE ENCOUNTER
Rx request for Turlicity, please review and sign off if appropriate. Thank you.      Last seen: 3/23/22  Return to clinic: 4 months

## 2022-11-21 ENCOUNTER — TELEPHONE (OUTPATIENT)
Dept: FAMILY MEDICINE CLINIC | Facility: CLINIC | Age: 62
End: 2022-11-21

## 2022-11-21 RX ORDER — DULAGLUTIDE 1.5 MG/.5ML
1.5 INJECTION, SOLUTION SUBCUTANEOUS
Qty: 6 ML | Refills: 0 | Status: SHIPPED | OUTPATIENT
Start: 2022-11-21

## 2022-11-21 NOTE — TELEPHONE ENCOUNTER
On 11-21-22, the following referrals for psychiatry were provided to the patient:     Tegan Olvera MD   [address] [facility], [facility] Health system   Phone: [phone], [address] Rajendra Berger MD   [address] [facility] [address] [facility], [facility] access hospital[facility]   Phone: [phone]     Amrita Hirsch, [address] [facility], [address] [facility]   Phone: [phone]     We discussed scheduling her for STEM with Dr. David Stauffer, but she stated she prefers to wait until after the Holidays to get things going. I have provided my contact information if additional resources are needed. I am closing the order at this time. Please feel free to re-refer the patient for navigation as needed. Thank you,     Jordan Monge M.S., [facility] [address]   [email or web address]

## 2022-11-28 ENCOUNTER — OFFICE VISIT (OUTPATIENT)
Dept: FAMILY MEDICINE CLINIC | Facility: CLINIC | Age: 62
End: 2022-11-28
Payer: COMMERCIAL

## 2022-11-28 VITALS
SYSTOLIC BLOOD PRESSURE: 124 MMHG | HEART RATE: 66 BPM | BODY MASS INDEX: 35.82 KG/M2 | WEIGHT: 209.81 LBS | DIASTOLIC BLOOD PRESSURE: 76 MMHG | HEIGHT: 64 IN

## 2022-11-28 DIAGNOSIS — Z79.4 TYPE 2 DIABETES MELLITUS WITHOUT COMPLICATION, WITH LONG-TERM CURRENT USE OF INSULIN (HCC): ICD-10-CM

## 2022-11-28 DIAGNOSIS — E11.9 TYPE 2 DIABETES MELLITUS WITHOUT COMPLICATION, WITH LONG-TERM CURRENT USE OF INSULIN (HCC): ICD-10-CM

## 2022-11-28 DIAGNOSIS — M47.12 CERVICAL SPONDYLOSIS WITH MYELOPATHY: ICD-10-CM

## 2022-11-28 DIAGNOSIS — F32.A DEPRESSIVE DISORDER: Primary | ICD-10-CM

## 2022-11-28 PROCEDURE — 3074F SYST BP LT 130 MM HG: CPT | Performed by: FAMILY MEDICINE

## 2022-11-28 PROCEDURE — 99214 OFFICE O/P EST MOD 30 MIN: CPT | Performed by: FAMILY MEDICINE

## 2022-11-28 PROCEDURE — 3008F BODY MASS INDEX DOCD: CPT | Performed by: FAMILY MEDICINE

## 2022-11-28 PROCEDURE — 3078F DIAST BP <80 MM HG: CPT | Performed by: FAMILY MEDICINE

## 2022-11-28 RX ORDER — BUPROPION HYDROCHLORIDE 75 MG/1
75 TABLET ORAL 2 TIMES DAILY
Qty: 30 TABLET | Refills: 0 | Status: SHIPPED | OUTPATIENT
Start: 2022-11-28

## 2022-11-28 NOTE — PATIENT INSTRUCTIONS
1. Depressive disorder  Starting low dose - pt sensitive to meds - bupropion 75 mg BID - start 1/2 tablet daily for 3 days then 1/2 tablet twice a day for 3 days then full tablet few days and so forth until full twice a day - 2nd dose before 3 pm.

## 2022-12-01 ENCOUNTER — OFFICE VISIT (OUTPATIENT)
Dept: PHYSICAL MEDICINE AND REHAB | Facility: CLINIC | Age: 62
End: 2022-12-01
Payer: COMMERCIAL

## 2022-12-01 VITALS — BODY MASS INDEX: 35.51 KG/M2 | HEIGHT: 64 IN | HEART RATE: 71 BPM | WEIGHT: 208 LBS | OXYGEN SATURATION: 98 %

## 2022-12-01 DIAGNOSIS — M79.601 RIGHT ARM PAIN: ICD-10-CM

## 2022-12-01 DIAGNOSIS — M47.892 OTHER OSTEOARTHRITIS OF SPINE, CERVICAL REGION: ICD-10-CM

## 2022-12-01 DIAGNOSIS — Z79.4 TYPE 2 DIABETES MELLITUS WITHOUT COMPLICATION, WITH LONG-TERM CURRENT USE OF INSULIN (HCC): ICD-10-CM

## 2022-12-01 DIAGNOSIS — M54.2 NECK PAIN: ICD-10-CM

## 2022-12-01 DIAGNOSIS — G56.03 BILATERAL CARPAL TUNNEL SYNDROME: ICD-10-CM

## 2022-12-01 DIAGNOSIS — M54.16 LUMBAR RADICULOPATHY: ICD-10-CM

## 2022-12-01 DIAGNOSIS — F32.A DEPRESSIVE DISORDER: ICD-10-CM

## 2022-12-01 DIAGNOSIS — Z98.1 S/P CERVICAL SPINAL FUSION: ICD-10-CM

## 2022-12-01 DIAGNOSIS — E11.9 TYPE 2 DIABETES MELLITUS WITHOUT COMPLICATION, WITH LONG-TERM CURRENT USE OF INSULIN (HCC): ICD-10-CM

## 2022-12-01 DIAGNOSIS — M54.12 CERVICAL RADICULOPATHY: ICD-10-CM

## 2022-12-01 DIAGNOSIS — M51.9 LUMBAR DISC DISEASE: Primary | ICD-10-CM

## 2022-12-01 NOTE — PATIENT INSTRUCTIONS
Plan  I will perform bilateral L5 TFESI(s) with dexamethasone. She will get a new MRI of the cervical spine due to her severe pain and new weakness. She will get cervical spine x-rays to assess her hardware. She will do PT on the cervical spine. She will let me know how she is doing after she had the imaging studies and done a couple of weeks of the PT. The patient will follow up in 2-3 months, but the patient will call me 2 weeks after having the injection to let me know how the injection worked.

## 2022-12-02 ENCOUNTER — HOSPITAL ENCOUNTER (OUTPATIENT)
Dept: GENERAL RADIOLOGY | Age: 62
Discharge: HOME | End: 2022-12-02
Attending: PHYSICAL MEDICINE & REHABILITATION
Payer: COMMERCIAL

## 2022-12-02 ENCOUNTER — TELEPHONE (OUTPATIENT)
Dept: PHYSICAL THERAPY | Facility: HOSPITAL | Age: 62
End: 2022-12-02

## 2022-12-02 DIAGNOSIS — Z98.1 S/P CERVICAL SPINAL FUSION: ICD-10-CM

## 2022-12-02 DIAGNOSIS — M54.12 CERVICAL RADICULOPATHY: ICD-10-CM

## 2022-12-02 PROCEDURE — 72050 X-RAY EXAM NECK SPINE 4/5VWS: CPT | Performed by: PHYSICAL MEDICINE & REHABILITATION

## 2022-12-05 ENCOUNTER — OFFICE VISIT (OUTPATIENT)
Dept: PHYSICAL THERAPY | Age: 62
End: 2022-12-05
Attending: NURSE PRACTITIONER
Payer: COMMERCIAL

## 2022-12-05 ENCOUNTER — TELEPHONE (OUTPATIENT)
Dept: NEUROLOGY | Facility: CLINIC | Age: 62
End: 2022-12-05

## 2022-12-05 DIAGNOSIS — M47.12 CERVICAL SPONDYLOSIS WITH MYELOPATHY: ICD-10-CM

## 2022-12-05 PROCEDURE — 97163 PT EVAL HIGH COMPLEX 45 MIN: CPT

## 2022-12-05 PROCEDURE — 97140 MANUAL THERAPY 1/> REGIONS: CPT

## 2022-12-05 PROCEDURE — 97530 THERAPEUTIC ACTIVITIES: CPT

## 2022-12-05 NOTE — TELEPHONE ENCOUNTER
Bilateral L5(L5-S1) TFESI CPT CODE 21096-87,83191    STATUS: pending approval for referral # [id number]    Clinical notes sent to [facility] for review.

## 2022-12-07 ENCOUNTER — OFFICE VISIT (OUTPATIENT)
Dept: PHYSICAL THERAPY | Age: 62
End: 2022-12-07
Attending: NURSE PRACTITIONER
Payer: COMMERCIAL

## 2022-12-07 PROCEDURE — 97110 THERAPEUTIC EXERCISES: CPT | Performed by: PHYSICAL THERAPIST

## 2022-12-08 RX ORDER — BUPROPION HYDROCHLORIDE 75 MG/1
75 TABLET ORAL
Qty: 30 TABLET | Refills: 0 | OUTPATIENT
Start: 2022-12-08

## 2022-12-12 ENCOUNTER — OFFICE VISIT (OUTPATIENT)
Dept: PHYSICAL THERAPY | Age: 62
End: 2022-12-12
Attending: NURSE PRACTITIONER
Payer: COMMERCIAL

## 2022-12-12 PROCEDURE — 97110 THERAPEUTIC EXERCISES: CPT | Performed by: PHYSICAL THERAPIST

## 2022-12-12 PROCEDURE — 97140 MANUAL THERAPY 1/> REGIONS: CPT | Performed by: PHYSICAL THERAPIST

## 2022-12-14 ENCOUNTER — OFFICE VISIT (OUTPATIENT)
Dept: PHYSICAL THERAPY | Age: 62
End: 2022-12-14
Attending: NURSE PRACTITIONER
Payer: COMMERCIAL

## 2022-12-14 PROCEDURE — 97110 THERAPEUTIC EXERCISES: CPT | Performed by: PHYSICAL THERAPIST

## 2022-12-14 PROCEDURE — 97140 MANUAL THERAPY 1/> REGIONS: CPT | Performed by: PHYSICAL THERAPIST

## 2022-12-19 ENCOUNTER — OFFICE VISIT (OUTPATIENT)
Dept: PHYSICAL THERAPY | Age: 62
End: 2022-12-19
Attending: NURSE PRACTITIONER
Payer: COMMERCIAL

## 2022-12-19 PROCEDURE — 97110 THERAPEUTIC EXERCISES: CPT | Performed by: PHYSICAL THERAPIST

## 2022-12-19 PROCEDURE — 97140 MANUAL THERAPY 1/> REGIONS: CPT | Performed by: PHYSICAL THERAPIST

## 2022-12-19 NOTE — TELEPHONE ENCOUNTER
Patient has been scheduled for Bilateral L5(L5-S1) TFESI on 1/6/23 at the [address] [place] [address] with Dr. Irina Sexton. -Anesthesia type: Local.  -Patient informed to fast 12 hours prior to procedure with IVCS/MAC.   -Scheduling [facility] covid testing required for all procedures whether patient is vaccinated or not. -Patient was advised that if he/she does receive the covid vaccine it needs to be at least 2 weeks before or after the injection. -Medications and allergies reviewed. -Patient reminded to hold NSAIDs (Ibuprofen, ASA 81, Aleve, Naproxen, Mobic, Diclofenac, Etodolac, Celebrex etc.) for 3 days prior to Lumbar MBB/Facet if BMI is greater than 35. For Cervical injections only hold multivitamins, Vitamin E, Fish Oil, Phentermine/Lomaira for 7 days prior to injection and NSAIDS.  mg to be held for 7 days prior to injections.  -If patient is receiving MAC/IVCS Phentermine Artur Sudhir) will need to be held for 7 days prior to injection.  -If on blood thinner clearance has been received to hold this medication by provider.   -Patient informed he/she will need a  to and from procedure. [facility] is located in the [facility] [address] 1st floor,  may park in the yellow/purple parking lot. Patient verbalized understanding and agrees with plan.  -----> Scheduled in Epic: Yes  -----> Scheduled in [employer]: Yes. Notes added: with dexamethasone & diabetic.

## 2022-12-25 DIAGNOSIS — K22.719 BARRETT'S ESOPHAGUS WITH DYSPLASIA: ICD-10-CM

## 2022-12-26 RX ORDER — ALBUTEROL SULFATE 90 UG/1
2 AEROSOL, METERED RESPIRATORY (INHALATION) EVERY 4 HOURS PRN
Qty: 25.5 G | Refills: 0 | Status: SHIPPED | OUTPATIENT
Start: 2022-12-26

## 2022-12-26 NOTE — TELEPHONE ENCOUNTER
Unable to refill per protocol due to high allergy warning    Requested Prescriptions   Pending Prescriptions Disp Refills    albuterol 108 (90 Base) MCG/ACT Inhalation Aero Soln 25.5 g 0     Sig: Inhale 2 puffs into the lungs every 4 (four) hours as needed for Wheezing. Asthma & COPD Medication Protocol Passed - 12/25/2022 10:17 PM        Passed - In person appointment or virtual visit in the past 6 mos or appointment in next 3 mos     Recent Outpatient Visits              1 week ago     [address] [place]    Office Visit    1 week ago     [address] [place]    Office Visit    2 weeks ago     [address] [place]    Office Visit    2 weeks ago     [address] [place]    Office Visit    3 weeks ago Cervical spondylosis with myelopathy    [address] Nesha Kwong, [place]    Office Visit          Future Appointments         Provider Department Appt Notes    In 2 days Whitley Ochoa  Edgar Soriano Stomach pains    In 1 week ADO MRI RM1 (1.5T) [place] [facility] in [facility] # given. TB    In 1 week Katya North, [address] 2023 REFERRAL? ??  BCBS HMO  no c/p, no limit    In 1 week Jazmin Villegas MD [facility] Bilateral L5(L5-S1) TFESI    In 2 weeks Katya North, [address] 2023 REFERRAL? ??  BCBS HMO  no c/p, no limit    In 2 weeks Katya North, PT [address] 2023 REFERRAL? ??  BCBS O  no c/p, no limit    In 2 months Nii Regan MD [facility], [address], [place] Pains right and left sides                 Lansoprazole 15 MG Oral Capsule Delayed Release 180 capsule 1     Sig: Take 1 capsule (15 mg total) by mouth in the morning and 1 capsule (15 mg total) before bedtime. Gastrointestional Medication Protocol Passed - 12/25/2022 10:17 PM        Passed - In person appointment or virtual visit in the past 12 mos or appointment in next 3 mos     Recent Outpatient Visits              1 week ago     [address] [place]    Office Visit    1 week ago     [address] [place]    Office Visit    2 weeks ago     [address], [place]    Office Visit    2 weeks ago     [address], [place]    Office Visit    3 weeks ago Cervical spondylosis with myelopathy    [address] Lamonte Mohs, [place]    Office Visit          Future Appointments         Provider Department Appt Notes    In 2 days Andre Smith  Edgar Soriano Stomach pains    In 1 week ADO MRI RM1 (1.5T) [place] [facility] in [facility] # given. TB    In 1 week Rodolfo Howard, [address] 2023 REFERRAL? ??  BCBS HMO  no c/p, no limit    In 1 week David Villegas MD [facility] Bilateral L5(L5-S1) TFESI    In 2 weeks Rhesa Howard, [address] 2023 REFERRAL? ??  BCBS HMO  no c/p, no limit    In 2 weeks Rhesa Howard, PT [address] 2023 REFERRAL? ??  BCBS HMO  no c/p, no limit    In 2 months Haylie Regan MD [facility], [address] right and left sides                    Recent Outpatient Visits              1 week ago     [address] [place]    Office Visit    1 week ago     [facility] [address] [place] [place], [place]    Office Visit    2 weeks ago     [address], [place]    Office Visit    2 weeks ago     [address] Rhesa Howard [place]    Office Visit    3 weeks ago Cervical spondylosis with myelopathy    [address] Lamonte [place], [place]    Office Visit            Future Appointments         Provider Department Appt Notes    In 2 days Andre Smith MD [address] [place] Stomach pains    In 1 week ADO MRI RM1 (1.5T) [place] [facility] in [facility] # given. TB    In 1 week Rhesa Lee, [address] 2023 REFERRAL? ??  BCBS HMO  no c/p, no limit    In 1 week David Villegas MD [facility] Bilateral L5(L5-S1) TFESI    In 2 weeks Rhesa Lee, [address] 2023 REFERRAL? ??  BCBS HMO  no c/p, no limit    In 2 weeks Rhesa Howard, PT [address] 2023 REFERRAL? ??  BCBS HMO  no c/p, no limit    In 2 months Haylie Regan MD [facility], [address] right and left sides

## 2022-12-27 RX ORDER — INSULIN GLARGINE 100 [IU]/ML
INJECTION, SOLUTION SUBCUTANEOUS
Qty: 45 ML | Refills: 3 | Status: SHIPPED | OUTPATIENT
Start: 2022-12-27 | End: 2022-12-28

## 2022-12-27 RX ORDER — PEN NEEDLE, DIABETIC 32GX 5/32"
1 NEEDLE, DISPOSABLE MISCELLANEOUS DAILY
Qty: 90 EACH | Refills: 0 | Status: SHIPPED | OUTPATIENT
Start: 2022-12-27

## 2022-12-27 RX ORDER — LANSOPRAZOLE 15 MG/1
15 CAPSULE, DELAYED RELEASE ORAL 2 TIMES DAILY
Qty: 180 CAPSULE | Refills: 1 | Status: SHIPPED | OUTPATIENT
Start: 2022-12-27

## 2022-12-28 ENCOUNTER — OFFICE VISIT (OUTPATIENT)
Dept: ENDOCRINOLOGY CLINIC | Facility: CLINIC | Age: 62
End: 2022-12-28
Payer: COMMERCIAL

## 2022-12-28 VITALS
DIASTOLIC BLOOD PRESSURE: 84 MMHG | BODY MASS INDEX: 36 KG/M2 | SYSTOLIC BLOOD PRESSURE: 142 MMHG | WEIGHT: 212 LBS | HEART RATE: 69 BPM

## 2022-12-28 DIAGNOSIS — E55.9 VITAMIN D DEFICIENCY: ICD-10-CM

## 2022-12-28 DIAGNOSIS — E03.8 SUBCLINICAL HYPOTHYROIDISM: ICD-10-CM

## 2022-12-28 DIAGNOSIS — E11.9 CONTROLLED TYPE 2 DIABETES MELLITUS WITHOUT COMPLICATION, WITHOUT LONG-TERM CURRENT USE OF INSULIN (HCC): Primary | ICD-10-CM

## 2022-12-28 LAB
CARTRIDGE LOT#: ABNORMAL NUMERIC
GLUCOSE BLOOD: 182
HEMOGLOBIN A1C: 7.6 % (ref 4.3–5.6)
TEST STRIP LOT #: NORMAL NUMERIC

## 2022-12-28 PROCEDURE — 3077F SYST BP >= 140 MM HG: CPT | Performed by: INTERNAL MEDICINE

## 2022-12-28 PROCEDURE — 83036 HEMOGLOBIN GLYCOSYLATED A1C: CPT | Performed by: INTERNAL MEDICINE

## 2022-12-28 PROCEDURE — 82947 ASSAY GLUCOSE BLOOD QUANT: CPT | Performed by: INTERNAL MEDICINE

## 2022-12-28 PROCEDURE — 99214 OFFICE O/P EST MOD 30 MIN: CPT | Performed by: INTERNAL MEDICINE

## 2022-12-28 PROCEDURE — 3051F HG A1C>EQUAL 7.0%<8.0%: CPT | Performed by: INTERNAL MEDICINE

## 2022-12-28 PROCEDURE — 3079F DIAST BP 80-89 MM HG: CPT | Performed by: INTERNAL MEDICINE

## 2022-12-28 RX ORDER — GLIMEPIRIDE 1 MG/1
2 TABLET ORAL
Qty: 180 TABLET | Refills: 1 | Status: SHIPPED | OUTPATIENT
Start: 2022-12-28

## 2022-12-28 RX ORDER — DULAGLUTIDE 1.5 MG/.5ML
1.5 INJECTION, SOLUTION SUBCUTANEOUS
Qty: 6 ML | Refills: 1 | Status: SHIPPED | OUTPATIENT
Start: 2022-12-28

## 2022-12-28 RX ORDER — INSULIN GLARGINE 100 [IU]/ML
INJECTION, SOLUTION SUBCUTANEOUS
Qty: 45 ML | Refills: 3 | Status: SHIPPED | OUTPATIENT
Start: 2022-12-28

## 2022-12-28 NOTE — PATIENT INSTRUCTIONS
ONCE STARTING WHOLE 30 DIET    Keep Carbohydrate 50gram     STOP Glimepiride    CONTINUE Trulicity     Lantus 45 units subcutaneous daily

## 2023-01-02 ENCOUNTER — HOSPITAL ENCOUNTER (OUTPATIENT)
Dept: MRI IMAGING | Age: 63
Discharge: HOME | End: 2023-01-02
Attending: PHYSICAL MEDICINE & REHABILITATION
Payer: COMMERCIAL

## 2023-01-02 DIAGNOSIS — M54.12 CERVICAL RADICULOPATHY: ICD-10-CM

## 2023-01-02 PROCEDURE — 72141 MRI NECK SPINE W/O DYE: CPT | Performed by: PHYSICAL MEDICINE & REHABILITATION

## 2023-01-04 ENCOUNTER — APPOINTMENT (OUTPATIENT)
Dept: PHYSICAL THERAPY | Age: 63
End: 2023-01-04
Attending: NURSE PRACTITIONER
Payer: COMMERCIAL

## 2023-01-04 ENCOUNTER — TELEPHONE (OUTPATIENT)
Dept: PHYSICAL THERAPY | Facility: HOSPITAL | Age: 63
End: 2023-01-04

## 2023-01-09 ENCOUNTER — TELEPHONE (OUTPATIENT)
Dept: CASE MANAGEMENT | Age: 63
End: 2023-01-09

## 2023-01-09 ENCOUNTER — OFFICE VISIT (OUTPATIENT)
Dept: PHYSICAL THERAPY | Age: 63
End: 2023-01-09
Attending: NURSE PRACTITIONER
Payer: COMMERCIAL

## 2023-01-09 DIAGNOSIS — G47.33 OSA (OBSTRUCTIVE SLEEP APNEA): Primary | ICD-10-CM

## 2023-01-09 PROCEDURE — 97110 THERAPEUTIC EXERCISES: CPT | Performed by: PHYSICAL THERAPIST

## 2023-01-09 PROCEDURE — 97140 MANUAL THERAPY 1/> REGIONS: CPT | Performed by: PHYSICAL THERAPIST

## 2023-01-09 NOTE — TELEPHONE ENCOUNTER
Juanpablo Hernandez,     I have received a request for cpap supplies. Please sign off if you agree with plan of care.      Thank you,  [facility]  Referral specialist

## 2023-01-11 ENCOUNTER — OFFICE VISIT (OUTPATIENT)
Dept: PHYSICAL THERAPY | Age: 63
End: 2023-01-11
Attending: NURSE PRACTITIONER
Payer: COMMERCIAL

## 2023-01-11 PROCEDURE — 97110 THERAPEUTIC EXERCISES: CPT | Performed by: PHYSICAL THERAPIST

## 2023-01-11 PROCEDURE — 97140 MANUAL THERAPY 1/> REGIONS: CPT | Performed by: PHYSICAL THERAPIST

## 2023-01-16 ENCOUNTER — PATIENT MESSAGE (OUTPATIENT)
Dept: FAMILY MEDICINE CLINIC | Facility: CLINIC | Age: 63
End: 2023-01-16

## 2023-01-17 ENCOUNTER — TELEPHONE (OUTPATIENT)
Dept: INTERNAL MEDICINE CLINIC | Facility: CLINIC | Age: 63
End: 2023-01-17

## 2023-01-17 ENCOUNTER — NURSE TRIAGE (OUTPATIENT)
Dept: FAMILY MEDICINE CLINIC | Facility: CLINIC | Age: 63
End: 2023-01-17

## 2023-01-17 NOTE — TELEPHONE ENCOUNTER
From: Lurdes De La Fuente  To: Kavitha Ferguson MD  Sent: 1/16/2023 7:33 PM CST  Subject: Appointment     I'm looking for an appointment with Dr Barrett Floyd for Tuesday January 17th.     Symptoms for about 2 weeks  Mild cough with congestion  Body aches  Wheezing  Headache  Severe sore throat with burning sensation   Burning in the nose  Pain underneath tongue

## 2023-01-17 NOTE — TELEPHONE ENCOUNTER
----- Message from Deni Houser RN sent at 1/17/2023 10:40 AM CST -----  Regarding: FW: Appointment       ----- Message -----  From: Tiera Giraldo RN  Sent: 1/17/2023   8:28 AM CST  To: Em Triage Support  Subject: FW: Appointment                                      ----- Message -----  From: Shaq Martinez  Sent: 1/16/2023   7:33 PM CST  To: Em Rn Triage  Subject: Appointment                                      I'm looking for an appointment with Dr Oliver Hernandez for Tuesday January 17th.     Symptoms for about 2 weeks  Mild cough with congestion  Body aches  Wheezing  Headache  Severe sore throat with burning sensation   Burning in the nose  Pain underneath tongue

## 2023-01-18 ENCOUNTER — OFFICE VISIT (OUTPATIENT)
Dept: INTERNAL MEDICINE CLINIC | Facility: CLINIC | Age: 63
End: 2023-01-18

## 2023-01-18 ENCOUNTER — OFFICE VISIT (OUTPATIENT)
Dept: PHYSICAL THERAPY | Age: 63
End: 2023-01-18
Attending: NURSE PRACTITIONER
Payer: COMMERCIAL

## 2023-01-18 ENCOUNTER — TELEPHONE (OUTPATIENT)
Dept: INTERNAL MEDICINE CLINIC | Facility: CLINIC | Age: 63
End: 2023-01-18

## 2023-01-18 VITALS
HEIGHT: 64 IN | HEART RATE: 72 BPM | WEIGHT: 212 LBS | DIASTOLIC BLOOD PRESSURE: 68 MMHG | TEMPERATURE: 98 F | SYSTOLIC BLOOD PRESSURE: 112 MMHG | BODY MASS INDEX: 36.19 KG/M2 | OXYGEN SATURATION: 98 %

## 2023-01-18 DIAGNOSIS — K21.9 GASTROESOPHAGEAL REFLUX DISEASE, UNSPECIFIED WHETHER ESOPHAGITIS PRESENT: ICD-10-CM

## 2023-01-18 DIAGNOSIS — J02.9 SORE THROAT: ICD-10-CM

## 2023-01-18 DIAGNOSIS — J43.2 CENTRILOBULAR EMPHYSEMA (HCC): Chronic | ICD-10-CM

## 2023-01-18 DIAGNOSIS — I10 ESSENTIAL HYPERTENSION: ICD-10-CM

## 2023-01-18 DIAGNOSIS — Z79.4 TYPE 2 DIABETES MELLITUS WITHOUT COMPLICATION, WITH LONG-TERM CURRENT USE OF INSULIN (HCC): ICD-10-CM

## 2023-01-18 DIAGNOSIS — E11.9 TYPE 2 DIABETES MELLITUS WITHOUT COMPLICATION, WITH LONG-TERM CURRENT USE OF INSULIN (HCC): ICD-10-CM

## 2023-01-18 DIAGNOSIS — J44.1 COPD WITH ACUTE EXACERBATION (HCC): Primary | ICD-10-CM

## 2023-01-18 PROCEDURE — 99214 OFFICE O/P EST MOD 30 MIN: CPT | Performed by: NURSE PRACTITIONER

## 2023-01-18 PROCEDURE — 3008F BODY MASS INDEX DOCD: CPT | Performed by: NURSE PRACTITIONER

## 2023-01-18 PROCEDURE — 97110 THERAPEUTIC EXERCISES: CPT

## 2023-01-18 PROCEDURE — 97112 NEUROMUSCULAR REEDUCATION: CPT

## 2023-01-18 PROCEDURE — 3074F SYST BP LT 130 MM HG: CPT | Performed by: NURSE PRACTITIONER

## 2023-01-18 PROCEDURE — 3078F DIAST BP <80 MM HG: CPT | Performed by: NURSE PRACTITIONER

## 2023-01-18 PROCEDURE — 97140 MANUAL THERAPY 1/> REGIONS: CPT

## 2023-01-18 RX ORDER — AZITHROMYCIN 250 MG/1
TABLET, FILM COATED ORAL
Qty: 6 TABLET | Refills: 0 | Status: SHIPPED | OUTPATIENT
Start: 2023-01-18 | End: 2023-01-23

## 2023-01-18 RX ORDER — PREDNISONE 20 MG/1
TABLET ORAL
Qty: 5 TABLET | Refills: 0 | Status: SHIPPED | OUTPATIENT
Start: 2023-01-18

## 2023-01-18 NOTE — TELEPHONE ENCOUNTER
[address] [facility]  [phone]    Regarding Prednisone, the directions say twice a day; quantity is  ten tablets. Is the quantity wrong or directions supposed to be once a day? Please call them to clarify.

## 2023-01-21 ENCOUNTER — PATIENT MESSAGE (OUTPATIENT)
Dept: ENDOCRINOLOGY CLINIC | Facility: CLINIC | Age: 63
End: 2023-01-21

## 2023-01-24 NOTE — TELEPHONE ENCOUNTER
From: Thang Adame  To: Charlynn Fothergill, MD  Sent: 1/21/2023 8:17 AM CST  Subject: Whole 30 diet     Update from my last visit. First week of January I was on The whole30 diet and by day four I was doubled over with stomach pains. Stop the diet for the second week of january. The third week of January started the diet back up again and by the fourth day I was doubled over with stomach pains again. On top of that my gerd has flared up and I am in massive esophagus pain. Looks like this isn't the diet for me.

## 2023-01-27 ENCOUNTER — OFFICE VISIT (OUTPATIENT)
Dept: PHYSICAL THERAPY | Age: 63
End: 2023-01-27
Attending: NURSE PRACTITIONER
Payer: COMMERCIAL

## 2023-01-27 PROCEDURE — 97140 MANUAL THERAPY 1/> REGIONS: CPT

## 2023-01-27 PROCEDURE — 97110 THERAPEUTIC EXERCISES: CPT

## 2023-01-27 PROCEDURE — 97530 THERAPEUTIC ACTIVITIES: CPT

## 2023-01-29 PROBLEM — M48.02 CERVICAL STENOSIS OF SPINE: Status: ACTIVE | Noted: 2023-01-29

## 2023-01-29 PROBLEM — M50.90 CERVICAL DISC DISEASE: Status: ACTIVE | Noted: 2023-01-29

## 2023-01-30 ENCOUNTER — TELEPHONE (OUTPATIENT)
Dept: PHYSICAL MEDICINE AND REHAB | Facility: CLINIC | Age: 63
End: 2023-01-30

## 2023-01-30 ENCOUNTER — OFFICE VISIT (OUTPATIENT)
Dept: PHYSICAL THERAPY | Age: 63
End: 2023-01-30
Attending: NURSE PRACTITIONER
Payer: COMMERCIAL

## 2023-01-30 PROCEDURE — 97112 NEUROMUSCULAR REEDUCATION: CPT

## 2023-01-30 PROCEDURE — 97140 MANUAL THERAPY 1/> REGIONS: CPT

## 2023-01-30 PROCEDURE — 97110 THERAPEUTIC EXERCISES: CPT

## 2023-01-30 NOTE — TELEPHONE ENCOUNTER
----- Message from Meseret Huang MD sent at 1/29/2023  8:35 PM CST -----  She has mild bulging discs.   Please see how she is doing with the PT.

## 2023-02-01 ENCOUNTER — PATIENT MESSAGE (OUTPATIENT)
Dept: PHYSICAL MEDICINE AND REHAB | Facility: CLINIC | Age: 63
End: 2023-02-01

## 2023-02-01 ENCOUNTER — APPOINTMENT (OUTPATIENT)
Dept: PHYSICAL THERAPY | Age: 63
End: 2023-02-01
Attending: NURSE PRACTITIONER
Payer: COMMERCIAL

## 2023-02-02 NOTE — TELEPHONE ENCOUNTER
From: Fabián Fontana  To: Carine Murray MD  Sent: 2/1/2023 10:04 AM CST  Subject: Cancel Appointment     I've been trying to call the office and can't get a hold of anybody. I need to cancel my procedure for Friday February 10th. I have another medical issue that I have to tend to first. I will reschedule at a later date. If there is an expiration to this referral / procedure please let me know that date.

## 2023-02-03 ENCOUNTER — HOSPITAL ENCOUNTER (OUTPATIENT)
Dept: MRI IMAGING | Age: 63
Discharge: HOME | End: 2023-02-03
Attending: Other
Payer: COMMERCIAL

## 2023-02-03 ENCOUNTER — APPOINTMENT (OUTPATIENT)
Dept: PHYSICAL THERAPY | Age: 63
End: 2023-02-03
Attending: NURSE PRACTITIONER
Payer: COMMERCIAL

## 2023-02-03 DIAGNOSIS — H47.10 PAPILLEDEMA: ICD-10-CM

## 2023-02-03 DIAGNOSIS — G93.2 IIH (IDIOPATHIC INTRACRANIAL HYPERTENSION): ICD-10-CM

## 2023-02-03 PROCEDURE — A9575 INJ GADOTERATE MEGLUMI 0.1ML: HCPCS | Performed by: OTHER

## 2023-02-03 PROCEDURE — 70553 MRI BRAIN STEM W/O & W/DYE: CPT | Performed by: OTHER

## 2023-02-03 PROCEDURE — 70544 MR ANGIOGRAPHY HEAD W/O DYE: CPT | Performed by: OTHER

## 2023-02-03 RX ORDER — GADOTERATE MEGLUMINE 376.9 MG/ML
20 INJECTION INTRAVENOUS
Status: COMPLETED | OUTPATIENT
Start: 2023-02-03 | End: 2023-02-03

## 2023-02-03 RX ADMIN — GADOTERATE MEGLUMINE 20 ML: 376.9 INJECTION INTRAVENOUS at 09:39:00

## 2023-02-06 ENCOUNTER — APPOINTMENT (OUTPATIENT)
Dept: PHYSICAL THERAPY | Age: 63
End: 2023-02-06
Attending: NURSE PRACTITIONER
Payer: COMMERCIAL

## 2023-02-09 ENCOUNTER — TELEPHONE (OUTPATIENT)
Dept: PHYSICAL THERAPY | Facility: HOSPITAL | Age: 63
End: 2023-02-09

## 2023-02-09 ENCOUNTER — APPOINTMENT (OUTPATIENT)
Dept: PHYSICAL THERAPY | Age: 63
End: 2023-02-09
Attending: NURSE PRACTITIONER
Payer: COMMERCIAL

## 2023-02-12 ENCOUNTER — PATIENT MESSAGE (OUTPATIENT)
Dept: FAMILY MEDICINE CLINIC | Facility: CLINIC | Age: 63
End: 2023-02-12

## 2023-02-12 NOTE — TELEPHONE ENCOUNTER
[employer] message sent for protocol. Future Appointments   Date Time Provider Jonny Kendall   2/15/2023 11:45 AM Mallory Cota, PT YRKPT EM [facility]   2/28/2023  5:00 PM Baldemar Pollock, [facility]   3/1/2023 11:00 AM Mallory Cota, PT YRKPT EM [facility]   3/14/2023  5:00 PM Baldemar Pollock, [facility]   3/28/2023  5:00 PM Baldemar Meekum, [facility]   5/9/2023  5:00 PM Leandro Dong MD PM&R [facility]         From: Sharon Fox  To: Michael Ulloa MD  Sent: 2/12/2023  3:59 PM CST  Subject: Appointment Needed     I have a head and chest cold and I can hear myself wheezing. I need to be checked out to make sure it's not turning into pneumonia. Can I get an appointment sometime Monday either in [address] [place] [address] or at [facility]? Or if Dr China Mac can just call in a prescription for an antibiotic (Z-Jackson), that would be even better.

## 2023-02-13 ENCOUNTER — APPOINTMENT (OUTPATIENT)
Dept: PHYSICAL THERAPY | Age: 63
End: 2023-02-13
Attending: NURSE PRACTITIONER
Payer: COMMERCIAL

## 2023-02-13 ENCOUNTER — OFFICE VISIT (OUTPATIENT)
Dept: INTERNAL MEDICINE CLINIC | Facility: CLINIC | Age: 63
End: 2023-02-13

## 2023-02-13 ENCOUNTER — NURSE TRIAGE (OUTPATIENT)
Dept: FAMILY MEDICINE CLINIC | Facility: CLINIC | Age: 63
End: 2023-02-13

## 2023-02-13 VITALS
TEMPERATURE: 98 F | HEART RATE: 67 BPM | OXYGEN SATURATION: 94 % | BODY MASS INDEX: 36 KG/M2 | SYSTOLIC BLOOD PRESSURE: 119 MMHG | DIASTOLIC BLOOD PRESSURE: 83 MMHG | WEIGHT: 209 LBS

## 2023-02-13 DIAGNOSIS — E11.9 TYPE 2 DIABETES MELLITUS WITHOUT COMPLICATION, WITH LONG-TERM CURRENT USE OF INSULIN (HCC): ICD-10-CM

## 2023-02-13 DIAGNOSIS — Z12.31 VISIT FOR SCREENING MAMMOGRAM: ICD-10-CM

## 2023-02-13 DIAGNOSIS — Z79.4 TYPE 2 DIABETES MELLITUS WITHOUT COMPLICATION, WITH LONG-TERM CURRENT USE OF INSULIN (HCC): ICD-10-CM

## 2023-02-13 DIAGNOSIS — J44.1 COPD WITH ACUTE EXACERBATION (HCC): Primary | ICD-10-CM

## 2023-02-13 PROBLEM — J01.00 ACUTE NON-RECURRENT MAXILLARY SINUSITIS: Status: RESOLVED | Noted: 2019-01-21 | Resolved: 2023-02-13

## 2023-02-13 PROCEDURE — 3079F DIAST BP 80-89 MM HG: CPT | Performed by: NURSE PRACTITIONER

## 2023-02-13 PROCEDURE — 3074F SYST BP LT 130 MM HG: CPT | Performed by: NURSE PRACTITIONER

## 2023-02-13 PROCEDURE — 99214 OFFICE O/P EST MOD 30 MIN: CPT | Performed by: NURSE PRACTITIONER

## 2023-02-13 NOTE — PATIENT INSTRUCTIONS
ASSESSMENT/PLAN:   Copd with acute exacerbation (hcc)  (primary encounter diagnosis)  Use Symbicort inhaler as directed  Use albuterol inhaler every 4-6 hours as needed  Take Z-Jackson as prescribed with food  Follow-up with pulmonologist    Visit for screening mammogram  Ordered mammogram call to schedule    Type 2 diabetes mellitus without complication ([facility]  Careful with diet and excercise at least 30 minutes 3-4 times a week. Check sugars at different times on different dates. Careful with low sugars. Carry something with you and check sugar if can. Can carry nhung cracker, etc. Decrease carbohydrates. But also, careful with fruits and natural sugars. One serving a day and no more than 1 handful every day. Check feet  every AM and careful with sores and ulcers on feet bilaterally. Check eyes every year with dilated eye exam.  Check sugars. 2-hour postmeal should be less than 140s. Pre-meal should be 's. Both equally affected A1c. No orders of the defined types were placed in this encounter. Follow-up with primary care in 1 month for physical or sooner if needed.   Meds This Visit:  Requested Prescriptions      No prescriptions requested or ordered in this encounter       Imaging & Referrals:  [facility] YAIMA 2D+3D SCREENING BILAT (CPT=[id number])

## 2023-02-13 NOTE — TELEPHONE ENCOUNTER
I have a head and chest cold and I can hear myself wheezing. I need to be checked out to make sure it's not turning into pneumonia. Can I get an appointment sometime Monday either in [facility] [place] or at [facility]? Or if Dr Audra Wells can just call in a prescription for an antibiotic (Z-Jackson), that would be even better. My chart message sent.

## 2023-02-15 ENCOUNTER — OFFICE VISIT (OUTPATIENT)
Dept: PHYSICAL THERAPY | Age: 63
End: 2023-02-15
Attending: NURSE PRACTITIONER
Payer: COMMERCIAL

## 2023-02-15 PROCEDURE — 97110 THERAPEUTIC EXERCISES: CPT | Performed by: PHYSICAL THERAPIST

## 2023-02-17 ENCOUNTER — TELEPHONE (OUTPATIENT)
Dept: INTERNAL MEDICINE CLINIC | Facility: CLINIC | Age: 63
End: 2023-02-17

## 2023-02-17 RX ORDER — PREDNISONE 20 MG/1
TABLET ORAL
Qty: 10 TABLET | Refills: 0 | Status: SHIPPED | OUTPATIENT
Start: 2023-02-17

## 2023-02-17 NOTE — TELEPHONE ENCOUNTER
Any fever, chills, sob, Wheezing? Sent Prednisone sent to pharmacy to be taken w/ food. Follow up with Pulmo Dr Conteh Pretty as schedule or sooner if needed.

## 2023-02-17 NOTE — TELEPHONE ENCOUNTER
Patient seen in the office with Sraah Mail ANGELINA on 2/13/23 for COPD exacerbation and was prescribed Zpack for which she has one pill left. She's calling today as she's not feeling any better and my need steroid. She called pulmonologist and has upcoming PFT ordered by Dr. Gregory Lei. But, she feels she needs the steroid or a different antibiotic.       Please advise

## 2023-02-17 NOTE — TELEPHONE ENCOUNTER
Pt contacted. Verified name and . Pt informed of [employer]. Pt states wheezing and shortness of breath noted. No fever or chills. Pt verbalized understanding to follow-up sooner if needed.

## 2023-02-20 ENCOUNTER — LAB ENCOUNTER (OUTPATIENT)
Dept: LAB | Age: 63
End: 2023-02-20
Attending: INTERNAL MEDICINE
Payer: COMMERCIAL

## 2023-02-20 DIAGNOSIS — E03.8 SUBCLINICAL HYPOTHYROIDISM: ICD-10-CM

## 2023-02-20 DIAGNOSIS — E55.9 VITAMIN D DEFICIENCY: ICD-10-CM

## 2023-02-20 DIAGNOSIS — Z01.818 PREOP EXAMINATION: ICD-10-CM

## 2023-02-20 DIAGNOSIS — Z11.59 ENCOUNTER FOR SCREENING FOR OTHER VIRAL DISEASES: ICD-10-CM

## 2023-02-20 LAB
T4 FREE SERPL-MCNC: 1.2 NG/DL (ref 0.8–1.7)
TSI SER-ACNC: 0.74 MIU/ML (ref 0.36–3.74)
VIT D+METAB SERPL-MCNC: 19.3 NG/ML (ref 30–100)

## 2023-02-20 PROCEDURE — 84443 ASSAY THYROID STIM HORMONE: CPT

## 2023-02-20 PROCEDURE — 82306 VITAMIN D 25 HYDROXY: CPT

## 2023-02-20 PROCEDURE — 36415 COLL VENOUS BLD VENIPUNCTURE: CPT

## 2023-02-20 PROCEDURE — 84439 ASSAY OF FREE THYROXINE: CPT

## 2023-02-21 ENCOUNTER — OFFICE VISIT (OUTPATIENT)
Dept: OBGYN CLINIC | Facility: CLINIC | Age: 63
End: 2023-02-21

## 2023-02-21 ENCOUNTER — APPOINTMENT (OUTPATIENT)
Dept: PHYSICAL THERAPY | Age: 63
End: 2023-02-21
Attending: NURSE PRACTITIONER
Payer: COMMERCIAL

## 2023-02-21 VITALS
WEIGHT: 205 LBS | HEIGHT: 64 IN | SYSTOLIC BLOOD PRESSURE: 122 MMHG | BODY MASS INDEX: 35 KG/M2 | DIASTOLIC BLOOD PRESSURE: 75 MMHG

## 2023-02-21 DIAGNOSIS — Z12.4 PAPANICOLAOU SMEAR FOR CERVICAL CANCER SCREENING: ICD-10-CM

## 2023-02-21 DIAGNOSIS — N39.3 STRESS INCONTINENCE, FEMALE: ICD-10-CM

## 2023-02-21 DIAGNOSIS — Z01.419 ENCOUNTER FOR WELL WOMAN EXAM WITH ROUTINE GYNECOLOGICAL EXAM: Primary | ICD-10-CM

## 2023-02-21 LAB — SARS-COV-2 RNA RESP QL NAA+PROBE: NOT DETECTED

## 2023-02-21 PROCEDURE — 87624 HPV HI-RISK TYP POOLED RSLT: CPT | Performed by: NURSE PRACTITIONER

## 2023-02-22 LAB — HPV I/H RISK 1 DNA SPEC QL NAA+PROBE: NEGATIVE

## 2023-02-23 ENCOUNTER — HOSPITAL ENCOUNTER (OUTPATIENT)
Dept: RESPIRATORY THERAPY | Facility: HOSPITAL | Age: 63
Discharge: HOME | End: 2023-02-23
Attending: INTERNAL MEDICINE
Payer: COMMERCIAL

## 2023-02-23 DIAGNOSIS — R06.09 DOE (DYSPNEA ON EXERTION): ICD-10-CM

## 2023-02-23 PROCEDURE — 94060 EVALUATION OF WHEEZING: CPT

## 2023-02-23 PROCEDURE — 94729 DIFFUSING CAPACITY: CPT

## 2023-02-23 PROCEDURE — 94726 PLETHYSMOGRAPHY LUNG VOLUMES: CPT

## 2023-02-24 RX ORDER — ERGOCALCIFEROL 1.25 MG/1
50000 CAPSULE ORAL WEEKLY
Qty: 12 CAPSULE | Refills: 0 | Status: SHIPPED | OUTPATIENT
Start: 2023-02-24 | End: 2023-03-26

## 2023-02-27 ENCOUNTER — APPOINTMENT (OUTPATIENT)
Dept: PHYSICAL THERAPY | Age: 63
End: 2023-02-27
Attending: NURSE PRACTITIONER
Payer: COMMERCIAL

## 2023-02-28 NOTE — TELEPHONE ENCOUNTER
From: Rupa Farias  To: Shantell Hernández MD  Sent: 6/2/2018 9:59 AM CDT  Subject: Referral Request    I had different insurance coverage in 8/2017.  I think we need a new referral for the new insurance plan which is   [facility] Refill Routing Note   Medication(s) are not appropriate for processing by [facility] for the following reason(s):       Required labs outdated    [facility] action(s):  Defer   Requires labs : Yes         Appointments  past 12m or future 3m with PCP    Date Provider   Last Visit   9/29/2022 Leoncio Toure MD   Next Visit   2/28/2023 Leoncio Toure MD   ED visits in past 90 days: 0        Note composed:12:17 PM 02/28/2023

## 2023-03-01 ENCOUNTER — APPOINTMENT (OUTPATIENT)
Dept: PHYSICAL THERAPY | Age: 63
End: 2023-03-01
Attending: NURSE PRACTITIONER
Payer: COMMERCIAL

## 2023-03-02 ENCOUNTER — APPOINTMENT (OUTPATIENT)
Dept: PHYSICAL THERAPY | Age: 63
End: 2023-03-02
Attending: NURSE PRACTITIONER
Payer: COMMERCIAL

## 2023-03-08 ENCOUNTER — APPOINTMENT (OUTPATIENT)
Dept: PHYSICAL THERAPY | Age: 63
End: 2023-03-08
Attending: NURSE PRACTITIONER
Payer: COMMERCIAL

## 2023-03-08 NOTE — TELEPHONE ENCOUNTER
Asymptomatic off any medication   Spoke with patient, name/ verified. She did not request refill, has not yet started it. She asked that I decline script. She will start the medication carefully as she has many medication intolerances. I asked her to let us know when she needs a refill or if she has problems tolerating it. She agreed to plan.

## 2023-03-09 NOTE — TELEPHONE ENCOUNTER
Refill passed per [place] [employer], [facility] protocol  Receiving Allergy Warning    Medication pended for your review and approval.      Requested Prescriptions   Pending Prescriptions Disp Refills    ATORVASTATIN 40 MG Oral Tab [Pharmacy Med Name: Atorvastatin Calcium 40 Mg Tab Nort] 90 tablet 0     Sig: TAKE ONE TABLET BY MOUTH ONE TIME DAILY       Cholesterol Medication Protocol Passed - 3/9/2023  1:46 PM        Passed - ALT in past 12 months        Passed - LDL in past 12 months        Passed - Last ALT < 80     Lab Results   Component Value Date    ALT 37 11/14/2022             Passed - Last LDL < 130     Lab Results   Component Value Date    LDL 62 11/14/2022             Passed - In person appointment or virtual visit in the past 12 mos or appointment in next 3 mos     Recent Outpatient Visits              2 weeks ago Encounter for well woman exam with routine gynecological exam    Aaron Cueva, [address] [facility], [facility] [address], APRN    Office Visit    3 weeks ago     [address], [place]    Office Visit    3 weeks ago COPD with acute exacerbation [facility])    [address], [address] [facility], Ivette Lopezide, APR    Office Visit    1 month ago     [address] Nambeat[address] [place], [place]    Office Visit    1 month ago     [address] Shekhar [place], [place]    Office Visit          Future Appointments         Provider Department Appt Notes    In 4 days ADO [facility]    In 1 month Junaid Morin MD [address], [address]     In 2 months Mulugeta Tapia MD [address], [address], [facility] f/u

## 2023-03-12 DIAGNOSIS — G43.109 MIGRAINE AURA WITHOUT HEADACHE: ICD-10-CM

## 2023-03-12 RX ORDER — ATORVASTATIN CALCIUM 40 MG/1
40 TABLET, FILM COATED ORAL DAILY
Qty: 90 TABLET | Refills: 3 | Status: SHIPPED | OUTPATIENT
Start: 2023-03-12

## 2023-03-13 ENCOUNTER — HOSPITAL ENCOUNTER (OUTPATIENT)
Dept: MAMMOGRAPHY | Age: 63
Discharge: HOME | End: 2023-03-13
Attending: NURSE PRACTITIONER
Payer: COMMERCIAL

## 2023-03-13 DIAGNOSIS — Z12.31 VISIT FOR SCREENING MAMMOGRAM: ICD-10-CM

## 2023-03-13 PROCEDURE — 77063 BREAST TOMOSYNTHESIS BI: CPT | Performed by: NURSE PRACTITIONER

## 2023-03-13 PROCEDURE — 77067 SCR MAMMO BI INCL CAD: CPT | Performed by: NURSE PRACTITIONER

## 2023-03-13 RX ORDER — UBROGEPANT 100 MG/1
TABLET ORAL
Qty: 10 TABLET | Refills: 0 | OUTPATIENT
Start: 2023-03-13

## 2023-03-13 RX ORDER — MECLIZINE HYDROCHLORIDE 25 MG/1
25 TABLET ORAL 3 TIMES DAILY PRN
Qty: 30 TABLET | Refills: 0 | Status: SHIPPED | OUTPATIENT
Start: 2023-03-13

## 2023-03-13 RX ORDER — UBROGEPANT 100 MG/1
TABLET ORAL
Qty: 10 TABLET | Refills: 0 | Status: SHIPPED | OUTPATIENT
Start: 2023-03-13

## 2023-03-13 NOTE — TELEPHONE ENCOUNTER
Refill passed per [place] [employer] [place], [facility] protocol. Requested Prescriptions   Pending Prescriptions Disp Refills    MECLIZINE 25 MG Oral Tab [Pharmacy Med Name: Meclizine Hydrochloride 25 Mg Tab Zydu] 30 tablet 0     Sig: Take 1 tablet (25 mg total) by mouth 3 (three) times daily as needed for Dizziness or Nausea.        Gastrointestional Medication Protocol Passed - 3/12/2023  5:36 PM        Passed - In person appointment or virtual visit in the past 12 mos or appointment in next 3 mos     Recent Outpatient Visits              2 weeks ago Encounter for well woman exam with routine gynecological exam    [address] [place] [address], [facility], [facility] 148, APRN    Office Visit    3 weeks ago     [address] [place]    Office Visit    4 weeks ago COPD with acute exacerbation [facility])    [address] [place] [address], [facility], APR    Office Visit    1 month ago     [address] Gas[address] [place]    Office Visit    1 month ago     [address] [place], [place]    Office Visit          Future Appointments         Provider Department Appt Notes    Today ADO JEANIE [address] [facility]    In 1 month Kalen Rivera MD [address], [address] [facility]     In 1 month Couri, Gordan Hodgkins, MD [address], [address], [place] f/u                   Recent Outpatient Visits              2 weeks ago Encounter for well woman exam with routine gynecological exam    [address] [place] [address], [facility], APRN    Office Visit    3 weeks ago     [address] [place]    Office Visit    4 weeks ago COPD with acute exacerbation [facility])    [address], [address], [facility] Daren Monroe, APRN    Office Visit    1 month ago     [address] Prim[address] [place]    Office Visit    1 month ago     [address]    Office Visit           Future Appointments         Provider Department Appt Notes    Today ADO [facility] [address] [facility] [place] [facility]    In 1 month MD Samson Lopez, [facility] 84     In 1 month Ceferino Quinteros, MD Samson Simmons, [address] [facility],[address] Floor, [facility] f/u

## 2023-03-23 ENCOUNTER — PATIENT MESSAGE (OUTPATIENT)
Dept: NEUROLOGY | Facility: CLINIC | Age: 63
End: 2023-03-23

## 2023-03-23 ENCOUNTER — PATIENT MESSAGE (OUTPATIENT)
Dept: ENDOCRINOLOGY CLINIC | Facility: CLINIC | Age: 63
End: 2023-03-23

## 2023-03-23 DIAGNOSIS — E11.9 CONTROLLED TYPE 2 DIABETES MELLITUS WITHOUT COMPLICATION, WITHOUT LONG-TERM CURRENT USE OF INSULIN (HCC): Primary | ICD-10-CM

## 2023-03-23 RX ORDER — DIAZEPAM 5 MG/1
5 TABLET ORAL EVERY 12 HOURS PRN
Qty: 30 TABLET | Refills: 0 | Status: SHIPPED | OUTPATIENT
Start: 2023-03-23

## 2023-03-23 NOTE — TELEPHONE ENCOUNTER
Anastasiia Hernandez, [address] Daniela Nurse 16 minutes ago (11:33 AM)       Hello, I recommend she follow up with me in clinic to discuss her migraines further. Please ask her to make an appt. I agree with ophthalmology consult re: eye issues.    Thanks

## 2023-03-23 NOTE — TELEPHONE ENCOUNTER
Patient returned call. Explained mild chronic ischemic changes. Patient was asking about meaning as she has been experiencing a \"floater\" & \"flashing lights\" to left eye for the past week. Does not actually visualize a \"floater\" but describes as obscured vision that is constant. The flashing lights occur intermittently. Denies headache, N/V, or any stroke like symptoms. She did have a headache a few weeks ago that was present for 2 weeks that progressed to a migraine. She is under a lot of stress in all aspects of life (Home, work, personal). Symptoms coincide with increased stress per pt report. She did get a referral for ophthalmology from Endocrinologist today.        Per epic review,  LOV 3/23/22 for migraine aura without headache    On Ubrelvy 100mg tabs PRN, has used 7 tabs over past 2.5 weeks

## 2023-03-23 NOTE — TELEPHONE ENCOUNTER
Dr. Lieutenant Altman    Please advise. Patient's LOV 12/28/22. I pended a referral for Ophthalmology [facility].

## 2023-03-23 NOTE — TELEPHONE ENCOUNTER
From: Jacqui Powers  To: Blake Dailey MD  Sent: 3/23/2023 9:47 AM CDT  Subject: Diabetic Eye Exam    I'm having some issues seeing out of my right eye. I have a floater and some white light flashes. I haven't been for my diabetic eye exam in a while. Can you please give me a referral to have that done?

## 2023-03-23 NOTE — TELEPHONE ENCOUNTER
Please review. Protocol failed / No protocol. Requested Prescriptions   Pending Prescriptions Disp Refills    diazePAM 5 MG Oral Tab 30 tablet 0     Sig: Take 1 tablet (5 mg total) by mouth every 12 (twelve) hours as needed.        There is no refill protocol information for this order          Recent Outpatient Visits              1 month ago Encounter for well woman exam with routine gynecological exam    Simona Heath, [address] [facility], [facility], APRN    Office Visit    1 month ago     [address] [facility], [address]    Office Visit    1 month ago COPD with acute exacerbation [facility])    Ciro Oleary, [place], APRN    Office Visit    1 month ago     [address] Vessie Duos, [address]    Office Visit    1 month ago     [address] Vessie Duos, [address]    Office Visit            Future Appointments         Provider Department Appt Notes    In 1 month Ceferino Quinteros, MD Samson Simmons, [address] [facility],[address] Floor, [facility] f/u

## 2023-03-23 NOTE — TELEPHONE ENCOUNTER
From: Tran Powers  To: Sultana Cheng DO  Sent: 3/23/2023 10:19 AM CDT  Subject: Clarification Please    What exactly does this mean? Your brain shows mild changes associated with your history of high blood pressure, high cholesterol and diabetes.

## 2023-03-24 ENCOUNTER — TELEPHONE (OUTPATIENT)
Dept: OPHTHALMOLOGY | Facility: CLINIC | Age: 63
End: 2023-03-24

## 2023-03-24 NOTE — TELEPHONE ENCOUNTER
Spoke to patient- she complains of an increase of floaters, flashing lights x 1 week and vision loss. She had a dilated exam last month with an optometrist and had a normal exam. She would like to be sent to a retina specialist. Information given to see Dr. Susannah Roberson. She will call and get an appointment and call her PCP for referral.     I told her to call me back if she needs help scheduling or getting in with RJ next week.

## 2023-03-24 NOTE — TELEPHONE ENCOUNTER
Reviewed chart as pended referral was signed. It did state on referral \"EHV- No RFL\" and it was not showing up under 'Referrals'. Resent referral with \"EHV-RFL' and it is now showing under 'Referrals'.  [facility]

## 2023-03-24 NOTE — TELEPHONE ENCOUNTER
Patient called back to arielle Delgadillo know that she has an appointment with Dr. Samy Storey @ [facility] on 3/27/23 at 10:00 am.

## 2023-03-24 NOTE — TELEPHONE ENCOUNTER
Per pt has active floater again, states she has also been seeing flashes of light, pt concerned, asking for appt soon. Please call thank you.

## 2023-04-05 DIAGNOSIS — G43.109 MIGRAINE AURA WITHOUT HEADACHE: ICD-10-CM

## 2023-04-05 RX ORDER — UBROGEPANT 100 MG/1
TABLET ORAL
Qty: 10 TABLET | Refills: 0 | Status: SHIPPED | OUTPATIENT
Start: 2023-04-05

## 2023-04-05 NOTE — TELEPHONE ENCOUNTER
UBRELVY 100 MG Oral Tab  Take one tablet at onset of migraine. May take additional tablet in 2 hours if needed. Do not exceed two tablets per 24 hour period.   #10, 0 refills     LOV: 3/23/2022  NOV: None scheduled  Last refilled: 3/13/2023

## 2023-04-16 ENCOUNTER — PATIENT MESSAGE (OUTPATIENT)
Dept: FAMILY MEDICINE CLINIC | Facility: CLINIC | Age: 63
End: 2023-04-16

## 2023-04-17 NOTE — TELEPHONE ENCOUNTER
Patient called in and was scheduled for the following ER follow up     Future Appointments   Date Time Provider Jonny Kendall   4/18/2023  9:00 AM ANGELINA AbbottO   4/18/2023 12:00 PM VIVIENNE Flores   4/25/2023 12:00 PM VIVIENNE Flores   5/2/2023 12:00 PM Jonathon Virk [facility]   5/9/2023  1:00 PM Jonathon Virk LCSW [facility]   5/9/2023  5:00 PM Keerthi Schulte MD [facility]     Added to wait list for Dr. Barrett Floyd and Juan Mac. Patient does not want to see any other providers at this time.

## 2023-04-18 ENCOUNTER — OFFICE VISIT (OUTPATIENT)
Dept: FAMILY MEDICINE CLINIC | Facility: CLINIC | Age: 63
End: 2023-04-18

## 2023-04-18 VITALS
SYSTOLIC BLOOD PRESSURE: 135 MMHG | DIASTOLIC BLOOD PRESSURE: 84 MMHG | HEART RATE: 65 BPM | HEIGHT: 64 IN | BODY MASS INDEX: 34.31 KG/M2 | WEIGHT: 201 LBS

## 2023-04-18 DIAGNOSIS — T14.8XXA MUSCLE STRAIN: Primary | ICD-10-CM

## 2023-04-18 DIAGNOSIS — Z79.4 TYPE 2 DIABETES MELLITUS WITHOUT COMPLICATION, WITH LONG-TERM CURRENT USE OF INSULIN (HCC): ICD-10-CM

## 2023-04-18 DIAGNOSIS — E11.9 TYPE 2 DIABETES MELLITUS WITHOUT COMPLICATION, WITH LONG-TERM CURRENT USE OF INSULIN (HCC): ICD-10-CM

## 2023-04-18 DIAGNOSIS — L60.0 INGROWING TOENAIL: ICD-10-CM

## 2023-04-18 DIAGNOSIS — H53.9 VISION DISTURBANCE: ICD-10-CM

## 2023-04-18 PROBLEM — R10.31 ABDOMINAL PAIN, RLQ: Status: RESOLVED | Noted: 2022-09-23 | Resolved: 2023-04-18

## 2023-04-18 PROCEDURE — 3008F BODY MASS INDEX DOCD: CPT | Performed by: NURSE PRACTITIONER

## 2023-04-18 PROCEDURE — 3079F DIAST BP 80-89 MM HG: CPT | Performed by: NURSE PRACTITIONER

## 2023-04-18 PROCEDURE — 3075F SYST BP GE 130 - 139MM HG: CPT | Performed by: NURSE PRACTITIONER

## 2023-04-18 PROCEDURE — 99214 OFFICE O/P EST MOD 30 MIN: CPT | Performed by: NURSE PRACTITIONER

## 2023-04-18 RX ORDER — CHLORHEXIDINE GLUCONATE 0.12 MG/ML
RINSE ORAL
COMMUNITY
Start: 2023-03-14

## 2023-04-18 RX ORDER — VALACYCLOVIR HYDROCHLORIDE 500 MG/1
500 TABLET, FILM COATED ORAL DAILY
COMMUNITY
Start: 2023-03-12

## 2023-04-19 NOTE — ASSESSMENT & PLAN NOTE
Continue present management  Discussed w pt the need to improve her diet-she needs to cut back on her carbs significantly and increase vegetable and protein.    Refer ophthalmology  Check bs in am and 2 hours after a meal daily  Exercise for goal of at least 150 min/week; combination weight training and cardio exercises

## 2023-04-19 NOTE — ASSESSMENT & PLAN NOTE
Soak toenail in warm soapy water 2-3 times daily  Apply triple antibiotics ointment to medial aspect of toenail  Refer podiatry as pt is Diabetic

## 2023-04-19 NOTE — ASSESSMENT & PLAN NOTE
Ice 20 min 4-6 times per day  Do not use heat on injury  Do not apply ice directly on skin, make certain a thin cloth is between skin and ice pack    Ibuprofen 600 mg I po twice a day to three times per day as needed w food  Gentle stretching  Please call if symptoms worsen or are not resolving.

## 2023-04-20 RX ORDER — MONTELUKAST SODIUM 10 MG/1
10 TABLET ORAL DAILY
Qty: 90 TABLET | Refills: 3 | Status: SHIPPED | OUTPATIENT
Start: 2023-04-20

## 2023-04-20 NOTE — TELEPHONE ENCOUNTER
Refill passed per [place] [employer], [facility] protocol. Requested Prescriptions   Pending Prescriptions Disp Refills    MONTELUKAST 10 MG Oral Tab [Pharmacy Med Name: Montelukast Sodium 10 Mg Tab Auro] 90 tablet 0     Sig: Take 1 tablet (10 mg total) by mouth in the morning.        Asthma & COPD Medication Protocol Passed - 4/20/2023  1:31 AM        Passed - In person appointment or virtual visit in the past 6 mos or appointment in next 3 mos     Recent Outpatient Visits              2 days ago Muscle strain    [address], Any[place] , APRN    Office Visit    1 month ago Encounter for well woman exam with routine gynecological exam    [address], [place], APRN    Office Visit    2 months ago     [address], [place]    Office Visit    2 months ago COPD with acute exacerbation [facility])    [address], [address], Daren Lopez, APRN    Office Visit    2 months ago     [address] [place]    Office Visit          Future Appointments         Provider Department Appt Notes    In 4 days [address] DPM [facility], [address] [facility] NAIL/OK [address] EM                   Recent Outpatient Visits              2 days ago Muscle strain    [address], Edgar Jermaine , APRN    Office Visit    1 month ago Encounter for well woman exam with routine gynecological exam    [address], [place], APRN    Office Visit    2 months ago     [address]    Office Visit    2 months ago COPD with acute exacerbation [facility])    [address], [place], APRN    Office Visit    2 months ago     [address] [place]    Office Visit           Future Appointments         Provider Department Appt Notes    In 4 days Manuel Roe DPM [facility], [address], [place] NAIL/OK TO BOOK PER EM

## 2023-04-24 ENCOUNTER — OFFICE VISIT (OUTPATIENT)
Dept: PODIATRY CLINIC | Facility: CLINIC | Age: 63
End: 2023-04-24

## 2023-04-24 DIAGNOSIS — M79.675 GREAT TOE PAIN, LEFT: ICD-10-CM

## 2023-04-24 DIAGNOSIS — L60.0 INGROWN LEFT GREATER TOENAIL: Primary | ICD-10-CM

## 2023-04-24 PROCEDURE — 99203 OFFICE O/P NEW LOW 30 MIN: CPT | Performed by: PODIATRIST

## 2023-05-19 DIAGNOSIS — J44.1 CHRONIC OBSTRUCTIVE PULMONARY DISEASE WITH ACUTE EXACERBATION (HCC): Chronic | ICD-10-CM

## 2023-05-20 RX ORDER — MECLIZINE HYDROCHLORIDE 25 MG/1
25 TABLET ORAL 3 TIMES DAILY PRN
Qty: 30 TABLET | Refills: 0 | Status: SHIPPED | OUTPATIENT
Start: 2023-05-20

## 2023-05-20 RX ORDER — BUDESONIDE AND FORMOTEROL FUMARATE DIHYDRATE 160; 4.5 UG/1; UG/1
2 AEROSOL RESPIRATORY (INHALATION) 2 TIMES DAILY
Qty: 30.6 G | Refills: 3 | Status: SHIPPED | OUTPATIENT
Start: 2023-05-20

## 2023-05-20 NOTE — TELEPHONE ENCOUNTER
Refill passed per [place] [employer], [facility] protocol  Received allergy warning/contraindication to Methylprednisolone for Symbicort refill    Medication pended for your review and approval.     Requested Prescriptions   Pending Prescriptions Disp Refills    MECLIZINE 25 MG Oral Tab [Pharmacy Med Name: Meclizine Hydrochloride 25 Mg Tab Zydu] 30 tablet 0     Sig: Take 1 tablet (25 mg total) by mouth 3 (three) times daily as needed for Dizziness or Nausea. Gastrointestional Medication Protocol Passed - 5/19/2023  4:37 PM        Passed - In person appointment or virtual visit in the past 12 mos or appointment in next 3 mos     Recent Outpatient Visits              3 weeks ago Ingrown left greater toenail    [facility], [address], [place], [place]    Office Visit    1 month ago Muscle strain    [address] [facility], [facility]    Office Visit    2 months ago Encounter for well woman exam with routine gynecological exam    [address] [facility], [facility] [place] [facility]    Office Visit    3 months ago     [address], [place]    Office Visit    3 months ago COPD with acute exacerbation [facility])    [address] [place], [address], Daren Lopez, APRMONROE    Office Visit          Future Appointments         Provider Department Appt Notes    In 3 weeks Ina Cordova DPM [facility], [address], [place] TOENAIL LT BIG TOE                 [employer] 160-4.5 MCG/ACT Inhalation [employer] Med Name: Symbicort 160-4.5 Mcg/Act Aer Astr] 30.6 g 0     Sig: Inhale 2 puffs into the lungs 2 (two) times daily.        Asthma & COPD Medication Protocol Passed - 5/19/2023  4:37 PM        Passed - In person appointment or virtual visit in the past 6 mos or appointment in next 3 mos     Recent Outpatient Visits              3 weeks ago Ingrown left greater toenail    [facility], [address], [place]    Office Visit    1 month ago Muscle strain    [address] [facility], Edgar Priest, APR    Office Visit    2 months ago Encounter for well woman exam with routine gynecological exam    [address] [facility], Monserrat, APRN    Office Visit    3 months ago     [address], Car[place], [place]    Office Visit    3 months ago COPD with acute exacerbation [facility])    [address] [facility], Daren Lopez, APRN    Office Visit          Future Appointments         Provider Department Appt Notes    In 3 weeks [facility], [address], [place]OWN TOENAIL LT BIG TOE

## 2023-05-22 RX ORDER — ERGOCALCIFEROL 1.25 MG/1
CAPSULE ORAL
Qty: 12 CAPSULE | Refills: 0 | Status: SHIPPED | OUTPATIENT
Start: 2023-05-22

## 2023-05-26 RX ORDER — MECLIZINE HYDROCHLORIDE 25 MG/1
25 TABLET ORAL 3 TIMES DAILY PRN
Qty: 30 TABLET | Refills: 0 | OUTPATIENT
Start: 2023-05-26

## 2023-06-15 RX ORDER — GLIMEPIRIDE 1 MG/1
2 TABLET ORAL
Qty: 180 TABLET | Refills: 0 | Status: SHIPPED | OUTPATIENT
Start: 2023-06-15

## 2023-06-15 RX ORDER — PERPHENAZINE 16 MG/1
TABLET, FILM COATED ORAL
Qty: 200 STRIP | Refills: 0 | Status: SHIPPED | OUTPATIENT
Start: 2023-06-15

## 2023-06-15 NOTE — TELEPHONE ENCOUNTER
LOV:12/28/22    RTC:4  Months    FU: No FU Appt Scheduled    3 Month Supply Pending    Sent [employer] message reminder sent to schedule fu appt.

## 2023-06-15 NOTE — TELEPHONE ENCOUNTER
LOV; 12/28/22    RTC; 4months    F/U;No FU/pt states she doesn't need a FU appt at this time. Pending Monthly Supply; order pending, approve if appropriate.

## 2023-06-21 ENCOUNTER — TELEPHONE (OUTPATIENT)
Dept: PODIATRY CLINIC | Facility: CLINIC | Age: 63
End: 2023-06-21

## 2023-06-21 NOTE — TELEPHONE ENCOUNTER
S/w pt and she states she had to cancel her left hallux medial nail border matrixectomy d/t emergent dental work. She is requesting Friday. Offered her next available on 8/25 at 10am, added her to wait list. Emiliana Bach her if ingrown nail develops any signs of infection such as redness, swelling, warmth and drainage to call the office. Pt did soak the foot in warm water and epsom salt yesterday and it helped relieve some pain. She will continue to do that to help with pain.

## 2023-06-28 DIAGNOSIS — K22.719 BARRETT'S ESOPHAGUS WITH DYSPLASIA: ICD-10-CM

## 2023-06-28 RX ORDER — MECOBALAMIN 5000 MCG
15 TABLET,DISINTEGRATING ORAL 2 TIMES DAILY
Qty: 180 CAPSULE | Refills: 3 | Status: SHIPPED | OUTPATIENT
Start: 2023-06-28

## 2023-07-10 ENCOUNTER — TELEPHONE (OUTPATIENT)
Dept: PHYSICAL MEDICINE AND REHAB | Facility: CLINIC | Age: 63
End: 2023-07-10

## 2023-07-10 NOTE — TELEPHONE ENCOUNTER
Pt has been packing for moving and has aggravated her sciatica Pt is requesting something for the pain

## 2023-07-11 ENCOUNTER — PATIENT MESSAGE (OUTPATIENT)
Dept: PHYSICAL MEDICINE AND REHAB | Facility: CLINIC | Age: 63
End: 2023-07-11

## 2023-07-12 NOTE — TELEPHONE ENCOUNTER
From: Maggie Jose  To: Gifty Ruiz MD  Sent: 7/11/2023 11:25 AM CDT  Subject: Pain Relief    [facility] asking for a earlier appointment so I can get some back injections sooner than October of this year as I am in a ton of pain. I was told no appointments are available but maybe a prescription could be called in for me. I have not heard anything as of yet and I'm suffering profusely. Please look into and advise.

## 2023-07-12 NOTE — TELEPHONE ENCOUNTER
S/w Pt regarding worsening pain/condition   Pt last seen Dr Ronnell Moran 12/1/2023. Per Dr Delia Hutchinson \"I will perform bilateral L5 TFESI(s) with dexamethasone. \"She will get a new MRI of the cervical spine due to her severe pain and new weakness. \"  Pt didn't proceed with injections and follow up due to personal reasons, and felt that oral steroids helped the pain at that time. Location of Pain: hip  across L to R, shoots both leg L leg > R leg (from buttock to the feet). Old or new pain: 3 weeks ago packing (Per pt aggravated the sciatica), could barely walk since this weekend. Date Pain Began: Chronic (worsening 3 weeks ago)              Numeric Rating Scale:  Pain at Present:  8                                                                                                            (No Pain) 0  to  10 (Worst Pain)                 Minimum Pain:   3  Maximum Pain  10 (Sunday)    Distribution of Pain:  From left to right hip traveling to both legs and feet (L>R)  Quality of Pain:   throbbing (constant)  Aggravating Factors:  sitting, bending   Current pain treatment:  Ibuprofen 400 mg OTC (helpful for a few hours) Tylenol 100 mg Q 4 hours  ( helps somewhat) , ice (helps some what 10% relief per Pt)    Pt didn't have the injections, since after the oral steroids helped and pain was better. Muscle relaxer (methacarbanol 750 mg not helping)  Mell Ji MD   NOV: 9/13/2023 Britney Sosa MD     Summary of patient request: Pt wants a sooner appointment. Possible reevaluate if injections are still needed.

## 2023-07-13 ENCOUNTER — PATIENT MESSAGE (OUTPATIENT)
Dept: FAMILY MEDICINE CLINIC | Facility: CLINIC | Age: 63
End: 2023-07-13

## 2023-07-13 DIAGNOSIS — N76.0 ACUTE VAGINITIS: Primary | ICD-10-CM

## 2023-07-13 RX ORDER — FLUCONAZOLE 150 MG/1
150 TABLET ORAL ONCE
Qty: 2 TABLET | Refills: 0 | Status: SHIPPED | OUTPATIENT
Start: 2023-07-13 | End: 2023-07-13

## 2023-07-13 NOTE — TELEPHONE ENCOUNTER
Spoke with patient and scheduled appt for 7/20/23. Patient was very appreciative. Nothing further needed at this time.

## 2023-07-17 RX ORDER — DULAGLUTIDE 1.5 MG/.5ML
1.5 INJECTION, SOLUTION SUBCUTANEOUS
Qty: 6 ML | Refills: 1 | Status: SHIPPED | OUTPATIENT
Start: 2023-07-17

## 2023-07-17 NOTE — TELEPHONE ENCOUNTER
Called patient verified full name and . Informed patient of gyne referral. Patient verbalized understanding and did not have any further questions. Problem: Patient Care Overview (Adult)  Goal: Plan of Care Review  Outcome: Ongoing (interventions implemented as appropriate)   02/22/18 2145   Coping/Psychosocial Response Interventions   Plan Of Care Reviewed With patient   Patient Care Overview   Progress no change       Problem: Infection, Risk/Actual (Adult)  Goal: Identify Related Risk Factors and Signs and Symptoms  Outcome: Outcome(s) achieved Date Met: 02/22/18    Goal: Infection Prevention/Resolution  Outcome: Ongoing (interventions implemented as appropriate)   02/22/18 2145   Infection, Risk/Actual (Adult)   Infection Prevention/Resolution making progress toward outcome       Problem: Pain, Acute (Adult)  Goal: Identify Related Risk Factors and Signs and Symptoms  Outcome: Outcome(s) achieved Date Met: 02/22/18    Goal: Acceptable Pain Control/Comfort Level  Outcome: Ongoing (interventions implemented as appropriate)   02/22/18 2145   Pain, Acute (Adult)   Acceptable Pain Control/Comfort Level making progress toward outcome

## 2023-07-17 NOTE — TELEPHONE ENCOUNTER
LOV;12/28/22    RTC;4months    FU; No FU/LMTCB    Pending Monthly Supply;order pending, approve if appropriate.

## 2023-07-17 NOTE — TELEPHONE ENCOUNTER
Please let pt know that I am out of the office. There is a new gyne NP in [place]. See if can schedule with her this week.

## 2023-07-18 ENCOUNTER — PATIENT MESSAGE (OUTPATIENT)
Dept: FAMILY MEDICINE CLINIC | Facility: CLINIC | Age: 63
End: 2023-07-18

## 2023-07-18 ENCOUNTER — OFFICE VISIT (OUTPATIENT)
Dept: OBGYN CLINIC | Facility: CLINIC | Age: 63
End: 2023-07-18
Payer: COMMERCIAL

## 2023-07-18 VITALS
HEIGHT: 64 IN | WEIGHT: 209 LBS | DIASTOLIC BLOOD PRESSURE: 61 MMHG | HEART RATE: 62 BPM | TEMPERATURE: 99 F | SYSTOLIC BLOOD PRESSURE: 128 MMHG | BODY MASS INDEX: 35.68 KG/M2

## 2023-07-18 DIAGNOSIS — N94.9 VAGINAL BURNING: ICD-10-CM

## 2023-07-18 DIAGNOSIS — L30.9 VULVAR DERMATITIS: Primary | ICD-10-CM

## 2023-07-18 DIAGNOSIS — M54.16 LUMBAR RADICULOPATHY: Primary | ICD-10-CM

## 2023-07-18 LAB
APPEARANCE: CLEAR
BILIRUBIN: NEGATIVE
GLUCOSE (URINE DIPSTICK): NEGATIVE MG/DL
KETONES (URINE DIPSTICK): NEGATIVE MG/DL
LEUKOCYTES: NEGATIVE
MULTISTIX LOT#: NORMAL NUMERIC
NITRITE, URINE: NEGATIVE
OCCULT BLOOD: NEGATIVE
PH, URINE: 6 (ref 4.5–8)
PROTEIN (URINE DIPSTICK): NEGATIVE MG/DL
SPECIFIC GRAVITY: 1 (ref 1–1.03)
URINE-COLOR: YELLOW
UROBILINOGEN,SEMI-QN: 0.2 MG/DL (ref 0–1.9)

## 2023-07-18 PROCEDURE — 3074F SYST BP LT 130 MM HG: CPT | Performed by: ADVANCED PRACTICE MIDWIFE

## 2023-07-18 PROCEDURE — 3008F BODY MASS INDEX DOCD: CPT | Performed by: ADVANCED PRACTICE MIDWIFE

## 2023-07-18 PROCEDURE — 81002 URINALYSIS NONAUTO W/O SCOPE: CPT | Performed by: ADVANCED PRACTICE MIDWIFE

## 2023-07-18 PROCEDURE — 99213 OFFICE O/P EST LOW 20 MIN: CPT | Performed by: ADVANCED PRACTICE MIDWIFE

## 2023-07-18 PROCEDURE — 3078F DIAST BP <80 MM HG: CPT | Performed by: ADVANCED PRACTICE MIDWIFE

## 2023-07-18 RX ORDER — CLOTRIMAZOLE AND BETAMETHASONE DIPROPIONATE 10; .64 MG/G; MG/G
1 CREAM TOPICAL 2 TIMES DAILY
Qty: 45 G | Refills: 0 | Status: SHIPPED | OUTPATIENT
Start: 2023-07-18 | End: 2023-08-01

## 2023-07-18 NOTE — TELEPHONE ENCOUNTER
From: Sharon Fox  To: Michael Ulloa MD  Sent: 2023 10:22 AM CDT  Subject: Referral for Dr. Jonna Zuluaga    I am going to see Dr Jonna Zuluaga this Thursday and it just dawned on me that my referral has  and I need a new one. Can you please put in a referral request for me to see Dr Jonna Zuluaga for an exam and then for steroid injections in both sciatic nerves?

## 2023-07-19 ENCOUNTER — PATIENT MESSAGE (OUTPATIENT)
Dept: ENDOCRINOLOGY CLINIC | Facility: CLINIC | Age: 63
End: 2023-07-19

## 2023-07-19 NOTE — TELEPHONE ENCOUNTER
Dr. Sofia Smith, please review pended referral. Pt has appt tomorrow.  I used dx code from last referral.

## 2023-07-20 ENCOUNTER — OFFICE VISIT (OUTPATIENT)
Dept: PHYSICAL MEDICINE AND REHAB | Facility: CLINIC | Age: 63
End: 2023-07-20
Payer: COMMERCIAL

## 2023-07-20 VITALS — HEIGHT: 64 IN | BODY MASS INDEX: 35.68 KG/M2 | WEIGHT: 209 LBS

## 2023-07-20 DIAGNOSIS — M51.9 THORACIC DISC DISEASE: ICD-10-CM

## 2023-07-20 DIAGNOSIS — M47.816 LUMBAR SPONDYLOSIS: ICD-10-CM

## 2023-07-20 DIAGNOSIS — M51.9 LUMBAR DISC DISEASE: Primary | ICD-10-CM

## 2023-07-20 DIAGNOSIS — M48.04 THORACIC STENOSIS: ICD-10-CM

## 2023-07-20 DIAGNOSIS — M54.16 LUMBAR RADICULOPATHY: ICD-10-CM

## 2023-07-20 DIAGNOSIS — I10 ESSENTIAL HYPERTENSION: ICD-10-CM

## 2023-07-20 DIAGNOSIS — M51.24 HERNIATED THORACIC DISC WITHOUT MYELOPATHY: ICD-10-CM

## 2023-07-20 LAB — TRICHOMONAS SCREEN: NEGATIVE

## 2023-07-20 PROCEDURE — 99214 OFFICE O/P EST MOD 30 MIN: CPT | Performed by: PHYSICAL MEDICINE & REHABILITATION

## 2023-07-20 PROCEDURE — 3008F BODY MASS INDEX DOCD: CPT | Performed by: PHYSICAL MEDICINE & REHABILITATION

## 2023-07-20 NOTE — TELEPHONE ENCOUNTER
Per chart review, looks like Lantus approval was good until 10/7/23, but pharmacy confirmed Lantus is not going through. Semglee is preferred but patient tried and had side effects: muscle/body pains, blurred vision.      Medication PA Requested:  Lantus Solostar 100unit/mL                                                   Quantity: 45mL  Day Supply: 90 day  Sig: INJECT 50 UNITS SUBCUTANEOUS DAILY   DX Code:   E11.9

## 2023-07-20 NOTE — PATIENT INSTRUCTIONS
Plan  I will perform bilateral L5 TFESI(s) with 10 mg of Dexamethasone to each side. If the above does not help her completely, then she will need to do 2-4 sessions of the PT. If the above does not help, then she will need to have a new MRI of the lumbar spine. The patient will follow up in 3 months, but the patient will call me 2 weeks after having the injection to let me know how the injection worked.

## 2023-07-20 NOTE — TELEPHONE ENCOUNTER
From: David Rodríguez  To: Anjelica Valentine MD  Sent: 7/19/2023 6:50 PM CDT  Subject: Insurance approval needed    I need your help please. Every year when my insurance renews they deny paying for my lantus insulin. Last year we tried Semglee and I had a reaction to it. You sent something into the insurance and then they approved the lantus. Can you please do that again this year so I can get my lantus insulin refilled? I have two pens left which will be gone in about a week. I greatly appreciate your help.

## 2023-07-20 NOTE — PROGRESS NOTES
Low Back Pain H & P    Chief Complaint: Patient presents with:  Sciatica: LOV 12/1/2022 Patient c/o BL sciatica pain L>R that started about 6 months ago and has progressively gotten worse. Pain begins in the low back and radiates posterior down BL legs to feet, primarily in glutes and back of the thigh. \"Feels like everything is twisted\" constant pain. Pain worse when sitting or bending. Has done PT previously with some relief, continues HEP however states sometimes makes the pain worse. Currently uses heat/ice and tylenol/ibuprofen prn with some relief. Denies N/T. LOP 10/10    Nursing note reviewed and verified. Patient was last seen on 12/1/2022. She did not have the bilateral L5 TFESI's since she was placed on oral steroids for COPD 3 times which resolved her low back pain. She did not have the MRI of the cervical spine but she did the PT which resolved the neck pain. Then 2 months ago when she was packing to move, she developed a return of the low back pain. She resumed her HEP which increased her pain. She has tried ice and heat which give her temporary relief. She has taken Tylenol and Ibuprofen which help at times. She purchased a wrap (B-Active Plus) which helped to decrease the left leg pain some. She then decided to follow up with me. Description of the Pain  The pain is located in the bilateral low back. The pain radiates to the left > right buttock, left > right posterior thigh, left > right posterior lower leg, left > right lateral foot, left > right arch of the foot, left > right top of the foot, left > right sole of the foot, and left > right first dorsal web space. .  The pain at its best is 3/10. The pain at its worst is 10/10. The pain is currently  7/10. The pain is described as a(n) throbbing sensation. The pain is worse when bending, sitting, going from sitting to standing, and in the evening.     The pain is better walking and with some hamstring and piriformis stretches .  There is no numbness. There is tingling in the left > right sole of the foot. There is weakness in both legs, left > right.      Past Medical History   Past Medical History:   Diagnosis Date    Acute non-recurrent maxillary sinusitis 01/21/2019    Age-related nuclear cataract of both eyes 01/27/2015    Allergic rhinitis     pereniaal and seasonal    Alternating exotropia 01/27/2015    Amenorrhea 08/2007    Anxiety 2008    Atrial tachycardia, paroxysmal ([address] 04/15/2015    Barretts esophagus     Carpal tunnel syndrome     Chlamydia 1986    Choroidal nevus, right eye 01/27/2015    COPD (chronic obstructive pulmonary disease) ([address]     Depression 2008    Diabetes ([address] 2009    Diabetes mellitus type 2 with complications ([address] 19/71/3135    Diabetic retinopathy of left eye ([address] 01/27/2015    Dry eyes 07/28/2015    DUB (dysfunctional uterine bleeding) 2005    endometrial biopsy    Esophageal reflux     Essential hypertension 2003    Exotropia     Fibromyalgia 2008    Genital warts     Heart murmur     Herpes simplex     High blood pressure     High cholesterol     History of pregnancy 1990, 1991    Hyperlipidemia 2009    Irregular menstrual cycle 2008    Neg endometrial biopsy    MGD (meibomian gland dysfunction) 07/28/2015    Migraines 2013    Ocular migraine 07/28/2015    BRENDA     Ovarian cyst 1980    Laparoscopic cystectomy    Pregnancy 1990, 1991, 1993    PVD (posterior vitreous detachment), right eye 03/27/2023    patient was seen by Dr. Samy Storey on 3/27/23 for flashes and floaters OD-DX PVD OD    Spinal stenosis 2010    C5-6, C6-7, physical therapy    TIA (transient ischemic attack)     Viral infection characterized by skin and mucous membrane lesions        Past Surgical History   Past Surgical History:   Procedure Laterality Date    CYST REMOVAL  1979    EGD N/A 7/23/2021    Procedure: ESOPHAGOGASTRODUODENOSCOPY, with bxs COLONOSCOPY with polypectomy;  Surgeon: Brock Smith MD;  Location: [facility] SURGICAL [address], COMPLETE  2013    scanned to media tab    OTHER SURGICAL HISTORY      Laparoscopic cystectomy    OTHER SURGICAL HISTORY      Endometrial biopsy    OTHER SURGICAL HISTORY      Endometrial biopsy    OTHER SURGICAL HISTORY  2017    cervical neck surgery     SPINE SURGERY PROCEDURE UNLISTED  2017    cervical     TUBAL LIGATION  1996       Family History   Family History   Problem Relation Age of Onset    Colon Cancer Mother     Heart Disease Mother         coronary artery disease    Hypertension Mother     Depression Mother     Heart Attack Mother     Heart Disease Father         coronary artery disease    Diabetes Father     Heart Attack Father     Glaucoma Sister     Diabetes Sister     Other (Other) Sister         Vascular necrosis    Ovarian Cancer Maternal Aunt     Seizure Disorder Sister        Social History   Social History    Socioeconomic History      Marital status:       Spouse name: Not on file      Number of children: Not on file      Years of education: Not on file      Highest education level: Not on file    Occupational History      Not on file    Tobacco Use      Smoking status: Former        Packs/day: 0.25        Years: 2.00        Pack years: .5        Types: Cigarettes        Quit date: 2017        Years since quittin.4      Smokeless tobacco: Never      Tobacco comments: 1 pack/wk per pt.     Vaping Use      Vaping Use: Never used    Substance and Sexual Activity      Alcohol use: Not Currently        Comment: Occasionally      Drug use: No      Sexual activity: Yes        Partners: Male        Birth control/protection: Tubal Ligation    Other Topics      Concerns:         Service: Not Asked        Blood Transfusions: Not Asked        Caffeine Concern: Yes          2 cups coffee daily        Occupational Exposure: Not Asked        Hobby Hazards: Not Asked        Sleep Concern: Not Asked        Stress Concern: Not Asked        Weight Concern: Not Asked        Special Diet: Not Asked        Back Care: Not Asked        Exercise: No        Bike Helmet: Not Asked        Seat Belt: Not Asked        Self-Exams: Not Asked    Social History Narrative      The patient does not use an assistive device. .        The patient does live in a home with stairs. Lives with             Work     Social Determinants of Health  Financial Resource Strain: Not on file  Food Insecurity: Not on file  Transportation Needs: Not on file  Physical Activity: Not on file  Stress: Not on file  Social Connections: Not on file  Housing Stability: Not on file    PE:  The patient does appear in her stated age in no distress. The patient is well groomed. Psychiatric:  The patient is alert and oriented x 3. The patient has a normal affect and mood. Respiratory:  No acute respiratory distress. Patient does not have a cough. HEENT:  Extraocular muscles are intact. There is no kern icterus. Pupils are equal, round, and reactive to light. No redness or discharge bilaterally. Skin:  There are no rashes or lesions. Vitals: There were no vitals filed for this visit. Gait:    Gait: Normal gait   Sit to Stand: no difficulty      Lumbar Spine:    Scoliosis: No scoliosis present     Lumbar Spine Palpation:    Spinous Processes: Tender at L5 and S1   Z-joints: Tender at  bilateral L5-S1   SIJ: Tender at left SIJ   Piriformis Muscle: Non-tender bilateral Piriformis muscles   Greater Trochanteric Bursa: Non-tender for bilateral Greater Trochanteric Bursa     Vascular lower extremity:   Dorsalis pedis pulse-RIGHT 2+   Dorsalis pedis pulse-LEFT 2+   Tibialis posterior pulse-RIGHT 2+   Tibialis posterior pulse-LEFT 2+     Neurological Lower Extremity:    Light Touch Sensation: Intact in bilateral Lower Extremities   LE Muscle Strength:  All LE strength measurements 5/5 except:  Gluteus medius RIGHT:   4+/5  Gluteus medius LEFT:   4+/5  Hamstring RIGHT:   4-/5  Hamstring LEFT:   4-/5   RIGHT plantar reflexes: downward response   LEFT plantar reflexes: downward response   Reflexes: 2+ in bilateral lower extremities     Assessment  1. L5-S1 mild diffuse, L4-5 mild diffuse, L3-4 right mild far lateral & left mild bulging discs    2. left > right L5-S1 radiculopathy    3. Lumbar spondylosis: L5-S1 bilateral mild-mod z-joint OA    4. T6-7 mil diffuse, T7-8 mild-mod diffuse, T8-9 right mild-mod/left mild, T9-10 mild-mod diffuse, T10-11 mod diffuse bulging discs    5. T9-10 mild-mod, T10-11 mod, T11-12 mod central stenosis    6. T11-12 mod central HNP    7. Essential hypertension         Plan  I will perform bilateral L5 TFESI(s) with 10 mg of Dexamethasone to each side. If the above does not help her completely, then she will need to do 2-4 sessions of the PT. If the above does not help, then she will need to have a new MRI of the lumbar spine. The patient will follow up in 3 months, but the patient will call me 2 weeks after having the injection to let me know how the injection worked. The patient understands and agrees with the stated plan. Marcos Oro MD  7/20/2023

## 2023-07-24 ENCOUNTER — PATIENT MESSAGE (OUTPATIENT)
Dept: PHYSICAL MEDICINE AND REHAB | Facility: CLINIC | Age: 63
End: 2023-07-24

## 2023-07-24 NOTE — TELEPHONE ENCOUNTER
Medication PA Requested:  Lantus Solostar 100unit/mL                                                   Quantity: 45mL  Day Supply: 90 day  Sig: INJECT 50 UNITS SUBCUTANEOUS DAILY   DX Code:   E11.9                           Callled  [phone], spoke with [facility]. He states pharmacy ran Lantus, Informed him have approved PA on file until 10/2023. He states formulary shifted so needs new PA request, use coverage exception PA form . He states preferred meds are Semglee yfgn, and Insulin glargine yfgn  He also states insulin glargine yfgn available to patient now, no PA needed. Call ref# [facility] [id number] 7/24/2023    Faxed [employer] formulary exception form with LOV 12/28/2022, A1c 12/28/2022, TE 10/4/2022 muscle pains from [facility] and   listing of t/f meds per chart review.     Awaiting determination

## 2023-07-25 NOTE — TELEPHONE ENCOUNTER
From: Deepika Estrella  To: Alejandra Carrillo MD  Sent: 7/24/2023 4:31 PM CDT  Subject: Injection Appointment     I saw Dr Dwayne Carrillo Thursday July 20th. I am looking to get steroid injections. No one has called me yet to schedule. Please call me at [phone] tomorrow so we can schedule the appointment as I need to take time off work.

## 2023-07-26 NOTE — TELEPHONE ENCOUNTER
BILATERAL L5-S1 Transforaminal epidural steroid injections. With 10 mg of Dexamethasone to each side     Patient has been scheduled for BILATERAL L5-S1 Transforaminal epidural steroid injections. With 10 mg of Dexamethasone to each side on 08/04/2023 at the [facility] with Dr. Verónica Welsh. -Anesthesia type: LOCAL. -If receiving MAC or IVC sedation patient will need to get COVID tested 3 days prior even if already vaccinated (order placed by [facility].)  -If scheduling [facility] covid testing required for all procedures whether patient is vaccinated or not. -Patient informed not to eat or drink anything after midnight the night prior to the procedure, if being sedated. -Patient was advised that if he/she does receive the covid vaccine it needs to be at least 2 weeks before or after the injection. -Medications and allergies reviewed. -Patient reminded to hold NSAIDs (Ibuprofen, ASA 81, Aleve, Naproxen, Mobic etc.) for 3 days prior to [facility]  if BMI is greater than 35. For Cervical injections only hold multivitamins, Vitamin E, Fish Oil, Phentermine (Lomaira) for 7 days prior to injection and NSAIDS.  mg to be held for 7 days prior to injections.  -If patient is receiving MAC/IVCS Phentermine Stoneluisa Britt) will need to be held for 7 days prior to injection.  -If on blood thinner clearance has been received to hold this medication by provider. Pt denies taking any ASA. -Patient informed he/she will need a  to and from procedure. -[facility] is located in the [facility] 1st floor. Patient may park in the yellow parking.  - Follow up appointment scheduled for: 09/19/2023      Patient verbalized understanding and agrees with plan.  -----> Scheduled in Epic: Yes  -----> Scheduled in [employer]:  Yes

## 2023-07-26 NOTE — TELEPHONE ENCOUNTER
Per [facility]:   PATIENT NAME:  Belle Hernandez [phone] (home)/ There is no work phone number on file. PATIENT :  10/15/1960  REFERRAL ID #:  [id number]  REFERRAL STATUS:  Authorized [1]  REVIEW REFERRAL NOTES FOR MORE INFORMATION:  DATE AUTHORIZED:  2023 // Ryley Chen DATE: 2024   REFERRED BY:  Mathieu Best MD (TEL: [phone])  REFERRED TO: Mathieu Best MD (TEL: [phone])     No vendor specified on referral. / No vendor phone number known.   No facility specified on referral  REFERRED FOR:    Diagnoses:    M54.16 (ICD-10-CM) - 724.4 (ICD-9-CM) - Lumbar radiculopathy  Procedures:     [id number] - LUMBAR TRANSFORAMINAL EPIDURAL INJECTION ([facility])   NUMBER OF VISITS AUTH: 1

## 2023-07-27 NOTE — TELEPHONE ENCOUNTER
Called [facility] [phone], spoke with Joeige Button. He states PA in place for insulin Glargine until 10/7/2023. He states pa for Lantus was denied-name brand Lantus is a plan exclusion. He will refax letter with denial rationale to [phone].    Call ref # Estefanía Button C. 7/27/2023 1:51 cst

## 2023-07-28 RX ORDER — INSULIN GLARGINE 100 [IU]/ML
INJECTION, SOLUTION SUBCUTANEOUS
Qty: 45 ML | Refills: 3 | Status: SHIPPED | OUTPATIENT
Start: 2023-07-28 | End: 2023-07-31

## 2023-07-28 NOTE — TELEPHONE ENCOUNTER
Patient notified that PA on file for insulin glargine - patient should be able to get this from pharmacy.

## 2023-07-31 ENCOUNTER — TELEPHONE (OUTPATIENT)
Dept: ENDOCRINOLOGY CLINIC | Facility: CLINIC | Age: 63
End: 2023-07-31

## 2023-07-31 RX ORDER — INSULIN GLARGINE 100 [IU]/ML
50 INJECTION, SOLUTION SUBCUTANEOUS DAILY
Qty: 45 ML | Refills: 1 | Status: SHIPPED | OUTPATIENT
Start: 2023-07-31 | End: 2023-10-29

## 2023-07-31 NOTE — TELEPHONE ENCOUNTER
Called [facility] and states the prescription they last received is still lantus. They need a prescription for generic glargine. RN resent prescription for generic glargine.

## 2023-07-31 NOTE — TELEPHONE ENCOUNTER
Called [facility] and confirmed with RN that it's now going through but would have to order the insulin. They expect to get the insulin tomorrow. RN called patient and gave her an update.  RN discontinued lantus on med list.

## 2023-07-31 NOTE — TELEPHONE ENCOUNTER
insulin glargine (LANTUS SOLOSTAR) 100 UNIT/ML Subcutaneous Solution Pen-injector, INJECT 50 UNITS SUBCUTANEOUS DAILY, Disp: 45 mL, Rfl: 3    Key:  [id number]

## 2023-07-31 NOTE — TELEPHONE ENCOUNTER
Patient is calling states that she is down to her last pen. States that her insurance will only cover a generic medication of the lantus.  Please call

## 2023-08-04 ENCOUNTER — OFFICE VISIT (OUTPATIENT)
Dept: SURGERY | Facility: CLINIC | Age: 63
End: 2023-08-04
Payer: COMMERCIAL

## 2023-08-04 ENCOUNTER — TELEPHONE (OUTPATIENT)
Dept: ENDOCRINOLOGY CLINIC | Facility: CLINIC | Age: 63
End: 2023-08-04

## 2023-08-04 DIAGNOSIS — M51.9 LUMBAR DISC DISEASE: ICD-10-CM

## 2023-08-04 DIAGNOSIS — M54.16 LUMBAR RADICULOPATHY: Primary | ICD-10-CM

## 2023-08-04 PROCEDURE — 64483 NJX AA&/STRD TFRM EPI L/S 1: CPT | Performed by: PHYSICAL MEDICINE & REHABILITATION

## 2023-08-04 NOTE — TELEPHONE ENCOUNTER
Dr. Hernandez Record,  Hyperglycemia     Onset of hyperglycemia: 8/4/23    BG levels (please print out CGM and/or pump report if patient is wearing one):  patient states she usually only checks fasting BG and it is usually around 120   Fasting BG on 8/4/23 was 154 - she forgot to take basaglar last night - took 50 units basaglar at 7:30am today; she took glimepiride 2mg at 10am - had steroid injection and BG was 350 at 1:45pm       Symptoms (RN only: N/V, abdominal pain and/or radiating to the back, blurry vision, increased urinary frequency, blurred vision, CP, SOB): denies    Pattern of hyperglycemia:     Steroid therapy:  injection today     Acute Illness: denies    Change in Diet: denies    List DM Medications/Compliance: basaglar 50 units at bedtime        Glimepiride 2mg daily        Trulicity 3.7TS weekly on Sundays          Patient asking if ok to take 50 units of basaglar again tonight - please advise -thanks

## 2023-08-04 NOTE — TELEPHONE ENCOUNTER
Spoke to patient to relay message below - patient stated understanding to take 50 units of basaglar this evening and if BG is high at dinner to take additional dose of glimepiride   Patient stated understanding to contact clinic if BG readings remain high after few days

## 2023-08-04 NOTE — PROCEDURES
[facility] [address] [facility]    BILATERAL LUMBAR TRANSFORAMINAL   NAME:  Namrata Victoria    MR #:    [id number] :  10/15/1960     PHYSICIAN:  Clifton Denton MD        Operative Report    DATE OF PROCEDURE: 2023   PREOPERATIVE DIAGNOSES: 1. left > right L5-S1 radiculopathy    2. L5-S1 mild diffuse, L4-5 mild diffuse, L3-4 right mild far lateral & left mild bulging discs        POSTOPERATIVE DIAGNOSES:   1. left > right L5-S1 radiculopathy    2. L5-S1 mild diffuse, L4-5 mild diffuse, L3-4 right mild far lateral & left mild bulging discs        PROCEDURES: Bilateral L5 transforaminal epidural steroid injections done under fluoroscopic guidance with contrast enhancement. SURGEON: Clifton Villegas MD   ANESTHESIA: Local   INDICATIONS:      OPERATIVE PROCEDURE:  Written consent was obtained from the patient. The patient was brought into the operating room and placed in the prone position on the fluoroscopy table with pillow underneath her abdomen. The patient's skin was cleaned and draped in a normal sterile fashion. Using AP fluoroscopy, all five lumbar vertebrae were identified. When the fifth vertebra was identified, fluoroscopy was left anterior obliqued opening up the right L5-S1 intervertebral foramen. At this point in time, the patient's skin was anesthetized with 1% PF lidocaine without epinephrine. Then, a 5 inch, 22-gauge spinal needle was inserted and directed towards the right L5-S1 intervertebral foramen. When it felt to be in good position, AP fluoroscopy was used to advance the needle to the 6 o'clock position on the right L5 pedicle. At this point in time, Omnipaque-240 contrast was used to obtain a good epidurogram indicating correct needle placement. Then, aspiration was performed. No blood, fluid, or air was aspirated. Then, the patient was injected with a 2 cc solution of 1 cc of 10 mg/cc of PF Dexamethasone and 1 cc of 1% PF lidocaine without epinephrine.   After this, the needle was removed. Then  fluoroscopy was right anterior obliqued opening up the left L5-S1 intervertebral foramen. At this point in time, the patient's skin was anesthetized with 1 to 2 cc of 1% PF lidocaine without epinephrine. Then, a 5 inch, 22-gauge spinal needle was inserted and directed towards the left L5-S1 intervertebral foramen. When it felt to be in good position, AP fluoroscopy was used to advance the needle to the 6 o'clock position on the left L5 pedicle. At this point in time, Omnipaque-240 contrast was used to obtain a good epidurogram indicating correct needle placement. Then, aspiration was performed. No blood, fluid, or air was aspirated. Then, the patient was injected with a 2 cc solution of 1 cc of 10 mg/cc of PF Dexamethasone and 1 cc of 1% PF lidocaine without epinephrine. After this, the needle was removed. The patient's skin was cleaned. Band-Aids were applied. The patient was transferred to the cart and into [facility]. The patient was given discharge instructions and will follow up in the clinic as scheduled. Throughout the whole procedure, the patient's pulse oximetry and vital signs were monitored and they remained completely stable. Also, throughout the whole procedure, prior to injection of any medication, aspiration was performed. No blood, fluid, or air was aspirated at anytime.

## 2023-08-04 NOTE — TELEPHONE ENCOUNTER
Yes, ok to increase basaglar to 50 untis subcutaneous daily for next 2 days. In addition if BG still high at dinner ok to take an extra glimepiride tablet at dinner today. Thanks.

## 2023-08-04 NOTE — TELEPHONE ENCOUNTER
Patient is calling to speak with nurse about her high blood sugars. Currently she is at 350. Patient states that she had steroid shot at [address] [facility] today. Patient states that she forgot to take her lantus last night so she did it this morning 50 units. She would like to know if she can take the medication tonight how she normally does.  Please call

## 2023-08-07 ENCOUNTER — TELEPHONE (OUTPATIENT)
Dept: PHYSICAL MEDICINE AND REHAB | Facility: CLINIC | Age: 63
End: 2023-08-07

## 2023-08-12 DIAGNOSIS — E55.9 VITAMIN D DEFICIENCY: Primary | ICD-10-CM

## 2023-08-14 ENCOUNTER — TELEPHONE (OUTPATIENT)
Dept: ENDOCRINOLOGY CLINIC | Facility: CLINIC | Age: 63
End: 2023-08-14

## 2023-08-15 RX ORDER — ERGOCALCIFEROL 1.25 MG/1
50000 CAPSULE ORAL WEEKLY
Qty: 12 CAPSULE | Refills: 0 | Status: SHIPPED | OUTPATIENT
Start: 2023-08-15

## 2023-08-15 NOTE — TELEPHONE ENCOUNTER
LOV: 12/28/22  F/U: 9/6/23  Last refill: 5/20/23    Per TE dtd 2/24/23:  Yes please start taking the weekly vitamin D supplement and let me know if you have any other questions or concerns.      RX pended for approval, if appropriate -thanks

## 2023-08-16 ENCOUNTER — PATIENT MESSAGE (OUTPATIENT)
Dept: FAMILY MEDICINE CLINIC | Facility: CLINIC | Age: 63
End: 2023-08-16

## 2023-08-16 NOTE — TELEPHONE ENCOUNTER
From: Demetrio Levine  To: Kori Seth MD  Sent: 8/16/2023 7:55 AM CDT  Subject: Insurance     Does your office take Medicaid?

## 2023-08-17 ENCOUNTER — MED REC SCAN ONLY (OUTPATIENT)
Dept: ENDOCRINOLOGY CLINIC | Facility: CLINIC | Age: 63
End: 2023-08-17

## 2023-08-17 RX ORDER — INSULIN GLARGINE 100 [IU]/ML
50 INJECTION, SOLUTION SUBCUTANEOUS NIGHTLY
Qty: 45 ML | Refills: 0 | Status: SHIPPED | OUTPATIENT
Start: 2023-08-17 | End: 2023-11-15

## 2023-08-17 NOTE — TELEPHONE ENCOUNTER
insulin glargine (BASAGLAR KWIKPEN) 100 UNIT/ML Subcutaneous Solution Pen-injector Inject 50 Units into the skin nightly.  15 mL 0

## 2023-08-21 ENCOUNTER — TELEPHONE (OUTPATIENT)
Dept: ENDOCRINOLOGY CLINIC | Facility: CLINIC | Age: 63
End: 2023-08-21

## 2023-08-21 ENCOUNTER — PATIENT MESSAGE (OUTPATIENT)
Dept: PHYSICAL MEDICINE AND REHAB | Facility: CLINIC | Age: 63
End: 2023-08-21

## 2023-08-21 DIAGNOSIS — M47.816 LUMBAR SPONDYLOSIS: ICD-10-CM

## 2023-08-21 DIAGNOSIS — M54.16 LUMBAR RADICULOPATHY: Primary | ICD-10-CM

## 2023-08-21 DIAGNOSIS — M47.892 OTHER OSTEOARTHRITIS OF SPINE, CERVICAL REGION: ICD-10-CM

## 2023-08-21 DIAGNOSIS — M51.9 LUMBAR DISC DISEASE: ICD-10-CM

## 2023-08-21 NOTE — TELEPHONE ENCOUNTER
Current Outpatient Medications   Medication Sig Dispense Refill    insulin glargine (BASAGLAR KWIKPEN) 100 UNIT/ML Subcutaneous Solution Pen-injector Inject 50 Units into the skin nightly.  45 mL 0       Needs PA- key # [id number]

## 2023-08-22 NOTE — TELEPHONE ENCOUNTER
Per patient last visit       Plan  I will perform bilateral L5 TFESI(s) with 10 mg of Dexamethasone to each side. If the above does not help her completely, then she will need to do 2-4 sessions of the PT. If the above does not help, then she will need to have a new MRI of the lumbar spine.

## 2023-08-22 NOTE — TELEPHONE ENCOUNTER
From: Vinicius Weaver  To: Fidelina Torres MD  Sent: 8/21/2023 12:05 PM CDT  Subject: Injections didn't work    Unfortunately this time the sciatica injections did not work. I am in a ton of pain on a constant basis. Per my last office visit our next plan of attack was for me to go to physical therapy and to get an MRI to see what is going on back there. Can you please put in an order for both requests? My next appointment with you is around September 5th. Hopefully with physical therapy I will be feeling much better by then and the MRI will have some answers. The pain is in my lower back goes into the buttocks and settles into the hamstrings and then goes down into the calves and feet. Both sides. I've Never had the pain this bad before. I appreciate your help and getting me pain-free soon.

## 2023-08-25 RX ORDER — ALBUTEROL SULFATE 90 UG/1
2 AEROSOL, METERED RESPIRATORY (INHALATION) EVERY 4 HOURS PRN
Qty: 25.5 G | Refills: 0 | Status: SHIPPED | OUTPATIENT
Start: 2023-08-25

## 2023-08-25 RX ORDER — PERPHENAZINE 16 MG/1
TABLET, FILM COATED ORAL
Qty: 200 STRIP | Refills: 0 | Status: SHIPPED | OUTPATIENT
Start: 2023-08-25

## 2023-08-25 RX ORDER — DIAZEPAM 5 MG/1
5 TABLET ORAL EVERY 12 HOURS PRN
Qty: 30 TABLET | Refills: 0 | Status: SHIPPED | OUTPATIENT
Start: 2023-08-25

## 2023-08-25 RX ORDER — MECLIZINE HYDROCHLORIDE 25 MG/1
25 TABLET ORAL 3 TIMES DAILY PRN
Qty: 30 TABLET | Refills: 0 | Status: SHIPPED | OUTPATIENT
Start: 2023-08-25

## 2023-08-25 NOTE — TELEPHONE ENCOUNTER
Failed Protocol/No Protocol. Requested Prescriptions   Pending Prescriptions Disp Refills    diazePAM 5 MG Oral Tab [Pharmacy Med Name: Diazepam 5 Mg Tab Teva] 30 tablet 0     Sig: Take 1 tablet (5 mg total) by mouth every 12 (twelve) hours as needed. There is no refill protocol information for this order      Signed Prescriptions Disp Refills    meclizine 25 MG Oral Tab 30 tablet 0     Sig: Take 1 tablet (25 mg total) by mouth 3 (three) times daily as needed for Dizziness or Nausea. Gastrointestional Medication Protocol Passed - 8/24/2023 10:26 PM        Passed - In person appointment or virtual visit in the past 12 mos or appointment in next 3 mos     Recent Outpatient Visits              3 weeks ago left > right L5-S1 radiculopathy    [address] Tiffanie Gross MD    Office Visit    1 month ago L5-S1 mild diffuse, L4-5 mild diffuse, L3-4 right mild far lateral & left mild bulging discs    [facility], [address], [place], Lacey Bernard MD    Office Visit    1 month ago Vulvar dermatitis    [facility], [address], [address] [place] [address] Rd., [place], [place]    Office Visit    4 months ago Ingrown left greater toenail    [facility], [address], [place], [place], [place]    Office Visit    4 months ago Muscle strain    [address], [facility] [place] [address], [facility], [facility]    Office Visit          Future Appointments         Provider Department Appt Notes    In 1 week Tiffanie Gross MD [address], [address], [place] sciatica pain, both sides    In 1 week Gunnar Smith MD [address] [place] [address]                 albuterol 108 (90 Base) MCG/ACT Inhalation Aero Soln 25.5 g 0     Sig: Inhale 2 puffs into the lungs every 4 (four) hours as needed for Wheezing.        Asthma & COPD Medication Protocol Passed - 8/24/2023 10:26 PM        Passed - In person appointment or virtual visit in the past 6 mos or appointment in next 3 mos     Recent Outpatient Visits              3 weeks ago left > right L5-S1 radiculopathy    [address] Fely Freitas MD    Office Visit    1 month ago L5-S1 mild diffuse, L4-5 mild diffuse, L3-4 right mild far lateral & left mild bulging discs    [facility], [address], [place] Justus Villegas MD    Office Visit    1 month ago Vulvar dermatitis    [facility], [address], [address] [place], [place]    Office Visit    4 months ago Ingrown left greater toenail    [facility], [address], [place], [place], [place]    Office Visit    4 months ago Muscle strain    [address], [place] Verlinda Vandana, APRN    Office Visit          Future Appointments         Provider Department Appt Notes    In 1 week Fely Freitas MD [address], [place] sciatica pain, both sides    In 1 week Michelle Mcdonnell MD [address], [address], [place]                    Future Appointments         Provider Department Appt Notes    In 1 week Justus Villegas MD [address], [address], [place] sciatica pain, both sides    In 1 week Michelle Mcdonnell MD [address], [address], [address] Outpatient Visits              3 weeks ago left > right L5-S1 radiculopathy    [address] Fely Freitas MD    Office Visit    1 month ago L5-S1 mild diffuse, L4-5 mild diffuse, L3-4 right mild far lateral & left mild bulging discs    [facility], [address], Bibi Matthew MD    Office Visit    1 month ago Vulvar dermatitis    [address], [address], [address] [place]    Office Visit    4 months ago Ingrown left greater toenail    [facility], [address], [place], [place]    Office Visit    4 months ago Muscle strain    [facility], APRN    Office Visit

## 2023-08-25 NOTE — TELEPHONE ENCOUNTER
Refill Passed per Protocol. Requested Prescriptions   Pending Prescriptions Disp Refills    MECLIZINE 25 MG Oral Tab [Pharmacy Med Name: Meclizine Hydrochloride 25 Mg Tab Zydu] 30 tablet 0     Sig: Take 1 tablet (25 mg total) by mouth 3 (three) times daily as needed for Dizziness or Nausea.        Gastrointestional Medication Protocol Passed - 8/24/2023 10:26 PM        Passed - In person appointment or virtual visit in the past 12 mos or appointment in next 3 mos     Recent Outpatient Visits              3 weeks ago left > right L5-S1 radiculopathy    [address] Jazmin Wood MD    Office Visit    1 month ago L5-S1 mild diffuse, L4-5 mild diffuse, L3-4 right mild far lateral & left mild bulging discs    [facility], [address], [place]    Office Visit    1 month ago Vulvar dermatitis    [facility], [address] [facility],[address], [address] [place] [address] Rd., [place], [place]    Office Visit    4 months ago Ingrown left greater toenail    [facility], [address], [place], [place], [place]    Office Visit    4 months ago Muscle strain    [address] [place] [address] ANGELINA Buenrostro    Office Visit          Future Appointments         Provider Department Appt Notes    In 1 week Jazmin Wood MD [address], [address], [place] sciatica pain, both sides    In 1 week Gabriele Vazquez MD [address] [place] [address] Diet                 DIAZEPAM 5 MG Oral Tab [Pharmacy Med Name: Diazepam 5 Mg Tab Teva] 30 tablet 0     Sig: TAKE 1 TABLET BY MOUTH EVERY 12 HOURS AS NEEDED       There is no refill protocol information for this order       ALBUTEROL 108 (90 Base) MCG/ACT Inhalation Aero Soln [Pharmacy Med Name: Albuterol Sulfate Hfa 108 Mcg/Act Aer Lupi] 25.5 g 0     Sig: INHALE 2 PUFFS INTO THE LUNGS EVERY 4 HOURS AS NEEDED FOR WHEEZING Asthma & COPD Medication Protocol Passed - 8/24/2023 10:26 PM        Passed - In person appointment or virtual visit in the past 6 mos or appointment in next 3 mos     Recent Outpatient Visits              3 weeks ago left > right L5-S1 radiculopathy    [address] Valentino Barba MD    Office Visit    1 month ago L5-S1 mild diffuse, L4-5 mild diffuse, L3-4 right mild far lateral & left mild bulging discs    [facility], [address], [place], Choco Shelley MD    Office Visit    1 month ago Vulvar dermatitis    [facility], [address], [address] [place] [address] Yevgeniy., [place], [place]    Office Visit    4 months ago Ingrown left greater toenail    [facility], [address], [place], [place], [place]    Office Visit    4 months ago Muscle strain    Edgar Potter APRN    Office Visit          Future Appointments         Provider Department Appt Notes    In 1 week Valentino Barba MD [address], [address], [place] sciatica pain, both sides    In 1 week Joel Nair MD [address], [address], [place] Diet                     Future Appointments         Provider Department Appt Notes    In 1 week Valentino Barba MD [address], [address], [place] sciatica pain, both sides    In 1 week MD Mikael Sanders, [place] Diet          Recent Outpatient Visits              3 weeks ago left > right L5-S1 radiculopathy    [address] Valentino Barba MD    Office Visit    1 month ago L5-S1 mild diffuse, L4-5 mild diffuse, L3-4 right mild far lateral & left mild bulging discs    [address], [address], Alfreda Waldrop MD    Office Visit    1 month ago Vulvar dermatitis    [facility], [address] [facility] [address], [place], [place]    Office Visit    4 months ago Ingrown left greater toenail    [facility], [address], [place], [place]    Office Visit    4 months ago Muscle strain    [address] [facility], [place], APRN    Office Visit

## 2023-08-31 ENCOUNTER — PATIENT MESSAGE (OUTPATIENT)
Dept: FAMILY MEDICINE CLINIC | Facility: CLINIC | Age: 63
End: 2023-08-31

## 2023-09-01 RX ORDER — MECLIZINE HYDROCHLORIDE 25 MG/1
25 TABLET ORAL 3 TIMES DAILY PRN
Qty: 30 TABLET | Refills: 5 | Status: SHIPPED | OUTPATIENT
Start: 2023-09-01

## 2023-09-06 ENCOUNTER — OFFICE VISIT (OUTPATIENT)
Dept: ENDOCRINOLOGY CLINIC | Facility: CLINIC | Age: 63
End: 2023-09-06

## 2023-09-06 VITALS
BODY MASS INDEX: 36 KG/M2 | WEIGHT: 211 LBS | DIASTOLIC BLOOD PRESSURE: 69 MMHG | SYSTOLIC BLOOD PRESSURE: 136 MMHG | HEART RATE: 79 BPM

## 2023-09-06 DIAGNOSIS — E11.9 CONTROLLED TYPE 2 DIABETES MELLITUS WITHOUT COMPLICATION, WITHOUT LONG-TERM CURRENT USE OF INSULIN (HCC): Primary | ICD-10-CM

## 2023-09-06 LAB
CARTRIDGE LOT#: ABNORMAL NUMERIC
GLUCOSE BLOOD: 151
HEMOGLOBIN A1C: 7.9 % (ref 4.3–5.6)
TEST STRIP LOT #: NORMAL NUMERIC

## 2023-09-06 PROCEDURE — 82947 ASSAY GLUCOSE BLOOD QUANT: CPT | Performed by: INTERNAL MEDICINE

## 2023-09-06 PROCEDURE — 3078F DIAST BP <80 MM HG: CPT | Performed by: INTERNAL MEDICINE

## 2023-09-06 PROCEDURE — 3075F SYST BP GE 130 - 139MM HG: CPT | Performed by: INTERNAL MEDICINE

## 2023-09-06 PROCEDURE — 3051F HG A1C>EQUAL 7.0%<8.0%: CPT | Performed by: INTERNAL MEDICINE

## 2023-09-06 PROCEDURE — 83036 HEMOGLOBIN GLYCOSYLATED A1C: CPT | Performed by: INTERNAL MEDICINE

## 2023-09-06 PROCEDURE — 99214 OFFICE O/P EST MOD 30 MIN: CPT | Performed by: INTERNAL MEDICINE

## 2023-09-08 ENCOUNTER — OFFICE VISIT (OUTPATIENT)
Dept: PHYSICAL MEDICINE AND REHAB | Facility: CLINIC | Age: 63
End: 2023-09-08
Payer: COMMERCIAL

## 2023-09-08 VITALS — BODY MASS INDEX: 36.02 KG/M2 | HEIGHT: 64 IN | WEIGHT: 211 LBS

## 2023-09-08 DIAGNOSIS — M51.24 HERNIATED THORACIC DISC WITHOUT MYELOPATHY: ICD-10-CM

## 2023-09-08 DIAGNOSIS — M47.816 LUMBAR SPONDYLOSIS: ICD-10-CM

## 2023-09-08 DIAGNOSIS — M48.04 THORACIC STENOSIS: ICD-10-CM

## 2023-09-08 DIAGNOSIS — Z98.1 S/P CERVICAL SPINAL FUSION: ICD-10-CM

## 2023-09-08 DIAGNOSIS — M54.16 LUMBAR RADICULOPATHY: Primary | ICD-10-CM

## 2023-09-08 DIAGNOSIS — M51.9 LUMBAR DISC DISEASE: ICD-10-CM

## 2023-09-08 DIAGNOSIS — M51.9 THORACIC DISC DISEASE: ICD-10-CM

## 2023-09-08 PROCEDURE — 3008F BODY MASS INDEX DOCD: CPT | Performed by: PHYSICAL MEDICINE & REHABILITATION

## 2023-09-08 PROCEDURE — 99214 OFFICE O/P EST MOD 30 MIN: CPT | Performed by: PHYSICAL MEDICINE & REHABILITATION

## 2023-09-08 NOTE — PROGRESS NOTES
Low Back Pain H & P    Chief Complaint: Patient presents with:  Low Back Pain: LOV 7/20/2023 Patient follows up on low back and leg pain, received Bilateral L5 transforaminal epidural steroid injections 8/4/2023, states she felt 0% relief and feels pain is worse now. Pain is across BL low back and radiates posterior down the left glute to the thigh. C/o intermittent tingling in toes in BL feet, all symptoms more left sided. Patient scheduled new MRI for 10/7, not currently in PT. LOP 8/10    Nursing note reviewed and verified. Patient was last seen on 7/20/2023. On 8/4/2023, I did bilateral L5 TFESI with 10 mg of Dexamethasone to each side which helped 100% for 2 days and then she developed left sided pain. Description of the Pain  The pain is located in the bilateral and central low back. She feels that this pain is more centralized. The pain radiates to the left buttock, left posterior thigh, and left > right posterior lower leg. .  The pain at its best is 2/10. The pain at its worst is 8/10. The pain is currently  8/10. The pain is described as a(n)  deep and shooting  sensation. The pain is worse when bending, sitting, and walking. The pain is better  with Voltaren gel and Tylenol and her HEP and stretching and doing prone press ups and hamstring stretches . Ibuprofen does not help the pain. There is numbness in the left > right lateral foot, left > right arch of the foot, left > right top of the foot, left > right sole of the foot, and left > right first dorsal web space. There is tingling in the left > right lateral foot, left > right arch of the foot, left > right top of the foot, left > right sole of the foot, and left > right first dorsal web space. There is weakness in the left leg.      Past Medical History   Past Medical History:   Diagnosis Date    Acute non-recurrent maxillary sinusitis 01/21/2019    Age-related nuclear cataract of both eyes 01/27/2015    Allergic rhinitis pereniaal and seasonal    Alternating exotropia 01/27/2015    Amenorrhea 08/2007    Anxiety 2008    Atrial tachycardia, paroxysmal ([facility] 04/15/2015    Barretts esophagus     Carpal tunnel syndrome     Chlamydia 1986    Choroidal nevus, right eye 01/27/2015    COPD (chronic obstructive pulmonary disease) ([facility]     Depression 2008    Diabetes ([facility] 2009    Diabetes mellitus type 2 with complications ([facility] 03/14/3448    Diabetic retinopathy of left eye ([facility] 01/27/2015    Dry eyes 07/28/2015    DUB (dysfunctional uterine bleeding) 2005    endometrial biopsy    Esophageal reflux     Essential hypertension 2003    Exotropia     Fibromyalgia 2008    Genital warts     Heart murmur     Herpes simplex     High blood pressure     High cholesterol     History of pregnancy 1990, 1991    Hyperlipidemia 2009    Irregular menstrual cycle 2008    Neg endometrial biopsy    MGD (meibomian gland dysfunction) 07/28/2015    Migraines 2013    Ocular migraine 07/28/2015    BRENDA     Ovarian cyst 1980    Laparoscopic cystectomy    Pregnancy 1990, 1991, 1993    PVD (posterior vitreous detachment), right eye 03/27/2023    patient was seen by Dr. Nic Mckinnon on 3/27/23 for flashes and floaters OD-DX PVD OD    Spinal stenosis 2010    C5-6, C6-7, physical therapy    TIA (transient ischemic attack)     Viral infection characterized by skin and mucous membrane lesions        Past Surgical History   Past Surgical History:   Procedure Laterality Date    CYST REMOVAL  1979    EGD N/A 7/23/2021    Procedure: ESOPHAGOGASTRODUODENOSCOPY, with bxs COLONOSCOPY with polypectomy;  Surgeon: Juanita Gunderson MD;  Location: [facility], COMPLETE  2/5/2013    scanned to media tab    [address]    Laparoscopic cystectomy    OTHER SURGICAL HISTORY  2005    Endometrial biopsy    OTHER SURGICAL HISTORY  2008    Endometrial biopsy    OTHER SURGICAL HISTORY  02/21/2017    cervical neck surgery     SPINE SURGERY PROCEDURE UNLISTED  2017    cervical     TUBAL LIGATION  1996       Family History   Family History   Problem Relation Age of Onset    Colon Cancer Mother     Heart Disease Mother         coronary artery disease    Hypertension Mother     Depression Mother     Heart Attack Mother     Heart Disease Father         coronary artery disease    Diabetes Father     Heart Attack Father     Glaucoma Sister     Diabetes Sister     Dementia Sister     Other (Other) Sister         Vascular necrosis    Ovarian Cancer Maternal Aunt     Seizure Disorder Sister        Social History   Social History    Socioeconomic History      Marital status:       Spouse name: Not on file      Number of children: Not on file      Years of education: Not on file      Highest education level: Not on file    Occupational History      Not on file    Tobacco Use      Smoking status: Former        Packs/day: 0.25        Years: 2.00        Pack years: .5        Types: Cigarettes        Quit date: 2017        Years since quittin.6      Smokeless tobacco: Never      Tobacco comments: 1 pack/wk per pt. Vaping Use      Vaping Use: Never used    Substance and Sexual Activity      Alcohol use: Not Currently        Comment: Occasionally      Drug use: No      Sexual activity: Yes        Partners: Male        Birth control/protection: Tubal Ligation    Other Topics      Concerns:         Service: Not Asked        Blood Transfusions: Not Asked        Caffeine Concern: Yes          2 cups coffee daily        Occupational Exposure: Not Asked        Hobby Hazards: Not Asked        Sleep Concern: Not Asked        Stress Concern: Not Asked        Weight Concern: Not Asked        Special Diet: Not Asked        Back Care: Not Asked        Exercise: No        Bike Helmet: Not Asked        Seat Belt: Not Asked        Self-Exams: Not Asked    Social History Narrative      The patient does not use an assistive device. .        The patient does live in a home with stairs. Lives with             Work     Social Determinants of Health  Financial Resource Strain: Not on file  Food Insecurity: Not on file  Transportation Needs: Not on file  Physical Activity: Not on file  Stress: Not on file  Social Connections: Not on file  Housing Stability: Not on file    PE:  The patient does appear in her stated age in no distress. The patient is well groomed. Psychiatric:  The patient is alert and oriented x 3. The patient has a normal affect and mood. Respiratory:  No acute respiratory distress. Patient does not have a cough. HEENT:  Extraocular muscles are intact. There is no kern icterus. Pupils are equal, round, and reactive to light. No redness or discharge bilaterally. Skin:  There are no rashes or lesions. Vitals: There were no vitals filed for this visit. Gait:    Gait: Normal gait   Sit to Stand: no difficulty      Vascular lower extremity:   Dorsalis pedis pulse-RIGHT 2+   Dorsalis pedis pulse-LEFT 2+   Tibialis posterior pulse-RIGHT 2+   Tibialis posterior pulse-LEFT 2+     Neurological Lower Extremity:    Light Touch Sensation: Intact in bilateral Lower Extremities   LE Muscle Strength: All LE strength measurements 5/5 except:  Hamstring RIGHT:   4+/5  Hamstring LEFT:   3/5   RIGHT plantar reflexes: downward response   LEFT plantar reflexes: downward response   Reflexes: 2+ in bilateral lower extremities except:  Right medial hamstring which are absent  Left medial hamstring which are absent     Assessment  1. left > right L5-S1 radiculopathy    2. L5-S1 mild diffuse, L4-5 mild diffuse, L3-4 right mild far lateral & left mild bulging discs    3. Lumbar spondylosis: L5-S1 bilateral mild-mod z-joint OA    4. s/p C5-7 ACDF    5. T11-12 mod central HNP    6. T6-7 mil diffuse, T7-8 mild-mod diffuse, T8-9 right mild-mod/left mild, T9-10 mild-mod diffuse, T10-11 mod diffuse bulging discs    7.  T9-10 mild-mod, T10-11 mod, T11-12 mod central stenosis       Plan  I will perform left lumbar HORACIO's with Dexamethasone once she has had the new MRI of the lumbar spine. She will notify me once she has had the new MRI of the lumbar spine. She will do 2-4 more sessions of the PT for the lumbar spine. She will continue with the Voltaren gel. The patient understands and agrees with the stated plan. Hermes Hutson MD  9/8/2023

## 2023-09-08 NOTE — PATIENT INSTRUCTIONS
Plan  I will perform left lumbar HORACIO's with Dexamethasone once she has had the new MRI of the lumbar spine. She will notify me once she has had the new MRI of the lumbar spine. She will do 2-4 more sessions of the PT for the lumbar spine. She will continue with the Voltaren gel.

## 2023-09-09 RX ORDER — GLIMEPIRIDE 1 MG/1
2 TABLET ORAL
Qty: 180 TABLET | Refills: 0 | Status: SHIPPED | OUTPATIENT
Start: 2023-09-09

## 2023-09-14 RX ORDER — GLIMEPIRIDE 1 MG/1
2 TABLET ORAL
Qty: 180 TABLET | Refills: 0 | Status: SHIPPED | OUTPATIENT
Start: 2023-09-14

## 2023-09-22 ENCOUNTER — OFFICE VISIT (OUTPATIENT)
Dept: FAMILY MEDICINE CLINIC | Facility: CLINIC | Age: 63
End: 2023-09-22

## 2023-09-22 VITALS
HEIGHT: 64 IN | DIASTOLIC BLOOD PRESSURE: 80 MMHG | WEIGHT: 209 LBS | BODY MASS INDEX: 35.68 KG/M2 | HEART RATE: 90 BPM | SYSTOLIC BLOOD PRESSURE: 130 MMHG

## 2023-09-22 DIAGNOSIS — M54.6 CHRONIC LEFT-SIDED THORACIC BACK PAIN: Primary | ICD-10-CM

## 2023-09-22 DIAGNOSIS — R10.12 LUQ PAIN: ICD-10-CM

## 2023-09-22 DIAGNOSIS — G89.29 CHRONIC LEFT-SIDED THORACIC BACK PAIN: Primary | ICD-10-CM

## 2023-09-22 PROCEDURE — 3075F SYST BP GE 130 - 139MM HG: CPT | Performed by: NURSE PRACTITIONER

## 2023-09-22 PROCEDURE — 99214 OFFICE O/P EST MOD 30 MIN: CPT | Performed by: NURSE PRACTITIONER

## 2023-09-22 PROCEDURE — 3079F DIAST BP 80-89 MM HG: CPT | Performed by: NURSE PRACTITIONER

## 2023-09-22 PROCEDURE — 3008F BODY MASS INDEX DOCD: CPT | Performed by: NURSE PRACTITIONER

## 2023-10-01 ENCOUNTER — HOSPITAL ENCOUNTER (OUTPATIENT)
Dept: CT IMAGING | Age: 63
Discharge: HOME | End: 2023-10-01
Attending: NURSE PRACTITIONER
Payer: COMMERCIAL

## 2023-10-01 ENCOUNTER — HOSPITAL ENCOUNTER (OUTPATIENT)
Dept: CT IMAGING | Age: 63
End: 2023-10-01
Attending: NURSE PRACTITIONER
Payer: COMMERCIAL

## 2023-10-01 DIAGNOSIS — G89.29 CHRONIC LEFT-SIDED THORACIC BACK PAIN: ICD-10-CM

## 2023-10-01 DIAGNOSIS — M54.6 CHRONIC LEFT-SIDED THORACIC BACK PAIN: ICD-10-CM

## 2023-10-01 DIAGNOSIS — R10.12 LUQ PAIN: ICD-10-CM

## 2023-10-01 PROCEDURE — 74150 CT ABDOMEN W/O CONTRAST: CPT | Performed by: NURSE PRACTITIONER

## 2023-10-01 PROCEDURE — 71250 CT THORAX DX C-: CPT | Performed by: NURSE PRACTITIONER

## 2023-10-06 ENCOUNTER — PATIENT MESSAGE (OUTPATIENT)
Dept: FAMILY MEDICINE CLINIC | Facility: CLINIC | Age: 63
End: 2023-10-06

## 2023-10-07 ENCOUNTER — HOSPITAL ENCOUNTER (OUTPATIENT)
Dept: MRI IMAGING | Facility: HOSPITAL | Age: 63
Discharge: HOME | End: 2023-10-07
Attending: PHYSICAL MEDICINE & REHABILITATION
Payer: COMMERCIAL

## 2023-10-07 DIAGNOSIS — M51.9 LUMBAR DISC DISEASE: ICD-10-CM

## 2023-10-07 DIAGNOSIS — M54.16 LUMBAR RADICULOPATHY: ICD-10-CM

## 2023-10-07 DIAGNOSIS — M47.816 LUMBAR SPONDYLOSIS: ICD-10-CM

## 2023-10-07 DIAGNOSIS — M47.892 OTHER OSTEOARTHRITIS OF SPINE, CERVICAL REGION: ICD-10-CM

## 2023-10-07 PROCEDURE — 72148 MRI LUMBAR SPINE W/O DYE: CPT | Performed by: PHYSICAL MEDICINE & REHABILITATION

## 2023-10-08 NOTE — TELEPHONE ENCOUNTER
From: Escobar Camargo  To: Seble Mcdonnell  Sent: 10/6/2023 11:19 AM CDT  Subject: Annual blood workup    I feel like it's time for a complete blood work up. Wondering if you could put the order in as I am also going for blood work for Dr Aysha Delgado. This way I can do it all at the same time. Your help is greatly appreciated.

## 2023-10-08 NOTE — TELEPHONE ENCOUNTER
Patient responded to viewed and acknowledged [employer] message. No further action needed at this time. .  Closing this encounter.

## 2023-10-30 ENCOUNTER — OFFICE VISIT (OUTPATIENT)
Dept: FAMILY MEDICINE CLINIC | Facility: CLINIC | Age: 63
End: 2023-10-30

## 2023-10-30 VITALS
BODY MASS INDEX: 35.79 KG/M2 | SYSTOLIC BLOOD PRESSURE: 124 MMHG | DIASTOLIC BLOOD PRESSURE: 75 MMHG | HEART RATE: 63 BPM | WEIGHT: 209.63 LBS | HEIGHT: 64 IN

## 2023-10-30 DIAGNOSIS — Z78.0 POST-MENOPAUSAL: ICD-10-CM

## 2023-10-30 DIAGNOSIS — J44.1 CHRONIC OBSTRUCTIVE PULMONARY DISEASE WITH ACUTE EXACERBATION (HCC): Chronic | ICD-10-CM

## 2023-10-30 DIAGNOSIS — Z00.00 ROUTINE MEDICAL EXAM: ICD-10-CM

## 2023-10-30 DIAGNOSIS — R91.1 PULMONARY NODULE: ICD-10-CM

## 2023-10-30 DIAGNOSIS — E11.9 TYPE 2 DIABETES MELLITUS WITHOUT COMPLICATION, WITH LONG-TERM CURRENT USE OF INSULIN (HCC): Primary | ICD-10-CM

## 2023-10-30 DIAGNOSIS — Z79.4 TYPE 2 DIABETES MELLITUS WITHOUT COMPLICATION, WITH LONG-TERM CURRENT USE OF INSULIN (HCC): Primary | ICD-10-CM

## 2023-10-30 DIAGNOSIS — J43.2 CENTRILOBULAR EMPHYSEMA (HCC): Chronic | ICD-10-CM

## 2023-10-30 DIAGNOSIS — I25.84 CORONARY ARTERY CALCIFICATION: ICD-10-CM

## 2023-10-30 DIAGNOSIS — M54.6 CHRONIC LEFT-SIDED THORACIC BACK PAIN: ICD-10-CM

## 2023-10-30 DIAGNOSIS — I25.10 CORONARY ARTERY CALCIFICATION: ICD-10-CM

## 2023-10-30 DIAGNOSIS — G89.29 CHRONIC LEFT-SIDED THORACIC BACK PAIN: ICD-10-CM

## 2023-10-30 DIAGNOSIS — E78.5 DYSLIPIDEMIA: Chronic | ICD-10-CM

## 2023-10-30 PROCEDURE — 99396 PREV VISIT EST AGE 40-64: CPT | Performed by: FAMILY MEDICINE

## 2023-10-30 PROCEDURE — 3074F SYST BP LT 130 MM HG: CPT | Performed by: FAMILY MEDICINE

## 2023-10-30 PROCEDURE — 3078F DIAST BP <80 MM HG: CPT | Performed by: FAMILY MEDICINE

## 2023-10-30 PROCEDURE — 3008F BODY MASS INDEX DOCD: CPT | Performed by: FAMILY MEDICINE

## 2023-10-30 RX ORDER — DIAZEPAM 5 MG/1
5 TABLET ORAL EVERY 12 HOURS PRN
Qty: 30 TABLET | Refills: 1 | Status: SHIPPED | OUTPATIENT
Start: 2023-10-30

## 2023-10-30 RX ORDER — FLUTICASONE PROPIONATE 50 MCG
SPRAY, SUSPENSION (ML) NASAL
Qty: 48 G | Refills: 1 | Status: SHIPPED | OUTPATIENT
Start: 2023-10-30

## 2023-10-30 RX ORDER — VALACYCLOVIR HYDROCHLORIDE 500 MG/1
500 TABLET, FILM COATED ORAL DAILY
Qty: 90 TABLET | Refills: 4 | Status: SHIPPED | OUTPATIENT
Start: 2023-10-30

## 2023-11-06 DIAGNOSIS — E55.9 VITAMIN D DEFICIENCY: ICD-10-CM

## 2023-11-06 RX ORDER — ERGOCALCIFEROL 1.25 MG/1
50000 CAPSULE ORAL WEEKLY
Qty: 12 CAPSULE | Refills: 0 | Status: SHIPPED | OUTPATIENT
Start: 2023-11-06

## 2023-11-19 ENCOUNTER — MOBILE ENCOUNTER (OUTPATIENT)
Dept: FAMILY MEDICINE CLINIC | Facility: CLINIC | Age: 63
End: 2023-11-19

## 2023-11-19 RX ORDER — AZITHROMYCIN 500 MG/1
500 TABLET, FILM COATED ORAL DAILY
Qty: 5 TABLET | Refills: 0 | Status: SHIPPED | OUTPATIENT
Start: 2023-11-19 | End: 2023-11-24

## 2023-11-20 NOTE — PROGRESS NOTES
(Message Delivered)   D E L I V E R I E S :  2023 03:19p           SHRUTISHERWIN       Delivered  ======================================================================  Paging    Message #          2023 06:54a   [[email or web address]]  To:  From:  JACE Doty MD:  Phone#:  ----------------------------------------------------------------------  Alexander Almaraz [phone]  [birth year] SHE HAS STREP THROAT AND WOULD LIKE ANTIBOTIC SENT OVER TO [facility].   Paged at  number :  PAGE: [phone] at :  06:54

## 2023-11-20 NOTE — PROGRESS NOTES
On call. Patient reports white exudates on tonsils and low grade fevers. Would like antibiotics. Has allergy to PCN. Sent azithromycin. Appt if not improving.

## 2023-12-04 ENCOUNTER — TELEPHONE (OUTPATIENT)
Dept: FAMILY MEDICINE CLINIC | Facility: CLINIC | Age: 63
End: 2023-12-04

## 2023-12-04 NOTE — TELEPHONE ENCOUNTER
See mychart message  Pt stated she have more white spots in throat not as sore but still sore, advised saline gargle , body aches-taking tylenol requesting another round of Abx       I still have strep throat. White patches on roof of mouth, on uvula and in the tonsil pockets. I have a headache, achy body, sore throat. I am allergic to penicillin. Please advise if more antibiotics will help. Thanks.

## 2023-12-04 NOTE — TELEPHONE ENCOUNTER
Needs to be evaluated in office. I sent in medications before on-call. Strep should not give white patches on uvula and roof of mouth - those may be ulcers.

## 2023-12-04 NOTE — TELEPHONE ENCOUNTER
Spoke with the patient,verified full name and     Informed her of message and instruction below    Future Appointments   Date Time Provider Jonny Faiza   2023 11:30 AM ANGELINA LemusO   2023  2:00 PM Melanie Navas, [facility]   2023  2:00 PM Melanie Navas [facility]   3/20/2024 11:30 AM MD SANTI GoinsO

## 2023-12-05 PROBLEM — M43.16 SPONDYLOLISTHESIS OF LUMBAR REGION: Status: ACTIVE | Noted: 2023-12-05

## 2023-12-06 ENCOUNTER — TELEPHONE (OUTPATIENT)
Dept: PHYSICAL MEDICINE AND REHAB | Facility: CLINIC | Age: 63
End: 2023-12-06

## 2023-12-06 NOTE — TELEPHONE ENCOUNTER
----- Message from Steph Patino MD sent at 12/5/2023 11:44 PM CST -----  No significant change since the last MRI scan. She did not notify this office once the MRI was complete as per the plan. Please see how she is doing now and if she is still in need of the injection or any further treatment for her lumbar spine at this time.

## 2023-12-07 ENCOUNTER — OFFICE VISIT (OUTPATIENT)
Dept: FAMILY MEDICINE CLINIC | Facility: CLINIC | Age: 63
End: 2023-12-07
Payer: COMMERCIAL

## 2023-12-07 VITALS
WEIGHT: 205 LBS | SYSTOLIC BLOOD PRESSURE: 132 MMHG | DIASTOLIC BLOOD PRESSURE: 84 MMHG | BODY MASS INDEX: 35 KG/M2 | HEIGHT: 64 IN | HEART RATE: 60 BPM

## 2023-12-07 DIAGNOSIS — J02.9 SORE THROAT: ICD-10-CM

## 2023-12-07 DIAGNOSIS — B37.0 ORAL THRUSH: Primary | ICD-10-CM

## 2023-12-07 PROCEDURE — 99213 OFFICE O/P EST LOW 20 MIN: CPT

## 2023-12-07 PROCEDURE — 3075F SYST BP GE 130 - 139MM HG: CPT

## 2023-12-07 PROCEDURE — 3079F DIAST BP 80-89 MM HG: CPT

## 2023-12-07 PROCEDURE — 3008F BODY MASS INDEX DOCD: CPT

## 2023-12-07 RX ORDER — PEN NEEDLE, DIABETIC 32GX 5/32"
1 NEEDLE, DISPOSABLE MISCELLANEOUS DAILY
Qty: 90 EACH | Refills: 0 | Status: SHIPPED | OUTPATIENT
Start: 2023-12-07

## 2023-12-07 RX ORDER — INSULIN DEGLUDEC INJECTION 100 U/ML
50 INJECTION, SOLUTION SUBCUTANEOUS
Qty: 30 ML | Refills: 1 | Status: SHIPPED | OUTPATIENT
Start: 2023-12-07

## 2023-12-07 RX ORDER — DULAGLUTIDE 1.5 MG/.5ML
1.5 INJECTION, SOLUTION SUBCUTANEOUS
Qty: 6 ML | Refills: 1 | Status: SHIPPED | OUTPATIENT
Start: 2023-12-07

## 2023-12-07 NOTE — ASSESSMENT & PLAN NOTE
Nystatin swishes 4 times a day times neck 7 days. Follow-up in 1 week if no resolution. Patient educated on importance of flushing and rinsing mouth after using inhalers.

## 2023-12-07 NOTE — ASSESSMENT & PLAN NOTE
Nystatin swish 4 times daily x 7. Rinse mouth after using inhalers. Supportive therapy at home including salt water rinses. Over-the-counter analgesics as needed.

## 2023-12-07 NOTE — PATIENT INSTRUCTIONS
Take nystatin swish 4 times per day for the next 7 days. Make sure when you are using your inhaler you are rinsing your mouth out thoroughly after each use. For the aches and pains continue taking Tylenol and Motrin over-the-counter every 4-6 hours as needed for pain. Follow-up in office if needed in 1 week.

## 2023-12-16 NOTE — TELEPHONE ENCOUNTER
LOV: 9/6/23     Next office visit: 3/20/24     Last filled: 8/25/23     Order pended and routed to Provider

## 2023-12-18 RX ORDER — PERPHENAZINE 16 MG/1
TABLET, FILM COATED ORAL
Qty: 200 STRIP | Refills: 0 | Status: SHIPPED | OUTPATIENT
Start: 2023-12-18

## 2023-12-18 RX ORDER — ALBUTEROL SULFATE 90 UG/1
2 AEROSOL, METERED RESPIRATORY (INHALATION) EVERY 4 HOURS PRN
Qty: 25.5 G | Refills: 3 | Status: SHIPPED | OUTPATIENT
Start: 2023-12-18

## 2023-12-18 NOTE — TELEPHONE ENCOUNTER
Refill passed per [place] [employer], [facility] protocol. Requested Prescriptions   Pending Prescriptions Disp Refills    ALBUTEROL 108 (90 Base) MCG/ACT Inhalation Aero Soln [Pharmacy Med Name: Albuterol Sulfate Hfa 108 Mcg/Act Aer Lupi] 25.5 g 0     Sig: Inhale 2 puffs into the lungs every 4 (four) hours as needed for Wheezing.        Asthma & COPD Medication Protocol Passed - 12/16/2023  3:20 PM        Passed - In person appointment or virtual visit in the past 6 mos or appointment in next 3 mos     Recent Outpatient Visits              1 week ago Oral thrush    [address] [place] [address], [facility], APRN    Office Visit    1 month ago Type 2 diabetes mellitus without complication, with long-term current use of insulin [facility])    [address] [place], [place] [address]Sandra MD    Office Visit    2 months ago Chronic left-sided thoracic back pain    [facility], [place] [address], [address] [place] [address], [facility]    Office Visit    3 months ago left > right L5-S1 radiculopathy    [address] [place], [place] [address]Sudeep MD    Office Visit    3 months ago Controlled type 2 diabetes mellitus without complication, without long-term current use of insulin ([address]    [address] [place], [place] [address]Sumi MD    Office Visit          Future Appointments         Provider Department Appt Notes    In 3 months Whitley Ochoa MD [address] [place] [address]                   Future Appointments         Provider Department Appt Notes    In 3 months Whitley Ochoa MD [address] [place] [address], [address] Visits              1 week ago Oral thrush    [address] [place] [address], [facility], APRN    Office Visit    1 month ago Type 2 diabetes mellitus without complication, with long-term current use of insulin ([address]    [facility] Daniel Mott MD    Office Visit    2 months ago Chronic left-sided thoracic back pain    Jaden Barrios, [facility]    Office Visit    3 months ago left > right L5-S1 radiculopathy    Jaime Sanchez MD    Office Visit    3 months ago Controlled type 2 diabetes mellitus without complication, without long-term current use of insulin [facility])    Jaden Barrios, Huma Gilliland MD    Office Visit

## 2023-12-21 ENCOUNTER — TELEPHONE (OUTPATIENT)
Dept: ENDOCRINOLOGY CLINIC | Facility: CLINIC | Age: 63
End: 2023-12-21

## 2023-12-21 NOTE — TELEPHONE ENCOUNTER
Spoke to pharmacy, states have 0.75mg, 1.5mg, and 3mg in store. Be ready by tomorrow per pharm tech    Spoke to patient, verbalized understanding and acknowledged.

## 2023-12-21 NOTE — TELEPHONE ENCOUNTER
Patient is do to have rx Trulicity filled, but says no Pharmacy has it. Patient requesting a different medication for her diabetes. Please call at [phone].

## 2023-12-28 ENCOUNTER — LAB ENCOUNTER (OUTPATIENT)
Dept: LAB | Age: 63
End: 2023-12-28
Attending: FAMILY MEDICINE
Payer: COMMERCIAL

## 2023-12-28 DIAGNOSIS — Z00.00 ROUTINE MEDICAL EXAM: ICD-10-CM

## 2023-12-28 DIAGNOSIS — E11.9 CONTROLLED TYPE 2 DIABETES MELLITUS WITHOUT COMPLICATION, WITHOUT LONG-TERM CURRENT USE OF INSULIN (HCC): ICD-10-CM

## 2023-12-28 LAB
ALBUMIN SERPL-MCNC: 4.3 G/DL (ref 3.2–4.8)
ALBUMIN/GLOB SERPL: 1.8 {RATIO} (ref 1–2)
ALP LIVER SERPL-CCNC: 66 U/L
ALT SERPL-CCNC: 29 U/L
ANION GAP SERPL CALC-SCNC: 5 MMOL/L (ref 0–18)
AST SERPL-CCNC: 20 U/L (ref ?–34)
BASOPHILS # BLD AUTO: 0.09 X10(3) UL (ref 0–0.2)
BASOPHILS NFR BLD AUTO: 1.1 %
BILIRUB SERPL-MCNC: 0.3 MG/DL (ref 0.2–1.1)
BILIRUB UR QL: NEGATIVE
BUN BLD-MCNC: 8 MG/DL (ref 9–23)
BUN/CREAT SERPL: 11.6 (ref 10–20)
CALCIUM BLD-MCNC: 9.5 MG/DL (ref 8.7–10.4)
CHLORIDE SERPL-SCNC: 105 MMOL/L (ref 98–112)
CHOLEST SERPL-MCNC: 188 MG/DL (ref ?–200)
CLARITY UR: CLEAR
CO2 SERPL-SCNC: 27 MMOL/L (ref 21–32)
CREAT BLD-MCNC: 0.69 MG/DL
CREAT UR-SCNC: 53.7 MG/DL
DEPRECATED RDW RBC AUTO: 43.1 FL (ref 35.1–46.3)
EGFRCR SERPLBLD CKD-EPI 2021: 97 ML/MIN/1.73M2 (ref 60–?)
EOSINOPHIL # BLD AUTO: 0.36 X10(3) UL (ref 0–0.7)
EOSINOPHIL NFR BLD AUTO: 4.3 %
ERYTHROCYTE [DISTWIDTH] IN BLOOD BY AUTOMATED COUNT: 13.3 % (ref 11–15)
FASTING PATIENT LIPID ANSWER: YES
FASTING STATUS PATIENT QL REPORTED: YES
GLOBULIN PLAS-MCNC: 2.4 G/DL (ref 2.8–4.4)
GLUCOSE BLD-MCNC: 111 MG/DL (ref 70–99)
GLUCOSE UR-MCNC: NORMAL MG/DL
HCT VFR BLD AUTO: 38.6 %
HDLC SERPL-MCNC: 68 MG/DL (ref 40–59)
HGB BLD-MCNC: 12.4 G/DL
HGB UR QL STRIP.AUTO: NEGATIVE
IMM GRANULOCYTES # BLD AUTO: 0.03 X10(3) UL (ref 0–1)
IMM GRANULOCYTES NFR BLD: 0.4 %
KETONES UR-MCNC: NEGATIVE MG/DL
LDLC SERPL CALC-MCNC: 89 MG/DL (ref ?–100)
LEUKOCYTE ESTERASE UR QL STRIP.AUTO: NEGATIVE
LYMPHOCYTES # BLD AUTO: 3.24 X10(3) UL (ref 1–4)
LYMPHOCYTES NFR BLD AUTO: 38.7 %
MCH RBC QN AUTO: 28.2 PG (ref 26–34)
MCHC RBC AUTO-ENTMCNC: 32.1 G/DL (ref 31–37)
MCV RBC AUTO: 87.7 FL
MICROALBUMIN UR-MCNC: <0.3 MG/DL
MONOCYTES # BLD AUTO: 0.62 X10(3) UL (ref 0.1–1)
MONOCYTES NFR BLD AUTO: 7.4 %
NEUTROPHILS # BLD AUTO: 4.03 X10 (3) UL (ref 1.5–7.7)
NEUTROPHILS # BLD AUTO: 4.03 X10(3) UL (ref 1.5–7.7)
NEUTROPHILS NFR BLD AUTO: 48.1 %
NITRITE UR QL STRIP.AUTO: NEGATIVE
NONHDLC SERPL-MCNC: 120 MG/DL (ref ?–130)
OSMOLALITY SERPL CALC.SUM OF ELEC: 283 MOSM/KG (ref 275–295)
PH UR: 6.5 [PH] (ref 5–8)
PLATELET # BLD AUTO: 278 10(3)UL (ref 150–450)
POTASSIUM SERPL-SCNC: 3.9 MMOL/L (ref 3.5–5.1)
PROT SERPL-MCNC: 6.7 G/DL (ref 5.7–8.2)
PROT UR-MCNC: NEGATIVE MG/DL
RBC # BLD AUTO: 4.4 X10(6)UL
SODIUM SERPL-SCNC: 137 MMOL/L (ref 136–145)
SP GR UR STRIP: 1.01 (ref 1–1.03)
T4 FREE SERPL-MCNC: 1.3 NG/DL (ref 0.8–1.7)
TRIGL SERPL-MCNC: 187 MG/DL (ref 30–149)
TSI SER-ACNC: 3.68 MIU/ML (ref 0.55–4.78)
UROBILINOGEN UR STRIP-ACNC: NORMAL
VIT B12 SERPL-MCNC: 475 PG/ML (ref 211–911)
VIT D+METAB SERPL-MCNC: 29.6 NG/ML (ref 30–100)
VLDLC SERPL CALC-MCNC: 30 MG/DL (ref 0–30)
WBC # BLD AUTO: 8.4 X10(3) UL (ref 4–11)

## 2023-12-28 PROCEDURE — 36415 COLL VENOUS BLD VENIPUNCTURE: CPT

## 2023-12-28 PROCEDURE — 80061 LIPID PANEL: CPT

## 2023-12-28 PROCEDURE — 80053 COMPREHEN METABOLIC PANEL: CPT

## 2023-12-28 PROCEDURE — 82570 ASSAY OF URINE CREATININE: CPT

## 2023-12-28 PROCEDURE — 82043 UR ALBUMIN QUANTITATIVE: CPT

## 2023-12-28 PROCEDURE — 81003 URINALYSIS AUTO W/O SCOPE: CPT | Performed by: FAMILY MEDICINE

## 2023-12-28 PROCEDURE — 84439 ASSAY OF FREE THYROXINE: CPT

## 2023-12-28 PROCEDURE — 82306 VITAMIN D 25 HYDROXY: CPT

## 2023-12-28 PROCEDURE — 85025 COMPLETE CBC W/AUTO DIFF WBC: CPT

## 2023-12-28 PROCEDURE — 84443 ASSAY THYROID STIM HORMONE: CPT

## 2023-12-28 PROCEDURE — 82607 VITAMIN B-12: CPT

## 2023-12-29 ENCOUNTER — PATIENT MESSAGE (OUTPATIENT)
Dept: FAMILY MEDICINE CLINIC | Facility: CLINIC | Age: 63
End: 2023-12-29

## 2023-12-29 DIAGNOSIS — E55.9 VITAMIN D DEFICIENCY: ICD-10-CM

## 2023-12-30 NOTE — TELEPHONE ENCOUNTER
LOV: 9/6/23     Next office visit: 3/20/24     Last filled: 11/6/23     Order pended and routed to Provider

## 2023-12-31 NOTE — TELEPHONE ENCOUNTER
From: Kyle Saucedo  To: Luke North  Sent: 12/29/2023 12:06 PM CST  Subject: Recurrance    I took the medication as prescribed. My thrush went away. I looked this morning and the thrush is back again even though I am rinsing after each inhaler application. Can you please call in a prescription for the medicine again? [facility] [address]. I greatly appreciate your help. Happy New Year.

## 2024-01-02 RX ORDER — ERGOCALCIFEROL 1.25 MG/1
50000 CAPSULE ORAL WEEKLY
Qty: 12 CAPSULE | Refills: 0 | Status: SHIPPED | OUTPATIENT
Start: 2024-01-02

## 2024-01-09 ENCOUNTER — PATIENT MESSAGE (OUTPATIENT)
Dept: FAMILY MEDICINE CLINIC | Facility: CLINIC | Age: 64
End: 2024-01-09

## 2024-01-09 DIAGNOSIS — I25.84 CORONARY ARTERY CALCIFICATION: ICD-10-CM

## 2024-01-09 DIAGNOSIS — I25.10 CORONARY ARTERY CALCIFICATION: ICD-10-CM

## 2024-01-09 DIAGNOSIS — E78.5 DYSLIPIDEMIA: Primary | ICD-10-CM

## 2024-01-10 NOTE — TELEPHONE ENCOUNTER
From: Ju Johnson  To: Carmelina Gonzalez  Sent: 1/9/2024 12:06 PM CST  Subject: Cardiologist Referral    I have new insurance for 2024. I need to see the cardiologist and [facility]. Can you please help me get this accomplished.

## 2024-01-10 NOTE — TELEPHONE ENCOUNTER
1st attempt - LMTCB FYI: patient insurance [employer] is not assign to us it is assign to:  PRIMARY CARE PROVIDER  Coverage Level   Individual  Service Type   Health Benefit Plan Coverage  Primary Care Provider Name   [facility]  Address   [address] [place] [id number]

## 2024-01-10 NOTE — TELEPHONE ENCOUNTER
CSS please update chart with new insurance information. Then route back to triage support pool for referral follow up.   Thank you.

## 2024-01-12 ENCOUNTER — PATIENT MESSAGE (OUTPATIENT)
Dept: FAMILY MEDICINE CLINIC | Facility: CLINIC | Age: 64
End: 2024-01-12

## 2024-01-12 NOTE — TELEPHONE ENCOUNTER
From: Ju Johnson  To: Danyel Leger  Sent: 1/12/2024 11:30 AM CST  Subject: Follow up to thrush diagnosis    At the end of December I sent a request and did not hear back. I am sending another request with a picture attached. My thrush has returned. I am asking for a refill prescription to please be sent to [facility].

## 2024-01-13 ENCOUNTER — NURSE TRIAGE (OUTPATIENT)
Dept: FAMILY MEDICINE CLINIC | Facility: CLINIC | Age: 64
End: 2024-01-13

## 2024-01-13 ENCOUNTER — TELEMEDICINE (OUTPATIENT)
Dept: FAMILY MEDICINE CLINIC | Facility: CLINIC | Age: 64
End: 2024-01-13
Payer: COMMERCIAL

## 2024-01-13 DIAGNOSIS — B37.0 CANDIDA INFECTION OF MOUTH: Primary | ICD-10-CM

## 2024-01-13 PROCEDURE — 99213 OFFICE O/P EST LOW 20 MIN: CPT | Performed by: FAMILY MEDICINE

## 2024-01-13 NOTE — PROGRESS NOTES
Subjective:   Patient ID: Ju Johnson is a 63 year old female.  Telehealth outside of [facility]  Telehealth Verbal Consent   I conducted a telehealth visit with Ju Johnson today, 01/13/24, which was completed using two-way, real-time interactive audio and video communication. This has been done in good rachid to provide continuity of care in the best interest of the provider-patient relationship, due to the COVID -19 public health crisis/national emergency where restrictions of face-to-face office visits are ongoing. Every conscious effort was taken to allow for sufficient and adequate time to complete the visit.  The patient was made aware of the limitations of the telehealth visit, including treatment limitations as no physical exam could be performed.  The patient was advised to call 911 or to go to the ER in case there was an emergency.  The patient was also advised of the potential privacy & security concerns related to the telehealth platform.   The patient was made aware of where to find [facility]'s notice of privacy practices, telehealth consent form and other related consent forms and documents.  which are located on the [facility] website. The patient verbally agreed to telehealth consent form, related consents and the risks discussed.    Lastly, the patient confirmed that they were in [place].   Included in this visit, time may have been spent reviewing labs, medications, radiology tests and decision making. Appropriate medical decision-making and tests are ordered as detailed in the plan of care above.  Coding/billing information is submitted for this visit based on complexity of care and/or time spent for the visit.  This is video visit    HPI  Patient has trush in the mouth again   And wanted refill of nystatin   Using steroid inhaler rinsing mouth   Being under a lot of stress  No other symptoms   History/Other:   Review of Systems    Constitutional: Negative.  Negative for activity change, appetite change,  diaphoresis and fatigue.   Heent see hpi  Respiratory: Negative.  Negative for apnea, cough, chest tightness and shortness of breath.    Cardiovascular: Negative.  Negative for chest pain, palpitations and leg swelling.     Current Outpatient Medications   Medication Sig Dispense Refill    nystatin 367728 UNIT/ML Mouth/Throat Suspension Take 5 mL (500,000 Units total) by mouth 4 (four) times daily. 473 mL 0    ergocalciferol 1.25 MG (88221 UT) Oral Cap Take 1 capsule (50,000 Units total) by mouth once a week. 12 capsule 0    albuterol 108 (90 Base) MCG/ACT Inhalation Aero Soln Inhale 2 puffs into the lungs every 4 (four) hours as needed for Wheezing. 25.5 g 3    CONTOUR NEXT TEST In Vitro Strip TEST SUGAR  2 TIMES DAILY 200 strip 0    Insulin Pen Needle (BD PEN NEEDLE ESTEVAN U/F) 32G X 4 MM Does not apply Misc 1 each daily. 90 each 0    TRULICITY 1.5 MG/0.5ML Subcutaneous Solution Pen-injector Inject 1.5 mg into the skin every 7 days. 6 mL 1    Insulin Degludec (TRESIBA FLEXTOUCH) 100 UNIT/ML Subcutaneous Solution Pen-injector Inject 0.5 mL (50 Units total) into the skin Daily@1600. 30 mL 1    fluticasone propionate 50 MCG/ACT Nasal Suspension use 2 sprays by Each nostrils route daily. 48 g 1    valACYclovir 500 MG Oral Tab Take 1 tablet (500 mg total) by mouth daily. 90 tablet 4    diazePAM 5 MG Oral Tab Take 1 tablet (5 mg total) by mouth every 12 (twelve) hours as needed. 30 tablet 1    glimepiride 1 MG Oral Tab Take 2 tablets (2 mg total) by mouth daily with breakfast. 180 tablet 0    diclofenac 1 % External Gel Apply 4 g topically 4 (four) times daily. 350 g 3    meclizine 25 MG Oral Tab Take 1 tablet (25 mg total) by mouth 3 (three) times daily as needed for Dizziness or Nausea. 30 tablet 5    Lansoprazole 15 MG Oral Capsule Delayed Release Take 1 capsule (15 mg total) by mouth in the morning and 1 capsule (15 mg total) before bedtime. 180 capsule 3    Budesonide-Formoterol Fumarate (SYMBICORT) 160-4.5 MCG/ACT  Inhalation Aerosol Inhale 2 puffs into the lungs 2 (two) times daily. 30.6 g 3    montelukast 10 MG Oral Tab Take 1 tablet (10 mg total) by mouth daily. 90 tablet 3    chlorhexidine gluconate 0.12 % Mouth/Throat Solution       ubrogepant (UBRELVY) 100 MG Oral Tab Take 1 tablet BY MOUTH at onset of migraine. May take additional tablet in 2 hours if needed.  Do not exceed 2 tablets per 24 hour period. 10 tablet 0    atorvastatin 40 MG Oral Tab Take 1 tablet (40 mg total) by mouth daily. 90 tablet 3    losartan 50 MG Oral Tab Take 1 tablet (50 mg total) by mouth in the morning and 1 tablet (50 mg total) before bedtime. 180 tablet 4    Diclofenac Sodium 1 % Transdermal Gel Apply 2 g topically 4 (four) times daily. 50 g 1    methocarbamol 750 MG Oral Tab Take 1 tablet (750 mg total) by mouth nightly as needed. 7 tablet 0    Propranolol HCl 80 MG Oral Tab Take 1 tablet (80 mg total) by mouth 2 (two) times daily. 180 tablet 4    acyclovir 5 % External Ointment Apply 1 Application topically every 3 (three) hours. 30 g 2    PATIENT SUPPLIED MEDICATION Vitamin B12, magnesium, zinc, vitamin c, & garlic      magnesium oxide 400 MG Oral Tab Take 1 tablet (400 mg total) by mouth daily.      Blood Glucose Monitoring Suppl (CONTOUR NEXT MONITOR) w/Device Does not apply Kit 1 each daily. 1 kit 0    triamcinolone acetonide 0.1 % External Cream Apply topically 2 (two) times daily as needed. 30 g 3    Ammonium Lactate (LAC-HYDRIN) 12 % External Cream Apply to affected areas 1-2x/day 1 Tube 3    Clobetasol Propionate 0.05 % External Ointment Apply 1 Application topically daily as needed. Apply to affected areas 1x/day as needed 30 g 2    aspirin 81 MG Oral Tab Take 1 tablet (81 mg total) by mouth daily.      acetaminophen 500 MG Oral Tab Take 1 tablet (500 mg total) by mouth every 6 (six) hours as needed for Pain.      Ciclopirox 8 % External Solution Apply 1 Application topically nightly. 1 Bottle 2    ketoconazole 2 % External Cream  MELVIN EXT AA BID  0    cetirizine 10 MG Oral Tab Take 1 tablet (10 mg total) by mouth daily.       Allergies:  Allergies   Allergen Reactions    Liraglutide SWELLING    Meloxicam PALPITATIONS    Penicillins SWELLING and ITCHING    Sulindac HIVES    Acyclovir DIZZINESS    Clarithromycin NAUSEA AND VOMITING    Methylprednisolone PAIN    Ciprofloxacin UNKNOWN    Cymbalta [Duloxetine Hcl] OTHER (SEE COMMENTS)     Tingling arms/legs, blurry vision, elevated glucose, HA     Gabapentin UNKNOWN    Metronidazole UNKNOWN    Naproxen Sodium OTHER (SEE COMMENTS)     Tingling to limbs    Nitrofurantoin UNKNOWN    Nitrofurantoin Macrocrystal UNKNOWN    Doxycycline RASH    Rosuvastatin DIARRHEA       Objective:   Physical Exam  Constitutional:       Appearance: Normal appearance.   Neurological:      Mental Status: She is alert.     Heent picture reviewed as much as possible does appears as trush     Assessment & Plan:     ICD-10-CM    1. Candida infection of mouth  B37.0       Nystatin   If no better needs office visit      No orders of the defined types were placed in this encounter.      Meds This Visit:  Requested Prescriptions     Signed Prescriptions Disp Refills    nystatin [id number] UNIT/ML Mouth/Throat Suspension 473 mL 0     Sig: Take 5 mL (500,000 Units total) by mouth 4 (four) times daily.       Imaging & Referrals:  None

## 2024-01-13 NOTE — TELEPHONE ENCOUNTER
Action Requested: Summary for Provider     []  Critical Lab, Recommendations Needed  [] Need Additional Advice  []   FYI    []   Need Orders  [] Need Medications Sent to Pharmacy  []  Other     SUMMARY: Patient has had thrush before recent and sent RX. Worked but now has symptoms again. Uses inhalers, does rinse after although asking for RX for current symptoms. Appointment virtual today, pictures in Mychart.     Reason for call: Thrush  Onset: unknown    Reason for Disposition   White patches that stick to tongue or inner cheek, which can be wiped off    Protocols used: Mouth Symptoms-A-OH

## 2024-01-17 ENCOUNTER — PATIENT MESSAGE (OUTPATIENT)
Dept: FAMILY MEDICINE CLINIC | Facility: CLINIC | Age: 64
End: 2024-01-17

## 2024-01-18 NOTE — TELEPHONE ENCOUNTER
From: Ju Johnson  To: Carmelina Gonzalez  Sent: 2024 8:44 AM CST  Subject: Refill meds     I'm having a lot of back spasming these days. I am trying to exercise to work it out but that just seems to be making it worse. Unfortunately I do not have time to go for physical therapy at this time. The methocarbamol I have is . I was wondering if you could please send in a prescription for methocarbamol 750 mg tablets? The medication is in my chart but won't let me request a refill through medications. Your help is greatly appreciated. Thank you.

## 2024-01-19 ENCOUNTER — OFFICE VISIT (OUTPATIENT)
Dept: OBGYN CLINIC | Facility: CLINIC | Age: 64
End: 2024-01-19

## 2024-01-19 VITALS
HEART RATE: 67 BPM | SYSTOLIC BLOOD PRESSURE: 134 MMHG | BODY MASS INDEX: 35 KG/M2 | WEIGHT: 206 LBS | DIASTOLIC BLOOD PRESSURE: 75 MMHG

## 2024-01-19 DIAGNOSIS — N64.4 BREAST PAIN, LEFT: Primary | ICD-10-CM

## 2024-01-19 PROCEDURE — 99213 OFFICE O/P EST LOW 20 MIN: CPT | Performed by: ADVANCED PRACTICE MIDWIFE

## 2024-01-19 PROCEDURE — 3075F SYST BP GE 130 - 139MM HG: CPT | Performed by: ADVANCED PRACTICE MIDWIFE

## 2024-01-19 PROCEDURE — 3078F DIAST BP <80 MM HG: CPT | Performed by: ADVANCED PRACTICE MIDWIFE

## 2024-01-19 RX ORDER — CYCLOBENZAPRINE HCL 5 MG
5 TABLET ORAL 3 TIMES DAILY PRN
Qty: 30 TABLET | Refills: 0 | Status: SHIPPED | OUTPATIENT
Start: 2024-01-19

## 2024-01-19 NOTE — PROGRESS NOTES
[facility]if[facility] Focus[facility] Gynecology Problem Exam    Ju Johnson is a 63 year old female presenting for Breast Pain (Pt states has left breast pain X 2 weeks )  .    HPI:     Chief Complaint   Patient presents with    Breast Pain     Pt states has left breast pain X 2 weeks        Has been having pain in left side. Started in upper back and now feels it under left armpit and side of breast. Saw NP and had a CT done and normal.     Made appt with her cardiologist to be sure not r/t anything cardiac.    Has had x 2 yrs. Has gotten bad in last 2 weeks.     Had lump in back that massage therapist was working on 20 yrs ago. 4 yrs ago another therapist was working on it and caused her pain.     Has been sole caregiver for  with brain cancer. Has been sleeping on couch and in hospital bed.     Previous vaginal itching and rash she had seen me for before has resolved.     PHQ-2 SCORE: 0, done 1/19/2024          Depression Screening (PHQ-2/PHQ-9): Over the LAST 2 WEEKS   Little interest or pleasure in doing things: Not at all    Feeling down, depressed, or hopeless: Not at all    PHQ-2 SCORE: 0           Medications (Active prior to today's visit):  Current Outpatient Medications   Medication Sig Dispense Refill    cyclobenzaprine 5 MG Oral Tab Take 1 tablet (5 mg total) by mouth 3 (three) times daily as needed for Muscle spasms. 30 tablet 0    nystatin 600379 UNIT/ML Mouth/Throat Suspension Take 5 mL (500,000 Units total) by mouth 4 (four) times daily. 473 mL 0    ergocalciferol 1.25 MG (75487 UT) Oral Cap Take 1 capsule (50,000 Units total) by mouth once a week. 12 capsule 0    albuterol 108 (90 Base) MCG/ACT Inhalation Aero Soln Inhale 2 puffs into the lungs every 4 (four) hours as needed for Wheezing. 25.5 g 3    CONTOUR NEXT TEST In Vitro Strip TEST SUGAR  2 TIMES DAILY 200 strip 0    Insulin Pen Needle (BD PEN NEEDLE ESTEVAN U/F) 32G X 4 MM Does not apply Misc 1 each daily. 90 each 0    TRULICITY 1.5  MG/0.5ML Subcutaneous Solution Pen-injector Inject 1.5 mg into the skin every 7 days. 6 mL 1    Insulin Degludec (TRESIBA FLEXTOUCH) 100 UNIT/ML Subcutaneous Solution Pen-injector Inject 0.5 mL (50 Units total) into the skin Daily@1600. 30 mL 1    fluticasone propionate 50 MCG/ACT Nasal Suspension use 2 sprays by Each nostrils route daily. 48 g 1    valACYclovir 500 MG Oral Tab Take 1 tablet (500 mg total) by mouth daily. 90 tablet 4    diazePAM 5 MG Oral Tab Take 1 tablet (5 mg total) by mouth every 12 (twelve) hours as needed. 30 tablet 1    glimepiride 1 MG Oral Tab Take 2 tablets (2 mg total) by mouth daily with breakfast. 180 tablet 0    diclofenac 1 % External Gel Apply 4 g topically 4 (four) times daily. 350 g 3    meclizine 25 MG Oral Tab Take 1 tablet (25 mg total) by mouth 3 (three) times daily as needed for Dizziness or Nausea. 30 tablet 5    Lansoprazole 15 MG Oral Capsule Delayed Release Take 1 capsule (15 mg total) by mouth in the morning and 1 capsule (15 mg total) before bedtime. 180 capsule 3    Budesonide-Formoterol Fumarate (SYMBICORT) 160-4.5 MCG/ACT Inhalation Aerosol Inhale 2 puffs into the lungs 2 (two) times daily. 30.6 g 3    montelukast 10 MG Oral Tab Take 1 tablet (10 mg total) by mouth daily. 90 tablet 3    chlorhexidine gluconate 0.12 % Mouth/Throat Solution       ubrogepant (UBRELVY) 100 MG Oral Tab Take 1 tablet BY MOUTH at onset of migraine. May take additional tablet in 2 hours if needed.  Do not exceed 2 tablets per 24 hour period. 10 tablet 0    atorvastatin 40 MG Oral Tab Take 1 tablet (40 mg total) by mouth daily. 90 tablet 3    losartan 50 MG Oral Tab Take 1 tablet (50 mg total) by mouth in the morning and 1 tablet (50 mg total) before bedtime. 180 tablet 4    Diclofenac Sodium 1 % Transdermal Gel Apply 2 g topically 4 (four) times daily. 50 g 1    Propranolol HCl 80 MG Oral Tab Take 1 tablet (80 mg total) by mouth 2 (two) times daily. 180 tablet 4    acyclovir 5 % External  Ointment Apply 1 Application topically every 3 (three) hours. 30 g 2    PATIENT SUPPLIED MEDICATION Vitamin B12, magnesium, zinc, vitamin c, & garlic      magnesium oxide 400 MG Oral Tab Take 1 tablet (400 mg total) by mouth daily.      Blood Glucose Monitoring Suppl (CONTOUR NEXT MONITOR) w/Device Does not apply Kit 1 each daily. 1 kit 0    triamcinolone acetonide 0.1 % External Cream Apply topically 2 (two) times daily as needed. 30 g 3    Ammonium Lactate (LAC-HYDRIN) 12 % External Cream Apply to affected areas 1-2x/day 1 Tube 3    Clobetasol Propionate 0.05 % External Ointment Apply 1 Application topically daily as needed. Apply to affected areas 1x/day as needed 30 g 2    aspirin 81 MG Oral Tab Take 1 tablet (81 mg total) by mouth daily.      acetaminophen 500 MG Oral Tab Take 1 tablet (500 mg total) by mouth every 6 (six) hours as needed for Pain.      Ciclopirox 8 % External Solution Apply 1 Application topically nightly. 1 Bottle 2    ketoconazole 2 % External Cream MELVIN EXT AA BID  0    cetirizine 10 MG Oral Tab Take 1 tablet (10 mg total) by mouth daily.       Allergies:  Allergies   Allergen Reactions    Liraglutide SWELLING    Meloxicam PALPITATIONS    Penicillins SWELLING and ITCHING    Sulindac HIVES    Acyclovir DIZZINESS    Clarithromycin NAUSEA AND VOMITING    Methylprednisolone PAIN    Ciprofloxacin UNKNOWN    Cymbalta [Duloxetine Hcl] OTHER (SEE COMMENTS)     Tingling arms/legs, blurry vision, elevated glucose, HA     Gabapentin UNKNOWN    Metronidazole UNKNOWN    Naproxen Sodium OTHER (SEE COMMENTS)     Tingling to limbs    Nitrofurantoin UNKNOWN    Nitrofurantoin Macrocrystal UNKNOWN    Doxycycline RASH    Rosuvastatin DIARRHEA     HISTORY:   Menstrual History:  OB History    Para Term  AB Living   3 1 1 0 2 1   SAB IAB Ectopic Multiple Live Births   2 0 0 0 0      Patient's last menstrual period was 2010.         Past Medical History:   Diagnosis Date    Acute non-recurrent  maxillary sinusitis 2019    Age-related nuclear cataract of both eyes 2015    Allergic rhinitis     pereniaal and seasonal    Alternating exotropia 2015    Amenorrhea 2007    Anxiety 2008    Atrial tachycardia, paroxysmal 04/15/2015    Barretts esophagus     Carpal tunnel syndrome     Chlamydia 1986    Choroidal nevus, right eye 2015    COPD (chronic obstructive pulmonary disease) ([facility])     Depression 2008    Diabetes ([facility]) 2009    Diabetes mellitus type 2 with complications ([facility]) 2015    Diabetic retinopathy of left eye ([facility]) 2015    Dry eyes 2015    DUB (dysfunctional uterine bleeding) 2005    endometrial biopsy    Eczema 2014    Esophageal reflux     Essential hypertension 2003    Exotropia     Fibromyalgia 2008    Genital warts     Heart murmur     Herpes simplex     High blood pressure     High cholesterol     History of pregnancy ,     Hyperlipidemia 2009    Irregular menstrual cycle     Neg endometrial biopsy    Irritable bowel syndrome 2005    MGD (meibomian gland dysfunction) 2015    Migraines 2013    Obesity 1996    Ocular migraine 2015    BRENDA     Ovarian cyst 1980    Laparoscopic cystectomy    Pregnancy , ,     PVD (posterior vitreous detachment), right eye 2023    patient was seen by Dr. Harkins on 3/27/23 for flashes and floaters OD-DX PVD OD    Spinal stenosis 2010    C5-6, C6-7, physical therapy    TIA (transient ischemic attack)     Viral infection characterized by skin and mucous membrane lesions     Visual impairment 3/1/2023     Past Surgical History:   Procedure Laterality Date    COLONOSCOPY      CYST REMOVAL      EGD N/A 2021    Procedure: ESOPHAGOGASTRODUODENOSCOPY, with bxs COLONOSCOPY with polypectomy;  Surgeon: Sy Slater MD;  Location: [facility]    ELECTROCARDIOGRAM, COMPLETE  2013    scanned to media tab          OTHER SURGICAL HISTORY       Laparoscopic cystectomy    OTHER SURGICAL HISTORY      Endometrial biopsy    OTHER SURGICAL HISTORY      Endometrial biopsy    OTHER SURGICAL HISTORY  2017    cervical neck surgery     SPINE SURGERY PROCEDURE UNLISTED  2017    cervical     TUBAL LIGATION       Family History   Problem Relation Age of Onset    Colon Cancer Mother     Heart Disease Mother         coronary artery disease    Hypertension Mother     Depression Mother     Heart Attack Mother     Heart Disorder Mother     Heart Disease Father         coronary artery disease    Diabetes Father     Heart Attack Father     Heart Disorder Father     Glaucoma Sister     Diabetes Sister     Dementia Sister     Other (Other) Sister         Vascular necrosis    Ovarian Cancer Maternal Aunt     Seizure Disorder Sister      Social History     Socioeconomic History    Marital status:      Spouse name: Not on file    Number of children: Not on file    Years of education: Not on file    Highest education level: Not on file   Occupational History    Not on file   Tobacco Use    Smoking status: Former     Packs/day: 0.25     Years: 2.00     Additional pack years: 0.00     Total pack years: 0.50     Types: Cigarettes     Quit date: 2017     Years since quittin.9     Passive exposure: Never    Smokeless tobacco: Never    Tobacco comments:     1 pack/wk per pt.   Vaping Use    Vaping Use: Never used   Substance and Sexual Activity    Alcohol use: Not Currently     Comment: Occasionally    Drug use: No    Sexual activity: Yes     Partners: Male     Birth control/protection: Tubal Ligation   Other Topics Concern     Service Not Asked    Blood Transfusions Not Asked    Caffeine Concern Yes     Comment: 2 cups coffee daily    Occupational Exposure Not Asked    Hobby Hazards Not Asked    Sleep Concern Not Asked    Stress Concern Not Asked    Weight Concern Not Asked    Special Diet Not Asked    Back Care Not Asked    Exercise No     Bike Helmet Not Asked    Seat Belt Not Asked    Self-Exams Not Asked   Social History Narrative    The patient does not use an assistive device..      The patient does live in a home with stairs.        Lives with         Work      Social Determinants of Health     Financial Resource Strain: Not on file   Food Insecurity: Not on file   Transportation Needs: Not on file   Physical Activity: Not on file   Stress: Not on file   Social Connections: Not on file   Housing Stability: Not on file       ROS:   Review of Systems   Constitutional: Negative.    Gastrointestinal: Negative.    Genitourinary: Negative.    Musculoskeletal:  Positive for back pain.        PHYSICAL EXAM:   /75   Pulse 67   Wt 206 lb (93.4 kg)   LMP 02/08/2010   BMI 35.36 kg/m²   Physical Exam  Constitutional:       General: She is not in acute distress.     Appearance: Normal appearance. She is not ill-appearing.   Pulmonary:      Effort: Pulmonary effort is normal.   Chest:   Breasts:     Right: No swelling, bleeding, inverted nipple, mass, nipple discharge, skin change or tenderness.      Left: Tenderness present. No swelling, bleeding, inverted nipple, mass, nipple discharge or skin change.          Comments: Area of tenderness. No lumps, rash or bruising noted.  Neurological:      Mental Status: She is alert and oriented to person, place, and time.   Psychiatric:         Behavior: Behavior normal.         Thought Content: Thought content normal.           ASSESSMENT:    Diagnoses and all orders for this visit:    Breast pain, left  -     Mammo Diagnostic LEFT; Future    Other orders  -     cyclobenzaprine 5 MG Oral Tab; Take 1 tablet (5 mg total) by mouth 3 (three) times daily as needed for Muscle spasms.      Normal exam. Discussed seems likely musculoskeletal. Rx Flexaril. Diagnostic mammogram to r/o any breast pathology. If normal recommend f/u with PCP.     Becky Rosenthal CNM  1/19/2024  12:35 PM

## 2024-01-21 RX ORDER — METHOCARBAMOL 750 MG/1
750 TABLET, FILM COATED ORAL NIGHTLY PRN
Qty: 90 TABLET | Refills: 0 | Status: SHIPPED | OUTPATIENT
Start: 2024-01-21

## 2024-01-26 RX ORDER — PEN NEEDLE, DIABETIC 32GX 5/32"
1 NEEDLE, DISPOSABLE MISCELLANEOUS DAILY
Qty: 100 EACH | Refills: 0 | Status: SHIPPED | OUTPATIENT
Start: 2024-01-26

## 2024-01-26 RX ORDER — GLIMEPIRIDE 1 MG/1
2 TABLET ORAL
Qty: 180 TABLET | Refills: 0 | Status: SHIPPED | OUTPATIENT
Start: 2024-01-26

## 2024-02-05 ENCOUNTER — PATIENT MESSAGE (OUTPATIENT)
Dept: FAMILY MEDICINE CLINIC | Facility: CLINIC | Age: 64
End: 2024-02-05

## 2024-02-06 ENCOUNTER — TELEPHONE (OUTPATIENT)
Dept: FAMILY MEDICINE CLINIC | Facility: CLINIC | Age: 64
End: 2024-02-06

## 2024-02-06 DIAGNOSIS — G47.33 OBSTRUCTIVE SLEEP APNEA: Primary | ICD-10-CM

## 2024-02-06 NOTE — TELEPHONE ENCOUNTER
CPAP Supplies Referral  (Newest Message First)  View All Conversations on this Encounter  Prachi Davis, RN routed conversation to You59 minutes ago (10:01 AM)     Ju SWEENEY Em Triage Support (supporting Carmelina Gonzalez MD)Yesterday (10:15 AM)       The insurance company is [employer] and all of the information should be in [employer].   Thanks again.        Ju SWEENEY Em Triage Support (supporting Carmelina Gonzalez MD)Yesterday (10:15 AM)       Happy Monday. I have a new health insurance provider for 2024 and I need a new referral put through to them for all of my CPAP supplies. Can you please do that sometime this week so I can get a new order in. I greatly appreciate the help. Have a great week ahead!

## 2024-02-08 NOTE — TELEPHONE ENCOUNTER
See 2/6/2024 telephone encounter.       February 6, 2024  Piper Egan, [employer]   to Ju Johnson   CF      2/6/24  5:08 PM  Dr. Carlos Estrada has approved your order for CPAP supplies which has been faxed to [facility].      Have a nice day,   Piper GAN CMA    Last read by Ju Johnson at  5:21 PM on 2/6/2024.

## 2024-02-17 ENCOUNTER — PATIENT MESSAGE (OUTPATIENT)
Dept: OBGYN CLINIC | Facility: CLINIC | Age: 64
End: 2024-02-17

## 2024-02-17 DIAGNOSIS — N64.4 BREAST PAIN, LEFT: Primary | ICD-10-CM

## 2024-03-19 ENCOUNTER — TELEPHONE (OUTPATIENT)
Dept: OBGYN CLINIC | Facility: CLINIC | Age: 64
End: 2024-03-19

## 2024-03-19 NOTE — TELEPHONE ENCOUNTER
Pt scheduled appt for 4/5 for genital warts removal. Warts are in an area where Pt wipes they get irritated. Can you give Pt medication to bring the irritation down. Val Campos (on file).

## 2024-03-20 ENCOUNTER — OFFICE VISIT (OUTPATIENT)
Dept: ENDOCRINOLOGY CLINIC | Facility: CLINIC | Age: 64
End: 2024-03-20
Payer: COMMERCIAL

## 2024-03-20 VITALS
WEIGHT: 210 LBS | DIASTOLIC BLOOD PRESSURE: 85 MMHG | SYSTOLIC BLOOD PRESSURE: 126 MMHG | BODY MASS INDEX: 36 KG/M2 | HEART RATE: 60 BPM

## 2024-03-20 DIAGNOSIS — E55.9 VITAMIN D DEFICIENCY: ICD-10-CM

## 2024-03-20 DIAGNOSIS — E55.9 VITAMIN D DEFICIENCY: Primary | ICD-10-CM

## 2024-03-20 DIAGNOSIS — E11.9 CONTROLLED TYPE 2 DIABETES MELLITUS WITHOUT COMPLICATION, WITHOUT LONG-TERM CURRENT USE OF INSULIN (HCC): ICD-10-CM

## 2024-03-20 LAB
CARTRIDGE LOT#: ABNORMAL NUMERIC
GLUCOSE BLOOD: 157
HEMOGLOBIN A1C: 7.5 % (ref 4.3–5.6)
TEST STRIP LOT #: NORMAL NUMERIC

## 2024-03-20 PROCEDURE — 82947 ASSAY GLUCOSE BLOOD QUANT: CPT | Performed by: INTERNAL MEDICINE

## 2024-03-20 PROCEDURE — 83036 HEMOGLOBIN GLYCOSYLATED A1C: CPT | Performed by: INTERNAL MEDICINE

## 2024-03-20 PROCEDURE — 99214 OFFICE O/P EST MOD 30 MIN: CPT | Performed by: INTERNAL MEDICINE

## 2024-03-20 RX ORDER — DULAGLUTIDE 1.5 MG/.5ML
1.5 INJECTION, SOLUTION SUBCUTANEOUS
Qty: 6 ML | Refills: 1 | Status: SHIPPED | OUTPATIENT
Start: 2024-03-20

## 2024-03-20 RX ORDER — ERGOCALCIFEROL 1.25 MG/1
50000 CAPSULE ORAL WEEKLY
Qty: 12 CAPSULE | Refills: 1 | Status: SHIPPED | OUTPATIENT
Start: 2024-03-20

## 2024-03-20 RX ORDER — INSULIN DEGLUDEC INJECTION 100 U/ML
50 INJECTION, SOLUTION SUBCUTANEOUS
Qty: 30 ML | Refills: 1 | Status: SHIPPED | OUTPATIENT
Start: 2024-03-20

## 2024-03-20 RX ORDER — GLIMEPIRIDE 1 MG/1
2 TABLET ORAL
Qty: 180 TABLET | Refills: 1 | Status: SHIPPED | OUTPATIENT
Start: 2024-03-20

## 2024-03-20 NOTE — TELEPHONE ENCOUNTER
She needs to be seen to see what is going on first. She can try Domeboro soaks to see if helps with the itching and irritation. That is over the counter. Thanks CHAPARRITA

## 2024-03-20 NOTE — TELEPHONE ENCOUNTER
Name and  verified    Patient states she will try recommendations. Offered [place] appt but wants [place] location.

## 2024-03-20 NOTE — TELEPHONE ENCOUNTER
Endocrine Refill protocol for Ergocalciferol       Protocol Criteria:  Appointment with Endocrinology completed in the last 6 months or scheduled in the next 3 months     Verify appointment has been completed or scheduled in the appropriate timeline. If so can send a 90 day supply with 1 refill.   Vitamin D level must have been completed within the last 6 months  Vitamin D level must be within the normal range        Last completed office visit: 9/6/23  Next scheduled Follow up: 3/20/24

## 2024-03-20 NOTE — PROGRESS NOTES
Name: Ju Johnson  Date: 3/20/2024    Referring Physician: No ref. provider found    HISTORY OF PRESENT ILLNESS   Ju Johnson is a 63 year old female who presents for diabetes mellitus.     Prior HbA, C or glycohemoglobin were 8.5% 2014; 7.9% 2014; 8.0% 2015; 8.9% 2016; 7.3% 2016; 7.3% 2017; 7.0% 2018; 6.8% 2019; 7.1% 2020; 6.7% 2020; 7.4% 2021; 7.0% 3/2022; 8.5% 2022; 7.6% 2022; 7.9% 2023; 7.5% POC Today     Dietary compliance: Good -->improved diet since last visit  Exercise: No  Polyuria/polydipsia: No  Blurred vision: No    Episodes of hypoglycemia: No    Blood Glucose:  Checking 1-2 times per day  Fastin,106,111    Medications for DM  Glimepiride 2mg PO daily  Trulicity 1.5mg SQ weekly  Tresiba 52 units SQ QHS     Intolerant of Semglee and Basaglar due to myalgias, weight gain - see MyChart encounter     Invokana (d/c'd due to yeast infections)  Intolerant Victoza  Metformin -->intolerant due to diarrhea  Bydureon 2mg SQ weekly -->intolerant      REVIEW OF SYSTEMS  Eyes: Diabetic retinopathy present: No            Most recent visit to eye doctor in last 12 months: Yes    CV: Cardiovascular disease present: No         Hypertension present: Yes         Hyperlipidemia present: Yes         Peripheral Vascular Disease present: No    : Nephropathy present: No    Neuro: Neuropathy present: No    Skin: Infection or ulceration: No    Osteoporosis: No    Thyroid disease: No      Medications:   Allergies:   Allergies   Allergen Reactions    Liraglutide SWELLING    Meloxicam PALPITATIONS    Penicillins SWELLING and ITCHING    Sulindac HIVES    Acyclovir DIZZINESS    Clarithromycin NAUSEA AND VOMITING    Methylprednisolone PAIN    Ciprofloxacin UNKNOWN    Cymbalta [Duloxetine Hcl] OTHER (SEE COMMENTS)     Tingling arms/legs, blurry vision, elevated glucose, HA     Gabapentin UNKNOWN    Metronidazole UNKNOWN    Naproxen Sodium OTHER (SEE COMMENTS)     Tingling to limbs    Nitrofurantoin  UNKNOWN    Nitrofurantoin Macrocrystal UNKNOWN    Doxycycline RASH    Rosuvastatin DIARRHEA       Social History:   Social History     Socioeconomic History    Marital status:    Tobacco Use    Smoking status: Former     Packs/day: 0.25     Years: 2.00     Additional pack years: 0.00     Total pack years: 0.50     Types: Cigarettes     Quit date: 2017     Years since quittin.1     Passive exposure: Never    Smokeless tobacco: Never    Tobacco comments:     1 pack/wk per pt.   Vaping Use    Vaping Use: Never used   Substance and Sexual Activity    Alcohol use: Not Currently     Comment: Occasionally    Drug use: No    Sexual activity: Yes     Partners: Male     Birth control/protection: Tubal Ligation   Other Topics Concern    Caffeine Concern Yes     Comment: 2 cups coffee daily    Exercise No       Medical History:   Past Medical History:   Diagnosis Date    Acute non-recurrent maxillary sinusitis 2019    Age-related nuclear cataract of both eyes 2015    Allergic rhinitis     pereniaal and seasonal    Alternating exotropia 2015    Amenorrhea 2007    Anxiety 2008    Atrial tachycardia, paroxysmal 04/15/2015    Barretts esophagus     Carpal tunnel syndrome     Chlamydia 1986    Choroidal nevus, right eye 2015    COPD (chronic obstructive pulmonary disease) ([facility])     Depression 2008    Diabetes ([facility]) 2009    Diabetes mellitus type 2 with complications ([facility]) 2015    Diabetic retinopathy of left eye ([facility]) 2015    Dry eyes 2015    DUB (dysfunctional uterine bleeding) 2005    endometrial biopsy    Eczema 2014    Esophageal reflux     Essential hypertension 2003    Exotropia     Fibromyalgia 2008    Genital warts     Heart murmur     Herpes simplex     High blood pressure     High cholesterol     History of pregnancy ,     Hyperlipidemia 2009    Irregular menstrual cycle 2008    Neg endometrial biopsy    Irritable bowel syndrome 2005    MGD (meibomian  gland dysfunction) 2015    Migraines     Obesity 1996    Ocular migraine 2015    BRENDA     Ovarian cyst     Laparoscopic cystectomy    Pregnancy (HCC) , ,     PVD (posterior vitreous detachment), right eye 2023    patient was seen by Dr. Harkins on 3/27/23 for flashes and floaters OD-DX PVD OD    Spinal stenosis 2010    C5-6, C6-7, physical therapy    TIA (transient ischemic attack)     Viral infection characterized by skin and mucous membrane lesions     Visual impairment 3/1/2023       Surgical history:   Past Surgical History:   Procedure Laterality Date    COLONOSCOPY      CYST REMOVAL      EGD N/A 2021    Procedure: ESOPHAGOGASTRODUODENOSCOPY, with bxs COLONOSCOPY with polypectomy;  Surgeon: Sy Slater MD;  [facility]    ELECTROCARDIOGRAM, COMPLETE  2013    scanned to media tab          OTHER SURGICAL HISTORY      Laparoscopic cystectomy    OTHER SURGICAL HISTORY      Endometrial biopsy    OTHER SURGICAL HISTORY      Endometrial biopsy    OTHER SURGICAL HISTORY  2017    cervical neck surgery     SPINE SURGERY PROCEDURE UNLISTED  2017    cervical     TUBAL LIGATION           PHYSICAL EXAM  /85   Pulse 60   Wt 210 lb (95.3 kg)   LMP 2010   BMI 36.05 kg/m²     General Appearance:  alert, well developed, in no acute distress  Eyes:  normal conjunctivae, sclera., normal sclera and normal pupils  Ears/Nose/Mouth/Throat/Neck:  no palpable thyroid nodules or cervical lymphadenopathy  Back: no kyphosis or back tenderness   Musculoskeletal:  normal muscle strength and tone  Skin:  normal moisture and skin texture  Neuro:  sensory grossly intact and motor grossly intact  Psychiatric:  oriented to time, self, and place  Nutritional:  no abnormal weight gain or loss  Bilateral barefoot skin diabetic exam is normal, visualized feet and the appearance is normal.  Bilateral  monofilament/sensation of both feet is normal.  Pulsation pedal pulse exam of both lower legs/feet is normal as well.    ASSESSMENT/PLAN:      1. Diabetes Mellitus Type 2, controlled  -controlled, HgA1c 7.5% -->improved   -Discussed importance of glycemic control to prevent complications of diabetes  -Discussed complications of diabetes include retinopathy, neuropathy, nephropathy and cardiovascular disease  -Discussed importance of SBGM  -Discussed importance of low CHO diet  -Continue Tresiba 52 units subcutaneous daily   -Continue Glimepiride 2mg PO daily   -Continue Trulicity 1.5mg subcutaneous weekly   -BP slightly elevated, repeat improved   -UTD with optho  -Normal foot exam performed today     2. Subclinical Hypothyroidsim  -Normal TFTs - normalized     RTC 6 months    3/20/2024  Prachi Montanez MD

## 2024-03-23 NOTE — TELEPHONE ENCOUNTER
LOV:  3/20/24     Next office visit: n/a RTC 6 months      Last filled: 1/26/24       Order pended and routed to provider

## 2024-03-25 RX ORDER — PEN NEEDLE, DIABETIC 32GX 5/32"
1 NEEDLE, DISPOSABLE MISCELLANEOUS DAILY
Qty: 100 EACH | Refills: 0 | Status: SHIPPED | OUTPATIENT
Start: 2024-03-25

## 2024-03-26 ENCOUNTER — TELEPHONE (OUTPATIENT)
Dept: ENDOCRINOLOGY CLINIC | Facility: CLINIC | Age: 64
End: 2024-03-26

## 2024-03-26 NOTE — TELEPHONE ENCOUNTER
[facility] states medicine needs PA TRULICITY 1.5 MG/0.5ML Subcutaneous Solution Pen-injector  please follow up

## 2024-03-26 NOTE — TELEPHONE ENCOUNTER
Medication PA Requested: TRULICITY 1.5 MG/0.5ML Subcutaneous Solution Pen                                                          CoverMyMeds Used:  Key:  Quantity: 6mL  Day Supply: 90  Sig: Inject 1.5 mg into the skin every 7 days.   DX Code:  E11.9

## 2024-03-27 NOTE — TELEPHONE ENCOUNTER
Medication PA Requested: TRULICITY 1.5 MG/0.5ML Subcutaneous Solution Pen                                                          CoverMyMeds Used: No  Key:  Quantity: 6mL  Day Supply: 90  Sig: Inject 1.5 mg into the skin every 7 days.   DX Code:  E11.9             EPA submitted, LOV 3/20 and A1C 3/20  Awaiting determination

## 2024-04-05 ENCOUNTER — OFFICE VISIT (OUTPATIENT)
Dept: OBGYN CLINIC | Facility: CLINIC | Age: 64
End: 2024-04-05

## 2024-04-05 VITALS
BODY MASS INDEX: 35.85 KG/M2 | HEART RATE: 63 BPM | WEIGHT: 210 LBS | HEIGHT: 64 IN | DIASTOLIC BLOOD PRESSURE: 60 MMHG | SYSTOLIC BLOOD PRESSURE: 114 MMHG

## 2024-04-05 DIAGNOSIS — A63.0 CONDYLOMA ACUMINATUM: Primary | ICD-10-CM

## 2024-04-05 DIAGNOSIS — L30.9 VULVAR DERMATITIS: ICD-10-CM

## 2024-04-05 PROCEDURE — 56501 DESTROY VULVA LESIONS SIM: CPT | Performed by: ADVANCED PRACTICE MIDWIFE

## 2024-04-05 RX ORDER — AMLODIPINE BESYLATE 5 MG/1
TABLET ORAL
COMMUNITY
Start: 2024-04-01

## 2024-04-09 NOTE — PROCEDURES
[facility]  Midwifery Focused Gynecology Problem Exam    Ju Johnson is a 63 year old female presenting for Gyn Exam (Genital wart removal,)  .    HPI:     Chief Complaint   Patient presents with    Gyn Exam     Genital wart removal,       Patient presents for removal of genital warts. Has had genital warts removed x 2 in the past. Can't recall what they did, but done once and were gone. She is caring for her  who is terminally ill so unable to make frequent trips to office. Warts are causing her irritation. She has been putting vaseline on them which helps some.     PHQ-2 SCORE: 0  , done 1/19/2024   Last [place] Suicide Screening on 4/5/2024 was No Risk.       Depression Screening (PHQ-2/PHQ-9): Over the LAST 2 WEEKS   Little interest or pleasure in doing things (over the last two weeks)?: Not at all    Feeling down, depressed, or hopeless (over the last two weeks)?: Not at all    PHQ-2 SCORE: 0           Medications (Active prior to today's visit):  Current Outpatient Medications   Medication Sig Dispense Refill    amLODIPine 5 MG Oral Tab       Insulin Pen Needle (BD PEN NEEDLE ESTEVAN U/F) 32G X 4 MM Does not apply Misc use 1 pen needle daily 100 each 0    diazePAM 5 MG Oral Tab Take 1 tablet (5 mg total) by mouth every 12 (twelve) hours as needed. 30 tablet 0    ergocalciferol 1.25 MG (72713 UT) Oral Cap Take 1 capsule (50,000 Units total) by mouth once a week. 12 capsule 1    glimepiride 1 MG Oral Tab Take 2 tablets (2 mg total) by mouth daily with breakfast. 180 tablet 1    insulin degludec (TRESIBA FLEXTOUCH) 100 UNIT/ML Subcutaneous Solution Pen-injector Inject 50 Units into the skin Daily@1600. 30 mL 1    TRULICITY 1.5 MG/0.5ML Subcutaneous Solution Pen-injector Inject 1.5 mg into the skin every 7 days. 6 mL 1    methocarbamol 750 MG Oral Tab Take 1 tablet (750 mg total) by mouth nightly as needed. 90 tablet 0    cyclobenzaprine 5 MG Oral Tab Take 1 tablet (5 mg total) by mouth 3 (three) times  daily as needed for Muscle spasms. 30 tablet 0    nystatin 327549 UNIT/ML Mouth/Throat Suspension Take 5 mL (500,000 Units total) by mouth 4 (four) times daily. 473 mL 0    albuterol 108 (90 Base) MCG/ACT Inhalation Aero Soln Inhale 2 puffs into the lungs every 4 (four) hours as needed for Wheezing. 25.5 g 3    CONTOUR NEXT TEST In Vitro Strip TEST SUGAR  2 TIMES DAILY 200 strip 0    fluticasone propionate 50 MCG/ACT Nasal Suspension use 2 sprays by Each nostrils route daily. 48 g 1    valACYclovir 500 MG Oral Tab Take 1 tablet (500 mg total) by mouth daily. 90 tablet 4    diclofenac 1 % External Gel Apply 4 g topically 4 (four) times daily. 350 g 3    meclizine 25 MG Oral Tab Take 1 tablet (25 mg total) by mouth 3 (three) times daily as needed for Dizziness or Nausea. 30 tablet 5    Lansoprazole 15 MG Oral Capsule Delayed Release Take 1 capsule (15 mg total) by mouth in the morning and 1 capsule (15 mg total) before bedtime. 180 capsule 3    Budesonide-Formoterol Fumarate (SYMBICORT) 160-4.5 MCG/ACT Inhalation Aerosol Inhale 2 puffs into the lungs 2 (two) times daily. 30.6 g 3    montelukast 10 MG Oral Tab Take 1 tablet (10 mg total) by mouth daily. 90 tablet 3    chlorhexidine gluconate 0.12 % Mouth/Throat Solution       atorvastatin 40 MG Oral Tab Take 1 tablet (40 mg total) by mouth daily. 90 tablet 3    losartan 50 MG Oral Tab Take 1 tablet (50 mg total) by mouth in the morning and 1 tablet (50 mg total) before bedtime. 180 tablet 4    Diclofenac Sodium 1 % Transdermal Gel Apply 2 g topically 4 (four) times daily. 50 g 1    Propranolol HCl 80 MG Oral Tab Take 1 tablet (80 mg total) by mouth 2 (two) times daily. 180 tablet 4    acyclovir 5 % External Ointment Apply 1 Application topically every 3 (three) hours. 30 g 2    PATIENT SUPPLIED MEDICATION Vitamin B12, magnesium, zinc, vitamin c, & garlic      magnesium oxide 400 MG Oral Tab Take 1 tablet (400 mg total) by mouth daily.      Blood Glucose Monitoring  Suppl (CONTOUR NEXT MONITOR) w/Device Does not apply Kit 1 each daily. 1 kit 0    triamcinolone acetonide 0.1 % External Cream Apply topically 2 (two) times daily as needed. 30 g 3    Ammonium Lactate (LAC-HYDRIN) 12 % External Cream Apply to affected areas 1-2x/day 1 Tube 3    Clobetasol Propionate 0.05 % External Ointment Apply 1 Application topically daily as needed. Apply to affected areas 1x/day as needed 30 g 2    aspirin 81 MG Oral Tab Take 1 tablet (81 mg total) by mouth daily.      acetaminophen 500 MG Oral Tab Take 1 tablet (500 mg total) by mouth every 6 (six) hours as needed for Pain.      Ciclopirox 8 % External Solution Apply 1 Application topically nightly. 1 Bottle 2    ketoconazole 2 % External Cream MELVNI EXT AA BID  0    cetirizine 10 MG Oral Tab Take 1 tablet (10 mg total) by mouth daily.      clotrimazole-betamethasone 1-0.05 % External Cream Apply 1 Application topically 2 (two) times daily for 14 days. 15 g 0    ubrogepant (UBRELVY) 100 MG Oral Tab Take 1 tablet BY MOUTH at onset of migraine. May take additional tablet in 2 hours if needed.  Do not exceed 2 tablets per 24 hour period. 10 tablet 0     Allergies:  Allergies   Allergen Reactions    Liraglutide SWELLING    Meloxicam PALPITATIONS    Penicillins SWELLING and ITCHING    Sulindac HIVES    Acyclovir DIZZINESS    Clarithromycin NAUSEA AND VOMITING    Methylprednisolone PAIN    Ciprofloxacin UNKNOWN    Cymbalta [Duloxetine Hcl] OTHER (SEE COMMENTS)     Tingling arms/legs, blurry vision, elevated glucose, HA     Gabapentin UNKNOWN    Metronidazole UNKNOWN    Naproxen Sodium OTHER (SEE COMMENTS)     Tingling to limbs    Nitrofurantoin UNKNOWN    Nitrofurantoin Macrocrystal UNKNOWN    Doxycycline RASH    Rosuvastatin DIARRHEA     HISTORY:   Menstrual History:  OB History    Para Term  AB Living   3 1 1 0 2 0   SAB IAB Ectopic Multiple Live Births   2 0 0 0 0      Patient's last menstrual period was 2010.    Menarche: 10  y/o  Period Cycle (Days): Postmenopausal  Use of Birth Control (if yes, specify type): Postmenopausal  Hx Prior Abnormal Pap: No  Pap Date: 02/21/23  Pap Result Notes: PAP NEG / HPV NEG  Follow Up Recommendation: MAMMO 7/30/19 Benign. LAST ANNUAL 9/17/18 CAP    Past Medical History:   Diagnosis Date    Acute non-recurrent maxillary sinusitis 01/21/2019    Age-related nuclear cataract of both eyes 01/27/2015    Allergic rhinitis     pereniaal and seasonal    Alternating exotropia 01/27/2015    Amenorrhea 08/2007    Anxiety 2008    Atrial tachycardia, paroxysmal ([facility]) 04/15/2015    Barretts esophagus     Carpal tunnel syndrome     Chlamydia 1986    Choroidal nevus, right eye 01/27/2015    COPD (chronic obstructive pulmonary disease) ([facility])     Depression 2008    Diabetes ([facility]) 2009    Diabetes mellitus type 2 with complications ([facility]) 01/27/2015    Diabetic retinopathy of left eye ([facility]) 01/27/2015    Dry eyes 07/28/2015    DUB (dysfunctional uterine bleeding) 2005    endometrial biopsy    Eczema 2014    Esophageal reflux     Essential hypertension 2003    Exotropia     Fibromyalgia 2008    Genital warts     Heart murmur     Herpes simplex     High blood pressure     High cholesterol     History of pregnancy 1990, 1991    Hyperlipidemia 2009    Irregular menstrual cycle 2008    Neg endometrial biopsy    Irritable bowel syndrome 2005    MGD (meibomian gland dysfunction) 07/28/2015    Migraines 2013    Obesity 1996    Ocular migraine 07/28/2015    BRENDA     Ovarian cyst 1980    Laparoscopic cystectomy    Pregnancy ([facility]) 1990, 1991, 1993    PVD (posterior vitreous detachment), right eye 03/27/2023    patient was seen by Dr. Harkins on 3/27/23 for flashes and floaters OD-DX PVD OD    Spinal stenosis 2010    C5-6, C6-7, physical therapy    TIA (transient ischemic attack)     Viral infection characterized by skin and mucous membrane lesions     Visual impairment 3/1/2023     Past Surgical History:   Procedure Laterality Date     COLONOSCOPY  2020    CYST REMOVAL  1979    EGD N/A 2021    Procedure: ESOPHAGOGASTRODUODENOSCOPY, with bxs COLONOSCOPY with polypectomy;  Surgeon: Sy Slater MD;  Location: [facility], [facility]    ELECTROCARDIOGRAM, COMPLETE  2013    scanned to media tab          OTHER SURGICAL HISTORY      Laparoscopic cystectomy    OTHER SURGICAL HISTORY      Endometrial biopsy    OTHER SURGICAL HISTORY      Endometrial biopsy    OTHER SURGICAL HISTORY  2017    cervical neck surgery     SPINE SURGERY PROCEDURE UNLISTED  2017    cervical     TUBAL LIGATION       Family History   Problem Relation Age of Onset    Colon Cancer Mother     Heart Disease Mother         coronary artery disease    Hypertension Mother     Depression Mother     Heart Attack Mother     Heart Disorder Mother     Heart Disease Father         coronary artery disease    Diabetes Father     Heart Attack Father     Heart Disorder Father     Glaucoma Sister     Diabetes Sister     Dementia Sister     Other (Other) Sister         Vascular necrosis    Ovarian Cancer Maternal Aunt     Seizure Disorder Sister      Social History     Socioeconomic History    Marital status:      Spouse name: Not on file    Number of children: Not on file    Years of education: Not on file    Highest education level: Not on file   Occupational History    Not on file   Tobacco Use    Smoking status: Former     Packs/day: 0.25     Years: 2.00     Additional pack years: 0.00     Total pack years: 0.50     Types: Cigarettes     Quit date: 2017     Years since quittin.2     Passive exposure: Never    Smokeless tobacco: Never    Tobacco comments:     1 pack/wk per pt.   Vaping Use    Vaping Use: Never used   Substance and Sexual Activity    Alcohol use: Not Currently     Comment: Occasionally    Drug use: No    Sexual activity: Yes     Partners: Male     Birth control/protection: Tubal Ligation   Other Topics Concern      Service Not Asked    Blood Transfusions Not Asked    Caffeine Concern Yes     Comment: 2 cups coffee daily    Occupational Exposure Not Asked    Hobby Hazards Not Asked    Sleep Concern Not Asked    Stress Concern Not Asked    Weight Concern Not Asked    Special Diet Not Asked    Back Care Not Asked    Exercise No    Bike Helmet Not Asked    Seat Belt Not Asked    Self-Exams Not Asked   Social History Narrative    The patient does not use an assistive device..      The patient does live in a home with stairs.        Lives with         Work      Social Determinants of Health     Financial Resource Strain: Not on file   Food Insecurity: Not on file   Transportation Needs: Not on file   Physical Activity: Not on file   Stress: Not on file   Social Connections: Not on file   Housing Stability: Not on file       ROS:   Review of Systems   Constitutional: Negative.    Genitourinary:  Positive for vaginal pain. Negative for difficulty urinating, dysuria, menstrual problem, pelvic pain, vaginal bleeding and vaginal discharge.        PHYSICAL EXAM:   /60 (BP Location: Right arm, Patient Position: Sitting, Cuff Size: large)   Pulse 63   Ht 5' 4\" (1.626 m)   Wt 210 lb (95.3 kg)   LMP 02/08/2010   BMI 36.05 kg/m²   Physical Exam  Constitutional:       General: She is not in acute distress.     Appearance: Normal appearance. She is not ill-appearing.   Pulmonary:      Effort: Pulmonary effort is normal.   Genitourinary:      Neurological:      Mental Status: She is alert and oriented to person, place, and time.   Psychiatric:         Mood and Affect: Mood normal.         Behavior: Behavior normal.         Thought Content: Thought content normal.        Area surrounding condyloma prepped with vaseline ointment-triple antibiotic ointment that was available in office. TCA applied to condyloma with q-tip. Laury Ketn MA assisted with holding labia open while applying. Kept open until  dried, then covered with additional ointment.      ASSESSMENT:    Diagnoses and all orders for this visit:    Condyloma acuminatum  -     DESTRUC,VULVA LESION,SIMPLE    Vulvar dermatitis      Discussed options with pt for removal of warts including methods that can be done in office- histofreeze or TCA, or self applied tx sucha as aldara or podofilox. Discussed that any tx may take multiple tx to resolve and that warts may return. She understands that no tx is also an option if it is not bothering her, however it is causing irritation so would like tx. After discussion desires TCA. See above for tx. Advised can either RTC 1 wk for another application if symptoms persist or she can message me and I can send in a topical tx she can do at home. Agrees with plan.     Rx steroid topical ointment to pharmacy to apply to erythematous patch. To RTC if does not resolve. Agrees with plan.   Becky Rosenthal CNM  4/9/2024  1:39 PM

## 2024-04-10 ENCOUNTER — HOSPITAL ENCOUNTER (OUTPATIENT)
Dept: ULTRASOUND IMAGING | Facility: HOSPITAL | Age: 64
Discharge: HOME | End: 2024-04-10
Attending: ADVANCED PRACTICE MIDWIFE
Payer: COMMERCIAL

## 2024-04-10 ENCOUNTER — HOSPITAL ENCOUNTER (OUTPATIENT)
Dept: MAMMOGRAPHY | Facility: HOSPITAL | Age: 64
Discharge: HOME | End: 2024-04-10
Attending: ADVANCED PRACTICE MIDWIFE
Payer: COMMERCIAL

## 2024-04-10 DIAGNOSIS — N64.4 BREAST PAIN, LEFT: ICD-10-CM

## 2024-04-10 PROCEDURE — 76642 ULTRASOUND BREAST LIMITED: CPT | Performed by: ADVANCED PRACTICE MIDWIFE

## 2024-04-10 PROCEDURE — 77066 DX MAMMO INCL CAD BI: CPT | Performed by: ADVANCED PRACTICE MIDWIFE

## 2024-04-10 PROCEDURE — 77062 BREAST TOMOSYNTHESIS BI: CPT | Performed by: ADVANCED PRACTICE MIDWIFE

## 2024-04-29 DIAGNOSIS — J44.1 CHRONIC OBSTRUCTIVE PULMONARY DISEASE WITH ACUTE EXACERBATION (HCC): Chronic | ICD-10-CM

## 2024-04-29 RX ORDER — MONTELUKAST SODIUM 10 MG/1
10 TABLET ORAL DAILY
Qty: 90 TABLET | Refills: 3 | Status: SHIPPED | OUTPATIENT
Start: 2024-04-29

## 2024-04-29 NOTE — TELEPHONE ENCOUNTER
Please review; protocol failed/No Protocol    Requested Prescriptions   Pending Prescriptions Disp Refills    MONTELUKAST 10 MG Oral Tab [Pharmacy Med Name: Montelukast Sodium 10 Mg Tab Torr] 90 tablet 0     Sig: TAKE ONE TABLET BY MOUTH ONE TIME DAILY       Asthma & COPD Medication Protocol Failed - 4/26/2024 12:06 PM        Failed - Asthma Action Score greater than or equal to 20        Failed - AAP/ACT given in last 12 months     No data recorded  No data recorded  No data recorded  No data recorded          Passed - Appointment in past 6 or next 3 months      Recent Outpatient Visits              3 weeks ago [facility]umin[facility]    Office Visit    1 month ago Vitamin D deficiency    [facility] Prachi Montanez MD    Office Visit    [facility] months [facility]    Office Visit    3 months ago Candida infection of mouth    [facility], Sienna Reis MD    Telemedicine    4 months ago Oral thrush    [facility] [place] Danyel Leger APRN    Office Visit                           Recent Outpatient Visits              3 weeks ago Condyloma acuminatum    [facility] Becky Coats CNM    Office Visit    1 month ago Vitamin D deficiency    [facility] Prachi Singh MD    Office Visit    3 months ago Breast pain, [facility] [facility]    Office Visit    3 months ago Candida infection of mouth    [facility], Sienna Reis MD    Telemedicine    4 months ago Oral thrush    [facility]  [place], Danyel Lua, APRN    Office Visit

## 2024-04-30 NOTE — TELEPHONE ENCOUNTER
Please review. Protocol Failed; No Protocol    Requested Prescriptions   Pending Prescriptions Disp Refills    BUDESONIDE-FORMOTEROL FUMARATE 160-4.5 MCG/ACT Inhalation Aerosol [Pharmacy Med Name: Budesonide/Formoterol Fumarate Dihydrate 160-4.5mcg Aer Pras] 30.6 g 0     Sig: Inhale 2 puffs into the lungs 2 (two) times daily.       Asthma & COPD Medication Protocol Failed - 4/29/2024 12:37 PM        Failed - Asthma Action Score greater than or equal to 20        Failed - AAP/ACT given in last 12 months     No data recorded  No data recorded  No data recorded  No data recorded          Passed - Appointment in past 6 or next 3 months      Recent Outpatient Visits              3 weeks ago Condyloma acuminatum    [facility] Becky Coats CNM    Office Visit    1 month ago Vitamin D deficiency    [facility] Prachi Montanez MD    Office Visit    3 months ago [facility]    [facility]    3 months [facility]    [facility]    4 months ago Oral thrush    [facility] [place] Ramirez APRN    Office Visit                               Recent Outpatient Visits              3 weeks ago Condyloma acuminatum    [facility] Becky Coats CNM    Office Visit    1 month ago Vitamin D deficiency    [facility] Prachi Singh MD    Office Visit    3 months ago [facility], [facility]    Office Visit    3 months ago Candida infection of [facility]     Tele[facility]icine    4 months ago Oral thrush    [facility] [place] Danyel Leger, ANGELINA    Office Visit

## 2024-05-01 RX ORDER — BUDESONIDE AND FORMOTEROL FUMARATE DIHYDRATE 160; 4.5 UG/1; UG/1
2 AEROSOL RESPIRATORY (INHALATION) 2 TIMES DAILY
Qty: 30.6 G | Refills: 2 | Status: SHIPPED | OUTPATIENT
Start: 2024-05-01

## 2024-05-15 ENCOUNTER — TELEPHONE (OUTPATIENT)
Dept: PHYSICAL MEDICINE AND REHAB | Facility: CLINIC | Age: 64
End: 2024-05-15

## 2024-05-15 DIAGNOSIS — M54.16 LUMBAR RADICULOPATHY: Primary | ICD-10-CM

## 2024-05-15 NOTE — TELEPHONE ENCOUNTER
Spoke with patient who stated since 8/2023 she has been helping take care of her  who was diagnosed with brain cancer. Patient has been the soal care giver and she has been having SI pain flare up.     Patient stated she would like to have a repeat of the last injection done. States all the pain is the same, but the intensity is worse.     Patient asking for repeat injection and if so next week would be great as she has help with her spouse.     Patient stated injections have to be with dexamethasone.     Last injection: 8/4/23- Bilateral L5 transforaminal epidural steroid injections.

## 2024-05-15 NOTE — TELEPHONE ENCOUNTER
Spoke with  who agreed to repeat injection and would like patient to schedule a follow up appointment afterwards.     Repeat injection order for: Bilateral L5 transforaminal epidural steroid injections placed.       LM informing patient of the above plan.

## 2024-05-21 ENCOUNTER — TELEPHONE (OUTPATIENT)
Dept: PHYSICAL MEDICINE AND REHAB | Facility: CLINIC | Age: 64
End: 2024-05-21

## 2024-05-21 DIAGNOSIS — M54.16 LUMBAR RADICULOPATHY: Primary | ICD-10-CM

## 2024-05-21 NOTE — TELEPHONE ENCOUNTER
Initiated authorization for Bilateral L5 transforaminal epidural steroid injections CPT 46731-36 dx:M54.16 to be done at [facility] with Dottie  Case #[id number]  Status: Approved w/ authorization #[id number] valid 5/21/24-11/17/24

## 2024-05-21 NOTE — TELEPHONE ENCOUNTER
Facility changed to [facility] for Bilateral L5 transforaminal epidural steroid injections  Authorization #[id number] and valid dates 5/21/24-11/17/24 remain the same

## 2024-05-21 NOTE — TELEPHONE ENCOUNTER
Patient has been scheduled for Bilateral L5 transforaminal epidural steroid injection on 5/28/24 at the [facility] with Dr. Villegas (FYI patient was scheduled for 5/24/24 appointment was cancelled).   Anesthesia type:  Local  Please note: The [facility] will call the business day prior to discuss the exact time/arrival and additional instructions for your appointment.  Patient was advised that if he/she does receive the covid vaccine it needs to be at least 2 weeks before or after the injection.  Medications and allergies reviewed.  Educated to hold NSAIDS (Aleve, Ibuprofen, Motrin, Advil) and anti-inflammatories (Meloxicam, Naproxen, Diclofenac, Celebrex) and for cervical injections must hold Multi-Vitamins, Vitamin E, Fish Oil/Omega-3.  If patient is receiving MAC/IVCS, weight loss oral/injectable medications will need to be held for 7 days prior to injection.  Patient informed to fast 8 hours prior to procedure and 10-12 hours prior to procedure with IVCS/MAC if patient is on a weight loss medication.   If on blood thinner, clearance has been received and approved to hold this medication by provider.   Patient informed of [facility]'s  policy:  he/she will need a  to and from procedure and must be on site for their entirety of their visit, if their ride is unable to the procedure will be cancelled.   [facility] is located in the [facility] 1st floor [address] [place] [id number].   may park in the yellow/purple parking lot.  Patient verbalized understanding and agrees with plan.  Scheduled in Epic:   Scheduled in Surgical Case: Yes  Follow up appointment made: NOV: Visit date not found

## 2024-06-04 NOTE — PAT NURSING NOTE
Instructions given: May have a light meal before coming to hospital, take medications on regular schedule, may have care partner if desired, directions to [facility]/parking, and location of surgery check in.

## 2024-06-05 NOTE — DISCHARGE INSTRUCTIONS
POST PROCEDURE CARE AND INFECTION PREVENTION:    1.   Your dressing should be removed within 24 hours.  If it falls off before that time, you need not reapply.    2.   You may shower tomorrow.    3.   If necessary, you may apply ice to the injection site for 20 minute intervals to help alleviate any localized discomfort.    4.  The [facility] office number is [phone].  Call and report the following conditions to the [facility]:   -Any excessive bleeding, swelling, or pain at the injection site.   -Any temperature greater than 101 degrees.   -Any excessive itch or rash.   -Any change in your bowel or bladder habits.   -Any increased numbness, tingling, or weakness to the extremities.   -Any persistent, severe headache (especially a headache aggravated by being in   An upright position.  The office is open between the hours of 7:30 am - 2:00pm.  After hours you may leave a message and the nurse will return your call during normal office hours.    5.  Please call the [facility] in one week to schedule an appointment for your follow up visit unless instructed differently by your doctor.  If you need to cancel or reschedule any appointment, please call as soon as possible.    6.  You may return to school or work per your doctor's instructions.    7.  Wash your hands before and after touching your dressing.  Be sure to rub your hands together with warm water and soap for 20 seconds or longer when washing.        MEDICATION:    1.  You may resume all your usual medications unless instructed otherwise by your doctor.    2.  To alleviate pain, you may take your over the counter or prescription pain medications as per the label instructions.    3.  Se Medication Reconciliation form for any new prescriptions.    4.  Do not drive, operate heavy machinery, or drink alcoholic beverages while taking narcotic pain medication.  Narcotic pain medication may cause constipation.   If no bowel movement in 48 hours, please contact your physician.        IN CASE OF EMERGENCY:  If you are unable to reach your doctor, call or go to the nearest emergency room.  The [facility] Emergency Department's phone number is [phone].

## 2024-06-07 ENCOUNTER — APPOINTMENT (OUTPATIENT)
Dept: GENERAL RADIOLOGY | Facility: HOSPITAL | Age: 64
End: 2024-06-07
Attending: PHYSICAL MEDICINE & REHABILITATION
Payer: COMMERCIAL

## 2024-06-07 ENCOUNTER — HOSPITAL ENCOUNTER (OUTPATIENT)
Facility: HOSPITAL | Age: 64
Setting detail: HOSPITAL OUTPATIENT SURGERY
Discharge: HOME | End: 2024-06-07
Attending: PHYSICAL MEDICINE & REHABILITATION | Admitting: PHYSICAL MEDICINE & REHABILITATION
Payer: COMMERCIAL

## 2024-06-07 VITALS
BODY MASS INDEX: 35.85 KG/M2 | SYSTOLIC BLOOD PRESSURE: 130 MMHG | OXYGEN SATURATION: 97 % | TEMPERATURE: 98 F | DIASTOLIC BLOOD PRESSURE: 57 MMHG | HEIGHT: 64 IN | HEART RATE: 62 BPM | WEIGHT: 210 LBS | RESPIRATION RATE: 16 BRPM

## 2024-06-07 PROCEDURE — 3E0R3BZ INTRODUCTION OF ANESTHETIC AGENT INTO SPINAL CANAL, PERCUTANEOUS APPROACH: ICD-10-PCS | Performed by: PHYSICAL MEDICINE & REHABILITATION

## 2024-06-07 PROCEDURE — 64483 NJX AA&/STRD TFRM EPI L/S 1: CPT | Performed by: PHYSICAL MEDICINE & REHABILITATION

## 2024-06-07 PROCEDURE — 3E0R33Z INTRODUCTION OF ANTI-INFLAMMATORY INTO SPINAL CANAL, PERCUTANEOUS APPROACH: ICD-10-PCS | Performed by: PHYSICAL MEDICINE & REHABILITATION

## 2024-06-07 RX ORDER — LIDOCAINE HYDROCHLORIDE 20 MG/ML
INJECTION, SOLUTION EPIDURAL; INFILTRATION; INTRACAUDAL; PERINEURAL AS NEEDED
Status: DISCONTINUED | OUTPATIENT
Start: 2024-06-07 | End: 2024-06-07 | Stop reason: HOSPADM

## 2024-06-07 RX ORDER — DEXAMETHASONE SODIUM PHOSPHATE 10 MG/ML
INJECTION, SOLUTION INTRAMUSCULAR; INTRAVENOUS AS NEEDED
Status: DISCONTINUED | OUTPATIENT
Start: 2024-06-07 | End: 2024-06-07 | Stop reason: HOSPADM

## 2024-06-07 RX ORDER — IOPAMIDOL 408 MG/ML
INJECTION, SOLUTION INTRATHECAL AS NEEDED
Status: DISCONTINUED | OUTPATIENT
Start: 2024-06-07 | End: 2024-06-07 | Stop reason: HOSPADM

## 2024-06-07 NOTE — OPERATIVE REPORT
[facility]    BILATERAL LUMBAR TRANSFORAMINAL   NAME:  Ju Johnson    MR #:    [id number] :  10/15/1960     PHYSICIAN:  Guero Villegas MD        Operative Report    DATE OF PROCEDURE: 2024   PREOPERATIVE DIAGNOSES: Problem   left > right L5-S1 radiculopathy   L5-S1 mild diffuse, L4-5 mild-mod diffuse, L3-4 mild-mod diffuse, L2-3 mild diffuse bulging discs      POSTOPERATIVE DIAGNOSES:   same   PROCEDURES: Bilateral L5 transforaminal epidural steroid injections done under fluoroscopic guidance with contrast enhancement.   SURGEON: Guero Villegas MD   ANESTHESIA: Local   INDICATIONS:      OPERATIVE PROCEDURE:  Written consent was obtained from the patient.  The patient was brought into the operating room and placed in the prone position on the fluoroscopy table with pillow underneath her abdomen.  The patient's skin was cleaned and draped in a normal sterile fashion.  Using AP fluoroscopy, all five lumbar vertebrae were identified.  When the fifth vertebra was identified, fluoroscopy was left anterior obliqued opening up the right L5-S1 intervertebral foramen.  At this point in time, the patient's skin was anesthetized with 1% PF lidocaine without epinephrine.  Then, a 5 inch, 22-gauge spinal needle was inserted and directed towards the right L5-S1 intervertebral foramen.  When it felt to be in good position, AP fluoroscopy was used to advance the needle to the 6 o'clock position on the right L5 pedicle.  At this point in time, Omnipaque-240 contrast was used to obtain a good epidurogram indicating correct needle placement.  Then, aspiration was performed.  No blood, fluid, or air was aspirated.  Then, the patient was injected with a 2 cc solution of 1 cc of 10 mg/cc of PF Dexamethasone and 1 cc of 1% PF lidocaine without epinephrine.  After this, the needle was removed.  Then  fluoroscopy was right anterior obliqued opening up the left L5-S1 intervertebral foramen.  At this point in  time, the patient's skin was anesthetized with 1 to 2 cc of 1% PF lidocaine without epinephrine.  Then, a 5 inch, 22-gauge spinal needle was inserted and directed towards the left L5-S1 intervertebral foramen.  When it felt to be in good position, AP fluoroscopy was used to advance the needle to the 6 o'clock position on the left L5 pedicle.  At this point in time, Omnipaque-240 contrast was used to obtain a good epidurogram indicating correct needle placement.  Then, aspiration was performed.  No blood, fluid, or air was aspirated.  Then, the patient was injected with a 2 cc solution of 1 cc of 10 mg/cc of PF Dexamethasone and 1 cc of 1% PF lidocaine without epinephrine.  After this, the needle was removed.  The patient's skin was cleaned.  Band-Aids were applied.  The patient was transferred to the cart and into [facility].  The patient was given discharge instructions and will follow up in the clinic as scheduled.  Throughout the whole procedure, the patient's pulse oximetry and vital signs were monitored and they remained completely stable.  Also, throughout the whole procedure, prior to injection of any medication, aspiration was performed.  No blood, fluid, or air was aspirated at anytime.

## 2024-06-07 NOTE — H&P
[facility]  part of [facility]    History & Physical    Ju Johnson Patient Status:  Hospital Outpatient Surgery    10/15/1960 MRN [id number]   Location [facility] PRE OP RECOVERY Attending Guero Villegas MD   Hosp Day # 0 PCP Carmelina Gonzalez MD     Date of Admission:  2024    History of Present Illness:  Ju Johnson is a(n) 63 year old female. She has bilateral leg and low back pain.    History:  Past Medical History:    Acute non-recurrent maxillary sinusitis    Age-related nuclear cataract of both eyes    Allergic rhinitis    pereniaal and seasonal    Alternating exotropia    Amenorrhea    Anxiety    Atrial tachycardia, paroxysmal (HCC)    Barretts esophagus    Carpal tunnel syndrome    Chlamydia    Choroidal nevus, right eye    COPD (chronic obstructive pulmonary disease) ([facility])    Depression    Diabetes ([facility])    Diabetes mellitus type 2 with complications ([facility])    Diabetic retinopathy of left eye ([facility])    Dry eyes    DUB (dysfunctional uterine bleeding)    endometrial biopsy    Eczema    Esophageal reflux    Essential hypertension    Exotropia    Fibromyalgia    Genital warts    Heart murmur    Herpes simplex    High blood pressure    High cholesterol    History of pregnancy    Hyperlipidemia    Irregular menstrual cycle    Neg endometrial biopsy    Irritable bowel syndrome    MGD (meibomian gland dysfunction)    Migraines    Obesity    Ocular migraine    BRENDA    Ovarian cyst    Laparoscopic cystectomy    Pregnancy (HCC)    PVD (posterior vitreous detachment), right eye    patient was seen by Dr. Harkins on 3/27/23 for flashes and floaters OD-DX PVD OD    Spinal stenosis    C5-6, C6-7, physical therapy    TIA (transient ischemic attack)    Viral infection characterized by skin and mucous membrane lesions    Visual impairment     Past Surgical History:   Procedure Laterality Date    Colonoscopy      Cyst removal      Egd N/A 2021    Procedure:  ESOPHAGOGASTRODUODENOSCOPY, with bxs COLONOSCOPY with polypectomy;  Surgeon: Sy Slater MD;  Location: [facility]    Electrocardiogram, complete  2013    scanned to media tab          Other surgical history      Laparoscopic cystectomy    Other surgical history      Endometrial biopsy    Other surgical history      Endometrial biopsy    Other surgical history  2017    cervical neck surgery     Spine surgery procedure unlisted  2017    cervical     Tubal ligation       Family History   Problem Relation Age of Onset    Colon Cancer Mother     Heart Disease Mother         coronary artery disease    Hypertension Mother     Depression Mother     Heart Attack Mother     Heart Disorder Mother     Heart Disease Father         coronary artery disease    Diabetes Father     Heart Attack Father     Heart Disorder Father     Glaucoma Sister     Diabetes Sister     Dementia Sister     Other (Other) Sister         Vascular necrosis    Ovarian Cancer Maternal Aunt     Seizure Disorder Sister       reports that she quit smoking about 7 years ago. Her smoking use included cigarettes. She started smoking about 9 years ago. She has a 0.5 pack-year smoking history. She has never been exposed to tobacco smoke. She has never used smokeless tobacco. She reports that she does not currently use alcohol. She reports that she does not use drugs.    Allergies:  Allergies   Allergen Reactions    Liraglutide SWELLING    Meloxicam PALPITATIONS    Penicillins ITCHING and SWELLING     No skin peeling or organ damage    Sulindac HIVES    Acyclovir DIZZINESS    Clarithromycin NAUSEA AND VOMITING    Methylprednisolone PAIN    Ciprofloxacin UNKNOWN    Cymbalta [Duloxetine Hcl] OTHER (SEE COMMENTS)     Tingling arms/legs, blurry vision, elevated glucose, HA     Gabapentin UNKNOWN    Metronidazole UNKNOWN    Naproxen Sodium OTHER (SEE COMMENTS)     Tingling to limbs    Nitrofurantoin UNKNOWN     Nitrofurantoin Macrocrystal UNKNOWN    Doxycycline RASH    Rosuvastatin DIARRHEA       Home Medications:  Medications Prior to Admission   Medication Sig Dispense Refill Last Dose    Imiquimod 5 % External Cream Apply 1 Application topically 3 (three) times a week. 24 each 1 Past Month    Budesonide-Formoterol Fumarate 160-4.5 MCG/ACT Inhalation Aerosol Inhale 2 puffs into the lungs 2 (two) times daily. 30.6 g 2 6/7/2024 at 0800    montelukast 10 MG Oral Tab Take 1 tablet (10 mg total) by mouth daily. 90 tablet 3 6/6/2024 at 2100    amLODIPine 5 MG Oral Tab    6/6/2024 at 2100    Insulin Pen Needle (BD PEN NEEDLE ESTEVAN U/F) 32G X 4 MM Does not apply Misc use 1 pen needle daily 100 each 0 Past Week    diazePAM 5 MG Oral Tab Take 1 tablet (5 mg total) by mouth every 12 (twelve) hours as needed. 30 tablet 0 Past Week    ergocalciferol 1.25 MG (58333 UT) Oral Cap Take 1 capsule (50,000 Units total) by mouth once a week. 12 capsule 1 5/30/2024    glimepiride 1 MG Oral Tab Take 2 tablets (2 mg total) by mouth daily with breakfast. 180 tablet 1 6/7/2024 at 0800    insulin degludec (TRESIBA FLEXTOUCH) 100 UNIT/ML Subcutaneous Solution Pen-injector Inject 50 Units into the skin Daily@1600. 30 mL 1 6/6/2024 at 2300    TRULICITY 1.5 MG/0.5ML Subcutaneous Solution Pen-injector Inject 1.5 mg into the skin every 7 days. 6 mL 1 6/1/2024    nystatin 067392 UNIT/ML Mouth/Throat Suspension Take 5 mL (500,000 Units total) by mouth 4 (four) times daily. 473 mL 0 Past Month    albuterol 108 (90 Base) MCG/ACT Inhalation Aero Soln Inhale 2 puffs into the lungs every 4 (four) hours as needed for Wheezing. 25.5 g 3 6/6/2024 at 2100    CONTOUR NEXT TEST In Vitro Strip TEST SUGAR  2 TIMES DAILY 200 strip 0 Past Week    fluticasone propionate 50 MCG/ACT Nasal Suspension use 2 sprays by Each nostrils route daily. 48 g 1 Past Week    valACYclovir 500 MG Oral Tab Take 1 tablet (500 mg total) by mouth daily. 90 tablet 4 6/6/2024 at 2100     diclofenac 1 % External Gel Apply 4 g topically 4 (four) times daily. 350 g 3 Past Month    Lansoprazole 15 MG Oral Capsule Delayed Release Take 1 capsule (15 mg total) by mouth in the morning and 1 capsule (15 mg total) before bedtime. 180 capsule 3 6/7/2024 at 0800    chlorhexidine gluconate 0.12 % Mouth/Throat Solution    Past Month    atorvastatin 40 MG Oral Tab Take 1 tablet (40 mg total) by mouth daily. 90 tablet 3 6/6/2024 at 2100    losartan 50 MG Oral Tab Take 1 tablet (50 mg total) by mouth in the morning and 1 tablet (50 mg total) before bedtime. 180 tablet 4 6/7/2024 at 0800    Diclofenac Sodium 1 % Transdermal Gel Apply 2 g topically 4 (four) times daily. 50 g 1 Past Month    Propranolol HCl 80 MG Oral Tab Take 1 tablet (80 mg total) by mouth 2 (two) times daily. 180 tablet 4 6/7/2024 at 0800    acyclovir 5 % External Ointment Apply 1 Application topically every 3 (three) hours. 30 g 2 Past Month    magnesium oxide 400 MG Oral Tab Take 1 tablet (400 mg total) by mouth daily.   Past Week    Blood Glucose Monitoring Suppl (CONTOUR NEXT MONITOR) w/Device Does not apply Kit 1 each daily. 1 kit 0 Past Week    triamcinolone acetonide 0.1 % External Cream Apply topically 2 (two) times daily as needed. 30 g 3 Past Month    Ammonium Lactate (LAC-HYDRIN) 12 % External Cream Apply to affected areas 1-2x/day 1 Tube 3 Past Month    Clobetasol Propionate 0.05 % External Ointment Apply 1 Application topically daily as needed. Apply to affected areas 1x/day as needed 30 g 2 Past Month    aspirin 81 MG Oral Tab Take 1 tablet (81 mg total) by mouth daily.   6/1/2024    acetaminophen 500 MG Oral Tab Take 1 tablet (500 mg total) by mouth every 6 (six) hours as needed for Pain.   6/6/2024 at 2100    Ciclopirox 8 % External Solution Apply 1 Application topically nightly. 1 Bottle 2 Past Month    ketoconazole 2 % External Cream MELVIN EXT AA BID  0 Past Month    cetirizine 10 MG Oral Tab Take 1 tablet (10 mg total) by mouth  daily.   2024 at 2100    [] clotrimazole-betamethasone 1-0.05 % External Cream Apply 1 Application topically 2 (two) times daily for 14 days. 15 g 0     methocarbamol 750 MG Oral Tab Take 1 tablet (750 mg total) by mouth nightly as needed. 90 tablet 0 More than a month    cyclobenzaprine 5 MG Oral Tab Take 1 tablet (5 mg total) by mouth 3 (three) times daily as needed for Muscle spasms. 30 tablet 0 More than a month    meclizine 25 MG Oral Tab Take 1 tablet (25 mg total) by mouth 3 (three) times daily as needed for Dizziness or Nausea. 30 tablet 5 More than a month    PATIENT SUPPLIED MEDICATION Vitamin B12, magnesium, zinc, vitamin c, & garlic   Unknown       Physical Exam:   General: Alert, orientated x3.  Cooperative.  No apparent distress.  Vital Signs:  Blood pressure 130/57, pulse 62, temperature 97.8 °F (36.6 °C), temperature source Oral, resp. rate 16, height 64\", weight 210 lb (95.3 kg), last menstrual period 2010, SpO2 97%, not currently breastfeeding.  HEENT: Exam is unremarkable.  Pupils are equal and round.    Lungs: no labored breathing.  Cardiac: Regular rate and rhythm.  Abdomen:  Soft, non-distended  Extremities:  No lower extremity edema noted.    Skin: Normal texture and turgor.  Neurologic: bilateral L5 radiculopathies    Impression and Plan:  Patient Active Problem List   Diagnosis    Brachial neuritis    Type II diabetes mellitus, uncontrolled    Dizziness    Esophageal reflux    Essential hypertension    Hyperlipidemia    Palpitations    Dermatitis    Back pain    Sciatica    Herpes    Muscle pain    left > right L5-S1 radiculopathy    Myofascial pain    Plantar fasciitis    Type 2 diabetes mellitus without complication (HCC)    Age-related nuclear cataract of both eyes    Alternating exotropia    Diabetic retinopathy of left eye (HCC)    Choroidal nevus, right eye    Elbow pain    Tendonitis    Right arm pain    Atrial tachycardia, paroxysmal ([facility])    Tobacco abuse counseling     Sleep apnea, obstructive    Ocular migraine    Dry eyes    MGD (meibomian gland dysfunction)    Diabetes mellitus ([facility])    BDR (background diabetic retinopathy) ([facility])    L5-S1 mild diffuse, L4-5 mild-mod diffuse, L3-4 mild-mod diffuse, L2-3 mild diffuse bulging discs    T11-12 mod central HNP    Lumbar spondylosis: L5-S1 bilateral mild-mod z-joint OA    Obsstructive lung disease    Edema    Pineda's esophagus    Myopia with astigmatism and presbyopia, bilateral    COPD (chronic obstructive pulmonary disease) ([facility])    Dyslipidemia    Cervical spondylarthritis    Cervical spondylosis with myelopathy    Floater, vitreous, right    T6-7 mil diffuse, T7-8 mild-mod diffuse, T8-9 right mild-mod/left mild, T9-10 mild-mod diffuse, T10-11 mod diffuse bulging discs    T9-10 mild-mod, T10-11 mod, T11-12 mod central stenosis    Bilateral carpal tunnel syndrome: right mod-severe sensory & mild motor, left mod sensory    Other chest pain    Skin lesion    Seasonal allergic rhinitis due to pollen    Keratosis pilaris    Lichenification and lichen simplex chronicus    Morbid (severe) obesity due to excess calories ([facility])    Ventral hernia    Depressive disorder    Neck pain    s/p C5-7 ACDF    right C5 and C7 radiculopathy    C2-3 mild central & left mild-mod foraminal, C3-4 mild central, C4-5 mild diffuse bulging discs    C2-3 left moderate foraminal stenosis    WOODARD (dyspnea on exertion)    Muscle strain    Ingrowing toenail    L5-S1 slight grade 1 spondylolisthesis    Oral thrush    Sore throat       I will do bilateral L5 TFESI's under MAC with Dexamethasone.    Guero Villegas MD  6/7/2024  2:55 PM

## 2024-06-18 DIAGNOSIS — K22.719 BARRETT'S ESOPHAGUS WITH DYSPLASIA: ICD-10-CM

## 2024-06-20 RX ORDER — PEN NEEDLE, DIABETIC 32GX 5/32"
1 NEEDLE, DISPOSABLE MISCELLANEOUS DAILY
Qty: 100 EACH | Refills: 0 | Status: SHIPPED | OUTPATIENT
Start: 2024-06-20

## 2024-06-20 RX ORDER — MECOBALAMIN 5000 MCG
15 TABLET,DISINTEGRATING ORAL 2 TIMES DAILY
Qty: 180 CAPSULE | Refills: 3 | Status: SHIPPED | OUTPATIENT
Start: 2024-06-20

## 2024-06-21 NOTE — TELEPHONE ENCOUNTER
Refill Passed Per Protocol    Requested Prescriptions   Pending Prescriptions Disp Refills    LANSOPRAZOLE 15 MG Oral Capsule Delayed Release [Pharmacy Med Name: Lansoprazole Dr 15 Mg Cap ] 180 capsule 0     Sig: Take 1 capsule by mouth in the morning and 1 capsule before bedtime.       Gastrointestional Medication Protocol Passed - 6/18/2024  9:00 PM        Passed - In person appointment or virtual visit in the past 12 mos or appointment in next 3 mos     Recent Outpatient Visits              2 months ago Condyloma acuminatum    [facility] [place] [facility]    Office Visit    3 months ago Vitamin D deficiency    [facility] [place]    Office Visit    5 months ago Breast pain, left    [facility] [place] [facility]    Office Visit    5 months ago Candida infection of mouth    [facility], Sienna Reis MD    Telemedicine    6 months ago Oral thrush    [facility], Danyel Lua APRN    Office Visit                             Recent Outpatient Visits              2 months ago Condyloma acuminatum    [facility] [place] [facility]    Office Visit    3 months ago Vitamin D deficiency    [facility], Prachi Singh MD    Office Visit    5 months ago Breast pain, left    [facility] [place] [facility]    Office Visit    5 months ago Candida infection of mouth    [facility], Sienna Reis MD    Telemedicine    6 months ago Oral thrush    [facility], Danyel Lua APRN    Office Visit

## 2024-07-27 DIAGNOSIS — F41.9 ANXIETY: ICD-10-CM

## 2024-07-30 RX ORDER — DIAZEPAM 5 MG/1
5 TABLET ORAL EVERY 12 HOURS PRN
Qty: 30 TABLET | Refills: 0 | Status: SHIPPED | OUTPATIENT
Start: 2024-07-30

## 2024-07-30 NOTE — TELEPHONE ENCOUNTER
Please Review. Protocol Failed; No Protocol     Recent fills: Quantity: 30  03/25/2024                                                                    Patient is due    Last Office Visit: 12/07/2024        Requested Prescriptions   Pending Prescriptions Disp Refills    diazePAM 5 MG Oral Tab 30 tablet 0     Sig: Take 1 tablet (5 mg total) by mouth every 12 (twelve) hours as needed.       Controlled Substance Medication Failed - 7/27/2024  2:25 PM        Failed - This medication is a controlled substance - forward to provider to refill                 Recent Outpatient Visits              3 months ago Condyloma acuminatum    [facility] Becky Rosenthal CNM    Office Visit    4 months ago Vitamin D deficiency    [facility], Prachi Singh MD    Office Visit    6 months ago Breast pain, left    [facility] Becky Rosenthal CNM    Office Visit    6 months ago Candida infection of mouth    [facility] [place]    [place]    7 months ago Oral thrush    [facility], Danyel Lua APRN    Office Visit

## 2024-08-06 NOTE — TELEPHONE ENCOUNTER
Please review. Protocol Failed; No Protocol    Please see patients MyChart message:        nystatin 587306 UNIT/ML Mouth/Throat Suspension [[facility]]      Patient Comment: I take symbicort and even though I rinse out my mouth I keep getting a yeast infection in the mouth. AKA thrush. Which is why I'm requesting the refill.        Requested Prescriptions   Pending Prescriptions Disp Refills    nystatin [id number] UNIT/ML Mouth/Throat Suspension 473 mL 0     Sig: Take 5 mL (500,000 Units total) by mouth 4 (four) times daily.       There is no refill protocol information for this order            Future Appointments         Provider Department Appt Notes    In 1 month Carmelina Gonzalez MD [facility] BP, anxiety, sleeplessness.          Recent Outpatient Visits              4 months ago Condyloma acuminatum    [facility] Becky Rosenthal CNM    Office Visit    4 months ago Vitamin D deficiency    [facility] Prachi Montanez MD    Office Visit    6 months ago Breast pain, left    [facility] Becky Rosenthal CNM    Office Visit    6 months ago Candida infection of mouth    [facility] [place]    [place]    8 months ago Oral thrush    [facility] [place] Danyel Leger APRN    Office Visit

## 2024-08-22 RX ORDER — INSULIN DEGLUDEC 100 U/ML
INJECTION, SOLUTION SUBCUTANEOUS
Qty: 30 ML | Refills: 1 | Status: SHIPPED | OUTPATIENT
Start: 2024-08-22

## 2024-08-22 NOTE — TELEPHONE ENCOUNTER
Endocrine refill protocol for basal insulins     Protocol Criteria: PASSED Reason: N/A    - Appointment with Endocrinology completed in the last 6 months or scheduled in the next 3 months    - A1c result completed in the last 6 months and is below 8.5%     Verify appointment has been completed or scheduled in the appropriate timeline. If so can send a 90 day supply with 1 refill per provider protocol.    Verify A1c has been completed within the last 6 months and is below 8.5%     Last completed office visit:3/20/2024 Prachi Montanez MD   Next scheduled Follow up:   Future Appointments   Date Time Provider Department Center   9/18/2024  2:00 PM Carmelina Gonzalez MD [facility]      Last A1c result: Last A1c value was 7.5% done 3/20/2024.

## 2024-08-26 ENCOUNTER — PATIENT MESSAGE (OUTPATIENT)
Dept: FAMILY MEDICINE CLINIC | Facility: CLINIC | Age: 64
End: 2024-08-26

## 2024-08-26 ENCOUNTER — NURSE TRIAGE (OUTPATIENT)
Dept: FAMILY MEDICINE CLINIC | Facility: CLINIC | Age: 64
End: 2024-08-26

## 2024-08-26 RX ORDER — HYDROXYZINE HYDROCHLORIDE 25 MG/1
25 TABLET, FILM COATED ORAL EVERY 8 HOURS PRN
Qty: 40 TABLET | Refills: 0 | Status: SHIPPED | OUTPATIENT
Start: 2024-08-26

## 2024-08-26 NOTE — TELEPHONE ENCOUNTER
Action Requested: Summary for Provider     []  Critical Lab, Recommendations Needed  [x] Need Additional Advice  []   FYI    []   Need Orders  [x] Need Medications Sent to Pharmacy  []  Other     SUMMARY: Patient out of town until Sunday 9/1/24 - there are no immediate care or walk-in-clinics around. Patient asking for prescription (can't take Medrol Pack) but can take prednisone, if appropriate. Closest pharmacy is [id number].    Reason for call: Rash Skin Problem  Onset: Aug 24, 2024    Spoke to patient, full name and date of birth verified.  See [email or web address] message sent by patient, copied at bottom of this note.    Patient reports everything she has tried is not helping: Benadryl, Zyrtec, witch hazel, anti-itch cream, oatmeal baths.     Patient says they are not mosquito bites, when she itches them, the skin burns and they turn into \"welts\".    Patient is itching from \"head to toe\".     Patient had already scheduled 9/18 visit, she is not back until Sunday, appointment scheduled for 1st available on 9/3/24 at 1:00 PM.     Added [facility] to prescription list.     Reason for Disposition   SEVERE local itching (i.e., interferes with work, school, activities) and not improved after 24 hours of hydrocortisone cream    Protocols used: Insect Bite-A-OH    Patient sent the below [email or web address] message prior to calling:     On Thursday evening I was outside for several hours. As if Friday I started itching and haven't stopped itching since. As I itch it turns into little welts and the skin burns.   I have tried everything to stop the itching. Benadryl, Zyrtec, witch hazel, anti-itch cream, oatmeal baths. Nothing is working and there is not one part of my body that isn't itching.   Unfortunately I am currently out of town but wondering if you can call in a prescription to [facility]  [id number] [address].  That can get rid of this itch from the inside out? Possibly a steroid? I can't take the medrol pack.  I can only prednisone. I am climbing the walls with irritation and would appreciate the help.    I can be reached at [phone] for any questions.

## 2024-08-26 NOTE — TELEPHONE ENCOUNTER
Those are mite bites - influx due to cicadas this year. normal reaction - creates welt or target looking lesions. Prednisone is not the answer as will raise her glucose significantly and this is not a  true allergic reaction.   I sent hydroxyzine 25 mg every 8 hours. It is a stronger anti-histamine to help with the itching sensation. Takes 1-2 weeks to resolve.

## 2024-08-29 DIAGNOSIS — E55.9 VITAMIN D DEFICIENCY: ICD-10-CM

## 2024-08-29 RX ORDER — ERGOCALCIFEROL 1.25 MG/1
50000 CAPSULE, LIQUID FILLED ORAL WEEKLY
Qty: 12 CAPSULE | Refills: 0 | Status: SHIPPED | OUTPATIENT
Start: 2024-08-29

## 2024-08-29 NOTE — TELEPHONE ENCOUNTER
Endocrine Refill protocol for Ergocalciferol     Protocol Criteria: FAILED Reason: Abnormal labs  Appointment with Endocrinology completed in the last 6 months or scheduled in the next 3 months     Verify appointment has been completed or scheduled in the appropriate timeline. If so can send a 90 day supply with 1 refill.   Vitamin D level must have been completed within the last 6 months  Vitamin D level must be within the normal range     Vitamin D, 25OH, Total (ng/mL)   Date Value   12/28/2023 29.6 (L)    (32.0-100.0)    Last completed office visit:3/20/2024 Prachi Montanez MD   Next scheduled Follow up: no future appt mcm sent

## 2024-09-03 ENCOUNTER — OFFICE VISIT (OUTPATIENT)
Dept: FAMILY MEDICINE CLINIC | Facility: CLINIC | Age: 64
End: 2024-09-03
Payer: COMMERCIAL

## 2024-09-03 VITALS
DIASTOLIC BLOOD PRESSURE: 69 MMHG | WEIGHT: 209.19 LBS | BODY MASS INDEX: 35.71 KG/M2 | HEART RATE: 58 BPM | HEIGHT: 64 IN | SYSTOLIC BLOOD PRESSURE: 115 MMHG

## 2024-09-03 DIAGNOSIS — Z79.4 TYPE 2 DIABETES MELLITUS WITHOUT COMPLICATION, WITH LONG-TERM CURRENT USE OF INSULIN (HCC): ICD-10-CM

## 2024-09-03 DIAGNOSIS — G89.29 CHRONIC PAIN OF RIGHT KNEE: ICD-10-CM

## 2024-09-03 DIAGNOSIS — E55.9 VITAMIN D DEFICIENCY: Primary | ICD-10-CM

## 2024-09-03 DIAGNOSIS — K21.9 GASTROESOPHAGEAL REFLUX DISEASE, UNSPECIFIED WHETHER ESOPHAGITIS PRESENT: ICD-10-CM

## 2024-09-03 DIAGNOSIS — Z00.00 ROUTINE MEDICAL EXAM: ICD-10-CM

## 2024-09-03 DIAGNOSIS — E11.9 TYPE 2 DIABETES MELLITUS WITHOUT COMPLICATION, WITH LONG-TERM CURRENT USE OF INSULIN (HCC): ICD-10-CM

## 2024-09-03 DIAGNOSIS — R43.2 LOSS OF TASTE: ICD-10-CM

## 2024-09-03 DIAGNOSIS — K14.3 TONGUE COATING: ICD-10-CM

## 2024-09-03 DIAGNOSIS — M25.561 CHRONIC PAIN OF RIGHT KNEE: ICD-10-CM

## 2024-09-03 PROCEDURE — 99214 OFFICE O/P EST MOD 30 MIN: CPT | Performed by: FAMILY MEDICINE

## 2024-09-03 RX ORDER — AMPICILLIN TRIHYDRATE 250 MG
500 CAPSULE ORAL 2 TIMES DAILY
COMMUNITY

## 2024-09-03 NOTE — PROGRESS NOTES
Ju Johnson is a 63 year old female.   Chief Complaint   Patient presents with    Loss Of Smell Or Taste    Knee Pain     Right knee      HPI:   Last week patient had called due to bite reactions - significant itching but called in pills and improved. Hydroxyzine helped.   Loss of taste and smell after covid. Reports tongue is sensitive to mint and spicy foods. Uses nystastin as needed.   Getting epigastric pain that can travel downward. Pain between breasts.  Worse after meals. Lots of stomach issues. - started to cut out caffeine - coffee, soda. Plans to start mediterranean diet.   Living alone now and causing more anxiety in evenings. Not sleeping well. -  passed away.   Right knee clicking - mild pain but does not want much for it.   Reports blurred vision - newer.   Current Outpatient Medications on File Prior to Visit   Medication Sig Dispense Refill    Cinnamon 500 MG Oral Cap Take 500 mg by mouth in the morning and 500 mg before bedtime.      ERGOCALCIFEROL 1.25 MG (61432 UT) Oral Cap TAKE ONE CAPSULE BY MOUTH WEEKLY 12 capsule 0    hydrOXYzine 25 MG Oral Tab Take 1 tablet (25 mg total) by mouth every 8 (eight) hours as needed for Itching. 40 tablet 0    TRESIBA FLEXTOUCH 100 UNIT/ML Subcutaneous Solution Pen-injector INJECT 50 UNITS (0.5ML) SUBCUTANEOUSLY DAILY AT 4PM. 30 mL 1    nystatin 323306 UNIT/ML Mouth/Throat Suspension Take 5 mL (500,000 Units total) by mouth 4 (four) times daily. 473 mL 0    diazePAM 5 MG Oral Tab Take 1 tablet (5 mg total) by mouth every 12 (twelve) hours as needed. 30 tablet 0    Lansoprazole 15 MG Oral Capsule Delayed Release Take 1 capsule (15 mg total) by mouth 2 (two) times daily. 180 capsule 3    BD PEN NEEDLE ESTEVAN 2ND GEN 32G X 4 MM Does not apply Misc use 1 pen needle daily 100 each 0    Budesonide-Formoterol Fumarate 160-4.5 MCG/ACT Inhalation Aerosol Inhale 2 puffs into the lungs 2 (two) times daily. 30.6 g 2    montelukast 10 MG Oral Tab Take 1 tablet (10 mg  total) by mouth daily. 90 tablet 3    amLODIPine 5 MG Oral Tab       glimepiride 1 MG Oral Tab Take 2 tablets (2 mg total) by mouth daily with breakfast. 180 tablet 1    TRULICITY 1.5 MG/0.5ML Subcutaneous Solution Pen-injector Inject 1.5 mg into the skin every 7 days. 6 mL 1    methocarbamol 750 MG Oral Tab Take 1 tablet (750 mg total) by mouth nightly as needed. 90 tablet 0    cyclobenzaprine 5 MG Oral Tab Take 1 tablet (5 mg total) by mouth 3 (three) times daily as needed for Muscle spasms. 30 tablet 0    albuterol 108 (90 Base) MCG/ACT Inhalation Aero Soln Inhale 2 puffs into the lungs every 4 (four) hours as needed for Wheezing. 25.5 g 3    CONTOUR NEXT TEST In Vitro Strip TEST SUGAR  2 TIMES DAILY 200 strip 0    fluticasone propionate 50 MCG/ACT Nasal Suspension use 2 sprays by Each nostrils route daily. 48 g 1    valACYclovir 500 MG Oral Tab Take 1 tablet (500 mg total) by mouth daily. 90 tablet 4    diclofenac 1 % External Gel Apply 4 g topically 4 (four) times daily. 350 g 3    meclizine 25 MG Oral Tab Take 1 tablet (25 mg total) by mouth 3 (three) times daily as needed for Dizziness or Nausea. 30 tablet 5    chlorhexidine gluconate 0.12 % Mouth/Throat Solution       atorvastatin 40 MG Oral Tab Take 1 tablet (40 mg total) by mouth daily. 90 tablet 3    losartan 50 MG Oral Tab Take 1 tablet (50 mg total) by mouth in the morning and 1 tablet (50 mg total) before bedtime. 180 tablet 4    Diclofenac Sodium 1 % Transdermal Gel Apply 2 g topically 4 (four) times daily. 50 g 1    Propranolol HCl 80 MG Oral Tab Take 1 tablet (80 mg total) by mouth 2 (two) times daily. 180 tablet 4    acyclovir 5 % External Ointment Apply 1 Application topically every 3 (three) hours. 30 g 2    PATIENT SUPPLIED MEDICATION Vitamin B12, magnesium, zinc, vitamin c, & garlic      magnesium oxide 400 MG Oral Tab Take 1 tablet (400 mg total) by mouth daily.      Blood Glucose Monitoring Suppl (CONTOUR NEXT MONITOR) w/Device Does not apply  Kit 1 each daily. 1 kit 0    triamcinolone acetonide 0.1 % External Cream Apply topically 2 (two) times daily as needed. 30 g 3    Ammonium Lactate (LAC-HYDRIN) 12 % External Cream Apply to affected areas 1-2x/day 1 Tube 3    Clobetasol Propionate 0.05 % External Ointment Apply 1 Application topically daily as needed. Apply to affected areas 1x/day as needed 30 g 2    aspirin 81 MG Oral Tab Take 1 tablet (81 mg total) by mouth daily.      acetaminophen 500 MG Oral Tab Take 1 tablet (500 mg total) by mouth every 6 (six) hours as needed for Pain.      Ciclopirox 8 % External Solution Apply 1 Application topically nightly. 1 Bottle 2    ketoconazole 2 % External Cream MELVIN EXT AA BID  0    cetirizine 10 MG Oral Tab Take 1 tablet (10 mg total) by mouth daily.       No current facility-administered medications on file prior to visit.      Past Medical History:    Acute non-recurrent maxillary sinusitis    Age-related nuclear cataract of both eyes    Allergic rhinitis    pereniaal and seasonal    Alternating exotropia    Amenorrhea    Anxiety    Atrial tachycardia, paroxysmal ([facility])    Barretts esophagus    Carpal tunnel syndrome    Chlamydia    Choroidal nevus, right eye    COPD (chronic obstructive pulmonary disease) (HCC)    Depression    Diabetes (HCC)    Diabetes mellitus type 2 with complications ([facility])    Diabetic retinopathy of left eye ([facility])    Dry eyes    DUB (dysfunctional uterine bleeding)    endometrial biopsy    Eczema    Esophageal reflux    Essential hypertension    Exotropia    Fibromyalgia    Genital warts    Heart murmur    Herpes simplex    High blood pressure    High cholesterol    History of pregnancy    Hyperlipidemia    Irregular menstrual cycle    Neg endometrial biopsy    Irritable bowel syndrome    MGD (meibomian gland dysfunction)    Migraines    Obesity    Ocular migraine    BRENDA    Ovarian cyst    Laparoscopic cystectomy    Pregnancy ([facility])    PVD (posterior vitreous detachment), right eye     patient was seen by Dr. Harkins on 3/27/23 for flashes and floaters OD-DX PVD OD    Spinal stenosis    C5-6, C6-7, physical therapy    TIA (transient ischemic attack)    Viral infection characterized by skin and mucous membrane lesions    Visual impairment      Social History:  Social History     Socioeconomic History    Marital status:    Tobacco Use    Smoking status: Former     Current packs/day: 0.00     Average packs/day: 0.3 packs/day for 2.0 years (0.5 ttl pk-yrs)     Types: Cigarettes     Start date: 2015     Quit date: 2017     Years since quittin.6     Passive exposure: Never    Smokeless tobacco: Never    Tobacco comments:     1 pack/wk per pt.   Vaping Use    Vaping status: Never Used   Substance and Sexual Activity    Alcohol use: Not Currently     Comment: Occasionally    Drug use: No    Sexual activity: Yes     Partners: Male     Birth control/protection: Tubal Ligation   Other Topics Concern    Caffeine Concern Yes     Comment: 2 cups coffee daily    Exercise No   Social History Narrative    The patient does not use an assistive device..      The patient does live in a home with stairs.        Lives with         Work         REVIEW OF SYSTEMS:   Review of Systems   See HPI     EXAM:   /69   Pulse 58   Ht 5' 4\" (1.626 m)   Wt 209 lb 3.2 oz (94.9 kg)   LMP 2010   BMI 35.91 kg/m²   GENERAL: well developed, well nourished,in no apparent distress  SKIN: thick yellowish film at posterior aspect of tongue.   LUNGS: clear to auscultation  CARDIO: RRR without murmur  GI: good BS's,no masses, HSM - significant epigastric tenderness        ASSESSMENT AND PLAN:   1. Vitamin D deficiency    - Vitamin D; Future    2. Type 2 diabetes mellitus without complication, with long-term current use of insulin (HCC)  Due for labs.   - Hemoglobin A1C  - Microalb/Creat Ratio, Random Urine  - Ophthalmology Referral - In Network    - CBC With Differential With  Platelet  - Comp Metabolic Panel (14)  - Lipid Panel  - TSH W Reflex To Free T4  - Vitamin B12  - Vitamin D; Future  - Hemoglobin A1C  - Microalb/Creat Ratio, Random Urine    4. Loss of taste    - GASTRO - INTERNAL    5. Gastroesophageal reflux disease, unspecified whether esophagitis present  Diet changes.   - GASTRO - INTERNAL    6. Tongue coating  Nystatin.       The patient indicates understanding of these issues and agrees to the plan.      Carmelina Gonzalez MD  9/3/2024  1:11 PM

## 2024-09-12 ENCOUNTER — TELEPHONE (OUTPATIENT)
Facility: CLINIC | Age: 64
End: 2024-09-12

## 2024-09-12 NOTE — TELEPHONE ENCOUNTER
Spoke to patient    Scheduled her for first available and added her to the wait list  She verbalized understanding    Patient aware of ER precautions  She is being worked up by Dr. Gonzalez    Your Appointments      Tuesday October 22, 2024 1:00 PM  Consult with ANGELINA Randhawa  [facility] ([facility]) [facility] [address] [facility] [address]  [place] [id number]  [phone]                No

## 2024-09-12 NOTE — TELEPHONE ENCOUNTER
Patient states that she has been having abdominal pain for the past 3 weeks and would like to be seen for consult. First available is 10/16/24.

## 2024-09-16 ENCOUNTER — LAB ENCOUNTER (OUTPATIENT)
Dept: LAB | Age: 64
End: 2024-09-16
Attending: FAMILY MEDICINE
Payer: COMMERCIAL

## 2024-09-16 DIAGNOSIS — Z00.00 ROUTINE MEDICAL EXAM: ICD-10-CM

## 2024-09-16 DIAGNOSIS — E55.9 VITAMIN D DEFICIENCY: ICD-10-CM

## 2024-09-16 LAB
ALBUMIN SERPL-MCNC: 4.3 G/DL (ref 3.2–4.8)
ALBUMIN/GLOB SERPL: 1.8 {RATIO} (ref 1–2)
ALP LIVER SERPL-CCNC: 65 U/L
ALT SERPL-CCNC: 22 U/L
ANION GAP SERPL CALC-SCNC: 8 MMOL/L (ref 0–18)
AST SERPL-CCNC: 19 U/L (ref ?–34)
BASOPHILS # BLD AUTO: 0.09 X10(3) UL (ref 0–0.2)
BASOPHILS NFR BLD AUTO: 1.1 %
BILIRUB SERPL-MCNC: 0.3 MG/DL (ref 0.2–1.1)
BUN BLD-MCNC: 10 MG/DL (ref 9–23)
BUN/CREAT SERPL: 14.9 (ref 10–20)
CALCIUM BLD-MCNC: 9.7 MG/DL (ref 8.7–10.4)
CHLORIDE SERPL-SCNC: 104 MMOL/L (ref 98–112)
CHOLEST SERPL-MCNC: 183 MG/DL (ref ?–200)
CO2 SERPL-SCNC: 28 MMOL/L (ref 21–32)
CREAT BLD-MCNC: 0.67 MG/DL
CREAT UR-SCNC: 39.8 MG/DL
DEPRECATED RDW RBC AUTO: 42.3 FL (ref 35.1–46.3)
EGFRCR SERPLBLD CKD-EPI 2021: 98 ML/MIN/1.73M2 (ref 60–?)
EOSINOPHIL # BLD AUTO: 0.35 X10(3) UL (ref 0–0.7)
EOSINOPHIL NFR BLD AUTO: 4.1 %
ERYTHROCYTE [DISTWIDTH] IN BLOOD BY AUTOMATED COUNT: 13.4 % (ref 11–15)
EST. AVERAGE GLUCOSE BLD GHB EST-MCNC: 174 MG/DL (ref 68–126)
FASTING PATIENT LIPID ANSWER: YES
FASTING STATUS PATIENT QL REPORTED: YES
GLOBULIN PLAS-MCNC: 2.4 G/DL (ref 2–3.5)
GLUCOSE BLD-MCNC: 107 MG/DL (ref 70–99)
HBA1C MFR BLD: 7.7 % (ref ?–5.7)
HCT VFR BLD AUTO: 38.1 %
HDLC SERPL-MCNC: 60 MG/DL (ref 40–59)
HGB BLD-MCNC: 12.4 G/DL
IMM GRANULOCYTES # BLD AUTO: 0.03 X10(3) UL (ref 0–1)
IMM GRANULOCYTES NFR BLD: 0.4 %
LDLC SERPL CALC-MCNC: 90 MG/DL (ref ?–100)
LYMPHOCYTES # BLD AUTO: 3.26 X10(3) UL (ref 1–4)
LYMPHOCYTES NFR BLD AUTO: 38.4 %
MCH RBC QN AUTO: 28.1 PG (ref 26–34)
MCHC RBC AUTO-ENTMCNC: 32.5 G/DL (ref 31–37)
MCV RBC AUTO: 86.4 FL
MICROALBUMIN UR-MCNC: <0.3 MG/DL
MONOCYTES # BLD AUTO: 0.68 X10(3) UL (ref 0.1–1)
MONOCYTES NFR BLD AUTO: 8 %
NEUTROPHILS # BLD AUTO: 4.09 X10 (3) UL (ref 1.5–7.7)
NEUTROPHILS # BLD AUTO: 4.09 X10(3) UL (ref 1.5–7.7)
NEUTROPHILS NFR BLD AUTO: 48 %
NONHDLC SERPL-MCNC: 123 MG/DL (ref ?–130)
OSMOLALITY SERPL CALC.SUM OF ELEC: 290 MOSM/KG (ref 275–295)
PLATELET # BLD AUTO: 250 10(3)UL (ref 150–450)
POTASSIUM SERPL-SCNC: 3.8 MMOL/L (ref 3.5–5.1)
PROT SERPL-MCNC: 6.7 G/DL (ref 5.7–8.2)
RBC # BLD AUTO: 4.41 X10(6)UL
SODIUM SERPL-SCNC: 140 MMOL/L (ref 136–145)
TRIGL SERPL-MCNC: 194 MG/DL (ref 30–149)
TSI SER-ACNC: 4 MIU/ML (ref 0.55–4.78)
VIT B12 SERPL-MCNC: 445 PG/ML (ref 211–911)
VIT D+METAB SERPL-MCNC: 52.1 NG/ML (ref 30–100)
VLDLC SERPL CALC-MCNC: 31 MG/DL (ref 0–30)
WBC # BLD AUTO: 8.5 X10(3) UL (ref 4–11)

## 2024-09-16 PROCEDURE — 80061 LIPID PANEL: CPT | Performed by: FAMILY MEDICINE

## 2024-09-16 PROCEDURE — 36415 COLL VENOUS BLD VENIPUNCTURE: CPT | Performed by: FAMILY MEDICINE

## 2024-09-16 PROCEDURE — 85025 COMPLETE CBC W/AUTO DIFF WBC: CPT | Performed by: FAMILY MEDICINE

## 2024-09-16 PROCEDURE — 80053 COMPREHEN METABOLIC PANEL: CPT | Performed by: FAMILY MEDICINE

## 2024-09-16 PROCEDURE — 82607 VITAMIN B-12: CPT | Performed by: FAMILY MEDICINE

## 2024-09-16 PROCEDURE — 82570 ASSAY OF URINE CREATININE: CPT | Performed by: FAMILY MEDICINE

## 2024-09-16 PROCEDURE — 82306 VITAMIN D 25 HYDROXY: CPT

## 2024-09-16 PROCEDURE — 82043 UR ALBUMIN QUANTITATIVE: CPT | Performed by: FAMILY MEDICINE

## 2024-09-16 PROCEDURE — 83036 HEMOGLOBIN GLYCOSYLATED A1C: CPT | Performed by: FAMILY MEDICINE

## 2024-09-16 PROCEDURE — 84443 ASSAY THYROID STIM HORMONE: CPT | Performed by: FAMILY MEDICINE

## 2024-09-18 ENCOUNTER — OFFICE VISIT (OUTPATIENT)
Dept: FAMILY MEDICINE CLINIC | Facility: CLINIC | Age: 64
End: 2024-09-18
Payer: COMMERCIAL

## 2024-09-18 VITALS
SYSTOLIC BLOOD PRESSURE: 132 MMHG | HEART RATE: 63 BPM | BODY MASS INDEX: 35.82 KG/M2 | WEIGHT: 209.81 LBS | HEIGHT: 64 IN | DIASTOLIC BLOOD PRESSURE: 62 MMHG

## 2024-09-18 DIAGNOSIS — R10.13 EPIGASTRIC PAIN: Primary | ICD-10-CM

## 2024-09-18 DIAGNOSIS — G47.00 INSOMNIA, UNSPECIFIED TYPE: ICD-10-CM

## 2024-09-18 DIAGNOSIS — F41.9 ANXIETY: ICD-10-CM

## 2024-09-18 DIAGNOSIS — K21.9 GASTROESOPHAGEAL REFLUX DISEASE, UNSPECIFIED WHETHER ESOPHAGITIS PRESENT: ICD-10-CM

## 2024-09-18 PROCEDURE — 99214 OFFICE O/P EST MOD 30 MIN: CPT | Performed by: FAMILY MEDICINE

## 2024-09-18 RX ORDER — GLIMEPIRIDE 1 MG/1
2 TABLET ORAL
Qty: 180 TABLET | Refills: 0 | Status: SHIPPED | OUTPATIENT
Start: 2024-09-18

## 2024-09-18 RX ORDER — ZOLPIDEM TARTRATE 5 MG/1
5 TABLET ORAL NIGHTLY PRN
Qty: 30 TABLET | Refills: 0 | Status: SHIPPED | OUTPATIENT
Start: 2024-09-18

## 2024-09-18 RX ORDER — CYCLOBENZAPRINE HCL 5 MG
5 TABLET ORAL 3 TIMES DAILY PRN
Qty: 30 TABLET | Refills: 0 | Status: SHIPPED | OUTPATIENT
Start: 2024-09-18

## 2024-09-18 NOTE — TELEPHONE ENCOUNTER
Endocrine Refill protocol for oral and injectable diabetic medications    Protocol Criteria:  PASSED  Reason: N/A Last completed office visit: 3/20/2024 Prachi Montanez MD   A1C 7.7 9/16/24  -Appointment with Endocrinology completed in the last 6 months or scheduled in the next 3 months    -A1c result below 8.5% in the past 6 months      Verify the above has been completed or scheduled in the appropriate timeline. If so can send a 90 day supply with 1 refill.     Last completed office visit: 3/20/2024 Prachi Montanez MD   Next scheduled Follow up:   Future Appointments   Date Time Provider Department Center   9/18/2024  2:00 PM Carmelina Gonzalez MD [facility]   10/22/2024  1:00 PM [facility]      Last A1c result: Last A1c value was 7.7% done 9/16/2024.

## 2024-09-18 NOTE — PROGRESS NOTES
Ju Johnson is a 63 year old female.   Chief Complaint   Patient presents with    Hypertension     Follow up      HPI:   End of July - Reports pain between her breasts and radiating to back - in the center. Reports hydroxyzine is making it worse. Taking her husbands old omeprazole. Last endoscopy 4 years ago.   Still has issues sleeping - toss and turns. In therapy and group therapy for Grief.   Current Outpatient Medications on File Prior to Visit   Medication Sig Dispense Refill    GLIMEPIRIDE 1 MG Oral Tab TAKE TWO TABLETS BY MOUTH DAILY WITH BREAKFAST 180 tablet 0    Cinnamon 500 MG Oral Cap Take 500 mg by mouth in the morning and 500 mg before bedtime.      ERGOCALCIFEROL 1.25 MG (42689 UT) Oral Cap TAKE ONE CAPSULE BY MOUTH WEEKLY 12 capsule 0    hydrOXYzine 25 MG Oral Tab Take 1 tablet (25 mg total) by mouth every 8 (eight) hours as needed for Itching. 40 tablet 0    TRESIBA FLEXTOUCH 100 UNIT/ML Subcutaneous Solution Pen-injector INJECT 50 UNITS (0.5ML) SUBCUTANEOUSLY DAILY AT 4PM. 30 mL 1    nystatin 733581 UNIT/ML Mouth/Throat Suspension Take 5 mL (500,000 Units total) by mouth 4 (four) times daily. 473 mL 0    diazePAM 5 MG Oral Tab Take 1 tablet (5 mg total) by mouth every 12 (twelve) hours as needed. 30 tablet 0    Lansoprazole 15 MG Oral Capsule Delayed Release Take 1 capsule (15 mg total) by mouth 2 (two) times daily. 180 capsule 3    BD PEN NEEDLE ESTEVAN 2ND GEN 32G X 4 MM Does not apply Misc use 1 pen needle daily 100 each 0    Budesonide-Formoterol Fumarate 160-4.5 MCG/ACT Inhalation Aerosol Inhale 2 puffs into the lungs 2 (two) times daily. 30.6 g 2    montelukast 10 MG Oral Tab Take 1 tablet (10 mg total) by mouth daily. 90 tablet 3    amLODIPine 5 MG Oral Tab       TRULICITY 1.5 MG/0.5ML Subcutaneous Solution Pen-injector Inject 1.5 mg into the skin every 7 days. 6 mL 1    methocarbamol 750 MG Oral Tab Take 1 tablet (750 mg total) by mouth nightly as needed. 90 tablet 0    cyclobenzaprine 5 MG  Oral Tab Take 1 tablet (5 mg total) by mouth 3 (three) times daily as needed for Muscle spasms. 30 tablet 0    albuterol 108 (90 Base) MCG/ACT Inhalation Aero Soln Inhale 2 puffs into the lungs every 4 (four) hours as needed for Wheezing. 25.5 g 3    CONTOUR NEXT TEST In Vitro Strip TEST SUGAR  2 TIMES DAILY 200 strip 0    fluticasone propionate 50 MCG/ACT Nasal Suspension use 2 sprays by Each nostrils route daily. 48 g 1    valACYclovir 500 MG Oral Tab Take 1 tablet (500 mg total) by mouth daily. 90 tablet 4    diclofenac 1 % External Gel Apply 4 g topically 4 (four) times daily. 350 g 3    meclizine 25 MG Oral Tab Take 1 tablet (25 mg total) by mouth 3 (three) times daily as needed for Dizziness or Nausea. 30 tablet 5    chlorhexidine gluconate 0.12 % Mouth/Throat Solution       atorvastatin 40 MG Oral Tab Take 1 tablet (40 mg total) by mouth daily. 90 tablet 3    losartan 50 MG Oral Tab Take 1 tablet (50 mg total) by mouth in the morning and 1 tablet (50 mg total) before bedtime. 180 tablet 4    Diclofenac Sodium 1 % Transdermal Gel Apply 2 g topically 4 (four) times daily. 50 g 1    Propranolol HCl 80 MG Oral Tab Take 1 tablet (80 mg total) by mouth 2 (two) times daily. 180 tablet 4    acyclovir 5 % External Ointment Apply 1 Application topically every 3 (three) hours. 30 g 2    PATIENT SUPPLIED MEDICATION Vitamin B12, magnesium, zinc, vitamin c, & garlic      magnesium oxide 400 MG Oral Tab Take 1 tablet (400 mg total) by mouth daily.      Blood Glucose Monitoring Suppl (CONTOUR NEXT MONITOR) w/Device Does not apply Kit 1 each daily. 1 kit 0    triamcinolone acetonide 0.1 % External Cream Apply topically 2 (two) times daily as needed. 30 g 3    Ammonium Lactate (LAC-HYDRIN) 12 % External Cream Apply to affected areas 1-2x/day 1 Tube 3    Clobetasol Propionate 0.05 % External Ointment Apply 1 Application topically daily as needed. Apply to affected areas 1x/day as needed 30 g 2    aspirin 81 MG Oral Tab Take 1  tablet (81 mg total) by mouth daily.      acetaminophen 500 MG Oral Tab Take 1 tablet (500 mg total) by mouth every 6 (six) hours as needed for Pain.      Ciclopirox 8 % External Solution Apply 1 Application topically nightly. 1 Bottle 2    ketoconazole 2 % External Cream MELVIN EXT AA BID  0    cetirizine 10 MG Oral Tab Take 1 tablet (10 mg total) by mouth daily.       No current facility-administered medications on file prior to visit.      Past Medical History:    Acute non-recurrent maxillary sinusitis    Age-related nuclear cataract of both eyes    Allergic rhinitis    pereniaal and seasonal    Alternating exotropia    Amenorrhea    Anxiety    Atrial tachycardia, paroxysmal ([facility])    Barretts esophagus    Carpal tunnel syndrome    Chlamydia    Choroidal nevus, right eye    COPD (chronic obstructive pulmonary disease) ([facility])    Depression    Diabetes ([facility])    Diabetes mellitus type 2 with complications ([facility])    Diabetic retinopathy of left eye ([facility])    Dry eyes    DUB (dysfunctional uterine bleeding)    endometrial biopsy    Eczema    Esophageal reflux    Essential hypertension    Exotropia    Fibromyalgia    Genital warts    Heart murmur    Herpes simplex    High blood pressure    High cholesterol    History of pregnancy    Hyperlipidemia    Irregular menstrual cycle    Neg endometrial biopsy    Irritable bowel syndrome    MGD (meibomian gland dysfunction)    Migraines    Obesity    Ocular migraine    BRENDA    Ovarian cyst    Laparoscopic cystectomy    Pregnancy ([facility])    PVD (posterior vitreous detachment), right eye    patient was seen by Dr. Harkins on 3/27/23 for flashes and floaters OD-DX PVD OD    Spinal stenosis    C5-6, C6-7, physical therapy    TIA (transient ischemic attack)    Viral infection characterized by skin and mucous membrane lesions    Visual impairment      Social History:  Social History     Socioeconomic History    Marital status:    Tobacco Use    Smoking status: Former     Current  packs/day: 0.00     Average packs/day: 0.3 packs/day for 2.0 years (0.5 ttl pk-yrs)     Types: Cigarettes     Start date: 2015     Quit date: 2017     Years since quittin.6     Passive exposure: Never    Smokeless tobacco: Never    Tobacco comments:     1 pack/wk per pt.   Vaping Use    Vaping status: Never Used   Substance and Sexual Activity    Alcohol use: Not Currently     Comment: Occasionally    Drug use: No    Sexual activity: Yes     Partners: Male     Birth control/protection: Tubal Ligation   Other Topics Concern    Caffeine Concern Yes     Comment: 2 cups coffee daily    Exercise No   Social History Narrative    The patient does not use an assistive device..      The patient does live in a home with stairs.        Lives with         Work         REVIEW OF SYSTEMS:   Review of Systems   See HPI     EXAM:   /62   Pulse 63   Ht 5' 4\" (1.626 m)   Wt 209 lb 12.8 oz (95.2 kg)   LMP 2010   BMI 36.01 kg/m²   GENERAL: well developed, well nourished,in no apparent distress  LUNGS: clear to auscultation  CARDIO: RRR without murmur  GI: good BS's,no masses, HSM mild epigastric tenderness  EXTREMITIES: no cyanosis, clubbing or edema      ASSESSMENT AND PLAN:   1. Epigastric pain    - US ABDOMEN LIMITED (CPT=76705); Future    2. Gastroesophageal reflux disease, unspecified whether esophagitis present    - US ABDOMEN LIMITED (CPT=76705); Future    3. Anxiety    - zolpidem 5 MG Oral Tab; Take 1 tablet (5 mg total) by mouth nightly as needed for Sleep.  Dispense: 30 tablet; Refill: 0    4. Insomnia, unspecified type    - zolpidem 5 MG Oral Tab; Take 1 tablet (5 mg total) by mouth nightly as needed for Sleep.  Dispense: 30 tablet; Refill: 0      The patient indicates understanding of these issues and agrees to the plan.      Carmelina Gonzalez MD  2024  2:11 PM

## 2024-09-27 ENCOUNTER — PATIENT MESSAGE (OUTPATIENT)
Dept: FAMILY MEDICINE CLINIC | Facility: CLINIC | Age: 64
End: 2024-09-27

## 2024-09-28 ENCOUNTER — HOSPITAL ENCOUNTER (OUTPATIENT)
Dept: GENERAL RADIOLOGY | Age: 64
Discharge: HOME | End: 2024-09-28
Attending: FAMILY MEDICINE
Payer: COMMERCIAL

## 2024-09-28 ENCOUNTER — NURSE TRIAGE (OUTPATIENT)
Dept: FAMILY MEDICINE CLINIC | Facility: CLINIC | Age: 64
End: 2024-09-28

## 2024-09-28 ENCOUNTER — OFFICE VISIT (OUTPATIENT)
Dept: FAMILY MEDICINE CLINIC | Facility: CLINIC | Age: 64
End: 2024-09-28
Payer: COMMERCIAL

## 2024-09-28 ENCOUNTER — TELEPHONE (OUTPATIENT)
Dept: FAMILY MEDICINE CLINIC | Facility: CLINIC | Age: 64
End: 2024-09-28

## 2024-09-28 VITALS
HEART RATE: 64 BPM | WEIGHT: 209.81 LBS | BODY MASS INDEX: 36 KG/M2 | SYSTOLIC BLOOD PRESSURE: 138 MMHG | RESPIRATION RATE: 16 BRPM | DIASTOLIC BLOOD PRESSURE: 79 MMHG

## 2024-09-28 DIAGNOSIS — M25.572 ACUTE LEFT ANKLE PAIN: ICD-10-CM

## 2024-09-28 DIAGNOSIS — M94.9 OSTEOCHONDRAL LESION OF TALAR DOME: Primary | ICD-10-CM

## 2024-09-28 DIAGNOSIS — M89.9 OSTEOCHONDRAL LESION OF TALAR DOME: Primary | ICD-10-CM

## 2024-09-28 DIAGNOSIS — M25.572 ACUTE LEFT ANKLE PAIN: Primary | ICD-10-CM

## 2024-09-28 PROCEDURE — 73610 X-RAY EXAM OF ANKLE: CPT | Performed by: FAMILY MEDICINE

## 2024-09-28 PROCEDURE — 99214 OFFICE O/P EST MOD 30 MIN: CPT | Performed by: FAMILY MEDICINE

## 2024-09-28 RX ORDER — INDOMETHACIN 50 MG/1
50 CAPSULE ORAL DAILY PRN
Qty: 15 CAPSULE | Refills: 0 | Status: SHIPPED | OUTPATIENT
Start: 2024-09-28

## 2024-09-28 NOTE — TELEPHONE ENCOUNTER
Name:SATURDAY TRIAGE RN                      2024 9:27:02 AM  ProfileId:    [id number]                   [facility]  Department:[facility] ANSWERING SERVICE  ======================================================================  Text Messages    Message # [id number]         2024 09:25a   [[email or web address]]  To:  SATURDAY TRIAGE RN  From:  ANGEL SNOW- [birth year]  Primary MD:  RACIEL  Phone#:  [phone]  ----------------------------------------------------------------------  PAIN ON LFT ANKLE    Also sent my chart message:      Can I get an appointment for . I woke up Friday morning and could not walk. Severe pain in left ankle. Have no idea why the pain appeared.     Action Requested: Summary for Provider     []  Critical Lab, Recommendations Needed  [] Need Additional Advice  []   FYI    []   Need Orders  [] Need Medications Sent to Pharmacy  []  Other     SUMMARY: patient called the on call for excruciating left ankle pain to the inner ankle. She has not injured it but wears slipper's often that are not supportive. She advised when she touches the ankle ever lightly it's very painful. She took Ibuprofen, put ice, and numbing cream on it. She is using a cane. She is schedule with Marisa ALMARAZ for Monday but it's hurting so bad she would like to come in today. Scheduled at 11 am with Dr. Valdez for today. Patient will keep Monday appointment in case she needs it.    Reason for call: Ankle Pain  Onset: Data Unavailable                       Reason for Disposition   SEVERE pain (e.g., excruciating, unable to walk) and not improved after 2 hours of pain medicine    Protocols used: Ankle Pain-A-[place]

## 2024-09-28 NOTE — PROGRESS NOTES
Chief Complaint   Patient presents with    Pain     Left ankle x 2days      HPI:   Ju Johnson is a 63 year old female who presents to clinic with complaints of severe L ankle pain.   Was laying on couch, stood up and could not put any weight on the foot dt the pain. No redness, swelling or injury.     No hx of gout or known arthritis.   REVIEW OF SYSTEMS:   Negative, except per HPI.     EXAM:   /79   Pulse 64   Resp 16   Wt 209 lb 12.8 oz (95.2 kg)   LMP 02/08/2010   BMI 36.01 kg/m²   Body mass index is 36.01 kg/m².  GENERAL: well developed, well nourished, in no apparent distress  SKIN: no rashes, no suspicious lesions  HEENT: atraumatic, normocephalic  EYES: PERRLA, EOMI,conjunctiva are clear  NECK: supple, no adenopathy  L foot: ttp over medial ankle, no swelling or redness    ASSESSMENT AND PLAN:      Acute left ankle pain  - acute issue, nsaids and icing   - XR ANKLE (MIN 3 VIEWS), LEFT (CPT=73610); Future  - indomethacin 50 MG Oral Cap; Take 1 capsule (50 mg total) by mouth daily as needed (anti-inflammatory take with food.).  Dispense: 15 capsule; Refill: 0  - Podiatry Referral - Tony ([place] Dr. Felske)     RTC if no improvement in symptoms. Red flags discussed to go to GIOVANY Valdez MD  9/28/2024  11:09 AM

## 2024-09-29 NOTE — TELEPHONE ENCOUNTER
Sourav Dodd,  I have reviewed your test results.  X-ray of the left ankle shows a bony/cartilage lesion in the ankle.  I will order an MRI of the ankle to evaluate further.  You also have some arthritis but no fracture or dislocation.      Please let me know if you have any questions.  Giovanna Valdez MD

## 2024-09-29 NOTE — TELEPHONE ENCOUNTER
From: Ju Johnson  To: Carmelina Gonzalez  Sent: 9/27/2024 10:13 PM CDT  Subject: Severe ankle pain    Can I get an appointment for Saturday September 28th. I woke up Friday morning and could not walk. Severe pain in left ankle. Have no idea why the pain appeared.

## 2024-09-30 ENCOUNTER — TELEPHONE (OUTPATIENT)
Dept: ORTHOPEDICS CLINIC | Facility: CLINIC | Age: 64
End: 2024-09-30

## 2024-09-30 NOTE — TELEPHONE ENCOUNTER
Patient scheduled with dr Devlin for left ankle pain and is getting mri done prior to appt. Please advise for additional imaging.   Future Appointments   Date Time Provider Department Center   10/2/2024 12:00 PM [facility] MRI RM1 (1.5T) [facility]O MRI EM [place]   10/22/2024  1:00 PM Mercedez Patterson APRN [facility]   10/25/2024 11:00 AM Caridad Devlin DPM [facility] [place]   1/29/2025  1:15 PM Prachi Montanez MD [facility]  [facility]

## 2024-10-01 NOTE — TELEPHONE ENCOUNTER
Please review. Protocol Failed; No Protocol    Requested Prescriptions   Pending Prescriptions Disp Refills    nystatin 230957 UNIT/ML Mouth/Throat Suspension 473 mL 0     Sig: Take 5 mL (500,000 Units total) by mouth 4 (four) times daily.       There is no refill protocol information for this order            Future Appointments         Provider Department Appt Notes    Tomorrow ADO MRI RM1 (1.5T) [facility] [place] Pt will have new insurance. Please update when she comes in. 09;30cz    In 3 weeks Mercedez Patterson APRN [facility] abdominal pain and tenderness    In 3 weeks Caridad Devlin DPM [facility] [address], [place] left Ankle pain (getting mri on 10/2)    In 4 months Prachi Montanez MD [facility] A1C          Recent Outpatient Visits              3 days ago Acute left ankle pain    [facility] Giovanna Valdez MD    Office Visit    1 week ago Epigastric pain    [facility] Carmelina Gonzalez MD    Office Visit    4 weeks ago Vitamin D deficiency    [facility] Carmelina Gonzalez MD    Office Visit    5 months ago Condyloma acuminatum    [facility] Becky Rosenthal CNM    Office Visit    6 months ago Vitamin D deficiency    [facility] Prachi Montanez MD    Office Visit

## 2024-10-02 ENCOUNTER — HOSPITAL ENCOUNTER (OUTPATIENT)
Dept: MRI IMAGING | Age: 64
Discharge: HOME | End: 2024-10-02
Attending: FAMILY MEDICINE
Payer: MEDICARE

## 2024-10-02 DIAGNOSIS — M89.9 OSTEOCHONDRAL LESION OF TALAR DOME: ICD-10-CM

## 2024-10-02 DIAGNOSIS — M94.9 OSTEOCHONDRAL LESION OF TALAR DOME: ICD-10-CM

## 2024-10-02 PROCEDURE — 73721 MRI JNT OF LWR EXTRE W/O DYE: CPT | Performed by: FAMILY MEDICINE

## 2024-10-02 RX ORDER — NYSTATIN 100000 [USP'U]/ML
5 SUSPENSION ORAL 4 TIMES DAILY
Qty: 473 ML | Refills: 0 | Status: SHIPPED | OUTPATIENT
Start: 2024-10-02

## 2024-10-06 DIAGNOSIS — M25.572 ACUTE LEFT ANKLE PAIN: ICD-10-CM

## 2024-10-08 RX ORDER — INDOMETHACIN 50 MG/1
50 CAPSULE ORAL AS NEEDED
Qty: 15 CAPSULE | Refills: 0 | Status: SHIPPED | OUTPATIENT
Start: 2024-10-08

## 2024-10-08 NOTE — TELEPHONE ENCOUNTER
Please review. New medication written 9/28/24 #15 with zero refills.  Is refill appropriate?     Requested Prescriptions   Pending Prescriptions Disp Refills    INDOMETHACIN 50 MG Oral Cap [Pharmacy Med Name: Indomethacin 50 Mg Cap [facility]] 15 capsule 0     Sig: TAKE ONE CAPSULE BY MOUTH DAILY AS NEEDED WITH FOOD .       Non-Narcotic Pain Medication Protocol Passed - 10/6/2024  1:09 PM        Passed - In person appointment or virtual visit in the past 6 mos or appointment in next 3 mos     Recent Outpatient Visits              1 week ago Acute left ankle pain    [facility] Giovanna Valdez MD    Office Visit    2 weeks ago Epigastric pain    [facility] Carmelina Gonzalez MD    Office Visit    1 month ago Vitamin D deficiency    [facility] Carmelina Gonzalez MD    Office Visit    6 months ago Condyloma acuminatum    [facility] Becky Rosenthal CNM    Office Visit    6 months ago Vitamin D deficiency    [facility] Prachi Montanez MD    Office Visit          Future Appointments         Provider Department Appt Notes    In 2 weeks Mercedez Patterson APRN [facility] [place] abdominal pain and tenderness    In 2 weeks Caridad Devlin DPM [facility] [address], [place] left Ankle pain (getting mri on 10/2)    In 1 month ADO US [address] Ultrasound - [place]     In 3 months Prachi Montanez MD [facility] A1C                       Future Appointments         Provider Department Appt Notes    In 2 weeks Mercedez Patterson APRN [facility] [place] abdominal pain and tenderness    In 2 weeks Caridad Devlin DPM [facility] [address], [place] left  Ankle pain (getting mri on 10/2)    In 1 month ADO US RM1 [facility] Ultrasound - [place]     In 3 months Prachi Montanez MD [facility] A1C          Recent Outpatient Visits              1 week ago Acute left ankle pain    [facility] Giovanna Valdez MD    Office Visit    2 weeks ago Epigastric pain    [facility] Carlos, Carmelina Andrews MD    Office Visit    1 month ago Vitamin D deficiency    [facility] Carlos, Carmelina Andrews MD    Office Visit    6 months ago Condyloma acuminatum    [facility] Becky Rosenthal CNM    Office Visit    6 months ago Vitamin D deficiency    [facility] Prachi Montanez MD    Office Visit

## 2024-10-16 ENCOUNTER — PATIENT MESSAGE (OUTPATIENT)
Dept: FAMILY MEDICINE CLINIC | Facility: CLINIC | Age: 64
End: 2024-10-16

## 2024-10-18 ENCOUNTER — PATIENT MESSAGE (OUTPATIENT)
Dept: ENDOCRINOLOGY CLINIC | Facility: CLINIC | Age: 64
End: 2024-10-18

## 2024-10-19 RX ORDER — BLOOD-GLUCOSE METER
1 EACH MISCELLANEOUS DAILY
Qty: 1 KIT | Refills: 0 | Status: SHIPPED | OUTPATIENT
Start: 2024-10-19

## 2024-10-20 RX ORDER — ATORVASTATIN CALCIUM 40 MG/1
40 TABLET, FILM COATED ORAL DAILY
Qty: 90 TABLET | Refills: 3 | Status: SHIPPED | OUTPATIENT
Start: 2024-10-20

## 2024-10-20 NOTE — TELEPHONE ENCOUNTER
Refill passed per [facility] protocol.    Requested Prescriptions   Pending Prescriptions Disp Refills    atorvastatin 40 MG Oral Tab 90 tablet 3     Sig: Take 1 tablet (40 mg total) by mouth daily.       Cholesterol Medication Protocol Passed - 10/20/2024 10:48 AM        Passed - ALT < 80     Lab Results   Component Value Date    ALT 22 09/16/2024             Passed - ALT resulted within past year        Passed - Lipid panel within past 12 months     Lab Results   Component Value Date    CHOLEST 183 09/16/2024    TRIG 194 (H) 09/16/2024    HDL 60 (H) 09/16/2024    LDL 90 09/16/2024    VLDL 31 (H) 09/16/2024    NONHDLC 123 09/16/2024             Passed - In person appointment or virtual visit in the past 12 mos or appointment in next 3 mos     Recent Outpatient Visits              3 weeks ago Acute left ankle pain    [facility] Giovanna Valdez MD    Office Visit    1 month ago Epigastric pain    [facility] Carmelina Gonzalez MD    Office Visit    1 month ago Vitamin D deficiency    [facility] Carmelina Gonzalez MD    Office Visit    6 months ago Condyloma acuminatum    [facility] Becky Rosenthal CNM    Office Visit    7 months ago Vitamin D deficiency    [facility] Prachi Montanez MD    Office Visit          Future Appointments         Provider Department Appt Notes    In 2 days Mercedez Patterson APRN [facility] abdominal pain and tenderness    In 3 weeks ADO US 1 [facility] Ultrasound - [place]     In 3 months Prachi Montanez MD [facility] A1C                         Future Appointments         Provider Department Appt Notes    In 2 days Mercedez Patterson APRN [facility]  [facility] abdominal pain and tenderness    In 3 weeks ADO US RM1 [facility] Ultrasound - [place]     In 3 months Prachi Montanez MD [facility] A1C            Recent Outpatient Visits              3 weeks ago Acute left ankle pain    [facility] Giovanna Valdez MD    Office Visit    1 month ago Epigastric pain    [facility] Carlos, Carmelina Andrews MD    Office Visit    1 month ago Vitamin D deficiency    [facility] Carmelina Gonzalez MD    Office Visit    6 months ago Condyloma acuminatum    [facility] Becky Rosenthal CNM    Office Visit    7 months ago Vitamin D deficiency    [facility] Prachi Montanez MD    Office Visit

## 2024-10-21 RX ORDER — PEN NEEDLE, DIABETIC 32GX 5/32"
1 NEEDLE, DISPOSABLE MISCELLANEOUS DAILY
Qty: 100 EACH | Refills: 1 | Status: SHIPPED | OUTPATIENT
Start: 2024-10-21

## 2024-10-21 NOTE — TELEPHONE ENCOUNTER
Endocrine Refill protocol for Glucose testing supplies     Protocol Criteria: PASSED Reason: N/A    If below requirement is met, send a 90-day supply with 1 refill per provider protocol.    Verify appointment with Endocrinology completed in the last 6 months or scheduled in the next 3 months.    Last completed office visit: 3/20/2024 Prachi Montanez MD   Next scheduled Follow up:   Future Appointments   Date Time Provider Department Center   1/29/2025  1:15 PM Prachi Montanze MD [facility]

## 2024-10-22 ENCOUNTER — HOSPITAL ENCOUNTER (OUTPATIENT)
Dept: GENERAL RADIOLOGY | Facility: HOSPITAL | Age: 64
Discharge: HOME | End: 2024-10-22
Attending: NURSE PRACTITIONER
Payer: MEDICARE

## 2024-10-22 ENCOUNTER — OFFICE VISIT (OUTPATIENT)
Facility: CLINIC | Age: 64
End: 2024-10-22
Payer: MEDICARE

## 2024-10-22 VITALS
BODY MASS INDEX: 35.68 KG/M2 | HEIGHT: 64 IN | DIASTOLIC BLOOD PRESSURE: 58 MMHG | HEART RATE: 67 BPM | SYSTOLIC BLOOD PRESSURE: 114 MMHG | WEIGHT: 209 LBS

## 2024-10-22 DIAGNOSIS — K59.09 CHRONIC CONSTIPATION: ICD-10-CM

## 2024-10-22 DIAGNOSIS — R10.9 LEFT SIDED ABDOMINAL PAIN: ICD-10-CM

## 2024-10-22 DIAGNOSIS — R10.9 LEFT SIDED ABDOMINAL PAIN: Primary | ICD-10-CM

## 2024-10-22 PROCEDURE — 74021 RADEX ABDOMEN 3+ VIEWS: CPT | Performed by: NURSE PRACTITIONER

## 2024-10-22 PROCEDURE — 99213 OFFICE O/P EST LOW 20 MIN: CPT | Performed by: NURSE PRACTITIONER

## 2024-10-22 NOTE — H&P
[facility] - Gastroenterology                                                                                                               Reason for consult: constipation and epigastric pain    Requesting physician or provider: Carmelina Gonzalez MD    Chief Complaint   Patient presents with    Pain     Left sided    Constipation       HPI:   Ju Johnson is a 64 year old year-old female with medical history of diabetes, hyperlipidemia, COPD.     she is here today for evaluation of epigastric pain that extends down to the left side x1 year. Has had same pain for past 10 years but has gotten worse this year. Has history of chronic constipation, is not sure if bowel movement makes pain better. Takes Dulcolax every night so she can have daily bowel movement in the morning. Miralax caused to much bloating. Metamucil work sometimes. Has tried Linzess but caused diarrhea. Has an abdominal ultrasound scheduled from PCP next month.   Has had a lot of stress this year and is not eating healthy.       Patient denies symptoms of nausea, vomiting, dyspepsia, dysphagia, odynophagia, globus sensation, heartburn, hematemesis, change in bowel habits, diarrhea, hematochezia, or melena. she denies recent change in appetite, fever or unintentional weight loss.      Last colonoscopy/EGD:  8/3/21-   7 year recall for TA  No findings on biopsies from EGD  Post-Operative Diagnosis:   1.  Normal z-line endoscopically.  Small hiatal hernia, 1cm  2.  Mild gastritis  3.  Normal small bowel  4.  A few benign appearing gastric polyps  5.  Small rectosigmoid colon polyp, 8mm, removed with cold snare  6.  Left sided diverticulosis  7.  No other abnormalities in the colon.   8.  Small internal hemorrhoids.          Procedure Performed:   EGD with biopsies  COLONOSCOPY with cold snare polypectomy  Informed Consent: Informed consent for both the procedure  and sedation were obtained from the patient. The potentially life-threatening complications of sedation, bleeding,  perforation, transfusion or repeat endoscopy were reviewed along with the possible need for hospitalization, surgical management, transfusion or repeat endoscopy should one of these complications arise. The patient understands and is agreeable to proceed.  Sedation Type: MAC-Patient received sedation with monitored anesthesia provided by an anesthesiologist    Moderate Sedation Time: None.  Deep sedation provided by anesthesia.     Cecum Withdrawal Time:  8 min     Date of previous colonoscopy:  2016     Procedure Description: The patient was placed in the left lateral decubitus position. A bite block was placed in the patient’s mouth. The endoscope was inserted through the mouth and advanced under direct visualization to the 3rd portion of the duodenum and was then withdrawn to examine the duodenal bulb and gastric antrum.  The endoscope was then retroflexed to examine the angulus, GE junction, cardia, body and fundus,  then withdrawn to examine the esophagus. The endoscope was then removed from the patient. The patient tolerated the procedure well with no immediate complications. The patient was then repositioned for colonoscopy.  After careful digital rectal examination, the Adult colonoscope was inserted into the rectum and advanced to the level of the cecum under direct visualization. The cecum was identified by landmarks, including the appendiceal orifice and ileoceccal valve. Careful examination of the entire colon was performed during withdrawal of the endoscope. The scope was withdrawn to the rectum and retroflexion was performed.  The patient tolerated the procedure well with no immediate complications. The patient was transferred to the recovery area in stable condition.   Quality of Preparation: Adequate  Aronchick Bowel Prep Scale:  Excellent - 1     Findings:                ESOPHAGUS:   Normal esophagus.  The Z-line and GE junction noted at 37cms from the incisors and was normal.  No endoscopic evidence of nguyen's esophagus.  There was a 1cm hiatal hernia noted.                STOMACH:  Mild gastric erythema suggestive of mild gastritis.  There were a few gastric polyps noted in the body and fundus.  Likely fundic gland polyps.   Retroflexed view was unremarkable.  Random biopsies taken from the stomach with cold forceps.                 DUODENUM:  Normal visualized duodenal mucosa.  Random biopsies taken with cold forceps to rule out celiac disease.               COLON:                              1.  There was a rectosigmoid colon sessile polyp, 8mm, fully removed with cold snare polypectomy.  Retrieved.                             2.  Left sided diverticulosis.                             3.  No other abnormalities throughout the colon.                              4.  Small internal hemorrhoids.       Recommendations:   1.  Follow up pathology results  2.  Fiber supplementation such as benefiber or citrucel daily.  3.  Repeat colonoscopy in 5 years.   4.  Continue trulance.       No history of adverse reaction to sedation  No BRENDA  No anticoagulants/antiplatelet  No pacemaker/defibrillator    Wt Readings from Last 6 Encounters:   10/22/24 209 lb (94.8 kg)   09/28/24 209 lb 12.8 oz (95.2 kg)   09/18/24 209 lb 12.8 oz (95.2 kg)   09/03/24 209 lb 3.2 oz (94.9 kg)   06/04/24 210 lb (95.3 kg)   04/05/24 210 lb (95.3 kg)        History, Medications, Allergies, ROS:      Past Medical History:    Acute non-recurrent maxillary sinusitis    Age-related nuclear cataract of both eyes    Allergic rhinitis    pereniaal and seasonal    Alternating exotropia    Amenorrhea    Anxiety    Atrial tachycardia, paroxysmal (HCC)    Barretts esophagus    Carpal tunnel syndrome    Chlamydia    Choroidal nevus, right eye    COPD (chronic obstructive pulmonary disease) (HCC)    Depression    Diabetes (HCC)    Diabetes  mellitus type 2 with complications (HCC)    Diabetic retinopathy of left eye (HCC)    Dry eyes    DUB (dysfunctional uterine bleeding)    endometrial biopsy    Eczema    Esophageal reflux    Essential hypertension    Exotropia    Fibromyalgia    Genital warts    Heart murmur    Herpes simplex    High blood pressure    High cholesterol    History of pregnancy    Hyperlipidemia    Irregular menstrual cycle    Neg endometrial biopsy    Irritable bowel syndrome    MGD (meibomian gland dysfunction)    Migraines    Obesity    Ocular migraine    BRENDA    Ovarian cyst    Laparoscopic cystectomy    Pregnancy (HCC)    PVD (posterior vitreous detachment), right eye    patient was seen by Dr. Harkins on 3/27/23 for flashes and floaters OD-DX PVD OD    Spinal stenosis    C5-6, C6-7, physical therapy    TIA (transient ischemic attack)    Viral infection characterized by skin and mucous membrane lesions    Visual impairment      Past Surgical History:   Procedure Laterality Date    Colonoscopy      Cyst removal      Egd N/A 2021    Procedure: ESOPHAGOGASTRODUODENOSCOPY, with bxs COLONOSCOPY with polypectomy;  Surgeon: Sy Slater MD;  Location: [facility]    Electrocardiogram, complete  2013    scanned to media tab          Other surgical history      Laparoscopic cystectomy    Other surgical history      Endometrial biopsy    Other surgical history      Endometrial biopsy    Other surgical history  2017    cervical neck surgery     Spine surgery procedure unlisted  2017    cervical     Tubal ligation  1996      Family Hx:   Family History   Problem Relation Age of Onset    Colon Cancer Mother     Heart Disease Mother         coronary artery disease    Hypertension Mother     Depression Mother     Heart Attack Mother     Heart Disorder Mother     Heart Disease Father         coronary artery disease    Diabetes Father     Heart Attack Father     Heart Disorder  Father     Glaucoma Sister     Diabetes Sister     Dementia Sister     Other (Other) Sister         Vascular necrosis    Ovarian Cancer Maternal Aunt     Seizure Disorder Sister       Social History:   Social History     Socioeconomic History    Marital status:    Tobacco Use    Smoking status: Former     Current packs/day: 0.00     Average packs/day: 0.3 packs/day for 2.0 years (0.5 ttl pk-yrs)     Types: Cigarettes     Start date: 2015     Quit date: 2017     Years since quittin.7     Passive exposure: Never    Smokeless tobacco: Never    Tobacco comments:     1 pack/wk per pt.   Vaping Use    Vaping status: Never Used   Substance and Sexual Activity    Alcohol use: Not Currently     Comment: Occasionally    Drug use: No    Sexual activity: Yes     Partners: Male     Birth control/protection: Tubal Ligation   Other Topics Concern    Caffeine Concern Yes     Comment: 2 cups coffee daily    Exercise No   Social History Narrative    The patient does not use an assistive device..      The patient does live in a home with stairs.        Lives with         Work         Medications (Active prior to today's visit):  Current Outpatient Medications   Medication Sig Dispense Refill    Insulin Pen Needle (BD PEN NEEDLE ESTEVAN 2ND GEN) 32G X 4 MM Does not apply Misc USE ONE PEN NEEDLE DAILY 100 each 1    atorvastatin 40 MG Oral Tab Take 1 tablet (40 mg total) by mouth daily. 90 tablet 3    Blood Glucose Monitoring Suppl (CONTOUR NEXT MONITOR) w/Device Does not apply Kit 1 each daily. 1 kit 0    indomethacin 50 MG Oral Cap Take 1 capsule (50 mg total) by mouth as needed. 15 capsule 0    nystatin 399470 UNIT/ML Mouth/Throat Suspension Take 5 mL (500,000 Units total) by mouth 4 (four) times daily. 473 mL 0    GLIMEPIRIDE 1 MG Oral Tab TAKE TWO TABLETS BY MOUTH DAILY WITH BREAKFAST 180 tablet 0    zolpidem 5 MG Oral Tab Take 1 tablet (5 mg total) by mouth nightly as needed for Sleep. 30  tablet 0    Cinnamon 500 MG Oral Cap Take 500 mg by mouth in the morning and 500 mg before bedtime.      ERGOCALCIFEROL 1.25 MG (57026 UT) Oral Cap TAKE ONE CAPSULE BY MOUTH WEEKLY 12 capsule 0    hydrOXYzine 25 MG Oral Tab Take 1 tablet (25 mg total) by mouth every 8 (eight) hours as needed for Itching. 40 tablet 0    TRESIBA FLEXTOUCH 100 UNIT/ML Subcutaneous Solution Pen-injector INJECT 50 UNITS (0.5ML) SUBCUTANEOUSLY DAILY AT 4PM. 30 mL 1    diazePAM 5 MG Oral Tab Take 1 tablet (5 mg total) by mouth every 12 (twelve) hours as needed. 30 tablet 0    Lansoprazole 15 MG Oral Capsule Delayed Release Take 1 capsule (15 mg total) by mouth 2 (two) times daily. 180 capsule 3    Budesonide-Formoterol Fumarate 160-4.5 MCG/ACT Inhalation Aerosol Inhale 2 puffs into the lungs 2 (two) times daily. 30.6 g 2    montelukast 10 MG Oral Tab Take 1 tablet (10 mg total) by mouth daily. 90 tablet 3    amLODIPine 5 MG Oral Tab       TRULICITY 1.5 MG/0.5ML Subcutaneous Solution Pen-injector Inject 1.5 mg into the skin every 7 days. 6 mL 1    methocarbamol 750 MG Oral Tab Take 1 tablet (750 mg total) by mouth nightly as needed. 90 tablet 0    albuterol 108 (90 Base) MCG/ACT Inhalation Aero Soln Inhale 2 puffs into the lungs every 4 (four) hours as needed for Wheezing. 25.5 g 3    CONTOUR NEXT TEST In Vitro Strip TEST SUGAR  2 TIMES DAILY 200 strip 0    fluticasone propionate 50 MCG/ACT Nasal Suspension use 2 sprays by Each nostrils route daily. 48 g 1    valACYclovir 500 MG Oral Tab Take 1 tablet (500 mg total) by mouth daily. 90 tablet 4    diclofenac 1 % External Gel Apply 4 g topically 4 (four) times daily. 350 g 3    meclizine 25 MG Oral Tab Take 1 tablet (25 mg total) by mouth 3 (three) times daily as needed for Dizziness or Nausea. 30 tablet 5    chlorhexidine gluconate 0.12 % Mouth/Throat Solution       losartan 50 MG Oral Tab Take 1 tablet (50 mg total) by mouth in the morning and 1 tablet (50 mg total) before bedtime. 180  tablet 4    Propranolol HCl 80 MG Oral Tab Take 1 tablet (80 mg total) by mouth 2 (two) times daily. 180 tablet 4    PATIENT SUPPLIED MEDICATION Vitamin B12, magnesium, zinc, vitamin c, & garlic      magnesium oxide 400 MG Oral Tab Take 1 tablet (400 mg total) by mouth daily.      triamcinolone acetonide 0.1 % External Cream Apply topically 2 (two) times daily as needed. 30 g 3    Ammonium Lactate (LAC-HYDRIN) 12 % External Cream Apply to affected areas 1-2x/day 1 Tube 3    Clobetasol Propionate 0.05 % External Ointment Apply 1 Application topically daily as needed. Apply to affected areas 1x/day as needed 30 g 2    aspirin 81 MG Oral Tab Take 1 tablet (81 mg total) by mouth daily.      acetaminophen 500 MG Oral Tab Take 1 tablet (500 mg total) by mouth every 6 (six) hours as needed for Pain.      ketoconazole 2 % External Cream MELVIN EXT AA BID  0    cetirizine 10 MG Oral Tab Take 1 tablet (10 mg total) by mouth daily.      cyclobenzaprine 5 MG Oral Tab Take 1 tablet (5 mg total) by mouth 3 (three) times daily as needed for Muscle spasms. (Patient not taking: Reported on 10/22/2024) 30 tablet 0    Diclofenac Sodium 1 % Transdermal Gel Apply 2 g topically 4 (four) times daily. (Patient not taking: Reported on 10/22/2024) 50 g 1    acyclovir 5 % External Ointment Apply 1 Application topically every 3 (three) hours. (Patient not taking: Reported on 10/22/2024) 30 g 2    Ciclopirox 8 % External Solution Apply 1 Application topically nightly. (Patient not taking: Reported on 10/22/2024) 1 Bottle 2       Allergies:  Allergies[1]    ROS:   CONSTITUTIONAL: negative for fevers, chills, sweats  EYES Negative for scleral icterus or redness, and diplopia  HEENT: Negative for hoarseness  RESPIRATORY: Negative for cough and severe shortness of breath  CARDIOVASCULAR: Negative for crushing sub-sternal chest pain  GASTROINTESTINAL: See HPI  GENITOURINARY: Negative for dysuria  MUSCULOSKELETAL: Negative for arthralgias and  myalgias  SKIN: Negative for jaundice, rash or pruritus  NEUROLOGICAL: Negative for dizziness and headaches  BEHAVIOR/PSYCH: Negative for psychotic behavior    PHYSICAL EXAM:   Blood pressure 114/58, pulse 67, height 5' 4\" (1.626 m), weight 209 lb (94.8 kg), last menstrual period 02/08/2010, not currently breastfeeding.    GEN: Alert, no acute distress, well-nourished   HEENT: anicteric sclera, neck supple, trachea midline, MMM, no palpable or tender neck or supraclavicular lymph nodes  CV: RRR, the extremities are warm and well perfused   LUNGS: No increased work of breathing, CTAB  ABDOMEN: Soft, symmetrical, non-tender without distention or guarding. No scars or lesions. Aorta is without bruit or visible pulsation. Umbilicus is midline without herniation.+hypoactive bowel sounds are present, No masses, hepatomegaly or splenomegaly noted.  MSK: No erythema, no warmth, no swelling of joints  SKIN: No jaundice, no erythema, no rashes, no lesions  HEMATOLOGIC: No bleeding, no bruising  NEURO: Alert and interactive, RAMÍREZ  PSYCH: appropriate mood & affect    Labs/Imaging/Procedures:     Patient's pertinent labs and imaging were reviewed and discussed with patient today.        .  ASSESSMENT/PLAN:   Ju Johnson is a 64 year old year-old female with medical history of diabetes, hyperlipidemia, COPD.    Will obtain abdominal x-ray to assess constipation. Advised that Trulicity may be making constipation worse. Will restart Metamucil as well but difficult to separate from other meds. Follow up pending x-ray and ultrasound results.     Recommendations:  -Increase water intake to 64oz of water per day  -Increase fiber to 20-30 g per day with fruits, vegetables and whole grains  -Increase exercise to 150 mins/week  -Do not ignore urge to defecate  -start use of squatty  -Avoid artifical sugars  -If bowel habits change or constipation worsens, call our office sooner   -follow up with Timothy in 6 weeks     1. Left sided abdominal  pain  - XR ABDOMEN, OBSTRUCTIVE SERIES 3 VIEWS(CPT=74021); Future    2. Chronic constipation  - XR ABDOMEN, OBSTRUCTIVE SERIES 3 VIEWS(CPT=74021); Future         Patient Instructions   -Schedule x-ray   -keep ultrasound scheduled  -ok to continue Dulcolax and Metamucil      Orders This Visit:  No orders of the defined types were placed in this encounter.      Meds This Visit:  Requested Prescriptions      No prescriptions requested or ordered in this encounter       Imaging & Referrals:  None      ANGELINA Randhawa    [facility] Gastroenterology  10/22/2024               [1]   Allergies  Allergen Reactions    Liraglutide SWELLING    Meloxicam PALPITATIONS    Penicillins ITCHING and SWELLING     No skin peeling or organ damage    Sulindac HIVES    Acyclovir DIZZINESS    Clarithromycin NAUSEA AND VOMITING    Methylprednisolone PAIN    Ciprofloxacin UNKNOWN    Cymbalta [Duloxetine Hcl] OTHER (SEE COMMENTS)     Tingling arms/legs, blurry vision, elevated glucose, HA     Gabapentin UNKNOWN    Metronidazole UNKNOWN    Naproxen Sodium OTHER (SEE COMMENTS)     Tingling to limbs    Nitrofurantoin UNKNOWN    Nitrofurantoin Macrocrystal UNKNOWN    Valsartan UNKNOWN    Doxycycline RASH    Rosuvastatin DIARRHEA

## 2024-11-01 NOTE — PROGRESS NOTES
"Noted per Dr. Alvarez's 2-20-24 visit note \"Chest pain: Somewhat atypical. Given risk factors will recommend Lexiscan nuclear stress test for further evaluation.\"     Per 2-21-24 Lexiscan nuc results note:  Stress test negative for inducible ischemia, no new recommendations at this time.  Cary Alvarez MD  2/28/2024  1:37 PM CST    Per 8/19/24 phone encounter:   Msg rec'd [phone] @ [phone]:  Cary Alvarez MD Gorshe, Maureen, RN  Agree on PCP. Can offer a follow up with us if she would like depending on PCP visit  Response noted - no further action needed at this time.  mg    ==  Return phone call to pt who provided a symptom update. Pt states she has been having chest pain for the past few weeks. Pt states she has difficulty describing these episodes and how it feels. Per pt, comes on randomly, not always correlated with activity. States that it ranges from being achy to sharp to having rather severe pressure. States that her episodes lasts generally 5 minutes and resolve. Happens roughly every 2 hours. States it can sometimes make it hard for her to fall asleep and does occasionally wake her up.     Questioned pt about if these episodes feel similar to the ones from August or at appointment in February. Pt stated she is unsure but notes similarities. Questioned if pt follow-up with PCP as recommended in August and pt stated she has not tried to make an appointment with her PCP.     Pt stated that while on the phone she feels tightness/pressure currently on the phone. States she also ate lunch 30-60 minutes ago. Denies any SOB, numbness/tingling, dizziness, jaw pain, arm pain.     Encouraged pt to set up a follow-up with her PCP as was recommended in August. Reassured pt that update would be sent to Dr. Alvarez to review and provide recommendations. Instructed pt if chest pain worsens, increases in frequency, or develops new or worsening symptoms to present to the ED for more acute evaluation. Understanding " Please call pt, no cancer X-ray was normal showing no acute infection. - Dr. Aundrea Mills verbalized. LMS

## 2024-11-05 ENCOUNTER — PATIENT MESSAGE (OUTPATIENT)
Dept: ENDOCRINOLOGY CLINIC | Facility: CLINIC | Age: 64
End: 2024-11-05

## 2024-11-05 DIAGNOSIS — E11.9 CONTROLLED TYPE 2 DIABETES MELLITUS WITHOUT COMPLICATION, WITHOUT LONG-TERM CURRENT USE OF INSULIN (HCC): Primary | ICD-10-CM

## 2024-11-07 RX ORDER — DULAGLUTIDE 1.5 MG/.5ML
1.5 INJECTION, SOLUTION SUBCUTANEOUS WEEKLY
Qty: 6 ML | Refills: 1 | Status: SHIPPED | OUTPATIENT
Start: 2024-11-07

## 2024-11-07 NOTE — TELEPHONE ENCOUNTER
Patient requesting refill on Trulicity. Has appointment on 11/18/24.    Endocrine Refill protocol for oral and injectable diabetic medications    Protocol Criteria:  PASSED  Reason: N/A    If all below requirements are met, send a 90-day supply with 1 refill per provider protocol.    Verify appointment with Endocrinology completed in the last 6 months or scheduled in the next 3 months.  Verify A1C has been completed within the last 6 months and is below 8.5%     Last completed office visit: 3/20/2024 Prachi Montanez MD   Next scheduled Follow up:   Future Appointments   Date Time Provider Department Center   11/14/2024  9:45 AM ADO  RM1 ADO US [facility] [place]   11/18/2024 11:00 AM Prachi Montanez MD [facility]   1/29/2025  1:15 PM Prachi Montanez MD [facility]      Last A1c result: Last A1c value was 7.7% done 9/16/2024.

## 2024-11-11 NOTE — TELEPHONE ENCOUNTER
Dr. Montanez, (FYI)  Patient recently started Medicare - cost of trulicity >$400/month (d/t deductible) - patient has apt on 11/18/24 and apt note updated to discuss alternatives  Thanks

## 2024-11-14 ENCOUNTER — NURSE ONLY (OUTPATIENT)
Dept: FAMILY MEDICINE CLINIC | Facility: CLINIC | Age: 64
End: 2024-11-14
Payer: MEDICARE

## 2024-11-14 ENCOUNTER — HOSPITAL ENCOUNTER (OUTPATIENT)
Dept: ULTRASOUND IMAGING | Age: 64
Discharge: HOME | End: 2024-11-14
Attending: FAMILY MEDICINE
Payer: MEDICARE

## 2024-11-14 DIAGNOSIS — Z23 NEED FOR VACCINATION: Primary | ICD-10-CM

## 2024-11-14 DIAGNOSIS — R10.13 EPIGASTRIC PAIN: ICD-10-CM

## 2024-11-14 DIAGNOSIS — K21.9 GASTROESOPHAGEAL REFLUX DISEASE, UNSPECIFIED WHETHER ESOPHAGITIS PRESENT: ICD-10-CM

## 2024-11-14 PROCEDURE — 90677 PCV20 VACCINE IM: CPT | Performed by: FAMILY MEDICINE

## 2024-11-14 PROCEDURE — G0009 ADMIN PNEUMOCOCCAL VACCINE: HCPCS | Performed by: FAMILY MEDICINE

## 2024-11-14 PROCEDURE — 76705 ECHO EXAM OF ABDOMEN: CPT | Performed by: FAMILY MEDICINE

## 2024-11-14 NOTE — PROGRESS NOTES
Patient came in for Pcv 20 vaccine. Verified . Patient tolerated vaccine well and no reaction at this time. VIS given to patient.

## 2024-11-18 ENCOUNTER — OFFICE VISIT (OUTPATIENT)
Dept: ENDOCRINOLOGY CLINIC | Facility: CLINIC | Age: 64
End: 2024-11-18
Payer: MEDICARE

## 2024-11-18 VITALS
BODY MASS INDEX: 37 KG/M2 | DIASTOLIC BLOOD PRESSURE: 71 MMHG | HEART RATE: 56 BPM | WEIGHT: 213 LBS | SYSTOLIC BLOOD PRESSURE: 124 MMHG

## 2024-11-18 DIAGNOSIS — E11.9 CONTROLLED TYPE 2 DIABETES MELLITUS WITHOUT COMPLICATION, WITHOUT LONG-TERM CURRENT USE OF INSULIN (HCC): Primary | ICD-10-CM

## 2024-11-18 LAB
GLUCOSE BLOOD: 236
HEMOGLOBIN A1C: 8.1 % (ref 4.3–5.6)
TEST STRIP LOT #: NORMAL NUMERIC

## 2024-11-18 PROCEDURE — 99214 OFFICE O/P EST MOD 30 MIN: CPT | Performed by: INTERNAL MEDICINE

## 2024-11-18 PROCEDURE — 82947 ASSAY GLUCOSE BLOOD QUANT: CPT | Performed by: INTERNAL MEDICINE

## 2024-11-18 PROCEDURE — 83036 HEMOGLOBIN GLYCOSYLATED A1C: CPT | Performed by: INTERNAL MEDICINE

## 2024-11-18 RX ORDER — PIOGLITAZONE 15 MG/1
15 TABLET ORAL DAILY
Qty: 90 TABLET | Refills: 1 | Status: SHIPPED | OUTPATIENT
Start: 2024-11-18

## 2024-11-18 RX ORDER — GLIMEPIRIDE 2 MG/1
2 TABLET ORAL 2 TIMES DAILY
Qty: 180 TABLET | Refills: 1 | Status: SHIPPED | OUTPATIENT
Start: 2024-11-18

## 2024-11-18 NOTE — PROGRESS NOTES
Name: Ju Johnson  Date: 2024    Referring Physician: No ref. provider found    HISTORY OF PRESENT ILLNESS   Ju Johnson is a 64 year old female who presents for diabetes mellitus.     Since last visit she lost her  with increased stress.     Prior HbA, C or glycohemoglobin were 8.5% 2014; 7.9% 2014; 8.0% 2015; 8.9% 2016; 7.3% 2016; 7.3% 2017; 7.0% 2018; 6.8% 2019; 7.1% 2020; 6.7% 2020; 7.4% 2021; 7.0% 3/2022; 8.5% 2022; 7.6% 2022; 7.9% 2023; 7.5% 3/2024; 8.1% POC Today     Dietary compliance: Good -->improved diet since last visit  Exercise: No  Polyuria/polydipsia: No  Blurred vision: No    Episodes of hypoglycemia: No    Blood Glucose:  Checking 1-2 times per day  Fastin,148,138,165,160,169,150,144    Medications for DM  Glimepiride 2mg PO daily  Trulicity 1.5mg SQ weekly -->no longer affordable   Tresiba 54 units SQ QHS     Intolerant of Semglee and Basaglar due to myalgias, weight gain - see MyChart encounter     Invokana (d/c'd due to yeast infections)  Intolerant Victoza  Metformin -->intolerant due to diarrhea  Bydureon 2mg SQ weekly -->intolerant      REVIEW OF SYSTEMS  Eyes: Diabetic retinopathy present: No            Most recent visit to eye doctor in last 12 months: Yes    CV: Cardiovascular disease present: No         Hypertension present: Yes         Hyperlipidemia present: Yes         Peripheral Vascular Disease present: No    : Nephropathy present: No    Neuro: Neuropathy present: No    Skin: Infection or ulceration: No    Osteoporosis: No    Thyroid disease: No      Medications:   Allergies:   Allergies   Allergen Reactions    Liraglutide SWELLING    Meloxicam PALPITATIONS    Penicillins ITCHING and SWELLING     No skin peeling or organ damage    Sulindac HIVES    Acyclovir DIZZINESS    Clarithromycin NAUSEA AND VOMITING    Methylprednisolone PAIN    Ciprofloxacin UNKNOWN    Cymbalta [Duloxetine Hcl] OTHER (SEE COMMENTS)     Tingling arms/legs,  blurry vision, elevated glucose, HA     Gabapentin UNKNOWN    Metronidazole UNKNOWN    Naproxen Sodium OTHER (SEE COMMENTS)     Tingling to limbs    Nitrofurantoin UNKNOWN    Nitrofurantoin Macrocrystal UNKNOWN    Valsartan UNKNOWN    Doxycycline RASH    Rosuvastatin DIARRHEA       Social History:   Social History     Socioeconomic History    Marital status:    Tobacco Use    Smoking status: Former     Current packs/day: 0.00     Average packs/day: 0.3 packs/day for 2.0 years (0.5 ttl pk-yrs)     Types: Cigarettes     Start date: 2015     Quit date: 2017     Years since quittin.8     Passive exposure: Never    Smokeless tobacco: Never    Tobacco comments:     1 pack/wk per pt.   Vaping Use    Vaping status: Never Used   Substance and Sexual Activity    Alcohol use: Not Currently     Comment: Occasionally    Drug use: No    Sexual activity: Yes     Partners: Male     Birth control/protection: Tubal Ligation   Other Topics Concern    Caffeine Concern Yes     Comment: 2 cups coffee daily    Exercise No       Medical History:   Past Medical History:    Acute non-recurrent maxillary sinusitis    Age-related nuclear cataract of both eyes    Allergic rhinitis    pereniaal and seasonal    Alternating exotropia    Amenorrhea    Anxiety    Atrial tachycardia, paroxysmal (HCC)    Barretts esophagus    Carpal tunnel syndrome    Chlamydia    Choroidal nevus, right eye    COPD (chronic obstructive pulmonary disease) (HCC)    Depression    Diabetes (HCC)    Diabetes mellitus type 2 with complications (HCC)    Diabetic retinopathy of left eye (HCC)    Dry eyes    DUB (dysfunctional uterine bleeding)    endometrial biopsy    Eczema    Esophageal reflux    Essential hypertension    Exotropia    Fibromyalgia    Genital warts    Heart murmur    Herpes simplex    High blood pressure    High cholesterol    History of pregnancy    Hyperlipidemia    Irregular menstrual cycle    Neg endometrial biopsy    Irritable bowel  syndrome    MGD (meibomian gland dysfunction)    Migraines    Obesity    Ocular migraine    BRENDA    Ovarian cyst    Laparoscopic cystectomy    Pregnancy (HCC)    PVD (posterior vitreous detachment), right eye    patient was seen by Dr. Harkins on 3/27/23 for flashes and floaters OD-DX PVD OD    Spinal stenosis    C5-6, C6-7, physical therapy    TIA (transient ischemic attack)    Viral infection characterized by skin and mucous membrane lesions    Visual impairment       Surgical history:   Past Surgical History:   Procedure Laterality Date    Colonoscopy      Cyst removal      Egd N/A 2021    Procedure: ESOPHAGOGASTRODUODENOSCOPY, with bxs COLONOSCOPY with polypectomy;  Surgeon: Sy Slater MD;  [place] [facility] [place]    Electrocardiogram, complete  2013    scanned to media tab          Other surgical history      Laparoscopic cystectomy    Other surgical history      Endometrial biopsy    Other surgical history      Endometrial biopsy    Other surgical history  2017    cervical neck surgery     Spine surgery procedure unlisted  2017    cervical     Tubal ligation           PHYSICAL EXAM  /71   Pulse 56   Wt 213 lb (96.6 kg)   LMP 2010   BMI 36.56 kg/m²     General Appearance:  alert, well developed, in no acute distress  Eyes:  normal conjunctivae, sclera., normal sclera and normal pupils  Ears/Nose/Mouth/Throat/Neck:  no palpable thyroid nodules or cervical lymphadenopathy  Back: no kyphosis or back tenderness   Musculoskeletal:  normal muscle strength and tone  Skin:  normal moisture and skin texture  Neuro:  sensory grossly intact and motor grossly intact  Psychiatric:  oriented to time, self, and place  Nutritional:  no abnormal weight gain or loss  Bilateral barefoot skin diabetic exam is normal, visualized feet and the appearance is normal.  Bilateral monofilament/sensation of both feet is normal.  Pulsation pedal pulse  exam of both lower legs/feet is normal as well.    ASSESSMENT/PLAN:      1. Diabetes Mellitus Type 2, controlled  -controlled, HgA1c 8.1% -->increased since last visit   -Discussed importance of glycemic control to prevent complications of diabetes  -Discussed complications of diabetes include retinopathy, neuropathy, nephropathy and cardiovascular disease  -Discussed importance of SBGM  -Discussed importance of low CHO diet  -Continue Tresiba 52 units subcutaneous daily   -Increase Glimepiride to 2mg PO BID, verbalized understanding of risks and benefits   -Start Pioglitazone 15mg PO daily, verbalized understanding of risks and benefits   -Discontinue Trulicity due to cost   -Normotensive   -UTD with optho  -Normal foot exam performed today     2. Subclinical Hypothyroidsim  -Normal TFTs - normalized     RTC 2 months    11/18/2024  Prachi Montanez MD

## 2024-11-18 NOTE — PATIENT INSTRUCTIONS
CONTINUE Tresiba 54 units subcutaneous daily     STOP Trulicity     INCREASE Glimepiride to 2mg 2 times per day     START Pioglitazone 15mg daily

## 2024-11-21 ENCOUNTER — PATIENT MESSAGE (OUTPATIENT)
Dept: ENDOCRINOLOGY CLINIC | Facility: CLINIC | Age: 64
End: 2024-11-21

## 2024-11-21 DIAGNOSIS — E55.9 VITAMIN D DEFICIENCY: ICD-10-CM

## 2024-11-22 RX ORDER — ERGOCALCIFEROL 1.25 MG/1
50000 CAPSULE, LIQUID FILLED ORAL WEEKLY
Qty: 12 CAPSULE | Refills: 1 | Status: SHIPPED | OUTPATIENT
Start: 2024-11-22

## 2024-11-22 NOTE — TELEPHONE ENCOUNTER
Endocrine Refill protocol for Ergocalciferol     Protocol Criteria: PASSED     If all below requirements are met, send a 90-day supply with 1 refill per provider protocol.    Verify appointment with Endocrinology completed in the last 6 months or scheduled in the next 3 months.  Vitamin D level must have been completed within the last 6 months  Vitamin D level must be within the normal range     Vitamin D, 25OH, Total (ng/mL)   Date Value   09/16/2024 52.1    (32.0-100.0)    Last completed office visit:11/18/2024 Prachi Montanez MD   Next scheduled Follow up:   Future Appointments   Date Time Provider Department Center   1/29/2025  1:15 PM Prachi Montanez MD [facility]

## 2024-11-22 NOTE — TELEPHONE ENCOUNTER
Dr. Montanez,  See  message  Per LOV dtd 11/18/24:   -Continue Tresiba 52 units subcutaneous daily   -Increase Glimepiride to 2mg PO BID, verbalized understanding of risks and benefits   -Start Pioglitazone 15mg PO daily, verbalized understanding of risks and benefits   -Discontinue Trulicity due to cost     Please advise -thanks

## 2024-11-25 RX ORDER — INSULIN DEGLUDEC 100 U/ML
54 INJECTION, SOLUTION SUBCUTANEOUS DAILY
Qty: 51 ML | Refills: 1 | Status: SHIPPED | OUTPATIENT
Start: 2024-11-25

## 2024-11-25 NOTE — TELEPHONE ENCOUNTER
I called and spoke with the patient. She states after she started pioglitazone on 11/20 she was having symptoms of dehydration, migraines, dizziness, lightheadedness, and unsteady gate. She took her last dose Saturday morning and did not take Sunday or thereafter. She purchased Trulicity and injected 1.5 mg last night. Symptoms currently resolved. Fasting sugar this morning was 120. Sugar while on the phone with meal (3 hours after lunch) 135.     She did not take glimepiride last night but did take this morning. She has been taking tresiba 54 units daily.     Patient asking what doses of glimepiride and tresiba she should take while on trulicity.   Patient would no longer like to take pioglitazone and is asking for order discontinuation.   Patient would like an updated refill sent for tresiba.     Patient would also like to update Dr. Montanez that she will be changing medicare prescription coverage to [employer] medicare value plan in 2025 and trulicity is covered. Tresiba will need to be changed to lantus in 2025.     Patient mentioned pain with finger sticks for blood glucose monitoring. Discussed CGM option since she has medicare and is on insulin. Pt states she will think about it.

## 2024-12-05 ENCOUNTER — TELEPHONE (OUTPATIENT)
Facility: CLINIC | Age: 64
End: 2024-12-05

## 2024-12-05 NOTE — TELEPHONE ENCOUNTER
Timothy Calderon I spoke with the patient    She states she is having substernal pain as well as LUQ pain.  The pain \"in between her breasts\" feels like someone is pressing their finger into her lower sternum. She states it comes and goes, and it does not worsen with deep breaths.    The pain in her LUQ starts off as dull and then becomes dull and aching.     She has been diagnosed with a Hiatal Hernia in which she takes Lansoprazole for.    Patient states her symptoms feel similar to when her hiatal hernia was aggravated last time.    Patient is in the middle of changing her diet to more healthy eating. She also states she recently lost her  which has led to stress in her life.    Patient currently rescheduled herself for a follow up on 1/15  She is asking if she can repeat her EGD    Recent US and ABX in chart    Please advise  Thank you

## 2024-12-05 NOTE — TELEPHONE ENCOUNTER
Patient scheduled appointment through [facility] on 1/15/25. Patient was contacted regarding concerns of abdominal pain. Patient asking if she can be scheduled for Esophagogastroduodenoscopy. Patient was last seen on 10/22/24. Please call at [phone],thanks.

## 2024-12-10 ENCOUNTER — PATIENT MESSAGE (OUTPATIENT)
Facility: CLINIC | Age: 64
End: 2024-12-10

## 2024-12-10 NOTE — TELEPHONE ENCOUNTER
Can we use a res24 to see her sooner? I can order the EGD but she is on a lot of meds she needs to stop.    Mercedez Patterson APRN

## 2024-12-10 NOTE — TELEPHONE ENCOUNTER
Spoke to patient    Moved up her appt to this week  Location, date and time confirmed    QUIQUE Stewart Appointments      Thursday December 12, 2024 2:00 PM  Follow Up Visit with ANGELINA Randhawa  [facility] [address] [address]  [phone]

## 2024-12-11 ENCOUNTER — OFFICE VISIT (OUTPATIENT)
Dept: NEUROLOGY | Facility: CLINIC | Age: 64
End: 2024-12-11
Payer: MEDICARE

## 2024-12-11 DIAGNOSIS — G43.109 OCULAR MIGRAINE: ICD-10-CM

## 2024-12-11 DIAGNOSIS — G43.719 INTRACTABLE CHRONIC MIGRAINE WITHOUT AURA AND WITHOUT STATUS MIGRAINOSUS: ICD-10-CM

## 2024-12-11 DIAGNOSIS — R47.89 SPELL OF CHANGE IN SPEECH: ICD-10-CM

## 2024-12-11 DIAGNOSIS — G43.E19 INTRACTABLE CHRONIC MIGRAINE WITH AURA AND WITHOUT STATUS MIGRAINOSUS: Primary | ICD-10-CM

## 2024-12-11 PROBLEM — I10 HYPERTENSIVE DISORDER: Status: ACTIVE | Noted: 2019-12-08

## 2024-12-11 PROBLEM — J44.9 CHRONIC OBSTRUCTIVE PULMONARY DISEASE (HCC): Status: ACTIVE | Noted: 2019-12-08

## 2024-12-11 PROCEDURE — 99214 OFFICE O/P EST MOD 30 MIN: CPT | Performed by: OTHER

## 2024-12-11 RX ORDER — AZELASTINE HYDROCHLORIDE, FLUTICASONE PROPIONATE 137; 50 UG/1; UG/1
2 SPRAY, METERED NASAL 2 TIMES DAILY
COMMUNITY

## 2024-12-11 RX ORDER — RIMEGEPANT SULFATE 75 MG/75MG
75 TABLET, ORALLY DISINTEGRATING ORAL AS NEEDED
Qty: 8 TABLET | Refills: 0 | Status: SHIPPED | OUTPATIENT
Start: 2024-12-11 | End: 2025-12-11

## 2024-12-11 RX ORDER — AMLODIPINE BESYLATE 2.5 MG/1
2.5 TABLET ORAL DAILY
COMMUNITY
Start: 2024-11-29

## 2024-12-11 RX ORDER — VALSARTAN 160 MG/1
TABLET ORAL
COMMUNITY
Start: 2024-03-18

## 2024-12-11 RX ORDER — BUDESONIDE AND FORMOTEROL FUMARATE DIHYDRATE 160; 4.5 UG/1; UG/1
2 AEROSOL RESPIRATORY (INHALATION) 2 TIMES DAILY
COMMUNITY

## 2024-12-11 RX ORDER — FLUTICASONE PROPIONATE AND SALMETEROL 250; 50 UG/1; UG/1
1 POWDER RESPIRATORY (INHALATION) EVERY 12 HOURS
COMMUNITY

## 2024-12-11 RX ORDER — DULAGLUTIDE 1.5 MG/.5ML
1.5 INJECTION, SOLUTION SUBCUTANEOUS WEEKLY
COMMUNITY
Start: 2024-11-23

## 2024-12-11 NOTE — PATIENT INSTRUCTIONS
Preventive (take daily to prevent migraine): Atogepant     Rescue (when you get a migraine): Nurtec    MRI brain with and without     Riboflavin (vitamin B2) 400 mg in the morning - can turn urine bright yellow     Magnesium 200 mg daily is fine.  You can try magnesium glycinate - this is less harsh on your stomach/intestines.

## 2024-12-11 NOTE — PROGRESS NOTES
[facility]  [address] [place] [address], [address]  [facility] [address]  [phone]          Established  Follow Up Visit       Date: December 11, 2024  Patient Name: Ju Johnson   MRN: [id number]  Primary physician: [address] [place] [address] [address]  [facility] [id number]    Interval History:   --Migraines are now occipital/right/vertex than right-sided that they used to be, sinus pressure.  Now daily.  Had no migraines when caretaking for her  who has passed from brain tumor.      --Chronic daily headache: right-sided dull throbbing; photophobia/phonophobia when this turns into a migraine 15/30     --Aura: scintillating scotomas.  Has migraine with and without aura.      --Has ocular migraines still.      --For about 2 weeks she has had mild paraphasic errors (ie, \"get the item from the fridge\" to \"get the fridge from the item\")     OUTPATIENT MEDICATIONS  Medications Ordered Prior to Encounter[1]      PHYSICAL EXAM:   [facility] 02/08/2010   General appearance: Well appearing, and in no acute distress  Skin: skin color, texture normal.  No rashes or lesions.    Head: Normocephalic, atraumatic.    Neurological exam:    Mental Status:   Attention/Concentration: intact attention on bedside test   Fund of knowledge: intact  Speech: no dysarthria or aphasia    LABS/DATA:  Hemoglobin A1C 8.1   LDL 90   Normal vitamin B12; CBC and CMP     IMAGING:  N/A    ASSESSMENT:  The patient is a 64 year old woman with past medical history of migraine with aura, transient ischemic attack, ocular migraines, length-dependent peripheral neuropathy, hypertension, hyperlipidemia, type 2 diabetes, cervical spondylosis, anxiety depression, COPD who presents for follow-up regarding migraines.    Tried Gabapentin, Amitriptyline, Propranolol, Topiramate    Chronic migraine without and with aura   Ocular migraines  Change in speech may be migraine aura without headache vs stroke/TIAs - speech normal today   -MRI brain stat    -Preventative: trial of Atogepant   Future considerations: Botox   -Abortive: Nurtec                 Future considerations: No triptans due to history of TIA, could do Nurtec                 Limit NSAID <2 times per week to avoid developing medication overuse headaches   -No estrogen supplementation due to aura   -Neuroimaging:  Topical  -Follow up:  3 months  -Lifestyle information provided      History of transient ischemic attack  -Continue Aspirin 81 mg daily and Lipitor 40 mg daily  - Glycemic control and LDL is being checked   -No triptans     STROKE RISK FACTORS:   *Hypertension - Target blood pressure <140/90, <130/85 for high risk, normal 120/80  *Physical inactivity - target exercise at least 3 times per week  *Obesity - target ideal body weight and girth <35\" for women, <40\" for men  *Diabetes - Target <6.5-7%   *Smoking - Target smoking cessation  *Hyperlipidemia - Target total cholesterol < 200, Target LDL <100, < 70 for high risk, Target HDL >45 for men, >55 for women, Target triglycerides <150      Discussed indication, administration, dose, and side effects with patient of any medications personally prescribed. Patient was advised to let my office know if they have any questions or concerns.       Today, I personally spent 20 minutes in this case, including chart review, time spent with patient doing face to face evaluation w/ interview and exam and patient education, counseling, and time was spent in patient education, counseling, and coordination of care as described above.   Issues discussed: Diagnosis and implications on future health, benefits and side effects of present and future medications, test results as well as further testing and medications required.    This note was prepared using Dragon Medical voice recognition dictation software and as a result, errors may occur. When identified, these errors have been corrected. While every attempt is made to correct errors during dictation,  discrepancies may still exist    BERRY Westbrook DO   Staff Physician, Neurology   12/11/2024  11:58 AM               [1]   Current Outpatient Medications on File Prior to Visit   Medication Sig Dispense Refill    amLODIPine 2.5 MG Oral Tab Take 1 tablet (2.5 mg total) by mouth daily.      valsartan 160 MG Oral Tab Take 1 tablet twice a day by oral route.      TRULICITY 1.5 MG/0.5ML Subcutaneous Solution Auto-injector Inject 1.5 mg into the skin once a week.      fluticasone-salmeterol (ADVAIR DISKUS) 250-50 MCG/ACT Inhalation Aerosol Powder, Breath Activated Inhale 1 puff into the lungs Q12H.      Azelastine-Fluticasone 137-50 MCG/ACT Nasal Suspension 2 sprays by Nasal route 2 (two) times daily.      Cyanocobalamin (B-12) 1000 MCG Oral Cap       Budesonide-Formoterol Fumarate (SYMBICORT) 160-4.5 MCG/ACT Inhalation Aerosol Inhale 2 puffs into the lungs 2 (two) times daily.      insulin degludec (TRESIBA FLEXTOUCH) 100 UNIT/ML Subcutaneous Solution Pen-injector Inject 54 Units into the skin daily. 51 mL 1    ERGOCALCIFEROL 1.25 MG (10186 UT) Oral Cap TAKE ONE CAPSULE BY MOUTH WEEKLY 12 capsule 1    glimepiride 2 MG Oral Tab Take 1 tablet (2 mg total) by mouth in the morning and 1 tablet (2 mg total) before bedtime. 180 tablet 1    Insulin Pen Needle (BD PEN NEEDLE ESTEVAN 2ND GEN) 32G X 4 MM Does not apply Misc USE ONE PEN NEEDLE DAILY 100 each 1    atorvastatin 40 MG Oral Tab Take 1 tablet (40 mg total) by mouth daily. 90 tablet 3    Blood Glucose Monitoring Suppl (CONTOUR NEXT MONITOR) w/Device Does not apply Kit 1 each daily. 1 kit 0    indomethacin 50 MG Oral Cap Take 1 capsule (50 mg total) by mouth as needed. 15 capsule 0    nystatin 069422 UNIT/ML Mouth/Throat Suspension Take 5 mL (500,000 Units total) by mouth 4 (four) times daily. 473 mL 0    zolpidem 5 MG Oral Tab Take 1 tablet (5 mg total) by mouth nightly as needed for Sleep. 30 tablet 0    cyclobenzaprine 5 MG Oral Tab Take 1 tablet (5 mg total) by mouth 3  (three) times daily as needed for Muscle spasms. 30 tablet 0    Cinnamon 500 MG Oral Cap Take 500 mg by mouth in the morning and 500 mg before bedtime.      hydrOXYzine 25 MG Oral Tab Take 1 tablet (25 mg total) by mouth every 8 (eight) hours as needed for Itching. 40 tablet 0    diazePAM 5 MG Oral Tab Take 1 tablet (5 mg total) by mouth every 12 (twelve) hours as needed. 30 tablet 0    Lansoprazole 15 MG Oral Capsule Delayed Release Take 1 capsule (15 mg total) by mouth 2 (two) times daily. 180 capsule 3    Budesonide-Formoterol Fumarate 160-4.5 MCG/ACT Inhalation Aerosol Inhale 2 puffs into the lungs 2 (two) times daily. 30.6 g 2    montelukast 10 MG Oral Tab Take 1 tablet (10 mg total) by mouth daily. 90 tablet 3    amLODIPine 5 MG Oral Tab       methocarbamol 750 MG Oral Tab Take 1 tablet (750 mg total) by mouth nightly as needed. 90 tablet 0    albuterol 108 (90 Base) MCG/ACT Inhalation Aero Soln Inhale 2 puffs into the lungs every 4 (four) hours as needed for Wheezing. 25.5 g 3    CONTOUR NEXT TEST In Vitro Strip TEST SUGAR  2 TIMES DAILY 200 strip 0    fluticasone propionate 50 MCG/ACT Nasal Suspension use 2 sprays by Each nostrils route daily. 48 g 1    valACYclovir 500 MG Oral Tab Take 1 tablet (500 mg total) by mouth daily. 90 tablet 4    diclofenac 1 % External Gel Apply 4 g topically 4 (four) times daily. 350 g 3    meclizine 25 MG Oral Tab Take 1 tablet (25 mg total) by mouth 3 (three) times daily as needed for Dizziness or Nausea. 30 tablet 5    chlorhexidine gluconate 0.12 % Mouth/Throat Solution       losartan 50 MG Oral Tab Take 1 tablet (50 mg total) by mouth in the morning and 1 tablet (50 mg total) before bedtime. 180 tablet 4    Diclofenac Sodium 1 % Transdermal Gel Apply 2 g topically 4 (four) times daily. 50 g 1    Propranolol HCl 80 MG Oral Tab Take 1 tablet (80 mg total) by mouth 2 (two) times daily. 180 tablet 4    acyclovir 5 % External Ointment Apply 1 Application topically every 3  (three) hours. 30 g 2    PATIENT SUPPLIED MEDICATION Vitamin B12, magnesium, zinc, vitamin c, & garlic      magnesium oxide 400 MG Oral Tab Take 1 tablet (400 mg total) by mouth daily.      triamcinolone acetonide 0.1 % External Cream Apply topically 2 (two) times daily as needed. 30 g 3    Ammonium Lactate (LAC-HYDRIN) 12 % External Cream Apply to affected areas 1-2x/day 1 Tube 3    Clobetasol Propionate 0.05 % External Ointment Apply 1 Application topically daily as needed. Apply to affected areas 1x/day as needed 30 g 2    aspirin 81 MG Oral Tab Take 1 tablet (81 mg total) by mouth daily.      acetaminophen 500 MG Oral Tab Take 1 tablet (500 mg total) by mouth every 6 (six) hours as needed for Pain.      Ciclopirox 8 % External Solution Apply 1 Application topically nightly. 1 Bottle 2    ketoconazole 2 % External Cream MELVIN EXT AA BID  0    cetirizine 10 MG Oral Tab Take 1 tablet (10 mg total) by mouth daily.       No current facility-administered medications on file prior to visit.

## 2024-12-12 ENCOUNTER — HOSPITAL ENCOUNTER (OUTPATIENT)
Dept: MRI IMAGING | Age: 64
Discharge: HOME | End: 2024-12-12
Attending: Other
Payer: MEDICARE

## 2024-12-12 DIAGNOSIS — G43.719 INTRACTABLE CHRONIC MIGRAINE WITHOUT AURA AND WITHOUT STATUS MIGRAINOSUS: ICD-10-CM

## 2024-12-12 DIAGNOSIS — G43.109 OCULAR MIGRAINE: ICD-10-CM

## 2024-12-12 DIAGNOSIS — G43.E19 INTRACTABLE CHRONIC MIGRAINE WITH AURA AND WITHOUT STATUS MIGRAINOSUS: ICD-10-CM

## 2024-12-12 PROCEDURE — 70553 MRI BRAIN STEM W/O & W/DYE: CPT | Performed by: OTHER

## 2024-12-12 PROCEDURE — A9575 INJ GADOTERATE MEGLUMI 0.1ML: HCPCS | Performed by: OTHER

## 2024-12-12 RX ORDER — GADOTERATE MEGLUMINE 376.9 MG/ML
20 INJECTION INTRAVENOUS
Status: COMPLETED | OUTPATIENT
Start: 2024-12-12 | End: 2024-12-12

## 2024-12-12 RX ADMIN — GADOTERATE MEGLUMINE 20 ML: 376.9 INJECTION INTRAVENOUS at 19:31:00

## 2024-12-13 ENCOUNTER — PATIENT MESSAGE (OUTPATIENT)
Dept: NEUROLOGY | Facility: CLINIC | Age: 64
End: 2024-12-13

## 2024-12-17 ENCOUNTER — TELEPHONE (OUTPATIENT)
Dept: NEUROLOGY | Facility: CLINIC | Age: 64
End: 2024-12-17

## 2024-12-18 ENCOUNTER — PATIENT MESSAGE (OUTPATIENT)
Dept: FAMILY MEDICINE CLINIC | Facility: CLINIC | Age: 64
End: 2024-12-18

## 2024-12-19 ENCOUNTER — TELEMEDICINE (OUTPATIENT)
Dept: NEUROLOGY | Facility: CLINIC | Age: 64
End: 2024-12-19
Payer: MEDICARE

## 2024-12-19 ENCOUNTER — TELEPHONE (OUTPATIENT)
Dept: FAMILY MEDICINE CLINIC | Facility: CLINIC | Age: 64
End: 2024-12-19

## 2024-12-19 DIAGNOSIS — G43.719 INTRACTABLE CHRONIC MIGRAINE WITHOUT AURA AND WITHOUT STATUS MIGRAINOSUS: ICD-10-CM

## 2024-12-19 DIAGNOSIS — Z86.73 HISTORY OF TIAS: ICD-10-CM

## 2024-12-19 DIAGNOSIS — G43.109 OCULAR MIGRAINE: ICD-10-CM

## 2024-12-19 DIAGNOSIS — G43.E19 INTRACTABLE CHRONIC MIGRAINE WITH AURA AND WITHOUT STATUS MIGRAINOSUS: Primary | ICD-10-CM

## 2024-12-19 DIAGNOSIS — G43.109 MIGRAINE AURA WITHOUT HEADACHE: ICD-10-CM

## 2024-12-19 RX ORDER — SULFAMETHOXAZOLE AND TRIMETHOPRIM 800; 160 MG/1; MG/1
1 TABLET ORAL 2 TIMES DAILY
Qty: 10 TABLET | Refills: 0 | Status: SHIPPED | OUTPATIENT
Start: 2024-12-19

## 2024-12-19 RX ORDER — CEPHALEXIN 500 MG/1
500 CAPSULE ORAL 2 TIMES DAILY
Qty: 10 CAPSULE | Refills: 0 | Status: SHIPPED | OUTPATIENT
Start: 2024-12-19 | End: 2024-12-19

## 2024-12-19 NOTE — TELEPHONE ENCOUNTER
Dr. Gonzalez. Please see patient message below regarding symptoms of UTI. Her last office visit was 9/18/2024. Last U/A was 12/28/2023. Thank you.

## 2024-12-19 NOTE — TELEPHONE ENCOUNTER
Normally I would leave it as not absolute allergy if allergic to PCN but looked at her allergy list again and sulfa is not on there so I sent bactrim.

## 2024-12-19 NOTE — PROGRESS NOTES
[address], [address]  [facility] [address]  [phone]          Established  Follow Up Visit     DISTANCE HEALTH VISIT          Telehealth using Immy Verbal Consent   I conducted a telehealth visit with Ju Johnson today, 12/19/2024, which was completed using two-way, real-time interactive audio and video communication. This has been done in good rachid to provide continuity of care in the best interest of the provider-patient relationship, due to the COVID -19 public health crisis/national emergency where restrictions of face-to-face office visits are ongoing. Every conscious effort was taken to allow for sufficient and adequate time to complete the visit.  The patient was made aware of the limitations of the telehealth visit, including treatment limitations as no physical exam could be performed.  The patient was advised to call 911 or to go to the ER in case there was an emergency.  The patient was also advised of the potential privacy & security concerns related to the telehealth platform.   The patient was made aware of where to find [facility]'s notice of privacy practices, telehealth consent form and other related consent forms and documents.  which are located on the [facility] website. The patient verbally agreed to telehealth consent form, related consents and the risks discussed.    Lastly, the patient confirmed that they were in [place].   Included in this visit, time may have been spent reviewing labs, medications, radiology tests and decision making. Appropriate medical decision-making and tests are ordered as detailed in the plan of care above.  Coding/billing information is submitted for this visit based on complexity of care and/or time spent for the visit.  15 min spent with the patient on the phone      Date: December 19, 2024  Patient Name: Ju Johnson   MRN: [id number]  Primary physician: [address] [facility] [address]  [facility] [address]    Interval  History:   --Discussed MRI brain - she was nervous about results.  She is having improvement on B2, Mg, Nurtec is effective but cost prohibitive.  Atogepant too expensive.  Still having episodes of blurry vision, speech changes, self-resolve.        OUTPATIENT MEDICATIONS  Medications Ordered Prior to Encounter[1]      PHYSICAL EXAM:   LMP 02/08/2010   General appearance: Well appearing, and in no acute distress  Skin: skin color, texture normal.  No rashes or lesions.    Head: Normocephalic, atraumatic.    Neurological exam:    Mental Status:   Attention/Concentration: intact attention on bedside test   Fund of knowledge: intact  Speech: no dysarthria or aphasia     LABS/DATA:  N/A      IMAGING:  MRI brain  CONCLUSION:   1. No acute intracranial process.      2. Senescent changes of parenchymal volume loss with sequela of chronic microvascular ischemic disease.     I PERSONALLY REVIEWED THESE IMAGES.     ASSESSMENT:  The patient is a 64 year old woman with past medical history of migraine with aura, transient ischemic attack, ocular migraines, length-dependent peripheral neuropathy, hypertension, hyperlipidemia, type 2 diabetes, cervical spondylosis, anxiety depression, COPD who presents for follow-up regarding migraines.  MRI brain - largely unremarkable, white matter changes probably age, migraine and vascular related      Tried Gabapentin, Amitriptyline, Propranolol, Topiramate, Nurtec and Atogepant too expensive     Discussed MRI image findings in detail      Chronic migraine without and with aura   Ocular migraines  Migraine aura without headache suspect her episodes of speech changes and blurry vision associated w/ these, supplements have started to help with migraines so expect these to improve - if not, we can get an EEG at follow up   -Preventative: Mg and B2   Future considerations: Botox   -Abortive: OTC - Nurtec is too expensive                  Future considerations: No triptans due to history of TIA                  Limit NSAID <2 times per week to avoid developing medication overuse headaches   -No estrogen supplementation due to aura   -Neuroimaging: completed   -Follow up:  3 months  -Lifestyle information provided       History of transient ischemic attack  -Continue Aspirin 81 mg daily and Lipitor 40 mg daily  -Glycemic control and LDL is being checked   -No triptans     STROKE RISK FACTORS:   *Hypertension - Target blood pressure <140/90, <130/85 for high risk, normal 120/80  *Physical inactivity - target exercise at least 3 times per week  *Obesity - target ideal body weight and girth <35\" for women, <40\" for men  *Diabetes - Target <6.5-7%   *Smoking - Target smoking cessation  *Hyperlipidemia - Target total cholesterol < 200, Target LDL <100, < 70 for high risk, Target HDL >45 for men, >55 for women, Target triglycerides <150    Follow up: 3 months     Discussed indication, administration, dose, and side effects with patient of any medications personally prescribed. Patient was advised to let my office know if they have any questions or concerns.       Today, I personally spent 15 minutes in this case, including chart review, time spent with patient doing face to face evaluation w/ interview and exam and patient education, counseling, and time was spent in patient education, counseling, and coordination of care as described above.   Issues discussed: Diagnosis and implications on future health, benefits and side effects of present and future medications, test results as well as further testing and medications required.    This note was prepared using Dragon Medical voice recognition dictation software and as a result, errors may occur. When identified, these errors have been corrected. While every attempt is made to correct errors during dictation, discrepancies may still exist    BERRY Westbrook DO   Staff Physician, Neurology   12/19/2024  2:33 PM               [1]   Current Outpatient Medications on File  Prior to Visit   Medication Sig Dispense Refill    fluticasone-salmeterol (ADVAIR DISKUS) 250-50 MCG/ACT Inhalation Aerosol Powder, Breath Activated Inhale 1 puff into the lungs Q12H.      Azelastine-Fluticasone 137-50 MCG/ACT Nasal Suspension 2 sprays by Nasal route 2 (two) times daily.      Cyanocobalamin (B-12) 1000 MCG Oral Cap       Budesonide-Formoterol Fumarate (SYMBICORT) 160-4.5 MCG/ACT Inhalation Aerosol Inhale 2 puffs into the lungs 2 (two) times daily.      amLODIPine 2.5 MG Oral Tab Take 1 tablet (2.5 mg total) by mouth daily.      valsartan 160 MG Oral Tab Take 1 tablet twice a day by oral route.      TRULICITY 1.5 MG/0.5ML Subcutaneous Solution Auto-injector Inject 1.5 mg into the skin once a week.      Atogepant 10 MG Oral Tab Take 10 mg by mouth daily. 90 tablet 0    Rimegepant Sulfate (NURTEC) 75 MG Oral Tablet Dispersible Take 75 mg by mouth as needed. Take one tablet at onset of migraine.  Maximum dose in 24 hours is 1 tablet (75mg). 8 tablet 0    insulin degludec (TRESIBA FLEXTOUCH) 100 UNIT/ML Subcutaneous Solution Pen-injector Inject 54 Units into the skin daily. 51 mL 1    ERGOCALCIFEROL 1.25 MG (99314 UT) Oral Cap TAKE ONE CAPSULE BY MOUTH WEEKLY 12 capsule 1    glimepiride 2 MG Oral Tab Take 1 tablet (2 mg total) by mouth in the morning and 1 tablet (2 mg total) before bedtime. 180 tablet 1    Insulin Pen Needle (BD PEN NEEDLE ESTEVAN 2ND GEN) 32G X 4 MM Does not apply Misc USE ONE PEN NEEDLE DAILY 100 each 1    atorvastatin 40 MG Oral Tab Take 1 tablet (40 mg total) by mouth daily. 90 tablet 3    Blood Glucose Monitoring Suppl (CONTOUR NEXT MONITOR) w/Device Does not apply Kit 1 each daily. 1 kit 0    indomethacin 50 MG Oral Cap Take 1 capsule (50 mg total) by mouth as needed. 15 capsule 0    nystatin 088407 UNIT/ML Mouth/Throat Suspension Take 5 mL (500,000 Units total) by mouth 4 (four) times daily. 473 mL 0    zolpidem 5 MG Oral Tab Take 1 tablet (5 mg total) by mouth nightly as needed for  Sleep. 30 tablet 0    cyclobenzaprine 5 MG Oral Tab Take 1 tablet (5 mg total) by mouth 3 (three) times daily as needed for Muscle spasms. 30 tablet 0    Cinnamon 500 MG Oral Cap Take 500 mg by mouth in the morning and 500 mg before bedtime.      hydrOXYzine 25 MG Oral Tab Take 1 tablet (25 mg total) by mouth every 8 (eight) hours as needed for Itching. 40 tablet 0    diazePAM 5 MG Oral Tab Take 1 tablet (5 mg total) by mouth every 12 (twelve) hours as needed. 30 tablet 0    Lansoprazole 15 MG Oral Capsule Delayed Release Take 1 capsule (15 mg total) by mouth 2 (two) times daily. 180 capsule 3    Budesonide-Formoterol Fumarate 160-4.5 MCG/ACT Inhalation Aerosol Inhale 2 puffs into the lungs 2 (two) times daily. 30.6 g 2    montelukast 10 MG Oral Tab Take 1 tablet (10 mg total) by mouth daily. 90 tablet 3    amLODIPine 5 MG Oral Tab       methocarbamol 750 MG Oral Tab Take 1 tablet (750 mg total) by mouth nightly as needed. 90 tablet 0    albuterol 108 (90 Base) MCG/ACT Inhalation Aero Soln Inhale 2 puffs into the lungs every 4 (four) hours as needed for Wheezing. 25.5 g 3    CONTOUR NEXT TEST In Vitro Strip TEST SUGAR  2 TIMES DAILY 200 strip 0    fluticasone propionate 50 MCG/ACT Nasal Suspension use 2 sprays by Each nostrils route daily. 48 g 1    valACYclovir 500 MG Oral Tab Take 1 tablet (500 mg total) by mouth daily. 90 tablet 4    diclofenac 1 % External Gel Apply 4 g topically 4 (four) times daily. 350 g 3    meclizine 25 MG Oral Tab Take 1 tablet (25 mg total) by mouth 3 (three) times daily as needed for Dizziness or Nausea. 30 tablet 5    chlorhexidine gluconate 0.12 % Mouth/Throat Solution       losartan 50 MG Oral Tab Take 1 tablet (50 mg total) by mouth in the morning and 1 tablet (50 mg total) before bedtime. 180 tablet 4    Diclofenac Sodium 1 % Transdermal Gel Apply 2 g topically 4 (four) times daily. 50 g 1    Propranolol HCl 80 MG Oral Tab Take 1 tablet (80 mg total) by mouth 2 (two) times daily.  180 tablet 4    acyclovir 5 % External Ointment Apply 1 Application topically every 3 (three) hours. 30 g 2    PATIENT SUPPLIED MEDICATION Vitamin B12, magnesium, zinc, vitamin c, & garlic      magnesium oxide 400 MG Oral Tab Take 1 tablet (400 mg total) by mouth daily.      triamcinolone acetonide 0.1 % External Cream Apply topically 2 (two) times daily as needed. 30 g 3    Ammonium Lactate (LAC-HYDRIN) 12 % External Cream Apply to affected areas 1-2x/day 1 Tube 3    Clobetasol Propionate 0.05 % External Ointment Apply 1 Application topically daily as needed. Apply to affected areas 1x/day as needed 30 g 2    aspirin 81 MG Oral Tab Take 1 tablet (81 mg total) by mouth daily.      acetaminophen 500 MG Oral Tab Take 1 tablet (500 mg total) by mouth every 6 (six) hours as needed for Pain.      Ciclopirox 8 % External Solution Apply 1 Application topically nightly. 1 Bottle 2    ketoconazole 2 % External Cream MELVNI EXT AA BID  0    cetirizine 10 MG Oral Tab Take 1 tablet (10 mg total) by mouth daily.       No current facility-administered medications on file prior to visit.

## 2024-12-19 NOTE — TELEPHONE ENCOUNTER
I received a call from patient pharmacy that you sent over a script for Cephalexin 500 mg but patient has a allergy to Penicillins she gets itching and swelling. Ok to override?

## 2024-12-19 NOTE — TELEPHONE ENCOUNTER
Called pharmacy and spoke with Rosalba, pharmacist.  Gave message from Dr Gonzalez regarding substitution with Bactrim.  Also sent message to patient to notify of the substitution.

## 2024-12-23 DIAGNOSIS — G43.109 OCULAR MIGRAINE: ICD-10-CM

## 2024-12-23 DIAGNOSIS — G43.719 INTRACTABLE CHRONIC MIGRAINE WITHOUT AURA AND WITHOUT STATUS MIGRAINOSUS: ICD-10-CM

## 2024-12-23 DIAGNOSIS — G43.E19 INTRACTABLE CHRONIC MIGRAINE WITH AURA AND WITHOUT STATUS MIGRAINOSUS: ICD-10-CM

## 2024-12-23 NOTE — TELEPHONE ENCOUNTER
The patient is requesting a refill on:   Rimegepant Sulfate (NURTEC) 75 MG Oral Tablet Dispersible (Discontinued) 8 tablet 0 12/11/2024 12/19/2024       [place] 12[place]11/24 (#8/0 RF)    Last OV: 12/11/24 & Tele 12/19/24  Next OV: 3/4/25  Future Appointments   Date Time Provider Department Center   1/21/2025  1:30 PM Mercedez Patterson APRN [facility]   1/29/2025  1:15 PM Prachi Montanez MD [facility]   3/4/2025 11:40 AM Yuli Westbrook DO [facility] [place] [facility]     ASSESSMENT:  The patient is a 64 year old woman with past medical history of migraine with aura, transient ischemic attack, ocular migraines, length-dependent peripheral neuropathy, hypertension, hyperlipidemia, type 2 diabetes, cervical spondylosis, anxiety depression, COPD who presents for follow-up regarding migraines.  MRI brain - largely unremarkable, white matter changes probably age, migraine and vascular related      Tried Gabapentin, Amitriptyline, Propranolol, Topiramate, Nurtec and Atogepant too expensive      Discussed MRI image findings in detail      Chronic migraine without and with aura   Ocular migraines  Migraine aura without headache suspect her episodes of speech changes and blurry vision associated w/ these, supplements have started to help with migraines so expect these to improve - if not, we can get an EEG at follow up   -Preventative: Mg and B2   Future considerations: Botox   -Abortive: OTC - Nurtec is too expensive                  Future considerations: No triptans due to history of TIA                 Limit NSAID <2 times per week to avoid developing medication overuse headaches   -No estrogen supplementation due to aura   -Neuroimaging: completed   -Follow up:  3 months  -Lifestyle information provided       History of transient ischemic attack  -Continue Aspirin 81 mg daily and Lipitor 40 mg daily  -Glycemic control and LDL is being checked   -No triptans     STROKE RISK FACTORS:    *Hypertension - Target blood pressure <140/90, <130/85 for high risk, normal 120/80  *Physical inactivity - target exercise at least 3 times per week  *Obesity - target ideal body weight and girth <35\" for women, <40\" for men  *Diabetes - Target <6.5-7%   *Smoking - Target smoking cessation  *Hyperlipidemia - Target total cholesterol < 200, Target LDL <100, < 70 for high risk, Target HDL >45 for men, >55 for women, Target triglycerides <150     Follow up: 3 months

## 2024-12-24 ENCOUNTER — PATIENT MESSAGE (OUTPATIENT)
Dept: NEUROLOGY | Facility: CLINIC | Age: 64
End: 2024-12-24

## 2024-12-24 DIAGNOSIS — G43.E19 INTRACTABLE CHRONIC MIGRAINE WITH AURA AND WITHOUT STATUS MIGRAINOSUS: Primary | ICD-10-CM

## 2024-12-24 DIAGNOSIS — G43.719 INTRACTABLE CHRONIC MIGRAINE WITHOUT AURA AND WITHOUT STATUS MIGRAINOSUS: ICD-10-CM

## 2024-12-24 RX ORDER — RIMEGEPANT SULFATE 75 MG/75MG
TABLET, ORALLY DISINTEGRATING ORAL
Qty: 8 TABLET | Refills: 0 | OUTPATIENT
Start: 2024-12-24

## 2024-12-26 ENCOUNTER — MED REC SCAN ONLY (OUTPATIENT)
Dept: FAMILY MEDICINE CLINIC | Facility: CLINIC | Age: 64
End: 2024-12-26

## 2024-12-26 ENCOUNTER — TELEPHONE (OUTPATIENT)
Dept: FAMILY MEDICINE CLINIC | Facility: CLINIC | Age: 64
End: 2024-12-26

## 2024-12-26 NOTE — TELEPHONE ENCOUNTER
Left message to call back to schedule medicare physical. Also will address use of CPAP to meet E documentation requirements.

## 2024-12-26 NOTE — TELEPHONE ENCOUNTER
Talked to patient told her message below understood, she says that she has an appointment already on 03/05/2025.  Offered her an earlier appointment but refused.

## 2024-12-30 RX ORDER — RIMEGEPANT SULFATE 75 MG/75MG
75 TABLET, ORALLY DISINTEGRATING ORAL AS NEEDED
Qty: 8 TABLET | Refills: 0 | Status: SHIPPED | OUTPATIENT
Start: 2024-12-30 | End: 2025-12-30

## 2025-01-06 ENCOUNTER — PATIENT MESSAGE (OUTPATIENT)
Dept: ENDOCRINOLOGY CLINIC | Facility: CLINIC | Age: 65
End: 2025-01-06

## 2025-01-06 DIAGNOSIS — E11.9 CONTROLLED TYPE 2 DIABETES MELLITUS WITHOUT COMPLICATION, WITHOUT LONG-TERM CURRENT USE OF INSULIN (HCC): Primary | ICD-10-CM

## 2025-01-08 ENCOUNTER — TELEPHONE (OUTPATIENT)
Dept: FAMILY MEDICINE CLINIC | Facility: CLINIC | Age: 65
End: 2025-01-08

## 2025-01-09 ENCOUNTER — TELEPHONE (OUTPATIENT)
Dept: ENDOCRINOLOGY CLINIC | Facility: CLINIC | Age: 65
End: 2025-01-09

## 2025-01-09 NOTE — TELEPHONE ENCOUNTER
[facility] called to confirm called to confirm if a fax for Dr. Johnson request was received 1/3/2025 for a CPAP supply order.     [facility] advised they will re-fax and fax number was confirmed to be correct.     Fax # [phone]  
Please check for fax sent to office   
See below  
Spoke with E, informed them that patient has appointment 3/5/25 and declined to be seen sooner. See TE 12/26    
no

## 2025-01-09 NOTE — TELEPHONE ENCOUNTER
Refill passed per [facility] protocols.    Please review pended refill request as unable to refill due to High / Very High drug interaction or warning copied here:  High  Allergy/Contraindication: valACYclovir Reactions: DIZZINESS. Reaction type: Side effect. User documented allergy severity: Medium.  Level 2 with ACYCLOVIR (Class: ACYCLOVIR AND RELATED).    Requested Prescriptions   Pending Prescriptions Disp Refills    VALACYCLOVIR 500 MG Oral Tab [Pharmacy Med Name: Valacyclovir Hydrochloride 500 Mg Tab Nort] 90 tablet 3     Sig: TAKE ONE TABLET BY MOUTH ONE TIME DAILY       Herpes Agent Protocol Passed - 1/9/2025  3:38 PM        Passed - In person appointment or virtual visit in the past 12 mos or appointment in next 3 mos     Recent Outpatient Visits              3 weeks ago Intractable chronic migraine with aura and without status migrainosus    [facility]    Telemedicine    4 weeks ago Intractable chronic migraine with aura and without status migrainosus    [facility] Yuli Westbrook DO    Office Visit    1 month ago Controlled type 2 diabetes mellitus without complication, without long-term current use of insulin (HCC)    [facility] Prachi Montanez MD    Office Visit    1 month ago Need for vaccination    [facility]    Nurse Only    2 months ago Left sided abdominal pain    [facility] Mercedez Patterson APRN    Office Visit          Future Appointments         Provider Department Appt Notes    In 1 month Yuli Westbrook DO [facility] 3 mos f/u    In 1 month Carmelina Gonzalez MD [facility] Weight/  medicare physical. Also will address use of CPAP to meet E documentation requirements. see  comm  last physical 10/30/2023 [place]    In 3 months Prachi Montanez MD [facility] [place] A1C                    Passed - Medication is active on med list

## 2025-01-12 RX ORDER — VALACYCLOVIR HYDROCHLORIDE 500 MG/1
500 TABLET, FILM COATED ORAL DAILY
Qty: 90 TABLET | Refills: 3 | Status: SHIPPED | OUTPATIENT
Start: 2025-01-12

## 2025-01-15 RX ORDER — INSULIN DEGLUDEC 100 U/ML
54 INJECTION, SOLUTION SUBCUTANEOUS DAILY
Qty: 51 ML | Refills: 1 | Status: SHIPPED | OUTPATIENT
Start: 2025-01-15

## 2025-01-15 RX ORDER — GLIMEPIRIDE 2 MG/1
2 TABLET ORAL
Qty: 90 TABLET | Refills: 1 | Status: SHIPPED | OUTPATIENT
Start: 2025-01-15

## 2025-01-15 RX ORDER — DULAGLUTIDE 1.5 MG/.5ML
1.5 INJECTION, SOLUTION SUBCUTANEOUS WEEKLY
Qty: 6 ML | Refills: 1 | Status: SHIPPED | OUTPATIENT
Start: 2025-01-15

## 2025-01-15 NOTE — TELEPHONE ENCOUNTER
90 day supply sent per protocol. Patient notified.     Tresiba 54 units daily  Glimepiride 2 mg AM  Trulicity 1.5 mg     Endocrine refill protocol for basal insulins     Protocol Criteria: PASSED Reason: N/A    If all below requirements are met, send a 90-day supply with 1 refill per provider protocol.       Verify Appointment with Endocrinology completed in the last 6 months or scheduled in the next 3 months.  Verify A1C has been completed within the last 6 months and is below 8.5%     Last completed office visit:11/18/2024 Prachi Montanez MD   Next scheduled Follow up:   Future Appointments   Date Time Provider Department Center   3/4/2025 11:40 AM Yuli Westbrook DO [facility]   3/5/2025  1:00 PM Carmelina Gonzalez MD [facility]   4/30/2025 10:30 AM Prachi Montanez MD [facility]      Last A1c result: Last A1c value was 8.1% done 11/18/2024.

## 2025-02-18 ENCOUNTER — PATIENT MESSAGE (OUTPATIENT)
Dept: FAMILY MEDICINE CLINIC | Facility: CLINIC | Age: 65
End: 2025-02-18

## 2025-02-18 ENCOUNTER — NURSE TRIAGE (OUTPATIENT)
Dept: FAMILY MEDICINE CLINIC | Facility: CLINIC | Age: 65
End: 2025-02-18

## 2025-02-18 ENCOUNTER — TELEMEDICINE (OUTPATIENT)
Dept: FAMILY MEDICINE CLINIC | Facility: CLINIC | Age: 65
End: 2025-02-18
Payer: MEDICARE

## 2025-02-18 DIAGNOSIS — J01.00 ACUTE NON-RECURRENT MAXILLARY SINUSITIS: ICD-10-CM

## 2025-02-18 DIAGNOSIS — R05.1 ACUTE COUGH: Primary | ICD-10-CM

## 2025-02-18 PROCEDURE — 99212 OFFICE O/P EST SF 10 MIN: CPT | Performed by: PHYSICIAN ASSISTANT

## 2025-02-18 RX ORDER — BENZONATATE 200 MG/1
200 CAPSULE ORAL 3 TIMES DAILY PRN
Qty: 30 CAPSULE | Refills: 0 | Status: SHIPPED | OUTPATIENT
Start: 2025-02-18

## 2025-02-18 RX ORDER — AZITHROMYCIN 250 MG/1
TABLET, FILM COATED ORAL
Qty: 6 TABLET | Refills: 0 | Status: SHIPPED | OUTPATIENT
Start: 2025-02-18 | End: 2025-02-23

## 2025-02-18 NOTE — TELEPHONE ENCOUNTER
Action Requested: Summary for Provider     []  Critical Lab, Recommendations Needed  [] Need Additional Advice  [x]   FYI    []   Need Orders  [] Need Medications Sent to Pharmacy  []  Other     SUMMARY: Contact to + Covid and not tested, symptoms since onset. Cough and nasal congestion with yellow discharge. Same day virtual visit scheduled. [See below for more details]    Reason for call: Covid and Sinusitis  Onset: 2/10/25    Reason for Disposition   HIGH RISK patient (e.g., weak immune system, age > 64 years, obesity with BMI of 30 or higher, pregnant, chronic lung disease or other chronic medical condition) and COVID symptoms (e.g., cough, fever)  (Exceptions: Already seen by doctor or NP/PA and no new or worsening symptoms.)    Protocols used: Coronavirus (COVID-19) Diagnosed or Eninjsjor-C-VI      Patient called states she has Covid [not tested, but contact to + Covid] and now thinks she has a sinus infection. She has some yellow discharge. She also has a cough that is intermittent, very productive--> yellow. Denies any shortness of breath, chest pain, and/or chest tightness, wheezing, headache, stiffness of neck, fever [chills were present now subsided]. Refer to system/assessment yes/no answers. Same day virtual visit offered --> agreeable and scheduled with NENITA. Home Care Advice discussed, per protocol. Patient instructed any new or worsening symptoms [reviewed] seek immediate medical attention--> Emergency Department. Patient verbalized understanding. No further questions or concerns at this time.    Future Appointments   Date Time Provider Department Center   2/18/2025 11:45 AM [facility] [place]

## 2025-02-18 NOTE — PROGRESS NOTES
HPI:     Sinus Problem  This is a new problem. Episode onset: 8 days. The problem has been gradually worsening since onset. There has been no fever. Associated symptoms include congestion, coughing, headaches and sinus pressure. Pertinent negatives include no chills, ear pain, shortness of breath, sneezing or sore throat.       Medications:     Current Outpatient Medications   Medication Sig Dispense Refill    benzonatate 200 MG Oral Cap Take 1 capsule (200 mg total) by mouth 3 (three) times daily as needed for cough. 30 capsule 0    azithromycin 250 MG Oral Tab Take 2 tablets (500 mg total) by mouth daily for 1 day, THEN 1 tablet (250 mg total) daily for 4 days. 6 tablet 0    TRULICITY 1.5 MG/0.5ML Subcutaneous Solution Auto-injector Inject 1.5 mg into the skin once a week. 6 mL 1    glimepiride 2 MG Oral Tab Take 1 tablet (2 mg total) by mouth daily with breakfast. 90 tablet 1    insulin degludec (TRESIBA FLEXTOUCH) 100 UNIT/ML Subcutaneous Solution Pen-injector Inject 54 Units into the skin daily. 51 mL 1    valACYclovir 500 MG Oral Tab Take 1 tablet (500 mg total) by mouth daily. 90 tablet 3    Rimegepant Sulfate (NURTEC) 75 MG Oral Tablet Dispersible Take 75 mg by mouth as needed. Take one tablet at onset of migraine.  Maximum dose in 24 hours is 1 tablet (75mg). 8 tablet 0    sulfamethoxazole-trimethoprim -160 MG Oral Tab per tablet Take 1 tablet by mouth 2 (two) times daily. 10 tablet 0    fluticasone-salmeterol (ADVAIR DISKUS) 250-50 MCG/ACT Inhalation Aerosol Powder, Breath Activated Inhale 1 puff into the lungs Q12H.      Azelastine-Fluticasone 137-50 MCG/ACT Nasal Suspension 2 sprays by Nasal route 2 (two) times daily.      Cyanocobalamin (B-12) 1000 MCG Oral Cap       Budesonide-Formoterol Fumarate (SYMBICORT) 160-4.5 MCG/ACT Inhalation Aerosol Inhale 2 puffs into the lungs 2 (two) times daily.      amLODIPine 2.5 MG Oral Tab Take 1 tablet (2.5 mg total) by mouth daily.      valsartan 160 MG Oral  Tab Take 1 tablet twice a day by oral route.      ERGOCALCIFEROL 1.25 MG (64154 UT) Oral Cap TAKE ONE CAPSULE BY MOUTH WEEKLY 12 capsule 1    Insulin Pen Needle (BD PEN NEEDLE ESTEVAN 2ND GEN) 32G X 4 MM Does not apply Misc USE ONE PEN NEEDLE DAILY 100 each 1    atorvastatin 40 MG Oral Tab Take 1 tablet (40 mg total) by mouth daily. 90 tablet 3    Blood Glucose Monitoring Suppl (CONTOUR NEXT MONITOR) w/Device Does not apply Kit 1 each daily. 1 kit 0    indomethacin 50 MG Oral Cap Take 1 capsule (50 mg total) by mouth as needed. 15 capsule 0    nystatin 001414 UNIT/ML Mouth/Throat Suspension Take 5 mL (500,000 Units total) by mouth 4 (four) times daily. 473 mL 0    zolpidem 5 MG Oral Tab Take 1 tablet (5 mg total) by mouth nightly as needed for Sleep. 30 tablet 0    cyclobenzaprine 5 MG Oral Tab Take 1 tablet (5 mg total) by mouth 3 (three) times daily as needed for Muscle spasms. 30 tablet 0    Cinnamon 500 MG Oral Cap Take 500 mg by mouth in the morning and 500 mg before bedtime.      hydrOXYzine 25 MG Oral Tab Take 1 tablet (25 mg total) by mouth every 8 (eight) hours as needed for Itching. 40 tablet 0    diazePAM 5 MG Oral Tab Take 1 tablet (5 mg total) by mouth every 12 (twelve) hours as needed. 30 tablet 0    Lansoprazole 15 MG Oral Capsule Delayed Release Take 1 capsule (15 mg total) by mouth 2 (two) times daily. 180 capsule 3    Budesonide-Formoterol Fumarate 160-4.5 MCG/ACT Inhalation Aerosol Inhale 2 puffs into the lungs 2 (two) times daily. 30.6 g 2    montelukast 10 MG Oral Tab Take 1 tablet (10 mg total) by mouth daily. 90 tablet 3    amLODIPine 5 MG Oral Tab       methocarbamol 750 MG Oral Tab Take 1 tablet (750 mg total) by mouth nightly as needed. 90 tablet 0    albuterol 108 (90 Base) MCG/ACT Inhalation Aero Soln Inhale 2 puffs into the lungs every 4 (four) hours as needed for Wheezing. 25.5 g 3    CONTOUR NEXT TEST In Vitro Strip TEST SUGAR  2 TIMES DAILY 200 strip 0    fluticasone propionate 50  MCG/ACT Nasal Suspension use 2 sprays by Each nostrils route daily. 48 g 1    diclofenac 1 % External Gel Apply 4 g topically 4 (four) times daily. 350 g 3    meclizine 25 MG Oral Tab Take 1 tablet (25 mg total) by mouth 3 (three) times daily as needed for Dizziness or Nausea. 30 tablet 5    chlorhexidine gluconate 0.12 % Mouth/Throat Solution       losartan 50 MG Oral Tab Take 1 tablet (50 mg total) by mouth in the morning and 1 tablet (50 mg total) before bedtime. 180 tablet 4    Diclofenac Sodium 1 % Transdermal Gel Apply 2 g topically 4 (four) times daily. 50 g 1    Propranolol HCl 80 MG Oral Tab Take 1 tablet (80 mg total) by mouth 2 (two) times daily. 180 tablet 4    acyclovir 5 % External Ointment Apply 1 Application topically every 3 (three) hours. 30 g 2    PATIENT SUPPLIED MEDICATION Vitamin B12, magnesium, zinc, vitamin c, & garlic      magnesium oxide 400 MG Oral Tab Take 1 tablet (400 mg total) by mouth daily.      triamcinolone acetonide 0.1 % External Cream Apply topically 2 (two) times daily as needed. 30 g 3    Ammonium Lactate (LAC-HYDRIN) 12 % External Cream Apply to affected areas 1-2x/day 1 Tube 3    Clobetasol Propionate 0.05 % External Ointment Apply 1 Application topically daily as needed. Apply to affected areas 1x/day as needed 30 g 2    aspirin 81 MG Oral Tab Take 1 tablet (81 mg total) by mouth daily.      acetaminophen 500 MG Oral Tab Take 1 tablet (500 mg total) by mouth every 6 (six) hours as needed for Pain.      Ciclopirox 8 % External Solution Apply 1 Application topically nightly. 1 Bottle 2    ketoconazole 2 % External Cream MEVLIN EXT AA BID  0    cetirizine 10 MG Oral Tab Take 1 tablet (10 mg total) by mouth daily.         Allergies:   Allergies[1]    History:     Health Maintenance   Topic Date Due    Zoster Vaccines (1 of 2) Never done    Diabetes Care Dilated Eye Exam  05/12/2024    COVID-19 Vaccine (1 - 2024-25 season) Never done    Influenza Vaccine (1) 10/01/2024    DEXA Scan   Never done    Annual Physical  10/30/2024    Annual Depression Screening  01/01/2025    Fall Risk Screening (Annual)  01/01/2025    Diabetes Care: Foot Exam (Annual)  01/01/2025    Diabetes Care: Microalb/Creat Ratio (Annual)  01/01/2025    Diabetes Care A1C  02/18/2025    Mammogram  04/10/2025    Diabetes Care: GFR  09/16/2025    Pap Smear  02/21/2026    Colorectal Cancer Screening  07/23/2028    Pneumococcal Vaccine: 50+ Years  Completed    Meningococcal B Vaccine  Aged Out       Patient's last menstrual period was 02/08/2010.   Past Medical History:     Past Medical History:    Acute non-recurrent maxillary sinusitis    Age-related nuclear cataract of both eyes    Allergic rhinitis    pereniaal and seasonal    Alternating exotropia    Amenorrhea    Anxiety    Atrial tachycardia, paroxysmal ([facility])    Barretts esophagus    Carpal tunnel syndrome    Chlamydia    Choroidal nevus, right eye    COPD (chronic obstructive pulmonary disease) (HCC)    Depression    Diabetes ([facility])    Diabetes mellitus type 2 with complications ([facility])    Diabetic retinopathy of left eye ([facility])    Dry eyes    DUB (dysfunctional uterine bleeding)    endometrial biopsy    Eczema    Esophageal reflux    Essential hypertension    Exotropia    Fibromyalgia    Genital warts    Heart murmur    Herpes simplex    High blood pressure    High cholesterol    History of pregnancy    Hyperlipidemia    Irregular menstrual cycle    Neg endometrial biopsy    Irritable bowel syndrome    MGD (meibomian gland dysfunction)    Migraines    Obesity    Ocular migraine    BRENDA    Ovarian cyst    Laparoscopic cystectomy    Pregnancy ([facility])    PVD (posterior vitreous detachment), right eye    patient was seen by Dr. Harkins on 3/27/23 for flashes and floaters OD-DX PVD OD    Spinal stenosis    C5-6, C6-7, physical therapy    TIA (transient ischemic attack)    Viral infection characterized by skin and mucous membrane lesions    Visual impairment       Past Surgical History:      Past Surgical History:   Procedure Laterality Date    Colonoscopy      Cyst removal      Egd N/A 2021    Procedure: ESOPHAGOGASTRODUODENOSCOPY, with bxs COLONOSCOPY with polypectomy;  Surgeon: Sy Slater MD;  Location: [facility] [place] [facility], [facility]    Electrocardiogram, complete  2013    scanned to media tab          Other surgical history      Laparoscopic cystectomy    Other surgical history      Endometrial biopsy    Other surgical history      Endometrial biopsy    Other surgical history  2017    cervical neck surgery     Spine surgery procedure unlisted  2017    cervical     Tubal ligation         Family History:     Family History   Problem Relation Age of Onset    Colon Cancer Mother     Heart Disease Mother         coronary artery disease    Hypertension Mother     Depression Mother     Heart Attack Mother     Heart Disorder Mother     Heart Disease Father         coronary artery disease    Diabetes Father     Heart Attack Father     Heart Disorder Father     Glaucoma Sister     Diabetes Sister     Dementia Sister     Other (Other) Sister         Vascular necrosis    Ovarian Cancer Maternal Aunt     Seizure Disorder Sister        Social History:     Social History     Socioeconomic History    Marital status:      Spouse name: Not on file    Number of children: Not on file    Years of education: Not on file    Highest education level: Not on file   Occupational History    Not on file   Tobacco Use    Smoking status: Former     Current packs/day: 0.00     Average packs/day: 0.3 packs/day for 2.0 years (0.5 ttl pk-yrs)     Types: Cigarettes     Start date: 2015     Quit date: 2017     Years since quittin.0     Passive exposure: Never    Smokeless tobacco: Never    Tobacco comments:     1 pack/wk per pt.   Vaping Use    Vaping status: Never Used   Substance and Sexual Activity    Alcohol use: Not Currently     Comment: Occasionally     Drug use: No    Sexual activity: Yes     Partners: Male     Birth control/protection: Tubal Ligation   Other Topics Concern     Service Not Asked    Blood Transfusions Not Asked    Caffeine Concern Yes     Comment: 2 cups coffee daily    Occupational Exposure Not Asked    Hobby Hazards Not Asked    Sleep Concern Not Asked    Stress Concern Not Asked    Weight Concern Not Asked    Special Diet Not Asked    Back Care Not Asked    Exercise No    Bike Helmet Not Asked    Seat Belt Not Asked    Self-Exams Not Asked   Social History Narrative    The patient does not use an assistive device..      The patient does live in a home with stairs.        Lives with         Work      Social Drivers of Health     Food Insecurity: Not on file   Transportation Needs: Not on file   Stress: Not on file   Housing Stability: Not on file       Review of Systems:   Review of Systems   Constitutional:  Negative for chills.   HENT:  Positive for congestion, sinus pressure and sinus pain. Negative for ear pain, sneezing and sore throat.    Respiratory:  Positive for cough. Negative for shortness of breath.    Neurological:  Positive for headaches.        There were no vitals filed for this visit.  There is no height or weight on file to calculate BMI.    Physical Exam:   Physical Exam   Patient alert and orient x3, able to carry on conversation easily, without shortness of breath, coughing, can speak in full sentences.    Assessment and Plan::     Assessment & Plan  Acute cough    Orders:    benzonatate 200 MG Oral Cap; Take 1 capsule (200 mg total) by mouth 3 (three) times daily as needed for cough.    Acute non-recurrent maxillary sinusitis    Orders:    azithromycin 250 MG Oral Tab; Take 2 tablets (500 mg total) by mouth daily for 1 day, THEN 1 tablet (250 mg total) daily for 4 days.       Discussed plan of care with pt and pt is in agreement.All questions answered. Pt to call with questions or  concerns.         [1]   Allergies  Allergen Reactions    Liraglutide SWELLING    Meloxicam PALPITATIONS and UNKNOWN    Penicillins ITCHING and SWELLING     No skin peeling or organ damage    Sulindac HIVES    Acyclovir DIZZINESS    Clarithromycin NAUSEA AND VOMITING    Methylprednisolone PAIN    Rosuvastatin DIARRHEA and UNKNOWN    Ciprofloxacin UNKNOWN    Cymbalta [Duloxetine Hcl] OTHER (SEE COMMENTS)     Tingling arms/legs, blurry vision, elevated glucose, HA     Gabapentin UNKNOWN    Metronidazole UNKNOWN    Naproxen Sodium OTHER (SEE COMMENTS)     Tingling to limbs    Nitrofurantoin UNKNOWN    Nitrofurantoin Macrocrystal UNKNOWN    Valsartan UNKNOWN    Doxycycline RASH

## 2025-02-19 NOTE — ASSESSMENT & PLAN NOTE
Orders:    azithromycin 250 MG Oral Tab; Take 2 tablets (500 mg total) by mouth daily for 1 day, THEN 1 tablet (250 mg total) daily for 4 days.

## 2025-02-24 ENCOUNTER — TELEPHONE (OUTPATIENT)
Dept: OBGYN CLINIC | Facility: CLINIC | Age: 65
End: 2025-02-24

## 2025-02-24 ENCOUNTER — TELEMEDICINE (OUTPATIENT)
Dept: OBGYN CLINIC | Facility: CLINIC | Age: 65
End: 2025-02-24
Payer: MEDICARE

## 2025-02-24 DIAGNOSIS — L29.3 PERINEAL IRRITATION: Primary | ICD-10-CM

## 2025-02-24 PROCEDURE — 99213 OFFICE O/P EST LOW 20 MIN: CPT | Performed by: ADVANCED PRACTICE MIDWIFE

## 2025-02-24 RX ORDER — TERCONAZOLE 8 MG/G
1 CREAM VAGINAL NIGHTLY
Qty: 20 G | Refills: 0 | Status: SHIPPED | OUTPATIENT
Start: 2025-02-24 | End: 2025-02-27

## 2025-02-24 RX ORDER — FLUCONAZOLE 150 MG/1
150 TABLET ORAL
Qty: 2 TABLET | Refills: 0 | Status: SHIPPED | OUTPATIENT
Start: 2025-02-24

## 2025-02-24 RX ORDER — CLOBETASOL PROPIONATE 0.5 MG/G
1 OINTMENT TOPICAL 2 TIMES DAILY
Qty: 45 G | Refills: 2 | Status: SHIPPED | OUTPATIENT
Start: 2025-02-24

## 2025-02-24 NOTE — TELEPHONE ENCOUNTER
Pt verified name and .    Pt reports vulvar irritation and inflammation and requesting rx refill of clotrimazole-betamethasone cream. Advised that pt be seen in office for further evaluation of symptoms or that pt be seen in Urgent Care. Pt states that they are currently out of town caring for their sister-in-law with Alzheimer's and recently tested positive for COVID. Recommended that pt be scheduled for a video visit with CNM on call to discuss current symptoms for rx refill. Pt verbalized understanding and agreed. Pt scheduled for video visit this afternoon. Pt aware of scheduling details. CNM on call notified.     Pt requesting rx be sent to [facility] on [address] in [place], [place].

## 2025-02-24 NOTE — PROGRESS NOTES
Virtual Telephone Check-In    Ju Johnson verbally consents to a Virtual Check-In visit on 02/24/25.  Patient has been referred to the [facility] website at [email or web address] to review the yearly Consent to Treat document.    Patient understands and accepts financial responsibility for any deductible, co-insurance and/or co-pays associated with this service.    Duration of the service: 15 minutes      Summary of topics discussed:     Pt reports that the last week she has had perineal irritation, swelling and redness. Reports she had been on antibiotics.  Denies any vaginal discharge or bleeding / spotting  States that it feels like a yeast infection.  Pt is diabetic.Has not used any OTC products.Reports she has also used clobetasol in the past with good results.  She is caring for her sister in [place] and wont be back in the area for 30 days.  She forgot her clobetasol at home and  was hoping a prescription could be sent to local pharmacy.  Reviewed with patient that we will presumptive treat for yeast and if symptoms are unresolved or worsen she will need an appointment for evaluation.  Agreed to refill Clobetasol to closer pharmacy for PRN use after treatment completed.  Instruction for use of rx given  Warning signs reviewed and all questions answered.    Diagnoses and all orders for this visit:    Perineal irritation  -     fluconazole (DIFLUCAN) 150 MG Oral Tab; Take 1 tablet (150 mg total) by mouth every 3 (three) days. Take one now and repeat in 72 hours  -     Terconazole 0.8 % Vaginal Cream; Place 1 applicator vaginally nightly for 3 days.  -     clobetasol 0.05 % External Ointment; Apply 1 Application topically 2 (two) times daily.     Probable yeast      Elizabeth Feng CNM

## 2025-02-24 NOTE — TELEPHONE ENCOUNTER
Patient calling to further discuss her vaginal itching concerns. Unable to come to an appointment as she is out of town. Has the requested medication at home but can't get it. Please call.

## 2025-02-27 RX ORDER — HYDROXYZINE HYDROCHLORIDE 25 MG/1
25 TABLET, FILM COATED ORAL EVERY 8 HOURS PRN
Qty: 40 TABLET | Refills: 0 | Status: SHIPPED | OUTPATIENT
Start: 2025-02-27

## 2025-02-27 NOTE — TELEPHONE ENCOUNTER
Please review.  Protocol failed/has no protocol.    Last Office Visit: 09/18/2024  Please see message below for upcoming appointment.    Future Appointments   Date Time Provider Department [place]      SANTI  KIRA   6/11/2025  2:15 PM [facility]

## 2025-03-07 ENCOUNTER — TELEPHONE (OUTPATIENT)
Dept: PULMONOLOGY | Facility: CLINIC | Age: 65
End: 2025-03-07

## 2025-03-07 ENCOUNTER — PATIENT MESSAGE (OUTPATIENT)
Dept: FAMILY MEDICINE CLINIC | Facility: CLINIC | Age: 65
End: 2025-03-07

## 2025-03-07 NOTE — TELEPHONE ENCOUNTER
Echo from [facility] is requesting for patient last chart notes, is aware that Dr. Leo is not with the practice please follow up fax [phone]

## 2025-03-10 NOTE — TELEPHONE ENCOUNTER
Aysha Leone is a 46 year old woman who presents today for annual GYN exam. She is sexually active.  Patient is currently using tubal ligation for birth control. In regards to her menstrual cycles, Aysha reports irregular periods. She had 2 periods in December and period started at usual time for January earlier this morning-very light. She has minimal if any  symptoms of dysmenorrhea. She currently has only rare episodes of stress incontinence.    OBSTETRIC/GYNECOLOGIC:   Obstetric History       T1      L2     SAB0   TAB0   Ectopic0   Molar0   Multiple0   Live Births0    Obstetric Comments   Both healthy    Patient's last menstrual period was 01/15/2018 (exact date).             Pap smears have been abnormal: history of LEEP for AGUSTÍN III    PAST MEDICAL HISTORY:    Past Medical History:   Diagnosis Date   • Chronic fatigue syndrome     in past   • PONV (postoperative nausea and vomiting)      PAST SURGICAL HISTORY:   Past Surgical History:   Procedure Laterality Date   • BIOPSY OR EXCISE CERVICAL LESION  1991    chronic inflammation and extensive koilocytosis   • CONIZATION CERVIX,LOOP ELECTRD  1992   • OCCLUDE FALLOPIAN TUBE BY DEVICE      Sterilization ligate Fallopian tubes clips or rings   • PAP,LIQUID BASED  1991    Class III-Moderate atypia; many squamous cells with mild/moderate dysplasia and HPV related changes   • PAP,LIQUID BASED  1991    Class II - Mild atypia, not suspicious for malignancy; mild dysplasia   • PAP,LIQUID BASED  1992    Class III - moderate atypia. Consistent with moderate to severe dysplasia   • PAP,LIQUID BASED  1992    Class II - mild atypia, not suspicious for malignancy. Mild dysplasia with possible HPV related changes   • TONSILLECTOMY  75   • TUBAL LIGATION Bilateral      SOCIAL HISTORY:   Social History     Social History   • Marital status:      Spouse name: Kevin   • Number of children: 2   • Years of  [facility] who doesn't see any current order. Transferred to [facility] who states they received what was requested (note) and order is in process.  Can take 7-10 business days      __________________________________________________________  Referral Information:  Order Date: Feb 6, 2024       Epic Order #: [id number]   Referral Type: DME - External Dx: Obstructive sleep apnea (G47.33)   Signed Referral [place]:        Comments: Cpap Supplies   education: N/A     Occupational History   • in lab [employer]     Social History Main Topics   • Smoking status: Never Smoker   • Smokeless tobacco: Never Used   • Alcohol use 0.0 oz/week      Comment: occasionally - special occasions   • Drug use: No   • Sexual activity: Yes     Partners: Male     Birth control/ protection: Surgical      Comment: tubal ligation     Other Topics Concern   • Not on file     Social History Narrative   • No narrative on file     Review of patient's social economics indicates:   in lab           [employer] [facility]    Patient denies a history of domestic violence.     FAMILY HISTORY:   Family History   Problem Relation Age of Onset   • Substance abuse Father      ETOH   • Asthma Sister    • Cancer Sister      lung, nonsmoker   • Cancer Other      Skin       REVIEW OF SYSTEMS:     CONSTITUTIONAL: Denies fever/sweats and unintentional weight change.  CARDIOVASCULAR:  Denies chest discomfort with exertion, shortness of breath or palpitations.   RESPIRATORY: Denies cough, shortness of breath, change in exercise tolerance.   GASTROINTESTINAL:  Denies abdominal pain, nausea, change in bowel habits, melena.   GENITOURINARY: Denies frequency, dysuria.  MUSCULOSKELETAL: Denies joint pain, muscle aches.   SKIN:  Denies concerns, changing moles.   NEUROLOGIC:  Denies changes.  PSYCHIATRIC: Denies changes.  HEMATOLOGIC/LYMPHATIC:  Denies abnormal bruising or bleeding, lumps or bumps.     Last Labs include:  CHOLESTEROL   Date Value Ref Range Status   10/28/2015 115 100 - 200 mg/dL Final     Comment:        Desirable                <200  Borderline High          200 to 239  High                     >=240            OBJECTIVE:  VITALS:   Visit Vitals  /64   Ht 5' 8.8\" (1.748 m)   Wt 94.9 kg   LMP 01/15/2018 (Exact Date)   BMI 31.07 kg/m²      GENERAL: Alert & oriented x3, mood and affect are appropriate.   HEENT: Neck is supple with no lymphadenopathy. Thyroid is normal size  SKIN: Warm and  moist without erythema or new lesions  BREASTS: Without masses or nipple discharge bilaterally. Breast self-examination reviewed and encouraged.  ABDOMEN: Soft, non-tender, no masses or distention present, no hepatosplenomegaly, no hernias present   EXTREMITIES: No erythema, no edema  LYMPH: No palpable neck, axillary or groin nodes  GYNECOLOGIC: External genitalia show no lesions  URETHRAL MEATUS: No polyps and well supported  LABIA MINORA/MAJORA: Normal, no lesions, atrophy absent  SKENE'S GLANDS: No erythema present  VAGINA:  Normal mucosa  CERVIX: Normal, without  lesions or masses  and pap smear obtained  UTERUS: Normal size, mobile , non-tender, anteverted  ADNEXA: No masses  and no tenderness  RECTAL: No external hemorrhoids      IMPRESSION:  > Irregular menses, Normal gynecologic exam  > Good results with tubal ligation    PLAN:  > Pap smear today  > Sexually transmitted disease screening performed: not performed per patient request   >Schedule mammogram  > Check TSH today. If TSH normal and irregular menses persists, will get pelvic US  > Return to clinic 1 year or as needed    Addendum: Call to patient notified normal TSH. Patient will call if bleeds again before 21 days.

## 2025-04-08 RX ORDER — BUDESONIDE AND FORMOTEROL FUMARATE DIHYDRATE 160; 4.5 UG/1; UG/1
2 AEROSOL RESPIRATORY (INHALATION) 2 TIMES DAILY
Refills: 0 | Status: CANCELLED | OUTPATIENT
Start: 2025-04-08

## 2025-04-10 ENCOUNTER — PATIENT MESSAGE (OUTPATIENT)
Dept: ENDOCRINOLOGY CLINIC | Facility: CLINIC | Age: 65
End: 2025-04-10

## 2025-04-10 DIAGNOSIS — J44.1 CHRONIC OBSTRUCTIVE PULMONARY DISEASE WITH ACUTE EXACERBATION (HCC): Chronic | ICD-10-CM

## 2025-04-11 ENCOUNTER — TELEPHONE (OUTPATIENT)
Dept: ENDOCRINOLOGY CLINIC | Facility: CLINIC | Age: 65
End: 2025-04-11

## 2025-04-11 RX ORDER — INSULIN GLARGINE 100 [IU]/ML
54 INJECTION, SOLUTION SUBCUTANEOUS NIGHTLY
Qty: 48 ML | Refills: 1 | Status: SHIPPED | OUTPATIENT
Start: 2025-04-11

## 2025-04-11 RX ORDER — MONTELUKAST SODIUM 10 MG/1
10 TABLET ORAL DAILY
Qty: 90 TABLET | Refills: 3 | Status: SHIPPED | OUTPATIENT
Start: 2025-04-11

## 2025-04-11 NOTE — TELEPHONE ENCOUNTER
Please review refill failed/no protocol Allergy/Contraindication: Budesonide-Formoterol FumarateReactions: PAIN. Reaction type: Side effect. User documented allergy severity: Medium.  Level 2 with METHYLPREDNISOLONE (Class: CORTICOSTEROIDS).  Details  Override reason        Budesonide-Formoterol Fumarate 160-4.5 MCG/ACT Inhalation Aerosol [Pharmacy Med Name: Budesonide/Formoterol Fumarate Dihydrate 160-4.5mcg Aer Pras]  Prescription. Reordered.  High  Drug-Drug: Budesonide-Formoterol Fumarate and Propranolol HClPharmacologic effects of beta-2 agonists (eg, Beta-2 Agonists) may be decreased by beta-adrenergic blockers (eg, Beta Blockers). Untoward physiologic effects, characterized by bronchospasm, may occur.  Details  Override reason        Budesonide-Formoterol Fumarate 160-4.5 MCG/ACT Inhalation Aerosol [Pharmacy Med Name: Budesonide/Formoterol Fumarate Dihydrate 160-4.5mcg Aer Pras]  Prescription. Reordered.  Propranolol HCl 80 MG Oral TabPrescription. Active.  Discontinue  Medium  Duplicate Therapy: fluticasone-salmeterol, Budesonide-Formoterol FumarateBETA-ADRENERGIC AGENTS, LONG-ACTING, STEROID INHALANTS. No Abuse/Dependency Potential.  Details    Requested Prescriptions     Pending Prescriptions Disp Refills    Budesonide-Formoterol Fumarate 160-4.5 MCG/ACT Inhalation Aerosol [Pharmacy Med Name: Budesonide/Formoterol Fumarate Dihydrate 160-4.5mcg Aer Pras] 30.6 g 3     Sig: Inhale 2 puffs into the lungs 2 (two) times daily.     Signed Prescriptions Disp Refills    montelukast 10 MG Oral Tab 90 tablet 3     Sig: Take 1 tablet (10 mg total) by mouth daily.     Authorizing Provider: EMMANUEL SCHRADER     Ordering User: WEST, APRIL         Recent Visits  Date Type Provider Dept   09/28/24 Office Visit Giovanna Valdez MD [facility]   09/18/24 Office Visit Emmanuel Schrader MD [facility]   09/03/24 Office Visit Emmanuel Schrader MD [facility]   12/07/23 Office Visit Daynel Leger APRN  [facility]   10/30/23 Office Visit Carmelina Gonzalez MD [facility]   Showing recent visits within past 540 days with a meds authorizing provider and meeting all other requirements  Future Appointments  Date Type Provider Dept   06/11/25 Appointment Carmelina Gonzalez MD [facility]   Showing future appointments within next 150 days with a meds authorizing provider and meeting all other requirements    Requested Prescriptions   Pending Prescriptions Disp Refills    Budesonide-Formoterol Fumarate 160-4.5 MCG/ACT Inhalation Aerosol [Pharmacy Med Name: Budesonide/Formoterol Fumarate Dihydrate 160-4.5mcg Aer Pras] 30.6 g 3     Sig: Inhale 2 puffs into the lungs 2 (two) times daily.       Asthma & COPD Medication Protocol Passed - 4/11/2025 11:09 AM        Passed - Appointment in past 6 or next 3 months      Recent Outpatient Visits              1 month ago Perineal irritation    [facility] Elizabeth Feng CNM    Telemedicine    1 month ago Acute cough    [facility] [place] Nguyen, Minhxuyen, PA-C    Telemedicine    3 months ago Intractable chronic migraine with aura and without status migrainosus    [facility] Yuli Westbrook DO    Telemedicine    4 months ago Intractable chronic migraine with aura and without status migrainosus    [facility] Yuli Westbrook DO    Office Visit    4 months ago Controlled type 2 diabetes mellitus without complication, without long-term current use of insulin (HCC)    [facility] Prachi Montanez MD    Office Visit          Future Appointments         Provider Department Appt Notes    In 2 weeks Prachi Montanez MD [facility] A1C    In 2 months Carmelina Gonzalez MD [facility] Weight/   medicare physical. Also will address use of CPAP to meet HME documentation requirements. see comm  last physical 10/30/2023 -[place]                    Passed - Medication is active on med list         Signed Prescriptions Disp Refills    montelukast 10 MG Oral Tab 90 tablet 3     Sig: Take 1 tablet (10 mg total) by mouth daily.       Asthma & COPD Medication Protocol Passed - 4/11/2025 11:09 AM        Passed - Appointment in past 6 or next 3 months      Recent Outpatient Visits              1 month ago Perineal irritation    [facility] Elizabeth Feng CNM    Telemedicine    1 month ago Acute cough    [facility] [place], Minhxuyen, PA-C    Telemedicine    3 months ago Intractable chronic migraine with aura and without status migrainosus    [facility] Yuli Neville DO    Telemedicine    4 months ago Intractable chronic migraine with aura and without status migrainosus    [facility] Yuli Westbrook DO    Office Visit    4 months ago Controlled type 2 diabetes mellitus without complication, without long-term current use of insulin (HCC)    [facility] Prachi Montanez MD    Office Visit          Future Appointments         Provider Department Appt Notes    In 2 weeks Prachi Montanez MD [facility] A1C    In 2 months Carmelina Gonzalez MD [facility] Weight/  medicare physical. Also will address use of CPAP to meet HME documentation requirements. see comm  last physical 10/30/2023 -[place]                    Passed - Medication is active on med list

## 2025-04-11 NOTE — TELEPHONE ENCOUNTER
Per pts mychart \"they will cover Lantus Insulin. Please put in a prescription to the [facility] on [address] in [place] for Lantus Insulin for 3 month supply. I take 54 units per day \"      Pended Lantus below. Please approve if appropriate.

## 2025-04-11 NOTE — TELEPHONE ENCOUNTER
insulin degludec (TRESIBA FLEXTOUCH) 100 UNIT/ML Subcutaneous Solution Pen-injector Inject 54 Units into the skin daily. 51 mL 1     Pharmacy message: Tresiba no longer covered.  Lantus and Toujeo preferred.Please advise.

## 2025-04-11 NOTE — TELEPHONE ENCOUNTER
Refill passed per [facility] protocol.  Requested Prescriptions   Pending Prescriptions Disp Refills    BUDESONIDE-FORMOTEROL FUMARATE 160-4.5 MCG/ACT Inhalation Aerosol [Pharmacy Med Name: Budesonide/Formoterol Fumarate Dihydrate 160-4.5mcg Aer Pras] 30.6 g 0     Sig: Inhale 2 puffs into the lungs 2 (two) times daily.       Asthma & COPD Medication Protocol Passed - 4/11/2025 11:08 AM        Passed - Appointment in past 6 or next 3 months      Recent Outpatient Visits              1 month ago Perineal irritation    [facility] Elizabeth Feng CNM    Telemedicine    1 month ago Acute cough    [facility] [place], Minhxuyen, PA-C    Telemedicine    3 months ago Intractable chronic migraine with aura and without status migrainosus    [facility] Yuli Westbrook DO    Telemedicine    4 months ago Intractable chronic migraine with aura and without status migrainosus    [facility] Piet[place], Yuli Choi DO    Office Visit    4 months ago Controlled type 2 diabetes mellitus without complication, without long-term current use of insulin (HCC)    [facility] Prachi Montanez MD    Office Visit          Future Appointments         Provider Department Appt Notes    In 2 weeks Prachi Montanez MD [facility] A1C    In 2 months Carmelina Gonzalez MD [facility] Weight/  medicare physical. Also will address use of CPAP to meet E documentation requirements. see comm  last physical 10/30/2023 -[place]                    Passed - Medication is active on med list          MONTELUKAST 10 MG Oral Tab [Pharmacy Med Name: Montelukast Sodium 10 Mg Tab Camb] 90 tablet 0     Sig: TAKE ONE TABLET BY MOUTH ONE TIME DAILY       Asthma & COPD Medication  Protocol Passed - 4/11/2025 11:08 AM        Passed - Appointment in past 6 or next 3 months      Recent Outpatient Visits              1 month ago Perineal irritation    [facility] Elizabeth Feng CNM    Telemedicine    1 month ago Acute cough    [facility] [place], Minhxuyen, PA-C    Telemedicine    3 months ago Intractable chronic migraine with aura and without status [facility]rainosus    [facility]    4 months ago Intractable chronic migraine with aura and without status migrainosus    [facility] Yuli Westbrook DO    Office Visit    4 months ago Controlled type 2 diabetes mellitus without complication, without long-term current use of insulin (HCC)    [facility] Prachi Montanez MD    Office Visit          Future Appointments         Provider Department Appt Notes    In 2 weeks [facility]    In 2 months Carmelina Gonzalez MD [facility] Weight/  medicare physical. Also will address use of CPAP to meet HME documentation requirements. see comm  last physical 10/30/2023 -[place]                    Passed - Medication is active on med list

## 2025-04-12 RX ORDER — BUDESONIDE AND FORMOTEROL FUMARATE DIHYDRATE 160; 4.5 UG/1; UG/1
2 AEROSOL RESPIRATORY (INHALATION) 2 TIMES DAILY
Qty: 30.6 G | Refills: 3 | Status: SHIPPED | OUTPATIENT
Start: 2025-04-12

## 2025-04-22 ENCOUNTER — PATIENT MESSAGE (OUTPATIENT)
Dept: ENDOCRINOLOGY CLINIC | Facility: CLINIC | Age: 65
End: 2025-04-22

## 2025-04-22 ENCOUNTER — ORDER TRANSCRIPTION (OUTPATIENT)
Dept: ADMINISTRATIVE | Facility: HOSPITAL | Age: 65
End: 2025-04-22

## 2025-04-22 DIAGNOSIS — Z13.6 SCREENING FOR HEART DISEASE: Primary | ICD-10-CM

## 2025-04-22 DIAGNOSIS — E11.9 CONTROLLED TYPE 2 DIABETES MELLITUS WITHOUT COMPLICATION, WITHOUT LONG-TERM CURRENT USE OF INSULIN (HCC): Primary | ICD-10-CM

## 2025-04-22 NOTE — TELEPHONE ENCOUNTER
Dr. Montanez --    Pt has visit on 4/30 and asking if any labs need to be checked prior? She's requesting a complete blood work up and to check cortisol levels

## 2025-04-30 ENCOUNTER — OFFICE VISIT (OUTPATIENT)
Dept: ENDOCRINOLOGY CLINIC | Facility: CLINIC | Age: 65
End: 2025-04-30
Payer: MEDICARE

## 2025-04-30 VITALS
DIASTOLIC BLOOD PRESSURE: 74 MMHG | SYSTOLIC BLOOD PRESSURE: 143 MMHG | WEIGHT: 205 LBS | HEART RATE: 68 BPM | BODY MASS INDEX: 35 KG/M2

## 2025-04-30 DIAGNOSIS — F32.A DEPRESSION, UNSPECIFIED DEPRESSION TYPE: ICD-10-CM

## 2025-04-30 DIAGNOSIS — E11.9 CONTROLLED TYPE 2 DIABETES MELLITUS WITHOUT COMPLICATION, WITHOUT LONG-TERM CURRENT USE OF INSULIN (HCC): Primary | ICD-10-CM

## 2025-04-30 LAB
GLUCOSE BLOOD: 226
HEMOGLOBIN A1C: 8.1 % (ref 4.3–5.6)
TEST STRIP LOT #: NORMAL NUMERIC

## 2025-04-30 PROCEDURE — 82947 ASSAY GLUCOSE BLOOD QUANT: CPT | Performed by: INTERNAL MEDICINE

## 2025-04-30 PROCEDURE — 83036 HEMOGLOBIN GLYCOSYLATED A1C: CPT | Performed by: INTERNAL MEDICINE

## 2025-04-30 PROCEDURE — 99214 OFFICE O/P EST MOD 30 MIN: CPT | Performed by: INTERNAL MEDICINE

## 2025-04-30 RX ORDER — INSULIN GLARGINE 300 U/ML
54 INJECTION, SOLUTION SUBCUTANEOUS DAILY
Qty: 27 ML | Refills: 1 | Status: SHIPPED | OUTPATIENT
Start: 2025-04-30

## 2025-04-30 NOTE — PROGRESS NOTES
Name: Ju Johnson  Date: 2025    Referring Physician: No ref. provider found    HISTORY OF PRESENT ILLNESS   Ju Johnson is a 64 year old female who presents for diabetes mellitus.     Since last visit she lost her  with increased stress.     Prior HbA, C or glycohemoglobin were 8.5% 2014; 7.9% 2014; 8.0% 2015; 8.9% 2016; 7.3% 2016; 7.3% 2017; 7.0% 2018; 6.8% 2019; 7.1% 2020; 6.7% 2020; 7.4% 2021; 7.0% 3/2022; 8.5% 2022; 7.6% 2022; 7.9% 2023; 7.5% 3/2024; 8.1% 2024; 8.1% POC Today     Dietary compliance: Good -->improved diet since last visit  Exercise: No  Polyuria/polydipsia: No  Blurred vision: No    Episodes of hypoglycemia: No    Blood Glucose:  Checking 1-2 times per day  Fastin-140    Medications for DM  Glimepiride 2mg PO daily  Lantus 54 units SQ QHS   Trulicity 1.5mg subcutaneous weekly -->just restarted 2 weeks ago     Intolerant of Semglee and Basaglar due to myalgias, weight gain - see MyChart encounter     Pioglitazone d/c'd due to side effects  Invokana (d/c'd due to yeast infections)  Intolerant Victoza  Metformin -->intolerant due to diarrhea  Bydureon 2mg SQ weekly -->intolerant      REVIEW OF SYSTEMS  Eyes: Diabetic retinopathy present: No            Most recent visit to eye doctor in last 12 months: Yes    CV: Cardiovascular disease present: No         Hypertension present: Yes         Hyperlipidemia present: Yes         Peripheral Vascular Disease present: No    : Nephropathy present: No    Neuro: Neuropathy present: No    Skin: Infection or ulceration: No    Osteoporosis: No    Thyroid disease: No      Medications:   Allergies:   Allergies   Allergen Reactions    Liraglutide SWELLING    Meloxicam PALPITATIONS and UNKNOWN    Penicillins ITCHING and SWELLING     No skin peeling or organ damage    Sulindac HIVES    Acyclovir DIZZINESS    Clarithromycin NAUSEA AND VOMITING    Methylprednisolone PAIN    Rosuvastatin DIARRHEA and UNKNOWN     Ciprofloxacin UNKNOWN    Cymbalta [Duloxetine Hcl] OTHER (SEE COMMENTS)     Tingling arms/legs, blurry vision, elevated glucose, HA     Gabapentin UNKNOWN    Metronidazole UNKNOWN    Naproxen Sodium OTHER (SEE COMMENTS)     Tingling to limbs    Nitrofurantoin UNKNOWN    Nitrofurantoin Macrocrystal UNKNOWN    Valsartan UNKNOWN    Doxycycline RASH       Social History:   Social History     Socioeconomic History    Marital status:    Tobacco Use    Smoking status: Former     Current packs/day: 0.00     Average packs/day: 0.3 packs/day for 2.0 years (0.5 ttl pk-yrs)     Types: Cigarettes     Start date: 2015     Quit date: 2017     Years since quittin.2     Passive exposure: Never    Smokeless tobacco: Never    Tobacco comments:     1 pack/wk per pt.   Vaping Use    Vaping status: Never Used   Substance and Sexual Activity    Alcohol use: Not Currently     Comment: Occasionally    Drug use: No    Sexual activity: Yes     Partners: Male     Birth control/protection: Tubal Ligation   Other Topics Concern    Caffeine Concern Yes     Comment: 2 cups coffee daily    Exercise No       Medical History:   Past Medical History:    Acute non-recurrent maxillary sinusitis    Age-related nuclear cataract of both eyes    Allergic rhinitis    pereniaal and seasonal    Alternating exotropia    Amenorrhea    Anxiety    Atrial tachycardia, paroxysmal (HCC)    Barretts esophagus    Carpal tunnel syndrome    Chlamydia    Choroidal nevus, right eye    COPD (chronic obstructive pulmonary disease) (HCC)    Depression    Diabetes (HCC)    Diabetes mellitus type 2 with complications (HCC)    Diabetic retinopathy of left eye (HCC)    Dry eyes    DUB (dysfunctional uterine bleeding)    endometrial biopsy    Eczema    Esophageal reflux    Essential hypertension    Exotropia    Fibromyalgia    Genital warts    Heart murmur    Herpes simplex    High blood pressure    High cholesterol    History of pregnancy    Hyperlipidemia     Irregular menstrual cycle    Neg endometrial biopsy    Irritable bowel syndrome    MGD (meibomian gland dysfunction)    Migraines    Obesity    Ocular migraine    BRENDA    Ovarian cyst    Laparoscopic cystectomy    Pregnancy (HCC)    PVD (posterior vitreous detachment), right eye    patient was seen by Dr. Harkins on 3/27/23 for flashes and floaters OD-DX PVD OD    Spinal stenosis    C5-6, C6-7, physical therapy    TIA (transient ischemic attack)    Viral infection characterized by skin and mucous membrane lesions    Visual impairment       Surgical history:   Past Surgical History:   Procedure Laterality Date    Colonoscopy      Cyst removal      Egd N/A 2021    Procedure: ESOPHAGOGASTRODUODENOSCOPY, with bxs COLONOSCOPY with polypectomy;  Surgeon: Sy Slater MD;  Location: [facility]    Electrocardiogram, complete  2013    scanned to media tab          Other surgical history      Laparoscopic cystectomy    Other surgical history      Endometrial biopsy    Other surgical history      Endometrial biopsy    Other surgical history  2017    cervical neck surgery     Spine surgery procedure unlisted  2017    cervical     Tubal ligation           PHYSICAL EXAM  /74   Pulse 68   Wt 205 lb (93 kg)   LMP 2010   BMI 35.19 kg/m²     General Appearance:  alert, well developed, in no acute distress  Eyes:  normal conjunctivae, sclera., normal sclera and normal pupils  Ears/Nose/Mouth/Throat/Neck:  no palpable thyroid nodules or cervical lymphadenopathy  Back: no kyphosis or back tenderness   Musculoskeletal:  normal muscle strength and tone  Skin:  normal moisture and skin texture  Neuro:  sensory grossly intact and motor grossly intact  Psychiatric:  oriented to time, self, and place  Nutritional:  no abnormal weight gain or loss  Bilateral barefoot skin diabetic exam is normal, visualized feet and the appearance is normal.  Bilateral  monofilament/sensation of both feet is normal.  Pulsation pedal pulse exam of both lower legs/feet is normal as well.    ASSESSMENT/PLAN:      1. Diabetes Mellitus Type 2, controlled  -controlled, HgA1c 8.1% -->increased since last visit   -Discussed importance of glycemic control to prevent complications of diabetes  -Discussed complications of diabetes include retinopathy, neuropathy, nephropathy and cardiovascular disease  -Discussed importance of SBGM  -Discussed importance of low CHO diet  -Continue Lantus 54 units subcutaneous daily -->change to Toujeo   -Continue Glimepiride to 2mg PO daily, verbalized understanding of risks and benefits   -Continue Trulicity for next 3 months, if not refilling then increase Glimepiride  -She just restarted Trulicity 2 weeks ago so suspect A1c will improve  -Normotensive   -UTD with optho  -Normal foot exam performed 11/2024  -Recheck CMP, lipids, MAB    2. Subclinical Hypothyroidsim  -Normal TFTs - normalized   -Recheck labs    3. Depressed Mood  -She notes increased symptoms of anxiety and overall lower mood in past few weeks  -Placed order for Santosh Tran  -But also discussed seeing PCP to discuss medication     RTC 4 months    4/30/2025  Prachi Montanez MD

## 2025-05-05 ENCOUNTER — TELEPHONE (OUTPATIENT)
Age: 65
End: 2025-05-05

## 2025-05-05 NOTE — TELEPHONE ENCOUNTER
Good afternoon, Ju,    Thank you for speaking with me today. Should you decide that you'd like to see a psychiatric provider for medication management, my team (Behavioral Health Navigation) can be reached at [phone].    Warm regards,    AVELINO James, [facility]  Behavioral Health Navigator  [phone]

## 2025-06-11 ENCOUNTER — OFFICE VISIT (OUTPATIENT)
Dept: FAMILY MEDICINE CLINIC | Facility: CLINIC | Age: 65
End: 2025-06-11
Payer: MEDICARE

## 2025-06-11 VITALS
BODY MASS INDEX: 35.31 KG/M2 | DIASTOLIC BLOOD PRESSURE: 82 MMHG | HEIGHT: 64 IN | HEART RATE: 61 BPM | WEIGHT: 206.81 LBS | SYSTOLIC BLOOD PRESSURE: 142 MMHG

## 2025-06-11 DIAGNOSIS — Z00.00 ENCOUNTER FOR ANNUAL HEALTH EXAMINATION: ICD-10-CM

## 2025-06-11 DIAGNOSIS — E11.9 TYPE 2 DIABETES MELLITUS WITHOUT COMPLICATION, WITHOUT LONG-TERM CURRENT USE OF INSULIN (HCC): Primary | ICD-10-CM

## 2025-06-11 DIAGNOSIS — J44.1 CHRONIC OBSTRUCTIVE PULMONARY DISEASE WITH ACUTE EXACERBATION (HCC): Chronic | ICD-10-CM

## 2025-06-11 DIAGNOSIS — E55.9 VITAMIN D DEFICIENCY: ICD-10-CM

## 2025-06-11 DIAGNOSIS — I10 PRIMARY HYPERTENSION: ICD-10-CM

## 2025-06-11 DIAGNOSIS — G56.03 BILATERAL CARPAL TUNNEL SYNDROME: ICD-10-CM

## 2025-06-11 DIAGNOSIS — G47.33 SLEEP APNEA, OBSTRUCTIVE: ICD-10-CM

## 2025-06-11 DIAGNOSIS — Z12.31 SCREENING MAMMOGRAM FOR BREAST CANCER: ICD-10-CM

## 2025-06-11 DIAGNOSIS — J01.00 ACUTE MAXILLARY SINUSITIS, RECURRENCE NOT SPECIFIED: ICD-10-CM

## 2025-06-11 DIAGNOSIS — G47.00 INSOMNIA, UNSPECIFIED TYPE: ICD-10-CM

## 2025-06-11 DIAGNOSIS — Z78.0 POST-MENOPAUSAL: ICD-10-CM

## 2025-06-11 DIAGNOSIS — M51.379 DEGENERATION OF INTERVERTEBRAL DISC OF LUMBOSACRAL REGION, UNSPECIFIED WHETHER PAIN PRESENT: ICD-10-CM

## 2025-06-11 DIAGNOSIS — E08.3292: ICD-10-CM

## 2025-06-11 DIAGNOSIS — M47.812 OSTEOARTHRITIS OF CERVICAL SPINE, UNSPECIFIED SPINAL OSTEOARTHRITIS COMPLICATION STATUS: ICD-10-CM

## 2025-06-11 DIAGNOSIS — K22.70 BARRETT'S ESOPHAGUS WITHOUT DYSPLASIA: ICD-10-CM

## 2025-06-11 DIAGNOSIS — G43.109 OCULAR MIGRAINE: ICD-10-CM

## 2025-06-11 DIAGNOSIS — E66.812 CLASS 2 SEVERE OBESITY DUE TO EXCESS CALORIES WITH SERIOUS COMORBIDITY AND BODY MASS INDEX (BMI) OF 35.0 TO 35.9 IN ADULT (HCC): ICD-10-CM

## 2025-06-11 DIAGNOSIS — E66.01 MORBID (SEVERE) OBESITY DUE TO EXCESS CALORIES (HCC): ICD-10-CM

## 2025-06-11 DIAGNOSIS — K64.8 OTHER HEMORRHOIDS: ICD-10-CM

## 2025-06-11 DIAGNOSIS — K58.8 OTHER IRRITABLE BOWEL SYNDROME: ICD-10-CM

## 2025-06-11 DIAGNOSIS — F43.21 GRIEF: ICD-10-CM

## 2025-06-11 DIAGNOSIS — J44.9 CHRONIC OBSTRUCTIVE PULMONARY DISEASE, UNSPECIFIED COPD TYPE (HCC): Chronic | ICD-10-CM

## 2025-06-11 DIAGNOSIS — E78.2 MIXED HYPERLIPIDEMIA: ICD-10-CM

## 2025-06-11 DIAGNOSIS — K21.9 GASTROESOPHAGEAL REFLUX DISEASE WITHOUT ESOPHAGITIS: ICD-10-CM

## 2025-06-11 DIAGNOSIS — F32.1 CURRENT MODERATE EPISODE OF MAJOR DEPRESSIVE DISORDER WITHOUT PRIOR EPISODE (HCC): ICD-10-CM

## 2025-06-11 DIAGNOSIS — K43.9 VENTRAL HERNIA WITHOUT OBSTRUCTION OR GANGRENE: ICD-10-CM

## 2025-06-11 DIAGNOSIS — J30.1 SEASONAL ALLERGIC RHINITIS DUE TO POLLEN: ICD-10-CM

## 2025-06-11 DIAGNOSIS — B00.9 HERPES: ICD-10-CM

## 2025-06-11 DIAGNOSIS — E66.01 CLASS 2 SEVERE OBESITY DUE TO EXCESS CALORIES WITH SERIOUS COMORBIDITY AND BODY MASS INDEX (BMI) OF 35.0 TO 35.9 IN ADULT (HCC): ICD-10-CM

## 2025-06-11 DIAGNOSIS — H25.13 AGE-RELATED NUCLEAR CATARACT OF BOTH EYES: ICD-10-CM

## 2025-06-11 PROBLEM — M48.02 CERVICAL STENOSIS OF SPINE: Status: RESOLVED | Noted: 2023-01-29 | Resolved: 2025-06-11

## 2025-06-11 PROBLEM — M50.90 CERVICAL DISC DISEASE: Status: RESOLVED | Noted: 2023-01-29 | Resolved: 2025-06-11

## 2025-06-11 PROBLEM — L60.0 INGROWING TOENAIL: Status: RESOLVED | Noted: 2023-04-18 | Resolved: 2025-06-11

## 2025-06-11 PROBLEM — M48.04 THORACIC STENOSIS: Status: RESOLVED | Noted: 2018-11-27 | Resolved: 2025-06-11

## 2025-06-11 PROBLEM — L28.0 LICHENIFICATION AND LICHEN SIMPLEX CHRONICUS: Status: RESOLVED | Noted: 2020-09-10 | Resolved: 2025-06-11

## 2025-06-11 PROBLEM — E78.5 DYSLIPIDEMIA: Chronic | Status: RESOLVED | Noted: 2017-02-20 | Resolved: 2025-06-11

## 2025-06-11 PROBLEM — L85.8 KERATOSIS PILARIS: Status: RESOLVED | Noted: 2020-09-10 | Resolved: 2025-06-11

## 2025-06-11 PROBLEM — M54.2 NECK PAIN: Status: RESOLVED | Noted: 2022-12-01 | Resolved: 2025-06-11

## 2025-06-11 PROBLEM — M51.9 THORACIC DISC DISEASE: Status: RESOLVED | Noted: 2018-08-01 | Resolved: 2025-06-11

## 2025-06-11 PROBLEM — L98.9 SKIN LESION: Status: RESOLVED | Noted: 2020-06-19 | Resolved: 2025-06-11

## 2025-06-11 PROBLEM — B37.0 ORAL THRUSH: Status: RESOLVED | Noted: 2023-12-07 | Resolved: 2025-06-11

## 2025-06-11 PROBLEM — M47.12 CERVICAL SPONDYLOSIS WITH MYELOPATHY: Status: RESOLVED | Noted: 2017-02-20 | Resolved: 2025-06-11

## 2025-06-11 PROBLEM — J02.9 SORE THROAT: Status: RESOLVED | Noted: 2023-12-07 | Resolved: 2025-06-11

## 2025-06-11 PROBLEM — Z98.1 S/P CERVICAL SPINAL FUSION: Status: RESOLVED | Noted: 2022-12-01 | Resolved: 2025-06-11

## 2025-06-11 PROBLEM — T14.8XXA MUSCLE STRAIN: Status: RESOLVED | Noted: 2023-04-18 | Resolved: 2025-06-11

## 2025-06-11 PROBLEM — H43.391 FLOATER, VITREOUS, RIGHT: Status: RESOLVED | Noted: 2018-02-01 | Resolved: 2025-06-11

## 2025-06-11 PROBLEM — R07.89 OTHER CHEST PAIN: Status: RESOLVED | Noted: 2019-04-18 | Resolved: 2025-06-11

## 2025-06-11 PROBLEM — M43.16 SPONDYLOLISTHESIS OF LUMBAR REGION: Status: RESOLVED | Noted: 2023-12-05 | Resolved: 2025-06-11

## 2025-06-11 PROBLEM — M54.12 CERVICAL RADICULOPATHY: Status: RESOLVED | Noted: 2022-12-01 | Resolved: 2025-06-11

## 2025-06-11 RX ORDER — BUDESONIDE AND FORMOTEROL FUMARATE DIHYDRATE 160; 4.5 UG/1; UG/1
2 AEROSOL RESPIRATORY (INHALATION) 2 TIMES DAILY
Qty: 30.6 G | Refills: 3 | Status: SHIPPED | OUTPATIENT
Start: 2025-06-11

## 2025-06-11 RX ORDER — AZITHROMYCIN 250 MG/1
TABLET, FILM COATED ORAL
Qty: 6 TABLET | Refills: 0 | Status: SHIPPED | OUTPATIENT
Start: 2025-06-11 | End: 2025-06-16

## 2025-06-11 NOTE — PROGRESS NOTES
The following individual(s) verbally consented to be recorded using ambient AI listening technology and understand that they can each withdraw their consent to this listening technology at any point by asking the clinician to turn off or pause the recording:    Patient name: Ju Johnson  Subjective:   Ju Johnson is a 64 year old female who presents for a Medicare Wellness Visit charge within the last 11 months and Patient may not meet criteria for AWV: Please evaluate for correct coding and scheduled follow up of multiple significant but stable problems.   History of Present Illness  Ju Johnson is a 64 year old female who presents for a regular physical exam.    She uses her CPAP machine nightly for sleep apnea, which helps reduce sleep disruption and breathlessness, yet she experiences persistent fatigue. She is attempting to regulate her sleep schedule by going to bed at 10:30 PM and waking up at 9 AM. She avoids using her phone late at night but watches TV until she falls asleep.    She has a history of depression and previously tried Wellbutrin but discontinued it due to adverse effects, describing feeling like a 'zombie.' She continues to see a therapist and has incorporated walking and yoga into her routine, which she finds beneficial. She mentions feeling better than a month ago and has reconnected with a friend, which has been helpful.    She experiences right-sided facial symptoms, including nostril blockage, eye discomfort, ear pain, and intermittent sharp pains. She uses Flonase twice daily, Zyrtec at night, and Benadryl for allergies. She reports the right side of her face feels 'hot.'    She has hypertension, which she monitors at home. She recently saw a cardiologist due to hearing her heartbeat in her ear and feeling it in her extremities. An echocardiogram was performed.    She takes Valtrex daily for herpes to prevent pain and outbreaks, noting increased breakouts due to stress. She also  uses Symbicort regularly for COPD, which she finds effective in controlling her symptoms.    She mentions arthritis in her hands, exacerbated by physical activities, and finds relief using Voltaren gel. She also has a history of ocular migraines, which continue to affect her.    She is due for a mammogram and a bone density scan, as her last mammogram was over a year ago.    History/Other:   Fall Risk Assessment:   She has been screened for Falls and is low risk.      Cognitive Assessment:   She had a completely normal cognitive assessment - see flowsheet entries     Functional Ability/Status:   Ju Johnson has a completely normal functional assessment. See flowsheet for details.      Depression Screening (PHQ):  PHQ-2 SCORE: 0  , done 6/8/2025   Trouble falling or staying asleep, or sleeping too much: 1     Feeling tired or having little energy: 3    Poor appetite or overeating: 3    Trouble concentrating on things, such as reading the newspaper or watching television: 3    If you checked off any problems, how difficult have these problems made it for you to do your work, take care of things at home, or get along with other people?: Very difficult         7 minutes spent screening and counseling for depression    Advanced Directives:   She does NOT have a Living Will. [Do you have a living will?: (Patient-Rptd) No]  She has a Power of  for Health Care on file in [facility].  Discussed Advance Care Planning with patient (and family/surrogate if present). Standard forms made available to patient in After Visit Summary.      Problem List[1]  Allergies:  She is allergic to liraglutide, meloxicam, penicillins, sulindac, acyclovir, clarithromycin, methylprednisolone, rosuvastatin, ciprofloxacin, cymbalta [duloxetine hcl], gabapentin, metronidazole, naproxen sodium, nitrofurantoin, nitrofurantoin macrocrystal, valsartan, and doxycycline.    Current Medications:  Active Meds, Sig Only[2]    Medical History:  She  has a  past medical history of Acute non-recurrent maxillary sinusitis (01/21/2019), Acute, but ill-defined, cerebrovascular disease, Age-related nuclear cataract of both eyes (01/27/2015), Allergic rhinitis, Alternating exotropia (01/27/2015), Amenorrhea (08/2007), Anxiety (2008), Arthritis (2020), Atrial tachycardia, paroxysmal ([facility]) (04/15/2015), Pineda esophagus (2008), Barretts esophagus, Carpal tunnel syndrome, Chlamydia (1986), Choroidal nevus, right eye (01/27/2015), Colon polyp (2020), Constipation (2008), COPD (chronic obstructive pulmonary disease) (HCC), Depression (2008), Diabetes ([facility]) (2009), Diabetes mellitus ([facility]) (2008), Diabetes mellitus type 2 with complications ([facility]) (01/27/2015), Diabetic retinopathy of left eye ([facility]) (01/27/2015), Dizziness, Dry eyes (07/28/2015), DUB (dysfunctional uterine bleeding) (2005), Eczema (2014), Environmental allergies (2000), Esophageal reflux, Essential hypertension (2003), Exotropia, Fibromyalgia (2008), Genital warts, Heart murmur, Hemorrhoids (2008), Herpes simplex, High blood pressure, High cholesterol, History of pregnancy (1990, 1991), Hyperlipidemia (2009), Irregular menstrual cycle (2008), Irritable bowel syndrome (2005), MGD (meibomian gland dysfunction) (07/28/2015), Migraines (2013), Muscle weakness (7/2022), Obesity (1996), Ocular migraine (07/28/2015), BRENDA, Ovarian cyst (1980), Pain (7/2022), Pneumonia due to organism, Pregnancy ([facility]) (1990, 1991, 1993), PVD (posterior vitreous detachment), right eye (03/27/2023), Spinal stenosis (2010), TIA (transient ischemic attack), Viral infection characterized by skin and mucous membrane lesions, and Visual impairment (3/1/2023).  Surgical History:  She  has a past surgical history that includes tubal ligation (1996); electrocardiogram, complete (02/05/2013); spine surgery procedure unlisted (02/21/2017); cyst removal (1979); other surgical history (1980); other surgical history (2005); other surgical history (2008);  other surgical history (2017); egd (N/A, 2021); colonoscopy (); and  ().   Family History:  Her family history includes Colon Cancer in her mother; Dementia in her sister; Depression in her mother; Diabetes in her father and sister; Glaucoma in her sister; Heart Attack in her father and mother; Heart Disease in her brother, father, and mother; Heart Disorder in her father and mother; Hypertension in her mother; Lipids in her mother; Other in her sister; Ovarian Cancer in her maternal aunt; Seizure Disorder in her sister.  Social History:  She  reports that she quit smoking about 8 years ago. Her smoking use included cigarettes. She started smoking about 10 years ago. She has a 0.5 pack-year smoking history. She has never been exposed to tobacco smoke. She has never used smokeless tobacco. She reports that she does not currently use alcohol. She reports that she does not use drugs.    Tobacco:  She smoked tobacco in the past but quit greater than 12 months ago.  Tobacco Use[3]     CAGE Alcohol Screen:   CAGE screening score of 0 on 2025, showing low risk of alcohol abuse.      Patient Care Team:  Carmelina Gonzalez MD as PCP - General (Family Practice)  Hector Caputo MD as Consulting Physician (Physical Medicine)  Guero Villegas MD as Consulting Physician (Physical Medicine)  Ann Marie Gonzalez, JEROME (Physical Therapy)  Cande Valdez (Physical Therapy)  Jaun Vazquez Ashish K, MD (GASTROENTEROLOGY)  Yuli Westbrook DO (NEUROLOGY)  Cecelia Clemente APRN (Nurse Practitioner)  Becky Rosenthal CNM (Midwife)  Elizabeth Feng CNM (Midwife)    Review of Systems     Negative except see HPI     Objective:   Physical Exam  Constitutional:       Appearance: She is well-developed.   HENT:      Left Ear: Tympanic membrane normal.      Ears:      Comments: Right Tm bulging and erythematous   Eyes:      Comments: Mild erythema    Cardiovascular:      Rate and Rhythm: Normal rate and regular  rhythm.      Heart sounds: Normal heart sounds.   Pulmonary:      Effort: Pulmonary effort is normal.      Breath sounds: Normal breath sounds.   Abdominal:      General: Bowel sounds are normal.      Palpations: Abdomen is soft.   Neurological:      Mental Status: She is alert and oriented to person, place, and time.      Deep Tendon Reflexes: Reflexes are normal and symmetric.   Psychiatric:         Behavior: Behavior normal.           /82   Pulse 61   Ht 5' 4\" (1.626 m)   Wt 206 lb 12.8 oz (93.8 kg)   LMP 02/08/2010   BMI 35.50 kg/m²  Estimated body mass index is 35.5 kg/m² as calculated from the following:    Height as of this encounter: 5' 4\" (1.626 m).    Weight as of this encounter: 206 lb 12.8 oz (93.8 kg).    Medicare Hearing Assessment:   Hearing Screening    Time taken: 6/11/2025  2:19 PM  Screening Method: Questionnaire  I have a problem hearing over the telephone: No I have trouble following the conversations when two or more people are talking at the same time: No   I have trouble understanding things on the TV: No I have to strain to understand conversations: No   I have to worry about missing the telephone ring or doorbell: No I have trouble hearing conversations in a noisy background such as a crowded room or restaurant: Yes   I get confused about where sounds come from: No I misunderstand some words in a sentence and need to ask people to repeat themselves: No   I especially have trouble understanding the speech of women and children: No I have trouble understanding the speaker in a large room such as at a meeting or place of Yazidi: No   Many people I talk to seem to mumble (or don't speak clearly): No People get annoyed because I misunderstand what they say: No   I misunderstand what others are saying and make inappropriate responses: No I avoid social activities because I cannot hear well and fear I will reply improperly: No   Family members and friends have told me they think I may  have hearing loss: No             Visual Acuity:   Right Eye Visual Acuity: Corrected Right Eye Chart Acuity: 20/30   Left Eye Visual Acuity: Corrected Left Eye Chart Acuity: 20/30   Both Eyes Visual Acuity: Corrected Both Eyes Chart Acuity: 20/30            Assessment & Plan:   Ju Johnson is a 64 year old female who presents for a Medicare Assessment.     1. Type 2 diabetes mellitus without complication, without long-term current use of insulin (HCC) (Primary)  -     Cancel: Ophthalmology Referral - In Network  -     Ophthalmology Referral - In Network  2. Morbid (severe) obesity due to excess calories ([facility])  3. Mild nonproliferative diabetic retinopathy of left eye without macular edema associated with diabetes mellitus due to underlying condition (HCC)  -     Cancel: Ophthalmology Referral - In Network  -     Ophthalmology Referral - In Network  4. Insomnia, unspecified type  5. Sleep apnea, obstructive  6. Grief  7. Current moderate episode of major depressive disorder without prior episode ([facility])  8. Screening mammogram for breast cancer  -     [facility] YAIMA 2D+3D SCREENING BILAT (CPT=77067/33052); Future; Expected date: 06/11/2025  9. Post-menopausal  -     XR DEXA BONE DENSITOMETRY (CPT=77080); Future; Expected date: 06/11/2025  10. Acute maxillary sinusitis, recurrence not specified  11. Seasonal allergic rhinitis due to pollen  12. Herpes  13. Age-related nuclear cataract of both eyes  -     Ophthalmology Referral - In Network  14. Gastroesophageal reflux disease without esophagitis  15. Pineda's esophagus without dysplasia  16. Ventral hernia without obstruction or gangrene  17. Other irritable bowel syndrome  18. Other hemorrhoids  19. Bilateral carpal tunnel syndrome: right mod-severe sensory & mild motor, left mod sensory  20. Osteoarthritis of cervical spine, unspecified spinal osteoarthritis complication status  21. Degeneration of intervertebral disc of lumbosacral region, unspecified whether pain  present  22. Ocular migraine  23. Vitamin D deficiency  24. Mixed hyperlipidemia  25. Primary hypertension  26. Chronic obstructive pulmonary disease, unspecified COPD type ([facility])  27. Class 2 severe obesity due to excess calories with serious comorbidity and body mass index (BMI) of 35.0 to 35.9 in adult ([facility])  28. Chronic obstructive pulmonary disease with acute exacerbation ([facility])  -     Budesonide-Formoterol Fumarate; Inhale 2 puffs into the lungs 2 (two) times daily.  Dispense: 30.6 g; Refill: 3  29. Encounter for annual health examination  Other orders  -     Azithromycin; Take 2 tablets (500 mg total) by mouth daily for 1 day, THEN 1 tablet (250 mg total) daily for 4 days.  Dispense: 6 tablet; Refill: 0  Assessment & Plan  Sinusitis  Acute right-sided sinus infection with nasal blockage, facial pain, and ear redness. Azithromycin chosen due to amoxicillin allergy. Neti pot recommended for nasal clearance.  - Prescribe azithromycin (Z-Jackson).  - Advise use of neti pot.  - Follow up via MyChart if symptoms do not improve in one week.    Hypertension  Elevated blood pressure likely due to sinus infection. Normal readings reported at home and recent visits.  - Monitor blood pressure at home and report high readings.    Obstructive Sleep Apnea  CPAP use nightly with significant symptom improvement. Compliant and satisfied with therapy.  - Send note to home medical supply for CPAP supplies.    Chronic Obstructive Pulmonary Disease (COPD)  Well-controlled with Symbicort. No issues reported with adherence.  - Renew Symbicort prescription.    Depression  Improved mood with therapy, yoga, and social activities. Discontinued Wellbutrin due to side effects.    Hyperlipidemia  Cholesterol well-controlled with atorvastatin every other day.    Herpes Simplex Virus  Daily Valtrex for outbreak management. Ongoing outbreaks expected to improve with stress reduction.    Ocular Migraines  Debilitating ocular migraines  experienced.    Carpal Tunnel Syndrome  Improved with yoga. Arthritis in hands managed with Voltaren gel.    General Health Maintenance  Due for mammogram and DEXA scan.  - Order mammogram.  - Order DEXA scan.    Follow-up  Advised to follow up with cardiologist and primary care provider. Encouraged to schedule cardiologist appointment due to long wait times.  - Schedule follow-up with cardiologist.  - Follow up with primary care provider as needed.  The patient indicates understanding of these issues and agrees to the plan.  Reinforced healthy diet, lifestyle, and exercise.      Return in about 6 months (around 12/11/2025).     Carmelina Gonzalez MD, 6/11/2025     Supplementary Documentation:   General Health:  In the past six months, have you lost more than 10 pounds without trying?: (Patient-Rptd) 2 - No  Has your appetite been poor?: (Patient-Rptd) No  Type of Diet: (Patient-Rptd) Other  How does the patient maintain a good energy level?: (Patient-Rptd) Appropriate Exercise  How would you describe your daily physical activity?: (Patient-Rptd) Moderate  How would you describe your current health state?: (Patient-Rptd) Fair  How do you maintain positive mental well-being?: (Patient-Rptd) Social Interaction  On a scale of 0 to 10, with 0 being no pain and 10 being severe pain, what is your pain level?: (Patient-Rptd) 4 - (Moderate)  In the past six months, have you experienced urine leakage?: (Patient-Rptd) 0-No  At any time do you feel concerned for the safety/well-being of yourself and/or your children, in your home or elsewhere?: (Patient-Rptd) No  Have you had any immunizations at another office such as Influenza, Hepatitis B, Tetanus, or Pneumococcal?: (Patient-Rptd) No    Health Maintenance   Topic Date Due    Zoster Vaccines (1 of 2) Never done    Diabetes Care Dilated Eye Exam  05/12/2024    COVID-19 Vaccine (1 - 2024-25 season) Never done    DEXA Scan  Never done    Annual Physical  10/30/2024    Diabetes  Care: Microalb/Creat Ratio (Annual)  01/01/2025    Mammogram  04/10/2025    HTN: BP Follow-Up  07/11/2025    Diabetes Care A1C  07/30/2025    Diabetes Care: GFR  09/16/2025    Influenza Vaccine (Season Ended) 10/01/2025    Diabetes Care Foot Exam  11/18/2025    Pap Smear  02/21/2026    Colorectal Cancer Screening  07/23/2028    Annual Depression Screening  Completed    Fall Risk Screening (Annual)  Completed    Pneumococcal Vaccine: 50+ Years  Completed    Meningococcal B Vaccine  Aged Out              [1]   Patient Active Problem List  Diagnosis    Esophageal reflux    Herpes    Age-related nuclear cataract of both eyes    Diabetic retinopathy of left eye ([facility])    Sleep apnea, obstructive    Ocular migraine    Pineda's esophagus    COPD (chronic obstructive pulmonary disease) ([facility])    Cervical spondylarthritis    Bilateral carpal tunnel syndrome: right mod-severe sensory & mild motor, left mod sensory    Seasonal allergic rhinitis due to pollen    Ventral hernia    Irritable bowel syndrome    Type 2 diabetes mellitus without complication, without long-term current use of insulin ([facility])    Degeneration of lumbar or lumbosacral intervertebral disc    Hemorrhoids    Insomnia    Vitamin D deficiency    Hypertensive disorder    Mixed hyperlipidemia    Current moderate episode of major depressive disorder without prior episode ([facility])    Class 2 severe obesity due to excess calories with serious comorbidity and body mass index (BMI) of 35.0 to 35.9 in adult ([facility])   [2]   Outpatient Medications Marked as Taking for the 6/11/25 encounter (Office Visit) with Carmelina Gonzalez MD   Medication Sig    azithromycin 250 MG Oral Tab Take 2 tablets (500 mg total) by mouth daily for 1 day, THEN 1 tablet (250 mg total) daily for 4 days.    Budesonide-Formoterol Fumarate 160-4.5 MCG/ACT Inhalation Aerosol Inhale 2 puffs into the lungs 2 (two) times daily.    diazePAM 5 MG Oral Tab Take 1 tablet (5 mg total) by mouth every 12 (twelve)  hours as needed.    meclizine 25 MG Oral Tab Take 1 tablet (25 mg total) by mouth 3 (three) times daily as needed for Dizziness or Nausea.    Insulin Glargine, 2 Unit Dial, (TOUJEO MAX SOLOSTAR) 300 UNIT/ML Subcutaneous Solution Pen-injector Inject 54 Units into the skin daily.    montelukast 10 MG Oral Tab Take 1 tablet (10 mg total) by mouth daily.    hydrOXYzine 25 MG Oral Tab Take 1 tablet (25 mg total) by mouth every 8 (eight) hours as needed for Itching.    clobetasol 0.05 % External Ointment Apply 1 Application topically 2 (two) times daily.    benzonatate 200 MG Oral Cap Take 1 capsule (200 mg total) by mouth 3 (three) times daily as needed for cough.    TRULICITY 1.5 MG/0.5ML Subcutaneous Solution Auto-injector Inject 1.5 mg into the skin once a week.    glimepiride 2 MG Oral Tab Take 1 tablet (2 mg total) by mouth daily with breakfast.    valACYclovir 500 MG Oral Tab Take 1 tablet (500 mg total) by mouth daily.    Rimegepant Sulfate (NURTEC) 75 MG Oral Tablet Dispersible Take 75 mg by mouth as needed. Take one tablet at onset of migraine.  Maximum dose in 24 hours is 1 tablet (75mg).    amLODIPine 2.5 MG Oral Tab Take 1 tablet (2.5 mg total) by mouth daily.    valsartan 160 MG Oral Tab Take 1 tablet twice a day by oral route.    ERGOCALCIFEROL 1.25 MG (41643 UT) Oral Cap TAKE ONE CAPSULE BY MOUTH WEEKLY    Insulin Pen Needle (BD PEN NEEDLE ESTEVAN 2ND GEN) 32G X 4 MM Does not apply Misc USE ONE PEN NEEDLE DAILY    atorvastatin 40 MG Oral Tab Take 1 tablet (40 mg total) by mouth daily.    Blood Glucose Monitoring Suppl (CONTOUR NEXT MONITOR) w/Device Does not apply Kit 1 each daily.    indomethacin 50 MG Oral Cap Take 1 capsule (50 mg total) by mouth as needed.    nystatin 831277 UNIT/ML Mouth/Throat Suspension Take 5 mL (500,000 Units total) by mouth 4 (four) times daily.    zolpidem 5 MG Oral Tab Take 1 tablet (5 mg total) by mouth nightly as needed for Sleep.    cyclobenzaprine 5 MG Oral Tab Take 1 tablet  (5 mg total) by mouth 3 (three) times daily as needed for Muscle spasms.    Lansoprazole 15 MG Oral Capsule Delayed Release Take 1 capsule (15 mg total) by mouth 2 (two) times daily.    amLODIPine 5 MG Oral Tab     methocarbamol 750 MG Oral Tab Take 1 tablet (750 mg total) by mouth nightly as needed.    albuterol 108 (90 Base) MCG/ACT Inhalation Aero Soln Inhale 2 puffs into the lungs every 4 (four) hours as needed for Wheezing.    CONTOUR NEXT TEST In Vitro Strip TEST SUGAR  2 TIMES DAILY    fluticasone propionate 50 MCG/ACT Nasal Suspension use 2 sprays by Each nostrils route daily.    diclofenac 1 % External Gel Apply 4 g topically 4 (four) times daily.    losartan 50 MG Oral Tab Take 1 tablet (50 mg total) by mouth in the morning and 1 tablet (50 mg total) before bedtime.    Diclofenac Sodium 1 % Transdermal Gel Apply 2 g topically 4 (four) times daily.    Propranolol HCl 80 MG Oral Tab Take 1 tablet (80 mg total) by mouth 2 (two) times daily.    acyclovir 5 % External Ointment Apply 1 Application topically every 3 (three) hours.    PATIENT SUPPLIED MEDICATION Vitamin B12, magnesium, zinc, vitamin c, & garlic    magnesium oxide 400 MG Oral Tab Take 1 tablet (400 mg total) by mouth daily.    triamcinolone acetonide 0.1 % External Cream Apply topically 2 (two) times daily as needed.    Ammonium Lactate (LAC-HYDRIN) 12 % External Cream Apply to affected areas 1-2x/day    aspirin 81 MG Oral Tab Take 1 tablet (81 mg total) by mouth daily.    acetaminophen 500 MG Oral Tab Take 1 tablet (500 mg total) by mouth every 6 (six) hours as needed for Pain.    Ciclopirox 8 % External Solution Apply 1 Application topically nightly.    ketoconazole 2 % External Cream MELVIN EXT AA BID    cetirizine 10 MG Oral Tab Take 1 tablet (10 mg total) by mouth daily.   [3]   Social History  Tobacco Use   Smoking Status Former    Current packs/day: 0.00    Average packs/day: 0.3 packs/day for 2.0 years (0.5 ttl pk-yrs)    Types: Cigarettes     Start date: 2015    Quit date: 2017    Years since quittin.3    Passive exposure: Never   Smokeless Tobacco Never   Tobacco Comments    1 pack/wk per pt.

## 2025-06-11 NOTE — TELEPHONE ENCOUNTER
Pt called for refill.   Pt is out of medication:      Current Outpatient Medications:   ••  insulin glargine (LANTUS SOLOSTAR) 100 UNIT/ML Subcutaneous Solution Pen-injector, INJECT 30 UNITS SUBCUTANEOUS DAILY, Disp: 30 mL, Rfl: 0 I have personally seen and examined the patient. I have collaborated with and supervised the

## 2025-06-16 ENCOUNTER — TELEPHONE (OUTPATIENT)
Dept: FAMILY MEDICINE CLINIC | Facility: CLINIC | Age: 65
End: 2025-06-16

## 2025-06-16 NOTE — TELEPHONE ENCOUNTER
Sent last office note for CPAP to [facility] fax# [phone] through [facility]. Confirmation rec'd

## 2025-07-02 DIAGNOSIS — E55.9 VITAMIN D DEFICIENCY: ICD-10-CM

## 2025-07-02 NOTE — TELEPHONE ENCOUNTER
Endocrine Refill protocol for Ergocalciferol     Protocol Criteria: PASSED Reason: N/A    If all below requirements are met, send a 90-day supply with 1 refill per provider protocol.    Verify appointment with Endocrinology completed in the last 6 months or scheduled in the next 3 months.  Vitamin D level must have been completed within the last 6 months  Vitamin D level must be within the normal range     Vitamin D, 25OH, Total (ng/mL)   Date Value   09/16/2024 52.1    (32.0-100.0)    Last completed office visit:4/30/2025 Prachi Montanez MD   Last completed telemed visit: Visit date not found  Next scheduled Follow up: No future appt

## 2025-07-03 RX ORDER — ERGOCALCIFEROL 1.25 MG/1
50000 CAPSULE, LIQUID FILLED ORAL WEEKLY
Qty: 12 CAPSULE | Refills: 0 | Status: SHIPPED | OUTPATIENT
Start: 2025-07-03

## 2025-07-03 NOTE — TELEPHONE ENCOUNTER
Please call to confirm dose of Vitamin D that she is on  We can send refill accordingly.  Thanks.

## 2025-07-09 ENCOUNTER — MED REC SCAN ONLY (OUTPATIENT)
Dept: FAMILY MEDICINE CLINIC | Facility: CLINIC | Age: 65
End: 2025-07-09

## 2025-07-14 ENCOUNTER — PATIENT MESSAGE (OUTPATIENT)
Dept: ENDOCRINOLOGY CLINIC | Facility: CLINIC | Age: 65
End: 2025-07-14

## 2025-07-15 RX ORDER — INSULIN GLARGINE 300 U/ML
54 INJECTION, SOLUTION SUBCUTANEOUS DAILY
Qty: 36 ML | Refills: 0 | Status: SHIPPED | OUTPATIENT
Start: 2025-07-15 | End: 2025-07-19

## 2025-07-15 NOTE — TELEPHONE ENCOUNTER
Endocrine refill protocol for rapid acting, regular, intermediate, and mixed insulin:    Protocol Criteria:  PASSED Reason: N/A    If all below requirements are met, send a 90-day supply with 1 refill per provider protocol.    Verify appointment with Endocrinology completed in the last 6 months or scheduled in the next 3 months.  Verify A1C has been completed within the last 6 months and is below 8.5%    -May substitute prescriptions for Novolog and Humalog unless documented allergy (pens and vials) at the same dose and concentration per insurance preference and provider protocol.   -May substitute prescriptions for Novolin R and Humulin R unless documented allergy (pens and vials) at the same dose and concentration per insurance preference and provider protocol.   -May substitute prescriptions for Novolin N and Humulin N unless documented allergy (pens and vials) at the same dose and concentration per insurance preference and provider protocol.   -May substitute prescriptions for Humulin and Novolin 70/30 insulin unless documented allergy at the same dose and concentration per insurance preference and provider protocol.    Last completed office visit:4/30/2025 Prachi Montanez MD   Last completed telemed visit: Visit date not found  Next scheduled Follow up:   Future Appointments   Date Time Provider Department Center   7/22/2025  3:00 PM ADO JEANIE RM1 ADO JEANIE EM [place]   7/22/2025  3:20 PM ADO DEXA RM1 ADO Dexa [facility] [place]   8/4/2025  1:20 PM Analy Moran DPM [facility]   8/4/2025  2:30 PM Karie Nogueira, PhD BRENDON Hernández      Last A1C result: 8.1% done 4/30/2025.

## 2025-07-18 ENCOUNTER — TELEPHONE (OUTPATIENT)
Dept: ENDOCRINOLOGY CLINIC | Facility: CLINIC | Age: 65
End: 2025-07-18

## 2025-07-18 NOTE — TELEPHONE ENCOUNTER
Per patient lantus was supposed to be sent to the pharmacy and they did not receive the prescription. Please advise

## 2025-07-19 RX ORDER — INSULIN GLARGINE 300 U/ML
INJECTION, SOLUTION SUBCUTANEOUS
Qty: 18 ML | Refills: 1 | Status: SHIPPED | OUTPATIENT
Start: 2025-07-19

## 2025-07-19 NOTE — TELEPHONE ENCOUNTER
Called pharmacy and went over prescription with them.  They are only able to dispense 2 boxes at this time (66 days worth) as they do not break boxes.  They also cannot exceed a qty of 90 days.      Reviewed mychart 7/14/25, pt states she is leaving out of town Monday.  Sent a new RX with direction to titrate as directed by MD HAIR 60 units, so pt can get an exact supply for the 90 days worth insurance is charging.  This could also give extra room if dose were to be titrated up in case of episodes of hyperglycemia.      QUIQUE Montanez/Dr. Johansen (on-call).

## 2025-07-22 ENCOUNTER — HOSPITAL ENCOUNTER (OUTPATIENT)
Dept: BONE DENSITY | Age: 65
Discharge: HOME | End: 2025-07-22
Attending: FAMILY MEDICINE
Payer: MEDICARE

## 2025-07-22 ENCOUNTER — HOSPITAL ENCOUNTER (OUTPATIENT)
Dept: MAMMOGRAPHY | Age: 65
Discharge: HOME | End: 2025-07-22
Attending: FAMILY MEDICINE
Payer: MEDICARE

## 2025-07-22 DIAGNOSIS — Z12.31 SCREENING MAMMOGRAM FOR BREAST CANCER: ICD-10-CM

## 2025-07-22 DIAGNOSIS — Z78.0 POST-MENOPAUSAL: ICD-10-CM

## 2025-07-22 PROCEDURE — 77080 DXA BONE DENSITY AXIAL: CPT | Performed by: FAMILY MEDICINE

## 2025-07-22 PROCEDURE — 77067 SCR MAMMO BI INCL CAD: CPT | Performed by: FAMILY MEDICINE

## 2025-07-22 PROCEDURE — 77063 BREAST TOMOSYNTHESIS BI: CPT | Performed by: FAMILY MEDICINE

## 2025-07-31 ENCOUNTER — PATIENT MESSAGE (OUTPATIENT)
Dept: FAMILY MEDICINE CLINIC | Facility: CLINIC | Age: 65
End: 2025-07-31

## 2025-07-31 DIAGNOSIS — M54.30 SCIATICA, UNSPECIFIED LATERALITY: Primary | ICD-10-CM

## 2025-08-09 DIAGNOSIS — K22.719 BARRETT'S ESOPHAGUS WITH DYSPLASIA: ICD-10-CM

## 2025-08-12 RX ORDER — MECOBALAMIN 5000 MCG
15 TABLET,DISINTEGRATING ORAL 2 TIMES DAILY
Qty: 180 CAPSULE | Refills: 3 | Status: SHIPPED | OUTPATIENT
Start: 2025-08-12

## 2025-08-27 ENCOUNTER — TELEPHONE (OUTPATIENT)
Dept: FAMILY MEDICINE CLINIC | Facility: CLINIC | Age: 65
End: 2025-08-27

## 2025-08-27 ENCOUNTER — PATIENT MESSAGE (OUTPATIENT)
Dept: FAMILY MEDICINE CLINIC | Facility: CLINIC | Age: 65
End: 2025-08-27

## 2025-08-28 RX ORDER — LANSOPRAZOLE 30 MG/1
30 TABLET, ORALLY DISINTEGRATING, DELAYED RELEASE ORAL
Qty: 90 TABLET | Refills: 3 | Status: SHIPPED | OUTPATIENT
Start: 2025-08-28

## (undated) DIAGNOSIS — G43.109 MIGRAINE AURA WITHOUT HEADACHE: Primary | ICD-10-CM

## (undated) DIAGNOSIS — Z11.59 ENCOUNTER FOR SCREENING FOR OTHER VIRAL DISEASES: ICD-10-CM

## (undated) DIAGNOSIS — Z01.818 PREOP EXAMINATION: Primary | ICD-10-CM

## (undated) DIAGNOSIS — G43.109 MIGRAINE AURA WITHOUT HEADACHE: ICD-10-CM

## (undated) DIAGNOSIS — G93.2 IIH (IDIOPATHIC INTRACRANIAL HYPERTENSION): Primary | ICD-10-CM

## (undated) DIAGNOSIS — G43.119 INTRACTABLE MIGRAINE WITH AURA WITHOUT STATUS MIGRAINOSUS: ICD-10-CM

## (undated) DIAGNOSIS — E53.8 B12 DEFICIENCY: Primary | ICD-10-CM

## (undated) DIAGNOSIS — H47.10 PAPILLEDEMA: ICD-10-CM

## (undated) DEVICE — SET EXTN 2.7ML TBNG L20IN DIA0.100IN MACBOR

## (undated) DEVICE — STERILE POLYISOPRENE POWDER-FREE SURGICAL GLOVES: Brand: PROTEXIS

## (undated) DEVICE — TOWEL,OR,DSP,ST,BLUE,DLX,2/PK,40PK/CS: Brand: MEDLINE

## (undated) DEVICE — MAXCESS C MODULE

## (undated) DEVICE — NVM5 NEEDLE MODULE S

## (undated) DEVICE — FLEXIBLE ADHESIVE BANDAGE: Brand: CURITY

## (undated) DEVICE — CAUTERY BIPOLAR ISOCOOL 1MM

## (undated) DEVICE — SUTURE MONOCRYL 4-0 PS-2

## (undated) DEVICE — COVER,MAYO STAND,STERILE: Brand: MEDLINE

## (undated) DEVICE — FRAZIER SUCTION INSTRUMENT 10 FR W/CONTROL VENT & OBTURATOR: Brand: FRAZIER

## (undated) DEVICE — DRAPE C-ARM UNIVERSAL

## (undated) DEVICE — NEEDLE SPNL 22GA L5IN BLK HUB QNCKE BVL DISP

## (undated) DEVICE — INTENDED USE FOR SURGICAL MARKING ON INTACT SKIN, ALSO PROVIDES A PERMANENT METHOD OF IDENTIFYING OBJECTS IN THE OPERATING ROOM: Brand: WRITESITE® PLUS MINI PREP RESISTANT MARKER

## (undated) DEVICE — SUTURE VICRYL 3-0 J761D

## (undated) DEVICE — NEEDLE BLNT 18GA L1.5IN FILL DISP PRECISGLDE

## (undated) DEVICE — Device

## (undated) DEVICE — NEEDLE SPNL 20GA L3.5IN YEL QNCKE STYL DISP

## (undated) DEVICE — NEEDLE ANES 22GA L3.5IN SPNL SS QNCKE STYL

## (undated) DEVICE — GAMMEX® PI HYBRID SIZE 7, STERILE POWDER-FREE SURGICAL GLOVE, POLYISOPRENE AND NEOPRENE BLEND: Brand: GAMMEX

## (undated) DEVICE — X-RAY DETECTABLE SPONGES,16 PLY: Brand: VISTEC

## (undated) DEVICE — 12 ML SYRINGE LUER-LOCK TIP: Brand: MONOJECT

## (undated) DEVICE — STANDARD HYPODERMIC NEEDLE,POLYPROPYLENE HUB: Brand: MONOJECT

## (undated) DEVICE — CERVICAL CDS: Brand: MEDLINE INDUSTRIES, INC.

## (undated) DEVICE — STERILE LATEX POWDER-FREE SURGICAL GLOVESWITH NITRILE COATING: Brand: PROTEXIS

## (undated) DEVICE — SNAP KOVER: Brand: UNBRANDED

## (undated) DEVICE — COLLAR CRV ADLT REG CHIN REST

## (undated) DEVICE — FLOSEAL SEALENT STERILE 10ML

## (undated) DEVICE — NVM5 NEEDLE MODULE M/E

## (undated) DEVICE — DRESSING BIOPATCH 1X4 CNTR

## (undated) DEVICE — DISTRACTION SCREW 12MM

## (undated) DEVICE — SPONGE GZ 4X4IN COT 12 PLY TYP VII WVN C

## (undated) DEVICE — 3.0MM PRECISION NEURO (MATCH HEAD)

## (undated) DEVICE — DRAPE SRG 150X54IN LEICA

## (undated) DEVICE — MEDI-VAC NON-CONDUCTIVE SUCTION TUBING: Brand: CARDINAL HEALTH

## (undated) DEVICE — DRAPE SHEET LAPAROTOMY

## (undated) DEVICE — C-ARM: Brand: UNBRANDED

## (undated) DEVICE — SOL  .9 1000ML BTL

## (undated) DEVICE — FRAZIER SUCTION INSTRUMENT 12 FR W/CONTROL VENT & OBTURATOR: Brand: FRAZIER

## (undated) DEVICE — GLV RAD SURG 8.5

## (undated) DEVICE — 6 ML SYRINGE LUER-LOCK TIP: Brand: MONOJECT

## (undated) DEVICE — APPLICATOR PREP 10.5ML ORNG CHG 2% ISO ALC

## (undated) DEVICE — DERMABOND LIQUID ADHESIVE

## (undated) NOTE — LETTER
[facility] SURGERY SCHEDULING FORM   [address] [place] [place] [facility]   [phone] (scheduling phone) [phone] (scheduling fax)     PATIENT INFORMATION   Last Name:      Darya Martinez Name:    Vielka Byrd Allergies: Clarithromycin; Acyclovir; Ciprofloxacin; Gabapentin; Liraglutide; Meloxicam; Methylprednisolone; Metronidazole; Naproxen Sodium;  Nitrofurantoin; Nitrofurantoin Macrocrystal; Penicillins; Sulindac         Completed by:    Live Peña

## (undated) NOTE — LETTER
February 1, 2018    Chares Kehr, [address] [facility]  [address]  [address] [address]     Patient: Nathaniel Ma   YOB: 1960   Date of Visit: 2/1/2018       Dear Dr. Ravi Plascencia MD:    Thank you for referring Flex Richie to me for ev • Other and unspecified hyperlipidemia    • Ovarian cyst 1980    Laparoscopic cystectomy   • Pregnancy 1990, 1991, 1993   • Sleep apnea    • Sleep apnea, obstructive 4/15/2015   • Spinal stenosis    • Spinal stenosis 2010    C5-6, C6-7, physical therapy CELECOXIB 200 MG Oral Cap TAKE 1 CAPSULE(200 MG) BY MOUTH DAILY AS NEEDED FOR PAIN Disp: 90 capsule Rfl: 0   ketoconazole 2 % External Cream MELVIN EXT AA BID Disp:  Rfl: 0   methocarbamol (ROBAXIN-750) 750 MG Oral Tab Take 1 tablet (750 mg total) by mouth 3 Cholecalciferol 30169 units Oral Cap Take 1 capsule by mouth once a week.  Disp: 8 capsule Rfl: 1   LOSARTAN POTASSIUM 25 MG Oral Tab TAKE 1 TABLET(25 MG) BY MOUTH DAILY Disp: 90 tablet Rfl: 1   B Complex Vitamins (B COMPLEX OR) Take 1 tablet by mouth daily Respiratory, Psychiatric, Allergic/Imm, Heme/Lymph    Last edited by Clarisse Reyes O.T. on 2/1/2018  1:11 PM. (History)          PHYSICAL EXAM:     Base Eye Exam     Visual Acuity (Snellen - Linear)       Right Left    Dist cc 20/20 20/25 +2    Correc Diabetes type I: no background retinopathy, no signs of neovascularization noted. Discussed ocular and systemic benefits of blood sugar control. Age-related nuclear cataract of both eyes  Discussed very early cataracts with patient.   No treatment recom

## (undated) NOTE — LETTER
10/20/2022          To Whom It May Concern:    Shakira Zavala is currently under my medical care and may not return to work at this time. Please excuse [place] for 5 days. She may return to work on Monday, October 24, 2022. Activity is restricted as follows: none. If you require additional information please contact our office.         Sincerely,    Avelina Michelle MD

## (undated) NOTE — LETTER
This letter is to inform you that Jaiden Richards has been attending Physical Therapy with me. Patient may benefit from continued PT and will need a new HMO referral for 8 more visits. See below for my most recent plan of care.     Physical Therapy Pro Posture: Forward head posture, some slouching during relaxed sitting. Corrects self easily when cued. Goals     • Therapy Goals      1. Patient will be independent with HEP, its progression, and management of residual symptoms.   2. Patient will report

## (undated) NOTE — LETTER
December 17, 2019         Raciel Newberry, [address] [facility]  [address]  [address] [address]      Patient: Kevan Andrade   YOB: 1960   Date of Visit: 12/16/2019       Dear Dr. Manuel Banuelos MD,    I saw your patient, Kevan Andrade, on

## (undated) NOTE — LETTER
[facility] SURGERY SCHEDULING FORM   [address]  [address] [facility]   [phone] (scheduling phone) [phone] (scheduling fax)     PATIENT INFORMATION   Last Name:      Estee Barriga Name:    Lyn Vivar Allergies: Liraglutide; Meloxicam; Penicillins; Sulindac; Acyclovir; Clarithromycin; Ciprofloxacin; Gabapentin; Methylprednisolone; Metronidazole; Naproxen Sodium;  Nitrofurantoin; Nitrofurantoin Macrocrystal; Doxycycline  Request 8a appt, pt is diabetic

## (undated) NOTE — LETTER
9/8/2023      Bola Rogers MD  Physical Medicine and Rehabilitation  [address] [facility], [address] [place] [address]  Dept: [phone]  Dept Fax: [phone]        RE: Consultation for Pj Charles        Dear Erica Vargas MD,    Thank you very much for the opportunity to see your patient. Attached please find a summary from your patient's recent visit. I appreciate the chance to take care of your patient with you. Please feel free to call me with any questions or concerns. Sincerely,        Haile Villegas MD  Electronically Signed on 9/8/2023

## (undated) NOTE — LETTER
12/7/2020    To Whom This May Concern,     This letter is to inform you of an official appeal to recently denied coverage of Dexilant 60mg Oral Capsule Delayed Release.  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  Mar

## (undated) NOTE — LETTER
7/20/2023      Doris Sicard A. Wynn Alpha, MD  Physical Medicine and Rehabilitation  [address] [facility], [address] [place] [address]  Dept: [phone]  Dept Fax: [phone]        RE: Consultation for Jessie Hatchet        Dear Ana Balbuena MD,    Thank you very much for the opportunity to see your patient. Attached please find a summary from your patient's recent visit. I appreciate the chance to take care of your patient with you. Please feel free to call me with any questions or concerns. Sincerely,        Honey Sanchez.  Seun Wilburn MD  Electronically Signed on 7/20/2023

## (undated) NOTE — LETTER
04/10/20        Minor Lawson  [address] [address]      Dear Britt Toribio records indicate that you have outstanding lab work and or testing that was ordered for you and has not yet been completed:  Orders Placed This Encounter

## (undated) NOTE — LETTER
06/08/21        Akua Black  [address] [address]      Dear Sadaf Ferro records indicate that you have outstanding lab work and or testing that was ordered for you and has not yet been completed:  Orders Placed This Encounter      Co

## (undated) NOTE — LETTER
Flo Zarate, [address]  [address]  [address]       12/14/17        Patient: Jeffy Keller   YOB: 1960   Date of Visit: 12/14/2017       Dear  Dr. Annabel Moralse MD,      Thank you for referring Jeffy Keller to sofía

## (undated) NOTE — LETTER
9/25/2018              [address]         Dear Megan Inman,    It was a pleasure to see you.         Neg pap, HPV neg, repeat pap needed in 3 years, return to clinic 1 year for annual exam      There is no

## (undated) NOTE — MR AVS SNAPSHOT
[address]  [address] [facility]  [phone]               Thank you for choosing us for your health care visit with Raciel Newberry MD.  We are glad to serve you and happy to provide you with this summa Current Medications          This list is accurate as of: 2/7/17 12:19 PM.  Always use your most recent med list.                ACCU-CHEK JUAN PLUS Strp   Generic drug:  Glucose Blood   CHECK BLOOD SUGAR TWICE DAILY           ACCU-CHEK SOFTCLIX LANCETS M USE 2 SPRAYS INTRANASALLY AS NEEDED AS DIRECTED   Commonly known as:  FLONASE           GARLIC OR   Take 1 capsule by mouth daily.            glimepiride 4 MG Tabs   TAKE 1 TABLET BY MOUTH EVERY MORNING BEFORE BREAKFAST   Commonly known as:  AMARYL MyChart questions? Call [phone] for help. [employer] is NOT to be used for urgent needs. For medical emergencies, dial 911.         Educational Information     Healthy Diet and Regular Exercise  [employer] [facility] [address] [place] [address] Webs

## (undated) NOTE — IP AVS SNAPSHOT
[address]  [address] [facility] ~ [phone]                Discharge Summary   2/20/2017    [facility]f           Admission Information        Provider Department    2/20/2017 Laney Christianson MD [facility] 4w/ Commonly known as:  Thalia Valenzuela   Notes to Patient:  PAIN MEDICATION        Take 1 tablet by mouth every 6 (six) hours as needed.     Rachel Mercedes                    EVERY 6 HOURS AS NEEDED FOR PAIN         methocarbamol 750 MG Tabs   Commonly known as:  BETTE Inhale 1-2 puffs into the lungs every 4 to 6 hours as needed (allergies).                      AS NEEDED FOR ALLERGIES       VENTOLIN  (90 Base) MCG/ACT Aers   Last time this was given:  1 puff on 2/21/2017  5:32 AM   Generic drug:  Albuterol Sulfat BEFORE BREAKFAST       BEFORE LUNCH       BEFORE DINNER  AT BEDTIME           diazepam 5 MG Tabs   Last time this was given:  5 mg on 2/21/2017 12:34 AM   Commonly known as:  VALIUM        Take 1 tablet (5 mg total) by mouth as needed.     Catalino Solano Next dose due:  TOMORROW 2/22/17 9 AM        TAKE 1 TABLET(25 MG) BY MOUTH DAILY    Temo Andrews        9 AM                   Propranolol HCl 60 MG Tabs   Last time this was given:  2/21/2017  8:43 AM   Commonly known as:  INDERAL   Next dose due:  TO Please take prescriptions to your pharmacy. Take medications as directed. Continue icing of the area of the incision for 30 minutes at a time, multiple   times a day.    If a cooling device (e.g. Polar Care) has been provided, utilize as directed per WBC RBC Hemoglobin Hematocrit MCV MCH MCHC RDW Platelet MPV    (03/67/69)  11.4 (H) (02/21/17)  4.16 (02/21/17)  11.7 (L) (02/21/17)  35.8 (02/21/17)  86.0 -- -- -- (02/21/17)  287 (02/21/17)  8.2    (02/04/17)  6.8 (02/04/17)  4.51 (02/04/17)  12.8 (02/0 - If you don’t have insurance, Imer Sandy has partnered with [facility] [employer] [facility] to help you get signed up for insurance coverage.   [facility] [employer] [address] is a Federal Navigator program that can help with your Affordable Care Act cov temperature, rash, itching, shortness of breath, chills, nausea, and diarrhea           Blood Pressure and Cardiac Medications     LOSARTAN POTASSIUM 25 MG Oral Tab    PROPRANOLOL HCL 60 MG Oral Tab    AmLODIPine Besylate 10 MG Oral Tab         Use: Treat GI Medications     Senna 17.2 MG Oral Tab    Lansoprazole (PREVACID) 15 MG Oral Capsule Delayed Release    clidinium-chlordiazepoxide 5-2.5 MG Oral Cap    Dicyclomine HCl (BENTYL) 20 MG Oral Tab       Use:  Nausea/vomiting, acid reflux, low bowel motility, BD PEN NEEDLE ESTEVAN U/F 32G X 4 MM Does not apply Misc    Chlorhexidine Gluconate 0.12 % Mouth/Throat Solution    TRIAMCINOLONE ACETONIDE 0.1 % External Cream    BETAMETHASONE DIPROPIONATE 0.05 % External Cream    ACCU-CHEK SOFTCLIX LANCETS Does not apply

## (undated) NOTE — LETTER
8/4/2023      Doris Sicard A. Wynn Alpha, MD  Physical Medicine and Rehabilitation  [address] [facility], [address] [place] [address]  Dept: [phone]  Dept Fax: [phone]        RE: Consultation for Jessie Hatchet        Dear Ana Balbuena MD,    Thank you very much for the opportunity to see your patient. Attached please find a summary from your patient's recent visit. I appreciate the chance to take care of your patient with you. Please feel free to call me with any questions or concerns. Sincerely,        Honey Sanchez.  Seun Wilburn MD  Electronically Signed on 8/4/2023

## (undated) NOTE — LETTER
Hospital Discharge Documentation    From: [address] Hospitalist's Office  Phone: [phone]    Patient discharged time/date: 2/21/2017  1:47 PM  Patient discharge disposition:  Home or Self Care       Discharge Summaries - D/C Summary      Discharge HYDROcodone-acetaminophen  MG Tabs   Last time this was given:  1 tablet on 2/21/2017  1:15 PM   Commonly known as:  Kaya Heath   Notes to Patient:  PAIN MEDICATION        Take 1 tablet by mouth every 6 (six) hours as needed.     Quantity:  60 tablet   Re Generic drug:  Albuterol Sulfate HFA        as needed. Refills:  10       AmLODIPine Besylate 10 MG Tabs   Last time this was given:  5 mg on 2/21/2017  8:28 AM   Commonly known as:  NORVASC        Take 5 mg by mouth daily.     Refills:  0       azithrom Last time this was given:  1 spray on 2/21/2017  8:32 AM   Commonly known as:  FLONASE   Notes to Patient:  Jasiel Zuleta SCHEDULE        USE 2 SPRAYS INTRANASALLY AS NEEDED AS DIRECTED    Quantity:  3 Bottle   Refills:  0       GARLIC OR        Take 1 capsu Take 1 capsule by mouth daily. Refills:  0         STOP taking these medications          aspirin 81 MG Tabs                Where to Get Your Medications      Information about where to get these medications is not yet available     !  Ask your nurs